# Patient Record
Sex: MALE | Race: WHITE | NOT HISPANIC OR LATINO | Employment: OTHER | URBAN - METROPOLITAN AREA
[De-identification: names, ages, dates, MRNs, and addresses within clinical notes are randomized per-mention and may not be internally consistent; named-entity substitution may affect disease eponyms.]

---

## 2017-11-28 ENCOUNTER — HOME CARE/HOSPICE - HEALTHEAST (OUTPATIENT)
Dept: HOME HEALTH SERVICES | Facility: HOME HEALTH | Age: 77
End: 2017-11-28

## 2017-11-30 ENCOUNTER — HOME CARE/HOSPICE - HEALTHEAST (OUTPATIENT)
Dept: HOME HEALTH SERVICES | Facility: HOME HEALTH | Age: 77
End: 2017-11-30

## 2017-12-04 ENCOUNTER — HOME CARE/HOSPICE - HEALTHEAST (OUTPATIENT)
Dept: HOME HEALTH SERVICES | Facility: HOME HEALTH | Age: 77
End: 2017-12-04

## 2017-12-06 ENCOUNTER — HOME CARE/HOSPICE - HEALTHEAST (OUTPATIENT)
Dept: HOME HEALTH SERVICES | Facility: HOME HEALTH | Age: 77
End: 2017-12-06

## 2017-12-08 ENCOUNTER — HOME CARE/HOSPICE - HEALTHEAST (OUTPATIENT)
Dept: HOME HEALTH SERVICES | Facility: HOME HEALTH | Age: 77
End: 2017-12-08

## 2017-12-11 ENCOUNTER — HOME CARE/HOSPICE - HEALTHEAST (OUTPATIENT)
Dept: HOME HEALTH SERVICES | Facility: HOME HEALTH | Age: 77
End: 2017-12-11

## 2017-12-13 ENCOUNTER — HOME CARE/HOSPICE - HEALTHEAST (OUTPATIENT)
Dept: HOME HEALTH SERVICES | Facility: HOME HEALTH | Age: 77
End: 2017-12-13

## 2017-12-15 ENCOUNTER — HOME CARE/HOSPICE - HEALTHEAST (OUTPATIENT)
Dept: HOME HEALTH SERVICES | Facility: HOME HEALTH | Age: 77
End: 2017-12-15

## 2017-12-19 ENCOUNTER — HOME CARE/HOSPICE - HEALTHEAST (OUTPATIENT)
Dept: HOME HEALTH SERVICES | Facility: HOME HEALTH | Age: 77
End: 2017-12-19

## 2017-12-21 ENCOUNTER — HOME CARE/HOSPICE - HEALTHEAST (OUTPATIENT)
Dept: HOME HEALTH SERVICES | Facility: HOME HEALTH | Age: 77
End: 2017-12-21

## 2017-12-27 ENCOUNTER — HOME CARE/HOSPICE - HEALTHEAST (OUTPATIENT)
Dept: HOME HEALTH SERVICES | Facility: HOME HEALTH | Age: 77
End: 2017-12-27

## 2018-06-12 ENCOUNTER — RECORDS - HEALTHEAST (OUTPATIENT)
Dept: ADMINISTRATIVE | Facility: OTHER | Age: 78
End: 2018-06-12

## 2018-06-12 ENCOUNTER — HOSPITAL ENCOUNTER (OUTPATIENT)
Dept: CT IMAGING | Facility: HOSPITAL | Age: 78
Discharge: HOME | End: 2018-06-12
Attending: PSYCHIATRY & NEUROLOGY

## 2018-06-12 DIAGNOSIS — G45.9 BRAIN TIA: ICD-10-CM

## 2018-06-12 DIAGNOSIS — G90.9 AUTONOMIC DYSFUNCTION: ICD-10-CM

## 2018-06-12 DIAGNOSIS — G20.A1 PARKINSON DISEASE (H): ICD-10-CM

## 2019-06-17 ENCOUNTER — AMBULATORY - HEALTHEAST (OUTPATIENT)
Dept: ADMINISTRATIVE | Facility: REHABILITATION | Age: 79
End: 2019-06-17

## 2019-06-17 DIAGNOSIS — G20.A1 PARKINSON DISEASE (H): ICD-10-CM

## 2019-06-17 DIAGNOSIS — R35.0 INCREASED URINARY FREQUENCY: ICD-10-CM

## 2019-06-17 DIAGNOSIS — G90.9 AUTONOMIC DYSFUNCTION: ICD-10-CM

## 2019-11-11 ENCOUNTER — SURGERY - HEALTHEAST (OUTPATIENT)
Dept: CARDIOLOGY | Facility: CLINIC | Age: 79
End: 2019-11-11

## 2019-11-11 ASSESSMENT — MIFFLIN-ST. JEOR: SCORE: 1599.41

## 2019-11-12 ENCOUNTER — HOME CARE/HOSPICE - HEALTHEAST (OUTPATIENT)
Dept: HOME HEALTH SERVICES | Facility: HOME HEALTH | Age: 79
End: 2019-11-12

## 2019-11-14 ENCOUNTER — HOME CARE/HOSPICE - HEALTHEAST (OUTPATIENT)
Dept: HOME HEALTH SERVICES | Facility: HOME HEALTH | Age: 79
End: 2019-11-14

## 2019-11-17 ENCOUNTER — HOME CARE/HOSPICE - HEALTHEAST (OUTPATIENT)
Dept: HOME HEALTH SERVICES | Facility: HOME HEALTH | Age: 79
End: 2019-11-17

## 2019-11-18 ENCOUNTER — HOME CARE/HOSPICE - HEALTHEAST (OUTPATIENT)
Dept: HOME HEALTH SERVICES | Facility: HOME HEALTH | Age: 79
End: 2019-11-18

## 2019-11-18 ENCOUNTER — RECORDS - HEALTHEAST (OUTPATIENT)
Dept: ADMINISTRATIVE | Facility: OTHER | Age: 79
End: 2019-11-18

## 2019-11-18 ENCOUNTER — COMMUNICATION - HEALTHEAST (OUTPATIENT)
Dept: HOME HEALTH SERVICES | Facility: HOME HEALTH | Age: 79
End: 2019-11-18

## 2019-11-19 ENCOUNTER — HOME CARE/HOSPICE - HEALTHEAST (OUTPATIENT)
Dept: HOME HEALTH SERVICES | Facility: HOME HEALTH | Age: 79
End: 2019-11-19

## 2019-11-20 ENCOUNTER — HOME CARE/HOSPICE - HEALTHEAST (OUTPATIENT)
Dept: HOME HEALTH SERVICES | Facility: HOME HEALTH | Age: 79
End: 2019-11-20

## 2019-11-21 ENCOUNTER — HOME CARE/HOSPICE - HEALTHEAST (OUTPATIENT)
Dept: HOME HEALTH SERVICES | Facility: HOME HEALTH | Age: 79
End: 2019-11-21

## 2019-11-22 ENCOUNTER — HOME CARE/HOSPICE - HEALTHEAST (OUTPATIENT)
Dept: HOME HEALTH SERVICES | Facility: HOME HEALTH | Age: 79
End: 2019-11-22

## 2019-11-25 ENCOUNTER — HOME CARE/HOSPICE - HEALTHEAST (OUTPATIENT)
Dept: HOME HEALTH SERVICES | Facility: HOME HEALTH | Age: 79
End: 2019-11-25

## 2019-11-26 ENCOUNTER — HOME CARE/HOSPICE - HEALTHEAST (OUTPATIENT)
Dept: HOME HEALTH SERVICES | Facility: HOME HEALTH | Age: 79
End: 2019-11-26

## 2019-11-27 ENCOUNTER — OFFICE VISIT - HEALTHEAST (OUTPATIENT)
Dept: CARDIOLOGY | Facility: CLINIC | Age: 79
End: 2019-11-27

## 2019-11-27 ENCOUNTER — HOME CARE/HOSPICE - HEALTHEAST (OUTPATIENT)
Dept: HOME HEALTH SERVICES | Facility: HOME HEALTH | Age: 79
End: 2019-11-27

## 2019-11-27 ENCOUNTER — RECORDS - HEALTHEAST (OUTPATIENT)
Dept: ADMINISTRATIVE | Facility: OTHER | Age: 79
End: 2019-11-27

## 2019-11-27 DIAGNOSIS — R55 SYNCOPE, UNSPECIFIED SYNCOPE TYPE: ICD-10-CM

## 2019-11-27 DIAGNOSIS — I21.4 ACUTE NON-Q WAVE NON-ST ELEVATION MYOCARDIAL INFARCTION (NSTEMI) (H): ICD-10-CM

## 2019-11-27 DIAGNOSIS — E83.42 HYPOMAGNESEMIA: ICD-10-CM

## 2019-11-27 DIAGNOSIS — I21.4 NSTEMI (NON-ST ELEVATED MYOCARDIAL INFARCTION) (H): ICD-10-CM

## 2019-11-27 DIAGNOSIS — R55 NEAR SYNCOPE: ICD-10-CM

## 2019-11-27 DIAGNOSIS — I95.1 ORTHOSTATIC HYPOTENSION: ICD-10-CM

## 2019-11-27 DIAGNOSIS — I10 ESSENTIAL HYPERTENSION: ICD-10-CM

## 2019-11-27 ASSESSMENT — MIFFLIN-ST. JEOR: SCORE: 1603.36

## 2019-11-29 ENCOUNTER — HOME CARE/HOSPICE - HEALTHEAST (OUTPATIENT)
Dept: HOME HEALTH SERVICES | Facility: HOME HEALTH | Age: 79
End: 2019-11-29

## 2019-12-02 ENCOUNTER — HOME CARE/HOSPICE - HEALTHEAST (OUTPATIENT)
Dept: HOME HEALTH SERVICES | Facility: HOME HEALTH | Age: 79
End: 2019-12-02

## 2019-12-03 ENCOUNTER — HOME CARE/HOSPICE - HEALTHEAST (OUTPATIENT)
Dept: HOME HEALTH SERVICES | Facility: HOME HEALTH | Age: 79
End: 2019-12-03

## 2019-12-03 ENCOUNTER — COMMUNICATION - HEALTHEAST (OUTPATIENT)
Dept: CARDIOLOGY | Facility: CLINIC | Age: 79
End: 2019-12-03

## 2019-12-05 ENCOUNTER — HOME CARE/HOSPICE - HEALTHEAST (OUTPATIENT)
Dept: HOME HEALTH SERVICES | Facility: HOME HEALTH | Age: 79
End: 2019-12-05

## 2019-12-06 ENCOUNTER — HOME CARE/HOSPICE - HEALTHEAST (OUTPATIENT)
Dept: HOME HEALTH SERVICES | Facility: HOME HEALTH | Age: 79
End: 2019-12-06

## 2019-12-08 ENCOUNTER — HOME CARE/HOSPICE - HEALTHEAST (OUTPATIENT)
Dept: HOME HEALTH SERVICES | Facility: HOME HEALTH | Age: 79
End: 2019-12-08

## 2019-12-11 ENCOUNTER — OFFICE VISIT - HEALTHEAST (OUTPATIENT)
Dept: GERIATRICS | Facility: CLINIC | Age: 79
End: 2019-12-11

## 2019-12-11 DIAGNOSIS — Z95.0 CARDIAC PACEMAKER IN SITU: ICD-10-CM

## 2019-12-11 DIAGNOSIS — I10 ESSENTIAL HYPERTENSION: ICD-10-CM
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
## 2019-12-11 DIAGNOSIS — G20.A1 PARKINSON DISEASE (H): ICD-10-CM

## 2019-12-11 DIAGNOSIS — G90.9 AUTONOMIC DYSFUNCTION: ICD-10-CM
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
## 2019-12-12 ENCOUNTER — OFFICE VISIT - HEALTHEAST (OUTPATIENT)
Dept: GERIATRICS | Facility: CLINIC | Age: 79
End: 2019-12-12

## 2019-12-12 ENCOUNTER — RECORDS - HEALTHEAST (OUTPATIENT)
Dept: LAB | Facility: CLINIC | Age: 79
End: 2019-12-12

## 2019-12-12 DIAGNOSIS — G90.9 AUTONOMIC DYSFUNCTION: ICD-10-CM

## 2019-12-12 DIAGNOSIS — I25.83 CORONARY ARTERY DISEASE DUE TO LIPID RICH PLAQUE: ICD-10-CM

## 2019-12-12 DIAGNOSIS — G20.A1 PARKINSON DISEASE (H): ICD-10-CM

## 2019-12-12 DIAGNOSIS — I25.10 CORONARY ARTERY DISEASE DUE TO LIPID RICH PLAQUE: ICD-10-CM

## 2019-12-16 ENCOUNTER — OFFICE VISIT - HEALTHEAST (OUTPATIENT)
Dept: GERIATRICS | Facility: CLINIC | Age: 79
End: 2019-12-16

## 2019-12-16 DIAGNOSIS — Z95.0 CARDIAC PACEMAKER IN SITU: ICD-10-CM

## 2019-12-16 DIAGNOSIS — G90.9 AUTONOMIC DYSFUNCTION: ICD-10-CM

## 2019-12-16 DIAGNOSIS — G20.A1 PARKINSON DISEASE (H): ICD-10-CM

## 2019-12-16 LAB
GAMMA INTERFERON BACKGROUND BLD IA-ACNC: 0.02 IU/ML
M TB IFN-G BLD-IMP: NEGATIVE
MITOGEN IGNF BCKGRD COR BLD-ACNC: 0 IU/ML
MITOGEN IGNF BCKGRD COR BLD-ACNC: 0 IU/ML
QTF INTERPRETATION: NORMAL
QTF MITOGEN - NIL: >10 IU/ML

## 2019-12-17 ENCOUNTER — OFFICE VISIT - HEALTHEAST (OUTPATIENT)
Dept: GERIATRICS | Facility: CLINIC | Age: 79
End: 2019-12-17

## 2019-12-17 DIAGNOSIS — G20.A1 PARKINSON DISEASE (H): ICD-10-CM

## 2019-12-17 DIAGNOSIS — I25.83 CORONARY ARTERY DISEASE DUE TO LIPID RICH PLAQUE: ICD-10-CM

## 2019-12-17 DIAGNOSIS — G90.9 AUTONOMIC DYSFUNCTION: ICD-10-CM

## 2019-12-17 DIAGNOSIS — I25.10 CORONARY ARTERY DISEASE DUE TO LIPID RICH PLAQUE: ICD-10-CM

## 2019-12-18 ENCOUNTER — OFFICE VISIT - HEALTHEAST (OUTPATIENT)
Dept: GERIATRICS | Facility: CLINIC | Age: 79
End: 2019-12-18

## 2019-12-18 DIAGNOSIS — G47.09 OTHER INSOMNIA: ICD-10-CM

## 2019-12-18 DIAGNOSIS — G20.A1 PARKINSON DISEASE (H): ICD-10-CM

## 2019-12-18 DIAGNOSIS — G90.9 AUTONOMIC DYSFUNCTION: ICD-10-CM

## 2019-12-19 ENCOUNTER — OFFICE VISIT - HEALTHEAST (OUTPATIENT)
Dept: GERIATRICS | Facility: CLINIC | Age: 79
End: 2019-12-19

## 2019-12-19 DIAGNOSIS — I25.83 CORONARY ARTERY DISEASE DUE TO LIPID RICH PLAQUE: ICD-10-CM

## 2019-12-19 DIAGNOSIS — G90.9 AUTONOMIC DYSFUNCTION: ICD-10-CM

## 2019-12-19 DIAGNOSIS — I25.10 CORONARY ARTERY DISEASE DUE TO LIPID RICH PLAQUE: ICD-10-CM

## 2019-12-19 DIAGNOSIS — G20.A1 PARKINSON DISEASE (H): ICD-10-CM

## 2019-12-20 ENCOUNTER — OFFICE VISIT - HEALTHEAST (OUTPATIENT)
Dept: GERIATRICS | Facility: CLINIC | Age: 79
End: 2019-12-20

## 2019-12-20 DIAGNOSIS — G90.9 AUTONOMIC DYSFUNCTION: ICD-10-CM
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
## 2019-12-20 DIAGNOSIS — G47.09 OTHER INSOMNIA: ICD-10-CM
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
## 2019-12-24 ENCOUNTER — OFFICE VISIT - HEALTHEAST (OUTPATIENT)
Dept: GERIATRICS | Facility: CLINIC | Age: 79
End: 2019-12-24

## 2019-12-24 DIAGNOSIS — G90.9 AUTONOMIC DYSFUNCTION: ICD-10-CM

## 2019-12-24 DIAGNOSIS — I25.83 CORONARY ARTERY DISEASE DUE TO LIPID RICH PLAQUE: ICD-10-CM

## 2019-12-24 DIAGNOSIS — G20.A1 PARKINSON DISEASE (H): ICD-10-CM

## 2019-12-24 DIAGNOSIS — I25.10 CORONARY ARTERY DISEASE DUE TO LIPID RICH PLAQUE: ICD-10-CM

## 2019-12-26 ENCOUNTER — OFFICE VISIT - HEALTHEAST (OUTPATIENT)
Dept: GERIATRICS | Facility: CLINIC | Age: 79
End: 2019-12-26

## 2019-12-26 DIAGNOSIS — I25.83 CORONARY ARTERY DISEASE DUE TO LIPID RICH PLAQUE: ICD-10-CM

## 2019-12-26 DIAGNOSIS — I25.10 CORONARY ARTERY DISEASE DUE TO LIPID RICH PLAQUE: ICD-10-CM

## 2019-12-26 DIAGNOSIS — G90.9 AUTONOMIC DYSFUNCTION: ICD-10-CM

## 2019-12-26 DIAGNOSIS — G20.A1 PARKINSON DISEASE (H): ICD-10-CM

## 2019-12-31 ENCOUNTER — OFFICE VISIT - HEALTHEAST (OUTPATIENT)
Dept: GERIATRICS | Facility: CLINIC | Age: 79
End: 2019-12-31

## 2019-12-31 DIAGNOSIS — G90.9 AUTONOMIC DYSFUNCTION: ICD-10-CM

## 2019-12-31 DIAGNOSIS — I25.10 CORONARY ARTERY DISEASE DUE TO LIPID RICH PLAQUE: ICD-10-CM

## 2019-12-31 DIAGNOSIS — I25.83 CORONARY ARTERY DISEASE DUE TO LIPID RICH PLAQUE: ICD-10-CM

## 2019-12-31 DIAGNOSIS — G20.A1 PARKINSON DISEASE (H): ICD-10-CM

## 2020-01-02 ENCOUNTER — OFFICE VISIT - HEALTHEAST (OUTPATIENT)
Dept: GERIATRICS | Facility: CLINIC | Age: 80
End: 2020-01-02

## 2020-01-02 DIAGNOSIS — G90.9 AUTONOMIC DYSFUNCTION: ICD-10-CM

## 2020-01-02 DIAGNOSIS — I25.83 CORONARY ARTERY DISEASE DUE TO LIPID RICH PLAQUE: ICD-10-CM

## 2020-01-02 DIAGNOSIS — I25.10 CORONARY ARTERY DISEASE DUE TO LIPID RICH PLAQUE: ICD-10-CM

## 2020-01-02 DIAGNOSIS — G20.A1 PARKINSON DISEASE (H): ICD-10-CM

## 2020-01-06 ENCOUNTER — OFFICE VISIT - HEALTHEAST (OUTPATIENT)
Dept: GERIATRICS | Facility: CLINIC | Age: 80
End: 2020-01-06

## 2020-01-06 DIAGNOSIS — G90.9 AUTONOMIC DYSFUNCTION: ICD-10-CM

## 2020-01-06 DIAGNOSIS — F32.9 REACTIVE DEPRESSION: ICD-10-CM

## 2020-01-06 DIAGNOSIS — G20.A1 PARKINSON DISEASE (H): ICD-10-CM

## 2020-01-07 ENCOUNTER — OFFICE VISIT - HEALTHEAST (OUTPATIENT)
Dept: GERIATRICS | Facility: CLINIC | Age: 80
End: 2020-01-07

## 2020-01-07 DIAGNOSIS — I25.10 CORONARY ARTERY DISEASE DUE TO LIPID RICH PLAQUE: ICD-10-CM

## 2020-01-07 DIAGNOSIS — F32.9 REACTIVE DEPRESSION: ICD-10-CM

## 2020-01-07 DIAGNOSIS — G20.A1 PARKINSON DISEASE (H): ICD-10-CM

## 2020-01-07 DIAGNOSIS — G90.9 AUTONOMIC DYSFUNCTION: ICD-10-CM

## 2020-01-07 DIAGNOSIS — I25.83 CORONARY ARTERY DISEASE DUE TO LIPID RICH PLAQUE: ICD-10-CM

## 2020-01-09 ENCOUNTER — RECORDS - HEALTHEAST (OUTPATIENT)
Dept: ADMINISTRATIVE | Facility: OTHER | Age: 80
End: 2020-01-09

## 2020-01-09 ENCOUNTER — OFFICE VISIT - HEALTHEAST (OUTPATIENT)
Dept: GERIATRICS | Facility: CLINIC | Age: 80
End: 2020-01-09

## 2020-01-09 DIAGNOSIS — F32.9 REACTIVE DEPRESSION: ICD-10-CM

## 2020-01-09 DIAGNOSIS — I25.10 CORONARY ARTERY DISEASE DUE TO LIPID RICH PLAQUE: ICD-10-CM

## 2020-01-09 DIAGNOSIS — I25.83 CORONARY ARTERY DISEASE DUE TO LIPID RICH PLAQUE: ICD-10-CM

## 2020-01-09 DIAGNOSIS — G20.A1 PARKINSON DISEASE (H): ICD-10-CM

## 2020-01-14 ENCOUNTER — OFFICE VISIT - HEALTHEAST (OUTPATIENT)
Dept: GERIATRICS | Facility: CLINIC | Age: 80
End: 2020-01-14

## 2020-01-14 DIAGNOSIS — G20.A1 PARKINSON DISEASE (H): ICD-10-CM

## 2020-01-14 DIAGNOSIS — F32.9 REACTIVE DEPRESSION: ICD-10-CM

## 2020-01-14 DIAGNOSIS — I25.83 CORONARY ARTERY DISEASE DUE TO LIPID RICH PLAQUE: ICD-10-CM

## 2020-01-14 DIAGNOSIS — I25.10 CORONARY ARTERY DISEASE DUE TO LIPID RICH PLAQUE: ICD-10-CM

## 2020-01-16 ENCOUNTER — OFFICE VISIT - HEALTHEAST (OUTPATIENT)
Dept: GERIATRICS | Facility: CLINIC | Age: 80
End: 2020-01-16

## 2020-01-16 DIAGNOSIS — I25.10 CORONARY ARTERY DISEASE DUE TO LIPID RICH PLAQUE: ICD-10-CM

## 2020-01-16 DIAGNOSIS — G90.9 AUTONOMIC DYSFUNCTION: ICD-10-CM

## 2020-01-16 DIAGNOSIS — I25.83 CORONARY ARTERY DISEASE DUE TO LIPID RICH PLAQUE: ICD-10-CM

## 2020-01-16 DIAGNOSIS — G20.A1 PARKINSON DISEASE (H): ICD-10-CM

## 2020-01-17 ENCOUNTER — OFFICE VISIT - HEALTHEAST (OUTPATIENT)
Dept: GERIATRICS | Facility: CLINIC | Age: 80
End: 2020-01-17

## 2020-01-17 DIAGNOSIS — G90.9 AUTONOMIC DYSFUNCTION: ICD-10-CM

## 2020-01-21 ENCOUNTER — OFFICE VISIT - HEALTHEAST (OUTPATIENT)
Dept: GERIATRICS | Facility: CLINIC | Age: 80
End: 2020-01-21

## 2020-01-21 DIAGNOSIS — I10 ESSENTIAL HYPERTENSION: ICD-10-CM

## 2020-01-21 DIAGNOSIS — G90.9 AUTONOMIC DYSFUNCTION: ICD-10-CM

## 2020-01-21 DIAGNOSIS — G20.A1 PARKINSON DISEASE (H): ICD-10-CM

## 2020-01-23 ENCOUNTER — OFFICE VISIT - HEALTHEAST (OUTPATIENT)
Dept: GERIATRICS | Facility: CLINIC | Age: 80
End: 2020-01-23

## 2020-01-23 DIAGNOSIS — G20.A1 PARKINSON DISEASE (H): ICD-10-CM

## 2020-01-23 DIAGNOSIS — I10 ESSENTIAL HYPERTENSION: ICD-10-CM

## 2020-01-23 DIAGNOSIS — G90.9 AUTONOMIC DYSFUNCTION: ICD-10-CM

## 2020-01-27 ENCOUNTER — OFFICE VISIT - HEALTHEAST (OUTPATIENT)
Dept: GERIATRICS | Facility: CLINIC | Age: 80
End: 2020-01-27

## 2020-01-27 DIAGNOSIS — R55 SYNCOPE AND COLLAPSE: ICD-10-CM

## 2020-01-27 DIAGNOSIS — G90.9 AUTONOMIC DYSFUNCTION: ICD-10-CM
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
## 2020-01-28 ENCOUNTER — OFFICE VISIT - HEALTHEAST (OUTPATIENT)
Dept: GERIATRICS | Facility: CLINIC | Age: 80
End: 2020-01-28

## 2020-01-28 DIAGNOSIS — R55 SYNCOPE AND COLLAPSE: ICD-10-CM
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
## 2020-01-28 DIAGNOSIS — G20.A1 PARKINSON DISEASE (H): ICD-10-CM

## 2020-01-28 DIAGNOSIS — I10 ESSENTIAL HYPERTENSION: ICD-10-CM

## 2020-01-29 ENCOUNTER — OFFICE VISIT - HEALTHEAST (OUTPATIENT)
Dept: GERIATRICS | Facility: CLINIC | Age: 80
End: 2020-01-29
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
## 2020-01-30 ENCOUNTER — OFFICE VISIT - HEALTHEAST (OUTPATIENT)
Dept: GERIATRICS | Facility: CLINIC | Age: 80
End: 2020-01-30

## 2020-01-30 DIAGNOSIS — I10 ESSENTIAL HYPERTENSION: ICD-10-CM

## 2020-01-30 DIAGNOSIS — G20.A1 PARKINSON DISEASE (H): ICD-10-CM

## 2020-01-30 DIAGNOSIS — I25.83 CORONARY ARTERY DISEASE DUE TO LIPID RICH PLAQUE: ICD-10-CM

## 2020-01-30 DIAGNOSIS — I25.10 CORONARY ARTERY DISEASE DUE TO LIPID RICH PLAQUE: ICD-10-CM

## 2020-02-03 ENCOUNTER — OFFICE VISIT - HEALTHEAST (OUTPATIENT)
Dept: GERIATRICS | Facility: CLINIC | Age: 80
End: 2020-02-03
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
## 2020-02-03 DIAGNOSIS — G20.A1 PARKINSON DISEASE (H): ICD-10-CM

## 2020-02-03 DIAGNOSIS — G90.9 AUTONOMIC DYSFUNCTION: ICD-10-CM
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
## 2020-02-04 ENCOUNTER — OFFICE VISIT - HEALTHEAST (OUTPATIENT)
Dept: GERIATRICS | Facility: CLINIC | Age: 80
End: 2020-02-04

## 2020-02-04 DIAGNOSIS — G20.A1 PARKINSON DISEASE (H): ICD-10-CM

## 2020-02-04 DIAGNOSIS — I25.10 CORONARY ARTERY DISEASE DUE TO LIPID RICH PLAQUE: ICD-10-CM

## 2020-02-04 DIAGNOSIS — I10 ESSENTIAL HYPERTENSION: ICD-10-CM

## 2020-02-04 DIAGNOSIS — I25.83 CORONARY ARTERY DISEASE DUE TO LIPID RICH PLAQUE: ICD-10-CM

## 2020-02-05 ENCOUNTER — OFFICE VISIT - HEALTHEAST (OUTPATIENT)
Dept: GERIATRICS | Facility: CLINIC | Age: 80
End: 2020-02-05

## 2020-02-05 DIAGNOSIS — G90.9 AUTONOMIC DYSFUNCTION: ICD-10-CM

## 2020-02-05 DIAGNOSIS — G20.A1 PARKINSON DISEASE (H): ICD-10-CM

## 2020-02-05 DIAGNOSIS — R07.81 RIB PAIN: ICD-10-CM

## 2020-02-06 ENCOUNTER — OFFICE VISIT - HEALTHEAST (OUTPATIENT)
Dept: GERIATRICS | Facility: CLINIC | Age: 80
End: 2020-02-06

## 2020-02-06 DIAGNOSIS — I10 ESSENTIAL HYPERTENSION: ICD-10-CM

## 2020-02-06 DIAGNOSIS — R07.81 RIB PAIN: ICD-10-CM

## 2020-02-06 DIAGNOSIS — G20.A1 PARKINSON DISEASE (H): ICD-10-CM

## 2020-02-10 ENCOUNTER — HOME CARE/HOSPICE - HEALTHEAST (OUTPATIENT)
Dept: HOME HEALTH SERVICES | Facility: HOME HEALTH | Age: 80
End: 2020-02-10

## 2020-02-11 ENCOUNTER — OFFICE VISIT - HEALTHEAST (OUTPATIENT)
Dept: GERIATRICS | Facility: CLINIC | Age: 80
End: 2020-02-11

## 2020-02-11 DIAGNOSIS — G90.9 AUTONOMIC DYSFUNCTION: ICD-10-CM

## 2020-02-11 DIAGNOSIS — I21.4 NSTEMI (NON-ST ELEVATED MYOCARDIAL INFARCTION) (H): ICD-10-CM

## 2020-02-11 DIAGNOSIS — G20.A1 PARKINSON DISEASE (H): ICD-10-CM

## 2020-02-11 DIAGNOSIS — I10 ESSENTIAL HYPERTENSION: ICD-10-CM

## 2020-02-12 ENCOUNTER — AMBULATORY - HEALTHEAST (OUTPATIENT)
Dept: GERIATRICS | Facility: CLINIC | Age: 80
End: 2020-02-12

## 2020-02-15 ENCOUNTER — HOME CARE/HOSPICE - HEALTHEAST (OUTPATIENT)
Dept: HOME HEALTH SERVICES | Facility: HOME HEALTH | Age: 80
End: 2020-02-15

## 2020-02-16 ENCOUNTER — HOME CARE/HOSPICE - HEALTHEAST (OUTPATIENT)
Dept: HOME HEALTH SERVICES | Facility: HOME HEALTH | Age: 80
End: 2020-02-16

## 2020-02-18 ENCOUNTER — HOME CARE/HOSPICE - HEALTHEAST (OUTPATIENT)
Dept: HOME HEALTH SERVICES | Facility: HOME HEALTH | Age: 80
End: 2020-02-18

## 2020-02-19 ENCOUNTER — HOME CARE/HOSPICE - HEALTHEAST (OUTPATIENT)
Dept: HOME HEALTH SERVICES | Facility: HOME HEALTH | Age: 80
End: 2020-02-19

## 2020-02-20 ENCOUNTER — HOME CARE/HOSPICE - HEALTHEAST (OUTPATIENT)
Dept: HOME HEALTH SERVICES | Facility: HOME HEALTH | Age: 80
End: 2020-02-20

## 2020-02-26 ENCOUNTER — HOME CARE/HOSPICE - HEALTHEAST (OUTPATIENT)
Dept: HOME HEALTH SERVICES | Facility: HOME HEALTH | Age: 80
End: 2020-02-26

## 2020-02-27 ENCOUNTER — HOME CARE/HOSPICE - HEALTHEAST (OUTPATIENT)
Dept: HOME HEALTH SERVICES | Facility: HOME HEALTH | Age: 80
End: 2020-02-27

## 2020-02-28 ENCOUNTER — HOME CARE/HOSPICE - HEALTHEAST (OUTPATIENT)
Dept: HOME HEALTH SERVICES | Facility: HOME HEALTH | Age: 80
End: 2020-02-28

## 2020-03-03 ENCOUNTER — HOME CARE/HOSPICE - HEALTHEAST (OUTPATIENT)
Dept: HOME HEALTH SERVICES | Facility: HOME HEALTH | Age: 80
End: 2020-03-03

## 2020-03-03 ENCOUNTER — COMMUNICATION - HEALTHEAST (OUTPATIENT)
Dept: HOME HEALTH SERVICES | Facility: HOME HEALTH | Age: 80
End: 2020-03-03

## 2020-03-05 ENCOUNTER — HOME CARE/HOSPICE - HEALTHEAST (OUTPATIENT)
Dept: HOME HEALTH SERVICES | Facility: HOME HEALTH | Age: 80
End: 2020-03-05

## 2020-03-11 ENCOUNTER — HOME CARE/HOSPICE - HEALTHEAST (OUTPATIENT)
Dept: HOME HEALTH SERVICES | Facility: HOME HEALTH | Age: 80
End: 2020-03-11

## 2020-03-13 ENCOUNTER — HOME CARE/HOSPICE - HEALTHEAST (OUTPATIENT)
Dept: HOME HEALTH SERVICES | Facility: HOME HEALTH | Age: 80
End: 2020-03-13

## 2020-03-17 ENCOUNTER — HOME CARE/HOSPICE - HEALTHEAST (OUTPATIENT)
Dept: HOME HEALTH SERVICES | Facility: HOME HEALTH | Age: 80
End: 2020-03-17

## 2020-03-20 ENCOUNTER — HOME CARE/HOSPICE - HEALTHEAST (OUTPATIENT)
Dept: HOME HEALTH SERVICES | Facility: HOME HEALTH | Age: 80
End: 2020-03-20

## 2020-05-01 ENCOUNTER — AMBULATORY - HEALTHEAST (OUTPATIENT)
Dept: CARDIOLOGY | Facility: CLINIC | Age: 80
End: 2020-05-01

## 2020-05-04 ENCOUNTER — OFFICE VISIT - HEALTHEAST (OUTPATIENT)
Dept: CARDIOLOGY | Facility: CLINIC | Age: 80
End: 2020-05-04

## 2020-05-04 DIAGNOSIS — E78.5 DYSLIPIDEMIA: ICD-10-CM

## 2020-05-04 DIAGNOSIS — I25.10 CORONARY ARTERY DISEASE INVOLVING NATIVE CORONARY ARTERY OF NATIVE HEART WITHOUT ANGINA PECTORIS: ICD-10-CM

## 2020-05-04 DIAGNOSIS — I95.1 ORTHOSTATIC HYPOTENSION: ICD-10-CM

## 2020-05-04 DIAGNOSIS — G20.A1 PARKINSON DISEASE (H): ICD-10-CM

## 2020-05-04 DIAGNOSIS — R55 SYNCOPE AND COLLAPSE: ICD-10-CM

## 2020-05-04 DIAGNOSIS — I10 ESSENTIAL HYPERTENSION: ICD-10-CM

## 2020-05-11 ENCOUNTER — AMBULATORY - HEALTHEAST (OUTPATIENT)
Dept: CARDIOLOGY | Facility: CLINIC | Age: 80
End: 2020-05-11

## 2020-05-11 DIAGNOSIS — Z95.0 CARDIAC PACEMAKER IN SITU: ICD-10-CM

## 2020-05-11 DIAGNOSIS — R55 SYNCOPE AND COLLAPSE: ICD-10-CM

## 2020-05-11 LAB
HCC DEVICE COMMENTS: NORMAL
HCC DEVICE IMPLANTING PROVIDER: NORMAL
HCC DEVICE MANUFACTURE: NORMAL
HCC DEVICE MODEL: NORMAL
HCC DEVICE SERIAL NUMBER: NORMAL
HCC DEVICE TYPE: NORMAL

## 2020-06-04 ENCOUNTER — AMBULATORY - HEALTHEAST (OUTPATIENT)
Dept: CARDIOLOGY | Facility: CLINIC | Age: 80
End: 2020-06-04

## 2020-06-04 ENCOUNTER — COMMUNICATION - HEALTHEAST (OUTPATIENT)
Dept: CARDIOLOGY | Facility: CLINIC | Age: 80
End: 2020-06-04

## 2020-06-04 DIAGNOSIS — Z95.0 CARDIAC PACEMAKER IN SITU: ICD-10-CM

## 2020-06-24 ENCOUNTER — HOME CARE/HOSPICE - HEALTHEAST (OUTPATIENT)
Dept: HOME HEALTH SERVICES | Facility: HOME HEALTH | Age: 80
End: 2020-06-24

## 2020-06-28 ENCOUNTER — HOME CARE/HOSPICE - HEALTHEAST (OUTPATIENT)
Dept: HOME HEALTH SERVICES | Facility: HOME HEALTH | Age: 80
End: 2020-06-28

## 2020-06-29 ENCOUNTER — HOME CARE/HOSPICE - HEALTHEAST (OUTPATIENT)
Dept: HOME HEALTH SERVICES | Facility: HOME HEALTH | Age: 80
End: 2020-06-29

## 2020-07-01 ENCOUNTER — HOME CARE/HOSPICE - HEALTHEAST (OUTPATIENT)
Dept: HOME HEALTH SERVICES | Facility: HOME HEALTH | Age: 80
End: 2020-07-01

## 2020-07-02 ENCOUNTER — HOME CARE/HOSPICE - HEALTHEAST (OUTPATIENT)
Dept: HOME HEALTH SERVICES | Facility: HOME HEALTH | Age: 80
End: 2020-07-02

## 2020-07-03 ENCOUNTER — HOME CARE/HOSPICE - HEALTHEAST (OUTPATIENT)
Dept: HOME HEALTH SERVICES | Facility: HOME HEALTH | Age: 80
End: 2020-07-03

## 2020-07-06 ENCOUNTER — HOME CARE/HOSPICE - HEALTHEAST (OUTPATIENT)
Dept: HOME HEALTH SERVICES | Facility: HOME HEALTH | Age: 80
End: 2020-07-06

## 2020-07-07 ENCOUNTER — HOME CARE/HOSPICE - HEALTHEAST (OUTPATIENT)
Dept: HOME HEALTH SERVICES | Facility: HOME HEALTH | Age: 80
End: 2020-07-07

## 2020-07-08 ENCOUNTER — HOME CARE/HOSPICE - HEALTHEAST (OUTPATIENT)
Dept: HOME HEALTH SERVICES | Facility: HOME HEALTH | Age: 80
End: 2020-07-08

## 2020-07-09 ENCOUNTER — HOME CARE/HOSPICE - HEALTHEAST (OUTPATIENT)
Dept: HOME HEALTH SERVICES | Facility: HOME HEALTH | Age: 80
End: 2020-07-09

## 2020-07-13 ENCOUNTER — HOME CARE/HOSPICE - HEALTHEAST (OUTPATIENT)
Dept: HOME HEALTH SERVICES | Facility: HOME HEALTH | Age: 80
End: 2020-07-13

## 2020-07-14 ENCOUNTER — HOME CARE/HOSPICE - HEALTHEAST (OUTPATIENT)
Dept: HOME HEALTH SERVICES | Facility: HOME HEALTH | Age: 80
End: 2020-07-14

## 2020-07-15 ENCOUNTER — HOME CARE/HOSPICE - HEALTHEAST (OUTPATIENT)
Dept: HOME HEALTH SERVICES | Facility: HOME HEALTH | Age: 80
End: 2020-07-15

## 2020-07-16 ENCOUNTER — HOME CARE/HOSPICE - HEALTHEAST (OUTPATIENT)
Dept: HOME HEALTH SERVICES | Facility: HOME HEALTH | Age: 80
End: 2020-07-16

## 2020-07-21 ENCOUNTER — HOME CARE/HOSPICE - HEALTHEAST (OUTPATIENT)
Dept: HOME HEALTH SERVICES | Facility: HOME HEALTH | Age: 80
End: 2020-07-21

## 2020-07-22 ENCOUNTER — HOME CARE/HOSPICE - HEALTHEAST (OUTPATIENT)
Dept: HOME HEALTH SERVICES | Facility: HOME HEALTH | Age: 80
End: 2020-07-22

## 2020-07-23 ENCOUNTER — HOME CARE/HOSPICE - HEALTHEAST (OUTPATIENT)
Dept: HOME HEALTH SERVICES | Facility: HOME HEALTH | Age: 80
End: 2020-07-23

## 2020-07-28 ENCOUNTER — HOME CARE/HOSPICE - HEALTHEAST (OUTPATIENT)
Dept: HOME HEALTH SERVICES | Facility: HOME HEALTH | Age: 80
End: 2020-07-28

## 2020-07-31 ENCOUNTER — HOME CARE/HOSPICE - HEALTHEAST (OUTPATIENT)
Dept: HOME HEALTH SERVICES | Facility: HOME HEALTH | Age: 80
End: 2020-07-31

## 2020-09-25 ENCOUNTER — AMBULATORY - HEALTHEAST (OUTPATIENT)
Dept: CARDIOLOGY | Facility: CLINIC | Age: 80
End: 2020-09-25

## 2020-09-25 DIAGNOSIS — Z95.0 CARDIAC PACEMAKER IN SITU: ICD-10-CM

## 2020-09-25 DIAGNOSIS — R55 SYNCOPE, UNSPECIFIED SYNCOPE TYPE: ICD-10-CM

## 2020-10-02 ENCOUNTER — COMMUNICATION - HEALTHEAST (OUTPATIENT)
Dept: CARDIOLOGY | Facility: CLINIC | Age: 80
End: 2020-10-02

## 2020-10-05 ENCOUNTER — AMBULATORY - HEALTHEAST (OUTPATIENT)
Dept: CARDIOLOGY | Facility: CLINIC | Age: 80
End: 2020-10-05

## 2020-10-05 ENCOUNTER — OFFICE VISIT - HEALTHEAST (OUTPATIENT)
Dept: CARDIOLOGY | Facility: CLINIC | Age: 80
End: 2020-10-05

## 2020-10-05 DIAGNOSIS — Z95.0 CARDIAC PACEMAKER IN SITU: ICD-10-CM

## 2020-10-05 DIAGNOSIS — R55 SYNCOPE, UNSPECIFIED SYNCOPE TYPE: ICD-10-CM

## 2020-10-05 DIAGNOSIS — I25.10 CORONARY ARTERY DISEASE DUE TO LIPID RICH PLAQUE: ICD-10-CM

## 2020-10-05 DIAGNOSIS — G20.A1 PARKINSON DISEASE (H): ICD-10-CM

## 2020-10-05 DIAGNOSIS — I95.1 ORTHOSTATIC HYPOTENSION: ICD-10-CM

## 2020-10-05 DIAGNOSIS — G90.9 AUTONOMIC DYSFUNCTION: ICD-10-CM

## 2020-10-05 DIAGNOSIS — I10 ESSENTIAL HYPERTENSION: ICD-10-CM

## 2020-10-05 DIAGNOSIS — E78.5 DYSLIPIDEMIA: ICD-10-CM

## 2020-10-05 DIAGNOSIS — I25.83 CORONARY ARTERY DISEASE DUE TO LIPID RICH PLAQUE: ICD-10-CM

## 2020-10-05 ASSESSMENT — MIFFLIN-ST. JEOR: SCORE: 1580.35

## 2020-10-20 DIAGNOSIS — I95.1 ORTHOSTATIC HYPOTENSION DYSAUTONOMIC SYNDROME: Primary | ICD-10-CM

## 2020-10-20 DIAGNOSIS — G20.A1 PARKINSON DISEASE (H): ICD-10-CM

## 2020-10-20 NOTE — LETTER
10/20/2020        RE: Canelo Cook  [address] [facility] [facility] [address]        We recently provided you with medication refills.  Many medications require routine follow-up with your doctor.    Your prescription(s) have been refilled for 30 days so you may have time for the above noted follow-up. Please call to schedule soon so we can assure you have an appointment before your next refills are needed. If you have already made a follow up appointment, please disregard this letter.         Sincerely,        [facility]     (Formerly known as [facility])

## 2020-10-21 NOTE — TELEPHONE ENCOUNTER
See refill request for Mestinon.  Pt  Is due for a follow up. No appt scheduled.  Letter mailed to pt.  Medication T'd for review and signature  Nathaly Patel LPN on 10/21/2020 at 11:26 AM

## 2020-10-22 RX ORDER — PYRIDOSTIGMINE BROMIDE 60 MG/1
TABLET ORAL
Qty: 45 TABLET | Refills: 6 | Status: SHIPPED | OUTPATIENT
Start: 2020-10-22 | End: 2020-11-06

## 2020-11-05 ENCOUNTER — COMMUNICATION - HEALTHEAST (OUTPATIENT)
Dept: CARDIOLOGY | Facility: CLINIC | Age: 80
End: 2020-11-05

## 2020-11-06 RX ORDER — PYRIDOSTIGMINE BROMIDE 60 MG/1
TABLET ORAL
Qty: 135 TABLET | Refills: 3 | Status: SHIPPED | OUTPATIENT
Start: 2020-11-06 | End: 2020-12-08

## 2020-11-06 NOTE — TELEPHONE ENCOUNTER
Spoke to pt's wife, Susanne, notified her that pt is due for follow up appt.  Appt scheduled on 12/8/20 with Dr. Mackay. Pt prefers face to face visit.  She is wondering if a 90 day supply of Mestinon can be sent.  Medication T'd for review and signature  Nathaly Patel LPN on 11/6/2020 at 11:46 AM

## 2020-11-06 NOTE — TELEPHONE ENCOUNTER
Pt called back to make corrections. She states she spoke with pharmacy and that the medication is not d/c'ed. She's asking for a 90 day supply of Pyridostigmine Rochelle be sent to pharmacy.

## 2020-11-17 ENCOUNTER — COMMUNICATION - HEALTHEAST (OUTPATIENT)
Dept: INTENSIVE CARE | Facility: CLINIC | Age: 80
End: 2020-11-17

## 2020-11-17 DIAGNOSIS — I21.4 ACUTE NON-Q WAVE NON-ST ELEVATION MYOCARDIAL INFARCTION (NSTEMI) (H): ICD-10-CM

## 2020-12-08 ENCOUNTER — VIRTUAL VISIT (OUTPATIENT)
Dept: NEUROLOGY | Facility: CLINIC | Age: 80
End: 2020-12-08
Payer: MEDICARE

## 2020-12-08 DIAGNOSIS — G20.A1 PARKINSON DISEASE (H): ICD-10-CM

## 2020-12-08 DIAGNOSIS — G90.3 PARKINSON'S DISEASE WITH NEUROGENIC ORTHOSTATIC HYPOTENSION (H): Primary | Chronic | ICD-10-CM

## 2020-12-08 DIAGNOSIS — I95.1 ORTHOSTATIC HYPOTENSION DYSAUTONOMIC SYNDROME: ICD-10-CM

## 2020-12-08 PROCEDURE — 99215 OFFICE O/P EST HI 40 MIN: CPT | Mod: 95 | Performed by: PSYCHIATRY & NEUROLOGY

## 2020-12-08 RX ORDER — MIDODRINE HYDROCHLORIDE 10 MG/1
10 TABLET ORAL
COMMUNITY
Start: 2020-02-25 | End: 2020-12-08

## 2020-12-08 RX ORDER — MIRTAZAPINE 7.5 MG/1
7.5 TABLET, FILM COATED ORAL
COMMUNITY
Start: 2020-11-09 | End: 2022-01-01

## 2020-12-08 RX ORDER — PYRIDOSTIGMINE BROMIDE 60 MG/1
TABLET ORAL
Qty: 135 TABLET | Refills: 3 | Status: SHIPPED | OUTPATIENT
Start: 2020-12-08 | End: 2022-01-12

## 2020-12-08 RX ORDER — NITROGLYCERIN 0.4 MG/1
0.4 TABLET SUBLINGUAL
COMMUNITY
Start: 2019-11-18 | End: 2021-10-27

## 2020-12-08 RX ORDER — CARBIDOPA AND LEVODOPA 25; 100 MG/1; MG/1
1.5 TABLET ORAL
COMMUNITY
Start: 2020-02-25 | End: 2020-12-08

## 2020-12-08 RX ORDER — TAMSULOSIN HYDROCHLORIDE 0.4 MG/1
0.4 CAPSULE ORAL
COMMUNITY
Start: 2019-06-07 | End: 2022-01-01

## 2020-12-08 RX ORDER — AZELASTINE 1 MG/ML
1 SPRAY, METERED NASAL
COMMUNITY
Start: 2020-04-14 | End: 2022-01-01

## 2020-12-08 RX ORDER — ATORVASTATIN CALCIUM 40 MG/1
40 TABLET, FILM COATED ORAL
COMMUNITY
Start: 2019-12-10 | End: 2022-01-01

## 2020-12-08 RX ORDER — MIDODRINE HYDROCHLORIDE 10 MG/1
10 TABLET ORAL 2 TIMES DAILY
Qty: 180 TABLET | Refills: 3 | Status: SHIPPED | OUTPATIENT
Start: 2020-12-08 | End: 2022-01-12

## 2020-12-08 RX ORDER — FLUDROCORTISONE ACETATE 0.1 MG/1
0.1 TABLET ORAL
COMMUNITY
Start: 2019-01-07 | End: 2020-12-08

## 2020-12-08 RX ORDER — CARBIDOPA AND LEVODOPA 25; 100 MG/1; MG/1
2 TABLET ORAL 4 TIMES DAILY
Qty: 720 TABLET | Refills: 3 | Status: SHIPPED | OUTPATIENT
Start: 2020-12-08 | End: 2022-01-12

## 2020-12-08 RX ORDER — SENNOSIDES A AND B 8.6 MG/1
2 TABLET, FILM COATED ORAL
COMMUNITY
End: 2022-01-01

## 2020-12-08 RX ORDER — TOLTERODINE 4 MG/1
4 CAPSULE, EXTENDED RELEASE ORAL
COMMUNITY
Start: 2020-02-18 | End: 2022-01-01

## 2020-12-08 RX ORDER — FLUDROCORTISONE ACETATE 0.1 MG/1
0.1 TABLET ORAL DAILY
Qty: 90 TABLET | Refills: 3 | Status: SHIPPED | OUTPATIENT
Start: 2020-12-08 | End: 2022-01-12

## 2020-12-08 NOTE — NURSING NOTE
Chief Complaint   Patient presents with     parkinson's     Pt's wife, Susanne, states he is having more tremors. States he wakes during the night feeling burning feet. Increase in fatigue and sleepiness     Video Visit     [employer] Touchpoint dain send link to [phone]     If difficulties can try land line at [phone].  Nathaly Patel LPN on 12/8/2020 at 1:47 PM

## 2020-12-08 NOTE — PROGRESS NOTES
" video follow-up visit requested by patient  Video follow-up visit due to the COVID-19 pandemic  Text number [phone]  Patient identified    .Patient is being evaluated via a billable video visit. The patient has been notified of following:     \"This video visit will be conducted via a call between you and your physician/provider. We have found that certain health care needs can be provided without the need for an in-person physical exam. This service lets us provide the care you need with a video conversation. If a prescription is necessary we can send it directly to your pharmacy. If lab work is needed we can place an order for that and you can then stop by our lab to have the test done at a later time.    If during the course of the call the physician/provider feels a video visit is not appropriate, you will not be charged for this service.    Physician has received verbal consent for a Video Visit from the patient? YES    Patient would like the video invitation sent by: Text number [phone]    Video Visit Details    Type of service: Video Visit    Video Start Time: 2 PM    Video End Time (time video stopped): 3 PM    Originating Location (pt. Location): Patient's Home    Distant Location (provider location): [facility]     Mode of Communication: Video Conference via [facility]    80-year-old follow-up December 8, 2020  Last seen May 19, 2020    Severe Parkinson's disease with severe autonomic instability  Has had difficulty with increased freezing and tremor as we lowered the parkinsonian medications.    Since last seen in May patient was hospitalized sometime in June for 1 to 2 days  While he was bariatric with his autonomic instability  An episode where he was up to the bathroom started to shake and passed out  Somehow they decided to decrease some of his medicines instead of increase them    Was not necessarily ill or dehydrated from what they could " remember    More recent blood pressures  Noon yesterday was 123/78  10 AM today was 120/79  1:00 today 119/86 next  He had a level of 96/49 a few days ago but it is rare    He did get his lift chair  Has not been wearing the abdominal binder but we discussed using it more often  Feels that he is having more trouble and is mainly wheelchair-bound    During my exam today though he actually was able to stand up next to the couch and his wife said he almost never could do that  He would like to try increasing the Sinemet again  Ranges of Sinemet have included 2.5 tablets 4 times daily down to 1.5 tablets 4 times daily    We will try compromise at 2 tablets 4 times daily and see if he can tolerate this without dropping his pressure may be get some more mobility        80-year-old with history of Parkinson's disease and severe autonomic instability        Some sample blood pressures back in May 2020  152/86  106/64  98/57  151/97    He has some alternating constipation and diarrhea we talked about his bowel program    He has difficulty getting up from a chair he freezes a lot he needs a lift chair so they can get up she has a transfer belt he does use the 4 wheeled walker with hand brakes to ambulate but has standby assistance due to his risk of autonomic instability and falls was able to get a lift chair which does help      We have tried slowly decreasing some of his carbidopa levodopa hoping to decrease some of his autonomic lightheadedness syncope and falls.    Patient had syncope December 2019 was in a TCU for about 2 months    Then February 17, 2020 had near syncope fell and fractured ribs on his left side   hospitalized June 2020 with syncope      For the patient's autonomic instability he is on  Florinef 0.1 mg daily  Proamantine 10 mg twice daily  Mestinon 60 mg half a tablet 3 times daily   we will restart using the abdominal binder  Sleeps with the head of his bed up      Review of Meds  Aspirin 81 mg once per  day  Atorvastatin 40 mg once per day  Brilanta 90 mg twice daily    B12 1000 mcg orally every other day    Florinef 0.1 mg every morning   Proamantine  10 mg by mouth 2 times daily  Mestinon 30 mg 3 times daily  Sinemet 25/100, increase 12/8/2020, (2 tablets 4 times daily) if he gets increased dizziness we will decreased alternating  2/1.5/2/1.5  Remeron 7.5 mg at nighttime             exam        Parkinsonian features  Soft hypophonic voice  Head drop posture old  Mild to moderate decreased blink but reasonable upgaze  No hallucinations or vivid dreams  Mild bradykinesia  Mild resting tremor a little bit more on the left than the right  Orthostatic BP changes     Review of Systems     Pertinent findings on review of system  Alternating constipation and diarrhea discussed his bowel program    Still has some orthostatic difficulty with falls but they seem to come on slower less dramatic so the wife can usually help him down but if he gets on the ground they have to call 911    Chronic decreased range of motion of both shoulders from his Parkinson's    Soft voice  Resting tremor left greater than right  Some bradykinesia  Poor postural stability and balance    Difficulty getting out of a chair    No vivid dreams no hallucinations    No actual headache chest pain shortness of breath no nausea or vomiting    Is trying to stay well-hydrated    Physical Exam Pertinent findings on exam   Alert oriented x3  No aphasia  No neglect  Recall okay    Cranials 2 through 12 significant for  Soft hypophonic voice  Head drop posture old  Mild to moderate decreased blink but reasonable upgaze  No other ophthalmoplegia  No nystagmus      Face is otherwise symmetrical    Upper extremities  No drift  Slow finger-nose  Decreased range of motion of the shoulders    Lower extremities he can kick them out when he is seated    Gait   patient actually got up out of the couch was significantly flexed forward but was able to do this twice as  using can    Can ambulate with a 4 wheeled walker with hand brakes but this is rare and mostly stays in the wheelchair    Gait safety be would be improved by using the transfer belt     Assessment/Plan     Autonomic dysfunction (G90.3)    Reviewed medications as above no changes stay well-hydrated       Parkinson Disease (G20)   We will not lower the Sinemet any further as he started to get more stiffness and tremor    We discussed progressive nature of Parkinson's disease        Wrote a prescription for a lift chair 2020  Patient has difficulty with freezing difficulty getting out of the chair does walk and ambulate with a 4 wheeled walker with hand break    Wife does have a transfer belt    Multiple safety issues and treatment issues discussed above        Current diagnoses:    1. Parkinson s disease going on for greater than 22 plus years as far back as in 1998 with tremor and bradykinesia.  2. Severe autonomic dysfunction with orthostatic blood pressure trouble, failed black liquorice, failed Florinef, had supine hypertension, has tried Mestinon in the past and midodrine.  3. History of heart disease on beta-blockers with some bradycardia, had a pacemaker placed in August 2015.  4. History of TIAs in August 2013 with hypertension and hyperlipidemia, is supposed to be on an aspirin, has been a past smoker.  5. Autonomic instability, which is resistant to multiple manipulations and activities, medications, and habits.    Patient does have:    a. Hospital bed. He is unable to sleep at 30 degrees the last time I saw him because he slides around, discussed at length.  b. Did get an abdominal binder, tried it a little bit, does not like to wear it.  We will try using this more often  c. Has used some midodrine.  D.  Retry abdominal binder    Current plan  Midodrine 10 mg 2 times daily refilled  Florinef 0.1 mg every morning refilled  Sinemet 25/100, 2 tablets 4 times daily, (if increased drops in blood pressure reduce  dose to,  2 / 1.5 / 2 / 1.5    Other physicians  Mestinon 30 mg 2 times daily through her other physicians  Atorvastatin 40 mg daily  Aspirin 81 mg once per day  Other meds see med list    Discussed gait safety high risk for falls and injuries      Patient will follow-up in April 2021    30 minutes total care time today with greater than for percent face-to-face discussion about multiple medication side effects and benefits interaction between the autonomic instability and try to increase his mobility balance seen to symptom signs and medicines.

## 2021-01-04 ENCOUNTER — AMBULATORY - HEALTHEAST (OUTPATIENT)
Dept: CARDIOLOGY | Facility: CLINIC | Age: 81
End: 2021-01-04

## 2021-01-04 DIAGNOSIS — Z95.0 CARDIAC PACEMAKER IN SITU: ICD-10-CM

## 2021-01-04 DIAGNOSIS — R55 SYNCOPE AND COLLAPSE: ICD-10-CM

## 2021-03-19 ENCOUNTER — COMMUNICATION - HEALTHEAST (OUTPATIENT)
Dept: ADMINISTRATIVE | Facility: CLINIC | Age: 81
End: 2021-03-19

## 2021-04-05 ENCOUNTER — AMBULATORY - HEALTHEAST (OUTPATIENT)
Dept: CARDIOLOGY | Facility: CLINIC | Age: 81
End: 2021-04-05

## 2021-04-05 DIAGNOSIS — R55 SYNCOPE AND COLLAPSE: ICD-10-CM

## 2021-04-05 DIAGNOSIS — Z95.0 CARDIAC PACEMAKER IN SITU: ICD-10-CM

## 2021-04-16 ENCOUNTER — OFFICE VISIT - HEALTHEAST (OUTPATIENT)
Dept: CARDIOLOGY | Facility: CLINIC | Age: 81
End: 2021-04-16

## 2021-04-16 DIAGNOSIS — Z95.0 CARDIAC PACEMAKER IN SITU: ICD-10-CM

## 2021-04-16 DIAGNOSIS — I25.83 CORONARY ARTERY DISEASE DUE TO LIPID RICH PLAQUE: ICD-10-CM

## 2021-04-16 DIAGNOSIS — R55 SYNCOPE AND COLLAPSE: ICD-10-CM

## 2021-04-16 DIAGNOSIS — G90.9 AUTONOMIC DYSFUNCTION: ICD-10-CM

## 2021-04-16 DIAGNOSIS — I10 ESSENTIAL HYPERTENSION: ICD-10-CM

## 2021-04-16 DIAGNOSIS — I25.10 CORONARY ARTERY DISEASE DUE TO LIPID RICH PLAQUE: ICD-10-CM

## 2021-04-16 DIAGNOSIS — E78.5 DYSLIPIDEMIA: ICD-10-CM

## 2021-04-16 DIAGNOSIS — G20.A1 PARKINSON DISEASE (H): ICD-10-CM

## 2021-05-20 ENCOUNTER — HOSPITAL ENCOUNTER (EMERGENCY)
Dept: EMERGENCY MEDICINE | Facility: HOSPITAL | Age: 81
Discharge: HOME | End: 2021-05-20
Attending: EMERGENCY MEDICINE
Payer: COMMERCIAL

## 2021-05-20 DIAGNOSIS — R55 SYNCOPE, UNSPECIFIED SYNCOPE TYPE: ICD-10-CM

## 2021-05-20 DIAGNOSIS — N39.0 URINARY TRACT INFECTION WITHOUT HEMATURIA, SITE UNSPECIFIED: ICD-10-CM

## 2021-05-20 LAB
ANION GAP SERPL CALCULATED.3IONS-SCNC: 6 MMOL/L (ref 5–18)
ATRIAL RATE - MUSE: 79 BPM
BASOPHILS # BLD AUTO: 0.1 THOU/UL (ref 0–0.2)
BASOPHILS NFR BLD AUTO: 1 % (ref 0–2)
BUN SERPL-MCNC: 32 MG/DL (ref 8–28)
CALCIUM SERPL-MCNC: 8.2 MG/DL (ref 8.5–10.5)
CHLORIDE BLD-SCNC: 105 MMOL/L (ref 98–107)
CO2 SERPL-SCNC: 30 MMOL/L (ref 22–31)
CREAT SERPL-MCNC: 1.43 MG/DL (ref 0.7–1.3)
DIASTOLIC BLOOD PRESSURE - MUSE: 71 MMHG
EOSINOPHIL # BLD AUTO: 0.1 THOU/UL (ref 0–0.4)
EOSINOPHIL NFR BLD AUTO: 2 % (ref 0–6)
ERYTHROCYTE [DISTWIDTH] IN BLOOD BY AUTOMATED COUNT: 14.2 % (ref 11–14.5)
GFR SERPL CREATININE-BSD FRML MDRD: 48 ML/MIN/1.73M2
GLUCOSE BLD-MCNC: 105 MG/DL (ref 70–125)
HCT VFR BLD AUTO: 34.5 % (ref 40–54)
HGB BLD-MCNC: 10.9 G/DL (ref 14–18)
IMM GRANULOCYTES # BLD: 0 THOU/UL
IMM GRANULOCYTES NFR BLD: 1 %
INR PPP: 1.16 (ref 0.9–1.1)
INTERPRETATION ECG - MUSE: NORMAL
LYMPHOCYTES # BLD AUTO: 0.9 THOU/UL (ref 0.8–4.4)
LYMPHOCYTES NFR BLD AUTO: 15 % (ref 20–40)
MCH RBC QN AUTO: 29.4 PG (ref 27–34)
MCHC RBC AUTO-ENTMCNC: 31.6 G/DL (ref 32–36)
MCV RBC AUTO: 93 FL (ref 80–100)
MONOCYTES # BLD AUTO: 0.6 THOU/UL (ref 0–0.9)
MONOCYTES NFR BLD AUTO: 10 % (ref 2–10)
NEUTROPHILS # BLD AUTO: 4.5 THOU/UL (ref 2–7.7)
NEUTROPHILS NFR BLD AUTO: 72 % (ref 50–70)
P AXIS - MUSE: NORMAL
PLATELET # BLD AUTO: 202 THOU/UL (ref 140–440)
PMV BLD AUTO: 10.8 FL (ref 8.5–12.5)
POTASSIUM BLD-SCNC: 3.8 MMOL/L (ref 3.5–5)
PR INTERVAL - MUSE: NORMAL
QRS DURATION - MUSE: 92 MS
QT - MUSE: 386 MS
QTC - MUSE: 450 MS
R AXIS - MUSE: 9 DEGREES
RBC # BLD AUTO: 3.71 MILL/UL (ref 4.4–6.2)
SODIUM SERPL-SCNC: 141 MMOL/L (ref 136–145)
SYSTOLIC BLOOD PRESSURE - MUSE: 138 MMHG
T AXIS - MUSE: 55 DEGREES
TROPONIN I SERPL-MCNC: 0.03 NG/ML (ref 0–0.29)
VENTRICULAR RATE- MUSE: 82 BPM
WBC: 6.2 THOU/UL (ref 4–11)

## 2021-05-20 ASSESSMENT — MIFFLIN-ST. JEOR: SCORE: 1603.03

## 2021-05-21 LAB — BACTERIA SPEC CULT: NO GROWTH

## 2021-05-26 ENCOUNTER — RECORDS - HEALTHEAST (OUTPATIENT)
Dept: ADMINISTRATIVE | Facility: CLINIC | Age: 81
End: 2021-05-26

## 2021-05-26 VITALS — HEART RATE: 91 BPM | DIASTOLIC BLOOD PRESSURE: 100 MMHG | SYSTOLIC BLOOD PRESSURE: 170 MMHG | OXYGEN SATURATION: 98 %

## 2021-05-26 VITALS — DIASTOLIC BLOOD PRESSURE: 76 MMHG | SYSTOLIC BLOOD PRESSURE: 130 MMHG

## 2021-05-26 VITALS — HEART RATE: 65 BPM | OXYGEN SATURATION: 96 % | SYSTOLIC BLOOD PRESSURE: 164 MMHG | DIASTOLIC BLOOD PRESSURE: 88 MMHG

## 2021-05-26 VITALS — HEART RATE: 73 BPM | TEMPERATURE: 98.3 F | DIASTOLIC BLOOD PRESSURE: 81 MMHG | SYSTOLIC BLOOD PRESSURE: 142 MMHG

## 2021-05-26 VITALS
RESPIRATION RATE: 16 BRPM | HEART RATE: 75 BPM | TEMPERATURE: 98 F | DIASTOLIC BLOOD PRESSURE: 80 MMHG | SYSTOLIC BLOOD PRESSURE: 110 MMHG | OXYGEN SATURATION: 96 %

## 2021-05-26 VITALS
OXYGEN SATURATION: 98 % | SYSTOLIC BLOOD PRESSURE: 85 MMHG | HEART RATE: 85 BPM | TEMPERATURE: 98 F | RESPIRATION RATE: 18 BRPM | DIASTOLIC BLOOD PRESSURE: 55 MMHG

## 2021-05-26 VITALS
RESPIRATION RATE: 16 BRPM | DIASTOLIC BLOOD PRESSURE: 89 MMHG | SYSTOLIC BLOOD PRESSURE: 149 MMHG | TEMPERATURE: 98 F | OXYGEN SATURATION: 98 % | HEART RATE: 92 BPM

## 2021-05-26 VITALS
HEART RATE: 81 BPM | OXYGEN SATURATION: 96 % | DIASTOLIC BLOOD PRESSURE: 72 MMHG | TEMPERATURE: 98.5 F | SYSTOLIC BLOOD PRESSURE: 121 MMHG

## 2021-05-26 VITALS — RESPIRATION RATE: 18 BRPM | HEART RATE: 84 BPM | DIASTOLIC BLOOD PRESSURE: 64 MMHG | SYSTOLIC BLOOD PRESSURE: 106 MMHG

## 2021-05-26 VITALS — RESPIRATION RATE: 18 BRPM | HEART RATE: 80 BPM | SYSTOLIC BLOOD PRESSURE: 152 MMHG | DIASTOLIC BLOOD PRESSURE: 78 MMHG

## 2021-05-26 VITALS
SYSTOLIC BLOOD PRESSURE: 159 MMHG | OXYGEN SATURATION: 97 % | RESPIRATION RATE: 16 BRPM | HEART RATE: 68 BPM | TEMPERATURE: 98.1 F | DIASTOLIC BLOOD PRESSURE: 89 MMHG

## 2021-05-26 VITALS
OXYGEN SATURATION: 96 % | DIASTOLIC BLOOD PRESSURE: 60 MMHG | TEMPERATURE: 98.6 F | HEART RATE: 73 BPM | SYSTOLIC BLOOD PRESSURE: 110 MMHG

## 2021-05-26 VITALS — SYSTOLIC BLOOD PRESSURE: 122 MMHG | DIASTOLIC BLOOD PRESSURE: 70 MMHG | HEART RATE: 82 BPM

## 2021-05-26 VITALS — SYSTOLIC BLOOD PRESSURE: 115 MMHG | DIASTOLIC BLOOD PRESSURE: 70 MMHG | HEART RATE: 70 BPM

## 2021-05-26 VITALS — SYSTOLIC BLOOD PRESSURE: 124 MMHG | HEART RATE: 64 BPM | DIASTOLIC BLOOD PRESSURE: 68 MMHG

## 2021-05-26 VITALS — HEART RATE: 70 BPM | SYSTOLIC BLOOD PRESSURE: 138 MMHG | TEMPERATURE: 97.7 F | DIASTOLIC BLOOD PRESSURE: 84 MMHG

## 2021-05-26 VITALS
OXYGEN SATURATION: 97 % | HEART RATE: 70 BPM | SYSTOLIC BLOOD PRESSURE: 121 MMHG | DIASTOLIC BLOOD PRESSURE: 77 MMHG | TEMPERATURE: 98.4 F

## 2021-05-26 VITALS — DIASTOLIC BLOOD PRESSURE: 55 MMHG | HEART RATE: 67 BPM | SYSTOLIC BLOOD PRESSURE: 95 MMHG

## 2021-05-26 VITALS — HEART RATE: 68 BPM | TEMPERATURE: 99.4 F | DIASTOLIC BLOOD PRESSURE: 80 MMHG | SYSTOLIC BLOOD PRESSURE: 142 MMHG

## 2021-05-26 VITALS — DIASTOLIC BLOOD PRESSURE: 81 MMHG | SYSTOLIC BLOOD PRESSURE: 144 MMHG | HEART RATE: 82 BPM

## 2021-05-26 VITALS
TEMPERATURE: 98.8 F | DIASTOLIC BLOOD PRESSURE: 90 MMHG | SYSTOLIC BLOOD PRESSURE: 130 MMHG | OXYGEN SATURATION: 97 % | HEART RATE: 67 BPM

## 2021-05-27 VITALS
SYSTOLIC BLOOD PRESSURE: 105 MMHG | DIASTOLIC BLOOD PRESSURE: 80 MMHG | RESPIRATION RATE: 17 BRPM | TEMPERATURE: 98.2 F | OXYGEN SATURATION: 98 % | HEART RATE: 72 BPM

## 2021-05-27 VITALS — SYSTOLIC BLOOD PRESSURE: 142 MMHG | DIASTOLIC BLOOD PRESSURE: 100 MMHG | HEART RATE: 84 BPM

## 2021-05-27 VITALS — SYSTOLIC BLOOD PRESSURE: 108 MMHG | HEART RATE: 87 BPM | DIASTOLIC BLOOD PRESSURE: 67 MMHG

## 2021-05-27 VITALS
HEART RATE: 68 BPM | DIASTOLIC BLOOD PRESSURE: 64 MMHG | SYSTOLIC BLOOD PRESSURE: 100 MMHG | TEMPERATURE: 98 F | SYSTOLIC BLOOD PRESSURE: 94 MMHG | DIASTOLIC BLOOD PRESSURE: 59 MMHG

## 2021-05-27 VITALS — DIASTOLIC BLOOD PRESSURE: 81 MMHG | HEART RATE: 71 BPM | SYSTOLIC BLOOD PRESSURE: 133 MMHG | TEMPERATURE: 98 F

## 2021-05-27 VITALS
HEART RATE: 68 BPM | OXYGEN SATURATION: 96 % | DIASTOLIC BLOOD PRESSURE: 85 MMHG | SYSTOLIC BLOOD PRESSURE: 132 MMHG | TEMPERATURE: 98.6 F

## 2021-05-27 VITALS — SYSTOLIC BLOOD PRESSURE: 120 MMHG | HEART RATE: 80 BPM | DIASTOLIC BLOOD PRESSURE: 83 MMHG

## 2021-05-27 VITALS
OXYGEN SATURATION: 94 % | TEMPERATURE: 99.1 F | HEART RATE: 73 BPM | SYSTOLIC BLOOD PRESSURE: 148 MMHG | DIASTOLIC BLOOD PRESSURE: 93 MMHG

## 2021-05-27 VITALS — SYSTOLIC BLOOD PRESSURE: 153 MMHG | DIASTOLIC BLOOD PRESSURE: 99 MMHG | HEART RATE: 73 BPM | TEMPERATURE: 98.2 F

## 2021-05-27 VITALS
TEMPERATURE: 99.2 F | DIASTOLIC BLOOD PRESSURE: 70 MMHG | SYSTOLIC BLOOD PRESSURE: 130 MMHG | OXYGEN SATURATION: 97 % | HEART RATE: 86 BPM

## 2021-05-27 VITALS
SYSTOLIC BLOOD PRESSURE: 158 MMHG | OXYGEN SATURATION: 92 % | TEMPERATURE: 98.2 F | DIASTOLIC BLOOD PRESSURE: 95 MMHG | RESPIRATION RATE: 18 BRPM | HEART RATE: 75 BPM

## 2021-05-27 VITALS
DIASTOLIC BLOOD PRESSURE: 84 MMHG | TEMPERATURE: 98.7 F | OXYGEN SATURATION: 98 % | HEART RATE: 87 BPM | SYSTOLIC BLOOD PRESSURE: 123 MMHG

## 2021-05-27 VITALS
DIASTOLIC BLOOD PRESSURE: 72 MMHG | SYSTOLIC BLOOD PRESSURE: 123 MMHG | HEART RATE: 80 BPM | OXYGEN SATURATION: 97 % | TEMPERATURE: 98 F

## 2021-05-27 VITALS
TEMPERATURE: 98.8 F | DIASTOLIC BLOOD PRESSURE: 80 MMHG | OXYGEN SATURATION: 95 % | HEART RATE: 65 BPM | SYSTOLIC BLOOD PRESSURE: 143 MMHG

## 2021-05-27 VITALS — DIASTOLIC BLOOD PRESSURE: 62 MMHG | SYSTOLIC BLOOD PRESSURE: 110 MMHG | TEMPERATURE: 98.4 F

## 2021-05-27 VITALS
SYSTOLIC BLOOD PRESSURE: 167 MMHG | OXYGEN SATURATION: 96 % | TEMPERATURE: 98.8 F | HEART RATE: 68 BPM | DIASTOLIC BLOOD PRESSURE: 98 MMHG

## 2021-05-27 VITALS — WEIGHT: 185 LBS | HEIGHT: 73 IN | BODY MASS INDEX: 24.52 KG/M2

## 2021-06-03 VITALS — HEIGHT: 73 IN | WEIGHT: 184.2 LBS | BODY MASS INDEX: 24.41 KG/M2

## 2021-06-03 NOTE — TELEPHONE ENCOUNTER
Request for Orders    Who s Requesting: Home Care Occupational Therapist    Orders being requested: OT  2 visit(s) every 1 week(s) for 4 week(s)  And 1 PRN for ADL retraining, CG Education, AE needs, home safety and cognition.      Where to send Orders: [facility], response through Epic preferred. Please call if you have questions.    Thank you!  Jesi Kulkarni, OTR/L  [phone]

## 2021-06-03 NOTE — TELEPHONE ENCOUNTER
----- Message from Lore Rothman sent at 12/2/2019  4:00 PM CST -----  General phone call:    Caller: Monica from [facility] home care  Primary cardiologist: Nanci  Detailed reason for call: Monica stated when she saw pt today his heart rate increased and blood pressure decreased during exercises. She would like to know how to proceed with pt's care. Please call back.  Best phone number: [phone] (Monica's number)  Best time to contact: Any  Ok to leave a detailed message? Yes  Device? No    Additional Info:

## 2021-06-03 NOTE — TELEPHONE ENCOUNTER
Dr Christine - pt takes 25 mg metoprolol tartrate in the evening, and midodrine 2.5 mg two times a day.  Do you want hold / give parameters on either of those medications?  May pt continue with therapy if home care gets those readings?  -rah  ---------------------------------------------------------------------------------------------------------------    Monica, [place] [facility], reports fluctuating BPs with dizziness (ongoing issue).    Date Time BP P Time BP P   11/29/19 1535 172/046 48 8485 147/82 Recliner   12/1/19 1620 220/120 Recliner 1830 113/72 Sitting    2145 167/90 Recliner      12/2/19 1200 75/45 Sitting 1245 115/70 Recliner    1340 77/50 Sitting 1830 70/43 Sitting    2030 171/100 Recliner 2115 195/108 Recliner

## 2021-06-04 VITALS
TEMPERATURE: 97.2 F | SYSTOLIC BLOOD PRESSURE: 170 MMHG | OXYGEN SATURATION: 98 % | BODY MASS INDEX: 24.14 KG/M2 | DIASTOLIC BLOOD PRESSURE: 90 MMHG | HEART RATE: 78 BPM | WEIGHT: 183 LBS

## 2021-06-04 VITALS
HEIGHT: 73 IN | DIASTOLIC BLOOD PRESSURE: 82 MMHG | RESPIRATION RATE: 12 BRPM | WEIGHT: 185.1 LBS | HEART RATE: 80 BPM | BODY MASS INDEX: 24.53 KG/M2 | SYSTOLIC BLOOD PRESSURE: 112 MMHG

## 2021-06-04 VITALS
DIASTOLIC BLOOD PRESSURE: 73 MMHG | BODY MASS INDEX: 24.14 KG/M2 | HEART RATE: 74 BPM | WEIGHT: 183 LBS | SYSTOLIC BLOOD PRESSURE: 126 MMHG | TEMPERATURE: 98.1 F | OXYGEN SATURATION: 96 %

## 2021-06-04 VITALS
BODY MASS INDEX: 22.03 KG/M2 | WEIGHT: 167 LBS | SYSTOLIC BLOOD PRESSURE: 99 MMHG | HEART RATE: 73 BPM | DIASTOLIC BLOOD PRESSURE: 54 MMHG | OXYGEN SATURATION: 98 % | TEMPERATURE: 97.2 F

## 2021-06-04 VITALS
BODY MASS INDEX: 23.75 KG/M2 | TEMPERATURE: 97.6 F | SYSTOLIC BLOOD PRESSURE: 115 MMHG | OXYGEN SATURATION: 95 % | HEART RATE: 77 BPM | DIASTOLIC BLOOD PRESSURE: 75 MMHG | WEIGHT: 180 LBS

## 2021-06-04 VITALS
TEMPERATURE: 98.6 F | OXYGEN SATURATION: 96 % | SYSTOLIC BLOOD PRESSURE: 96 MMHG | BODY MASS INDEX: 22.56 KG/M2 | WEIGHT: 171 LBS | HEART RATE: 73 BPM | DIASTOLIC BLOOD PRESSURE: 62 MMHG

## 2021-06-04 VITALS
BODY MASS INDEX: 23.62 KG/M2 | TEMPERATURE: 98 F | HEART RATE: 64 BPM | WEIGHT: 179 LBS | SYSTOLIC BLOOD PRESSURE: 70 MMHG | OXYGEN SATURATION: 99 % | DIASTOLIC BLOOD PRESSURE: 50 MMHG

## 2021-06-04 VITALS
TEMPERATURE: 97.5 F | SYSTOLIC BLOOD PRESSURE: 167 MMHG | WEIGHT: 171 LBS | HEART RATE: 65 BPM | OXYGEN SATURATION: 97 % | DIASTOLIC BLOOD PRESSURE: 78 MMHG | BODY MASS INDEX: 22.56 KG/M2

## 2021-06-04 VITALS
HEART RATE: 60 BPM | WEIGHT: 180 LBS | SYSTOLIC BLOOD PRESSURE: 140 MMHG | DIASTOLIC BLOOD PRESSURE: 83 MMHG | BODY MASS INDEX: 23.75 KG/M2

## 2021-06-04 VITALS
WEIGHT: 171 LBS | SYSTOLIC BLOOD PRESSURE: 113 MMHG | TEMPERATURE: 97.4 F | DIASTOLIC BLOOD PRESSURE: 69 MMHG | HEART RATE: 68 BPM | BODY MASS INDEX: 22.56 KG/M2

## 2021-06-04 VITALS
SYSTOLIC BLOOD PRESSURE: 92 MMHG | TEMPERATURE: 98.4 F | HEART RATE: 100 BPM | BODY MASS INDEX: 23.75 KG/M2 | OXYGEN SATURATION: 97 % | DIASTOLIC BLOOD PRESSURE: 56 MMHG | WEIGHT: 180 LBS

## 2021-06-04 VITALS
OXYGEN SATURATION: 98 % | HEART RATE: 73 BPM | SYSTOLIC BLOOD PRESSURE: 70 MMHG | TEMPERATURE: 98.5 F | WEIGHT: 180 LBS | DIASTOLIC BLOOD PRESSURE: 46 MMHG | BODY MASS INDEX: 23.75 KG/M2

## 2021-06-04 NOTE — PROGRESS NOTES
[facility]    Facility:   [facility] [[id number]]   Code Status: DNR/DNI      CHIEF COMPLAINT/REASON FOR VISIT:  Chief Complaint   Patient presents with     Problem Visit     insomnia       HISTORY:      HPI: Canelo is a 79 y.o. male with a history of CAD, STEMI (11/11/19), cardiac pacemaker, parkinsons disease with orthostatic hypotension, dsyphagia, htn who experienced a fall at home with near syncope.     Today: Low bp of 65/37 today; this improved to 90s/50s.He is now feeling okay. He is endorsing insomnia and does not think he been receiving melatonin.     For is CAD with recent STEMI: On asa, brilinta, metoprolol, lipitor.     Autonomic orthostatic hypotension: He remains on midodrine and his florinef was increased during this recent hospital stay. He has had extensive workup in the past.      Fall: negative for fractures including CT. Continue OT/PT. Continue ice prn which he is using to right leg.     Parkinson's: on sinimet.     Past Medical History:   Diagnosis Date     Acute chest pain 11/10/2019     Chronic ischemic heart disease      GERD (gastroesophageal reflux disease)      HLD (hyperlipidemia)      HTN (hypertension)      Near syncope 10/12/2015     Pacemaker      Pacemaker     dual chamber     Parkinson's disease (H)      Urinary urgency      Vitamin D deficiency              Family History   Problem Relation Age of Onset     Heart disease Mother      Heart disease Father      Social History     Socioeconomic History     Marital status:      Spouse name: Not on file     Number of children: Not on file     Years of education: Not on file     Highest education level: Not on file   Occupational History     Not on file   Social Needs     Financial resource strain: Not on file     Food insecurity:     Worry: Not on file     Inability: Not on file     Transportation needs:     Medical: Not on file     Non-medical: Not on file   Tobacco Use     Smoking status:  Former Smoker     Smokeless tobacco: Never Used   Substance and Sexual Activity     Alcohol use: Yes     Comment: occasional     Drug use: No     Sexual activity: Not on file   Lifestyle     Physical activity:     Days per week: Not on file     Minutes per session: Not on file     Stress: Not on file   Relationships     Social connections:     Talks on phone: Not on file     Gets together: Not on file     Attends Baptism service: Not on file     Active member of club or organization: Not on file     Attends meetings of clubs or organizations: Not on file     Relationship status: Not on file     Intimate partner violence:     Fear of current or ex partner: Not on file     Emotionally abused: Not on file     Physically abused: Not on file     Forced sexual activity: Not on file   Other Topics Concern     Not on file   Social History Narrative     Not on file         Review of Systems   Cardiovascular: Negative for chest pain and leg swelling.   Gastrointestinal: Negative.    Musculoskeletal: Negative for arthralgias and myalgias.        Right leg pain/hip pain.    Skin: Negative.    Neurological: Positive for weakness.   Psychiatric/Behavioral: Negative for agitation and behavioral problems.       Vitals:    12/26/19 1154   BP: (!) 70/46   Pulse: 73   Temp: 98.5  F (36.9  C)   SpO2: 98%   Weight: 180 lb (81.6 kg)       Physical Exam  Constitutional:       Appearance: Normal appearance.   HENT:      Head: Atraumatic.      Mouth/Throat:      Mouth: Mucous membranes are moist.   Eyes:      Pupils: Pupils are equal, round, and reactive to light.   Cardiovascular:      Rate and Rhythm: Normal rate.      Pulses: Normal pulses.   Pulmonary:      Effort: Pulmonary effort is normal. No respiratory distress.      Breath sounds: Normal breath sounds. No rales.   Abdominal:      General: Abdomen is flat.   Skin:     General: Skin is warm and dry.   Neurological:      Mental Status: He is alert. Mental status is at baseline.    Psychiatric:         Behavior: Behavior normal.           LABS:   Reviewed    ASSESSMENT:    .     ICD-10-CM    1. Other insomnia G47.09    2. Fall at home, sequela W19.XXXS     Y92.009    3. Autonomic dysfunction G90.9        PLAN:    Insomnia: Schedule melatonin.      CHF/swellng: Continue BHARATHI arriaza to norman LE on am an off HS.     For is CAD with recent STEMI: On asa, brilinta, metoprolol, lipitor.     Autonomic orthostatic hypotension: He remains on midodrine and his florinef was increased during this recent hospital stay. He has had extensive workup in the past.   Bps vary as expeced with sbp 70s-170s.     Fall: negative for fractures including CT. Continue OT/PT. Continue ice prn.   -says pain is improved. Denies pain today.     Parkinson's: on sinimet.     R hip: OT/PT.         Electronically signed by: Cy Jerry, CNP

## 2021-06-04 NOTE — PROGRESS NOTES
[facility]    Facility:   [facility] [[id number]]   Code Status: POLST AVAILABLE    Reevaluation of 71-year-old male with advanced Parkinson's, autonomic dysfunction, recent NST SALO, chronic hypotension, admitted to hospital with weakness, transferred to TCU for rehabilitation and management of medical problems. Continued significant decrease in blood pressure recordings.    Review of systems: syncopal episode in physical therapy today, continues with significant lightheadedness with standing. Denies anterior chest discomfort. Nocturia frequent, no dysuria. Parkinson's under adequate control. Frustrated with lack of ability to sit or stand upright without becoming lightheaded. Remainder of 12 point review of systems obtained negative.    Exam: blood pressure 93/61, 80/40, patient semi upright in bed, affect flat with masking of facies. Minimal tremor. Mild cogwheeling. S1 and S2 regular. Pulmonary exam without rales rhonchi or wheezes. Abdomen without masses tenderness organomegaly. Periphery with nonpitting edema of 1 mm. Strength and muscle tone diffusely diminished.    Impression and plan:   significant hypo tension continuing, recent decrease in metoprolol to 12.5 mg Q day, increase Florinef to 0.2 mg Q day increase midodrine to 10 mg TID. Patient is self-limiting fluid intake due to frequent urination, increase fluids, reviewed multiple above issues with patient nursing and wife in attendance.   Recent NSTEMI, no anginal symptoms, no cardiac decompensation.   Deconditioning, unable to do physical therapy out of bed due to hypotension.   Multiple issues reviewed, prolonged evaluation time, issues addressed include hypotension, generalized weakness, nocturia, orthostatic symptoms, medication change. Additional discussion with nursing staff and patient's wife.      Electronically signed by: Elena Orozco MD

## 2021-06-04 NOTE — PROGRESS NOTES
[facility]    Facility:   [facility] [[id number]]   Code Status: DNR/DNI      CHIEF COMPLAINT/REASON FOR VISIT:  Chief Complaint   Patient presents with     Review Of Multiple Medical Conditions       HISTORY:      HPI: Canelo is a 79 y.o. male with a history of CAD, STEMI (11/11/19), cardiac pacemaker, parkinsons disease with orthostatic hypotension, dsyphagia, htn who experienced a fall at home with near syncope.     For is CAD with recent STEMI: On asa, brilinta, metoprolol, lipitor.     Autonomic orthostatic hypotension: He remains on midodrine and his florinef was increased during this recent hospital stay. He has had extensive workup in the past.      Fall: negative for fractures including CT. Continue OT/PT. Continue ice prn.     Parkinson's: on sinimet.     Past Medical History:   Diagnosis Date     Acute chest pain 11/10/2019     Chronic ischemic heart disease      GERD (gastroesophageal reflux disease)      HLD (hyperlipidemia)      HTN (hypertension)      Near syncope 10/12/2015     Pacemaker      Pacemaker     dual chamber     Parkinson's disease (H)      Urinary urgency      Vitamin D deficiency              Family History   Problem Relation Age of Onset     Heart disease Mother      Heart disease Father      Social History     Socioeconomic History     Marital status:      Spouse name: Not on file     Number of children: Not on file     Years of education: Not on file     Highest education level: Not on file   Occupational History     Not on file   Social Needs     Financial resource strain: Not on file     Food insecurity:     Worry: Not on file     Inability: Not on file     Transportation needs:     Medical: Not on file     Non-medical: Not on file   Tobacco Use     Smoking status: Former Smoker     Smokeless tobacco: Never Used   Substance and Sexual Activity     Alcohol use: Yes     Comment: occasional     Drug use: No     Sexual activity: Not on file    Lifestyle     Physical activity:     Days per week: Not on file     Minutes per session: Not on file     Stress: Not on file   Relationships     Social connections:     Talks on phone: Not on file     Gets together: Not on file     Attends Mandaen service: Not on file     Active member of club or organization: Not on file     Attends meetings of clubs or organizations: Not on file     Relationship status: Not on file     Intimate partner violence:     Fear of current or ex partner: Not on file     Emotionally abused: Not on file     Physically abused: Not on file     Forced sexual activity: Not on file   Other Topics Concern     Not on file   Social History Narrative     Not on file         Review of Systems   Cardiovascular: Negative for chest pain and leg swelling.   Gastrointestinal: Negative.    Musculoskeletal: Positive for arthralgias and myalgias.        Right leg pain/hip pain.    Skin: Negative.    Neurological: Positive for weakness.   Psychiatric/Behavioral: Negative for agitation and behavioral problems.       Vitals:    12/18/19 0959   BP: (!) 70/50   Pulse: 64   Temp: 98  F (36.7  C)   SpO2: 99%   Weight: 179 lb (81.2 kg)       Physical Exam  Constitutional:       Appearance: Normal appearance.   HENT:      Head: Atraumatic.      Mouth/Throat:      Mouth: Mucous membranes are moist.   Eyes:      Pupils: Pupils are equal, round, and reactive to light.   Cardiovascular:      Rate and Rhythm: Normal rate.      Pulses: Normal pulses.   Pulmonary:      Effort: Respiratory distress present.      Breath sounds: No rales.   Abdominal:      General: Abdomen is flat.   Skin:     General: Skin is warm and dry.   Neurological:      Mental Status: He is alert. Mental status is at baseline.   Psychiatric:         Behavior: Behavior normal.           LABS:   Reviewed    ASSESSMENT:    .     ICD-10-CM    1. Autonomic dysfunction G90.9    2. Parkinson disease (H) G20    3. Cardiac pacemaker in situ Z95.0         PLAN:    CHF/swellng: Continue BHARATHI arriaza to norman LE on am an off HS.     For is CAD with recent STEMI: On asa, brilinta, metoprolol, lipitor.     Autonomic orthostatic hypotension: He remains on midodrine and his florinef was increased during this recent hospital stay. He has had extensive workup in the past.   Bps vary as expeced with sbp 70s-170s.     Fall: negative for fractures including CT. Continue OT/PT. Continue ice prn.     Parkinson's: on sinimet.     R hip: OT/PT.         Electronically signed by: Cy Jerry, CNP

## 2021-06-04 NOTE — PROGRESS NOTES
[facility]    Facility:   [facility] [[id number]]   Code Status: POLST AVAILABLE    Assessment of 79-year-old male with Parkinson's, significant hypotension, history of hypertension, autonomic dysfunction, recent hospitalization with weakness and falls, Florinef dose increased, continued on midodrine, transferred to TCU for rehabilitation and management of medical problems. Recent discontinuation of Toviaz.    Review of systems: continued profound fatigue. Aware of lightheadedness intermittently. Drowsy at this time, wife present contributes to review of systems. No chest pain. Wife doubts that hypotension can be improved, concerned regarding long-term prognosis. Remainder 12 point review of systems obtained negative.    Exam: continued significant decrease in blood pressure recordings, systolic 70 on this date. Patient drowsy, oriented, bradykinesia present. No evidence of respiratory distress. S1 and S2 regular. Pulmonary exam without adventitious sounds. Abdomen without masses or tenderness. Periphery with nonpitting edema, ace wraps in place. No tremor.    Impression and plan:   persistent hypotension, continue midodrineand Florinef, hold  Flomax times two weeks, monitor urinary symptoms and blood pressure, continue use of ace wraps, continue attempts at rehabilitation.   Autonomic dysfunction significant.   Parkinson's with bradykinesia continuing Sinemet.   Coronary artery disease with recent NSTEMI, no anginal symptoms.   Concerns reviewed with patient and nursing staff.      Electronically signed by: Elena Orozco MD

## 2021-06-04 NOTE — PROGRESS NOTES
[facility]    Facility:   [facility] [[id number]]   Code Status: POLST AVAILABLE    Reevaluation of 79-year-old male with advanced Parkinson's, hypertension, autonomic dysfunction, admitted to hospital with multiple falls, Florinef dose increased in hospital, transferred to TCU, persistent decrease in blood pressure recordings, Toviaz and Flomax have been discontinued, ace wraps applied to lower extremities.    Review of systems: patient is seen with wife on this occasion. Continued significant episodes of hypotension, dizziness and lightheadedness have occurred, no syncope. Notes no improvement in strength. Profoundly fatigued at all times. No focal neurologic deficits of new onset. No anterior chest discomfort. Remainder of 12 point review of systems obtained negative.    Exam: drowsy, oriented, bradykinesia present. Sitting in chair with feet in dependent position. Modest tremor bilateral hands, no cogwheeling. S1 and S2 regular. Pulmonary exam without rales rhonchi or wheezes. Abdomen without masses or tenderness. Periphery with nonpitting edema of 2 mm. Pedal pulses diminished and symmetrical. Strength diffusely diminished.    Impression and plan:   significant hypotension, leg elevation encouraged, continues symptomatic with orthostatic changes, continue attempts at rehabilitation, continue use of ace wraps with edema bilateral lower extremities.   Hypertension historically, recent NSTEMI, continues metoprolol at 25 mg Q day, no anginal symptoms.   Advanced Parkinson's, continues Sinemet.   Autonomic dysfunction contributing greatly to hypotension.   Multiple concerns reviewed with patient and wife in attendance.      Electronically signed by: Elena Orozco MD

## 2021-06-04 NOTE — PROGRESS NOTES
[facility]    Facility:   [facility] [[id number]]   Code Status: POLST AVAILABLE    Reevaluation of 79-year-old male with Parkinson's, autonomic dysfunction, multiple falls and weakness, recent NSTEMI, admitted to hospital with falls, on Florinef and midodrine, transferred to TCU for rehabilitation and management of medical problems. Recent discontinuation of toviaz and Flomax.    Review of systems: persistent decreased blood pressure recordings. Near syncope during physical therapy today, was doing over the head activities. No chest pain or palpitations. No change in profound weakness. No focal neurologic deficits of new onset. Remainder of 12 point review of systems obtained negative.    Exam: blood pressure 65/46, 02 90% on room air, heart rate 73, temperature 98.5, respiratory 17. Masking of facies, minimal tremor, bradykinesia present. No HJR. S1 and S2 regular. Pulmonary exam without rales rhonchi or wheezes. Abdomen without masses or tenderness. Periphery with 2 mm nonpitting edema. Strength and muscle tone diffusely diminished.    Impression and plan:   autonomic dysfunction and significant hypotension, no improvement with discontinuation of Flomax, decrease metoprolol to 12.5 mg from 25 mg Q day, metoprolol will not be discontinued in view of recent NSTEMI.   Advanced Parkinson's on Sinemet.   Near syncopal episode in physical therapy today, consider physical therapy activities in bed, patient will limit activities pending his symptoms of lightheadedness which preceed syncope.   Profound weakness multifactorial.   Issues reviewed with patient and nursing staff.      Electronically signed by: Elena Orozco MD

## 2021-06-04 NOTE — TELEPHONE ENCOUNTER
Recommendations relayed to pt.  Pt verbalized understanding and said he is wearing his support stockings.  Updates sent to home care team as well.  -ra    ----- Message -----  From: Rhona Christine MD  Sent: 12/3/2019   4:15 PM CST  To: Sumi Conrad RN    Please recommend physical therapy and elastic support sock.  Continue midodrine, hold metoprolol if blood pressure is low.  Thanks  Malachi

## 2021-06-04 NOTE — PROGRESS NOTES
[facility]    Facility:   [facility] [[id number]]   Code Status: DNR/DNI      CHIEF COMPLAINT/REASON FOR VISIT:  Chief Complaint   Patient presents with     Review Of Multiple Medical Conditions       HISTORY:      HPI: Canelo is a 79 y.o. male with a history of CAD, STEMI (11/11/19), cardiac pacemaker, parkinsons disease with orthostatic hypotension, dsyphagia, htn who experienced a fall at home with near syncope.     For is CAD with recent STEMI: On asa, brilinta, metoprolol, lipitor.     Autonomic orthostatic hypotension: He remains on midodrine and his florinef was increased during this recent hospital stay. He has had extensive workup in the past.      Fall: negative for fractures including CT. Continue OT/PT. Continue ice prn.     Parkinson's: on sinimet.     Past Medical History:   Diagnosis Date     Acute chest pain 11/10/2019     Chronic ischemic heart disease      GERD (gastroesophageal reflux disease)      HLD (hyperlipidemia)      HTN (hypertension)      Near syncope 10/12/2015     Pacemaker      Pacemaker     dual chamber     Parkinson's disease (H)      Urinary urgency      Vitamin D deficiency              Family History   Problem Relation Age of Onset     Heart disease Mother      Heart disease Father      Social History     Socioeconomic History     Marital status:      Spouse name: Not on file     Number of children: Not on file     Years of education: Not on file     Highest education level: Not on file   Occupational History     Not on file   Social Needs     Financial resource strain: Not on file     Food insecurity:     Worry: Not on file     Inability: Not on file     Transportation needs:     Medical: Not on file     Non-medical: Not on file   Tobacco Use     Smoking status: Former Smoker     Smokeless tobacco: Never Used   Substance and Sexual Activity     Alcohol use: Yes     Comment: occasional     Drug use: No     Sexual activity: Not on file    Lifestyle     Physical activity:     Days per week: Not on file     Minutes per session: Not on file     Stress: Not on file   Relationships     Social connections:     Talks on phone: Not on file     Gets together: Not on file     Attends Mosque service: Not on file     Active member of club or organization: Not on file     Attends meetings of clubs or organizations: Not on file     Relationship status: Not on file     Intimate partner violence:     Fear of current or ex partner: Not on file     Emotionally abused: Not on file     Physically abused: Not on file     Forced sexual activity: Not on file   Other Topics Concern     Not on file   Social History Narrative     Not on file         Review of Systems   Cardiovascular: Negative for chest pain and leg swelling.   Gastrointestinal: Negative.    Musculoskeletal: Positive for arthralgias and myalgias.        Right leg pain/hip pain.    Skin: Negative.    Neurological: Positive for weakness.   Psychiatric/Behavioral: Negative for agitation and behavioral problems.       Vitals:    12/15/19 1436   BP: 115/75   Pulse: 77   Temp: 97.6  F (36.4  C)   SpO2: 95%   Weight: 180 lb (81.6 kg)       Physical Exam  Constitutional:       Appearance: Normal appearance.   HENT:      Head: Atraumatic.      Mouth/Throat:      Mouth: Mucous membranes are moist.   Eyes:      Pupils: Pupils are equal, round, and reactive to light.   Cardiovascular:      Rate and Rhythm: Normal rate.      Pulses: Normal pulses.   Pulmonary:      Effort: Respiratory distress present.      Breath sounds: No rales.   Abdominal:      General: Abdomen is flat.   Skin:     General: Skin is warm and dry.   Neurological:      Mental Status: He is alert. Mental status is at baseline.   Psychiatric:         Behavior: Behavior normal.           LABS:   Reviewed    ASSESSMENT:    .     ICD-10-CM    1. Fall at home, initial encounter W19.XXXA     Y92.009    2. Cardiac pacemaker in situ Z95.0    3. Autonomic  dysfunction G90.9    4. Parkinson disease (H) G20    5. Essential hypertension I10        PLAN:    CHF/swellng: Start BHARATHI hoes to norman LE on am an off HS.   Continue all other medication as ordred,     R hip: OT/PT.     Total 35 minutes of [id number]% was spent counseling and coordination of care renny above plan.    Electronically signed by: Cy Jerry, EDISON

## 2021-06-04 NOTE — PROGRESS NOTES
[facility]    Facility:   [facility] [[id number]]   Code Status: POLST AVAILABLE    Admission evaluation to TCU of 79-year-old male. History is taken from patient who gives a rational history, wife in attendance who gives an accurate history and accompanying hospital notes. History of progressive Parkinson's over 20+ years, dysphagia, hypertension, recent NSTEMI  11/11 with PCI to LAD, placed on Brilinta at that time, urinary frequency on Flomax, recent initiation of Toviaz, hyperlipidemia on Lipitor, autonomic dysfunction, recurrent falls, was admitted to hospital following a fall while getting out of bed, brief loss of consciousness, no acute CNS findings, no seizure activity, pacemaker adequate on admission to hospital, EKG and troponins negative, chronically on midodrine and florinef for orthostatic hypotension, Florinef dose increased, continued on low-dose metoprolol, transferred to TCU for rehabilitation and management of medical problems.    Past medical history, current medical problem list, drug allergies, current medication list, social history, family history, code status reviewed in epic.    Review of systems: profound fatigue present at all times. Progressive Parkinsonian symptoms. Relates multiple falls and brief syncopal episodes, all occurring with standing, no associated neurologic or cardiac symptoms. Toviaz initiated over past two - three months, unaware of increased symptomatology with Toviaz, Flomax initiated over past one year for nocturia, symptoms improved. Aware of mild progressive memory deficit. Dysphagia present. No new focal neurologic deficits. Denies fever sweats or chills. No current lightheadedness with standing. Hip and shoulder pain following recent fall. Remainder of 12 point review of systems obtained negative.    Exam: pleasant male, bradykinesia present, masking of facies, no pharyngeal erythema, no credit bruits or HJR. Blood pressure  182/102, pulse 61, 02 93% on room air. Attends to all conversation, thyroid midline regular without nodularity or tenderness. S1 and S2 regular with systolic flow murmur. Pulmonary exam without rales rhonchi or wheezes. Abdomen without masses tenderness organomegaly. Modest DJD changes joints without acute inflammatory change. Trace venous stasis changes lower extremities. Pedal pulses symmetrical. No calf tenderness or swelling. Bradykinesia present, no cogwheeling.    Impression and plan:   advanced Parkinson's, recurrent falls on the basis of orthostatic hypotension, currently with elevated blood pressure recordings, recent increase in Florinef, hold midodrine and Florinef for systolic blood pressure greater than 140, monitor for orthostatic blood pressure changes should Florinef and midodrine be held, should orthostatic blood pressure changes occur and blood pressure remain elevated increase in metoprolol dose.   Mestinon use, no mention of history of myasthenia gravis, patient reports  drooping of eyelids and profound weakness, wife confirms symptoms, unaware  however of any given diagnosis of myasthenia gravis.   Urinary frequency improved with Flomax, recent addition of Toviaz, consider hold of Flomax pending blood pressure orthostatic recordings.   Coronary artery disease, recent NSTEMI, recent satisfactory EKG and troponins.   Significant deconditioning and weakness multifactorial, rehabilitation indicated.   Dysphagia historically, monitor, no history of aspiration pneumonia.   Hyperlipidemia on statin.   Multiple complex medical issues are reviewed including orthostatic hypotension, current elevation of blood pressure, medication potentially contributing to orthostasis, diagnosis of Parkinson's, deconditioning. Additional discussion with nursing staff. Outside medical records reviewed.      Electronically signed by: Elena Orozco MD

## 2021-06-04 NOTE — PROGRESS NOTES
[facility]    Facility:   [facility] [[id number]]   Code Status: DNR/DNI      CHIEF COMPLAINT/REASON FOR VISIT:  Chief Complaint   Patient presents with     Problem Visit       HISTORY:      HPI: Canelo is a 79 y.o. male with a history of CAD, STEMI (11/11/19), cardiac pacemaker, parkinsons disease with orthostatic hypotension, dsyphagia, htn who experienced a fall at home with near syncope.     Today: Low bp of 65/37 today; this improved to 90s/50s once he was off the toilet (was having bm) and laid down. He is now feeling okay. He is endorsing insomnia that happens nightly and he wakes nearly every 15 minutes.     For is CAD with recent STEMI: On asa, brilinta, metoprolol, lipitor.     Autonomic orthostatic hypotension: He remains on midodrine and his florinef was increased during this recent hospital stay. He has had extensive workup in the past.        Fall: negative for fractures including CT. Continue OT/PT. Continue ice prn which he is using to right leg.     Parkinson's: on sinimet.     Past Medical History:   Diagnosis Date     Acute chest pain 11/10/2019     Chronic ischemic heart disease      GERD (gastroesophageal reflux disease)      HLD (hyperlipidemia)      HTN (hypertension)      Near syncope 10/12/2015     Pacemaker      Pacemaker     dual chamber     Parkinson's disease (H)      Urinary urgency      Vitamin D deficiency              Family History   Problem Relation Age of Onset     Heart disease Mother      Heart disease Father      Social History     Socioeconomic History     Marital status:      Spouse name: Not on file     Number of children: Not on file     Years of education: Not on file     Highest education level: Not on file   Occupational History     Not on file   Social Needs     Financial resource strain: Not on file     Food insecurity:     Worry: Not on file     Inability: Not on file     Transportation needs:     Medical: Not on file      Non-medical: Not on file   Tobacco Use     Smoking status: Former Smoker     Smokeless tobacco: Never Used   Substance and Sexual Activity     Alcohol use: Yes     Comment: occasional     Drug use: No     Sexual activity: Not on file   Lifestyle     Physical activity:     Days per week: Not on file     Minutes per session: Not on file     Stress: Not on file   Relationships     Social connections:     Talks on phone: Not on file     Gets together: Not on file     Attends Gnosticism service: Not on file     Active member of club or organization: Not on file     Attends meetings of clubs or organizations: Not on file     Relationship status: Not on file     Intimate partner violence:     Fear of current or ex partner: Not on file     Emotionally abused: Not on file     Physically abused: Not on file     Forced sexual activity: Not on file   Other Topics Concern     Not on file   Social History Narrative     Not on file         Review of Systems   Cardiovascular: Negative for chest pain and leg swelling.   Gastrointestinal: Negative.    Musculoskeletal: Positive for arthralgias and myalgias.        Right leg pain/hip pain.    Skin: Negative.    Neurological: Positive for weakness.   Psychiatric/Behavioral: Negative for agitation and behavioral problems.       Vitals:    12/20/19 1326   BP: 92/56   Pulse: 100   Temp: 98.4  F (36.9  C)   SpO2: 97%   Weight: 180 lb (81.6 kg)       Physical Exam  Constitutional:       Appearance: Normal appearance.   HENT:      Head: Atraumatic.      Mouth/Throat:      Mouth: Mucous membranes are moist.   Eyes:      Pupils: Pupils are equal, round, and reactive to light.   Cardiovascular:      Rate and Rhythm: Normal rate.      Pulses: Normal pulses.   Pulmonary:      Effort: Respiratory distress present.      Breath sounds: No rales.   Abdominal:      General: Abdomen is flat.   Skin:     General: Skin is warm and dry.   Neurological:      Mental Status: He is alert. Mental status is at  baseline.   Psychiatric:         Behavior: Behavior normal.           LABS:   Reviewed    ASSESSMENT:    .     ICD-10-CM    1. Autonomic dysfunction G90.9    2. Other insomnia G47.09    3. Parkinson disease (H) G20        PLAN:    Insomnia: Start melatonin 3mg po at bedtime. Will reevaluate this after 2 nights.     CHF/swellng: Continue BHARATHI arriaza to norman LE on am an off HS.     For is CAD with recent STEMI: On asa, brilinta, metoprolol, lipitor.     Autonomic orthostatic hypotension: He remains on midodrine and his florinef was increased during this recent hospital stay. He has had extensive workup in the past.   Bps vary as expeced with sbp 70s-170s.     Fall: negative for fractures including CT. Continue OT/PT. Continue ice prn.     Parkinson's: on sinimet.     R hip: OT/PT.         Electronically signed by: Cy Jerry, CNP

## 2021-06-04 NOTE — PROGRESS NOTES
[facility]    Facility:   [facility] [[id number]]   Code Status: POLST AVAILABLE    Reassessment of 79-year-old male with advanced Parkinson's, recent NSTEMI , history of hypertension, significant hypotension on midodrine and florinef, admitted to hospital with weakness and falls, florinef dose increased in hospital, on initial admission to TCU with elevated blood pressure recordings, variable blood pressure recording since that time with significant hypo tension continuing, lowest level of 88/55.    Review of systems: continues to feel weak. Ace wraps have been applied to lower extremities. No syncopal episodes. No near faints. Denies anterior chest discomfort. Parkinson's symptomatic. Bradykinesia as primary complaint. No active urinary symptoms. Remainder of 12 point review of systems obtained negative. Wife in attendance concerned regarding patient's ongoing significant fatigue and weakness.    Exam: blood pressure 88/55, vital signs otherwise stable. Pleasant, flat affect, appears weak. Bradykinesia present. No tremor. No carotid bruits. S1 and S2 regular. Pulmonary exam without rales rhonchi or wheezes. Abdomen without masses tenderness organomegaly. Periphery with 2 mm nonpitting edema, ace wraps are in place. Strength and muscle tone diffusely diminished.    Impression and plan:   advanced Parkinson's continuing Sinemet.   Significant hypotension, recent increase in florinef dose, continues midodrine, discontinue Toviaz, continue use of ace wraps, monitor.   Coronary artery disease with recent NSTEMI, no anginal symptoms.   Profound fatigue multifactorial.   Extensive discussion with patient and wife in attendance regarding symptoms.      Electronically signed by: Elena Orozco MD

## 2021-06-05 VITALS
WEIGHT: 180 LBS | HEART RATE: 76 BPM | SYSTOLIC BLOOD PRESSURE: 86 MMHG | HEIGHT: 73 IN | RESPIRATION RATE: 16 BRPM | BODY MASS INDEX: 23.86 KG/M2 | DIASTOLIC BLOOD PRESSURE: 58 MMHG

## 2021-06-05 VITALS — SYSTOLIC BLOOD PRESSURE: 104 MMHG | RESPIRATION RATE: 16 BRPM | HEART RATE: 72 BPM | DIASTOLIC BLOOD PRESSURE: 64 MMHG

## 2021-06-05 NOTE — PROGRESS NOTES
[facility]    Facility:   [facility] [[id number]]   Code Status: POLST AVAILABLE  Reassessment of 79-year-old male who continues in TCU for management of profound weakness, orthostatic hypotension, history of Parkinson's/hypertension/hypotension/autonomic dysfunction/recent NSTEMI. Recent increase in metoprolol to 25 mg a.m. 12.5 mg afternoon, recent decrease in Florinef to 0.15 mg Q day, continues midodrine 10 mg TID, recent initiation of Remeron 7.5 mg Q day for depression, neurology has decreased  Sinemet to two tablets TID from at 2.5 tablets Q ID.    Review of systems: patient is seen with his wife and son on this occasion. Continues to feel improved. No significant hypotensive episodes, no syncopal episodes, able to ambulate to bathroom. Notes no change in Parkinson's symptoms with decrease in Sinemet dose. Mood significantly improved, appetite improved. Feels hopeful. No chest pain or palpitations. Remainder of 12 point review of systems obtained negative.    Exam: blood pressure continues to show variability, previous elevations greater than 200 systolic have not occurred, levels of 80 systolic have not recurred. Patient sitting in chair, alert and oriented, mild bradykinesia, follows all conversation. No pharyngeal erythema. S1 and S2 regular. Pulmonary exam without adventitious sounds. Abdomen without masses or tenderness. No tremor appreciated. Minimal bradykinesia decreased from previous exam. Periphery with nonpitting edema trace.    Impression and plan:   Parkinson's, recent decrease in Sinemet dose, improved control of symptoms with decreased dose.   Reactive depression on Remeron 7.5 mg QHS, mood improved.   Hypertension and hypotension, less variability in blood pressure with recent adjustment of medications, now able to sit upright and able to walk to the lavatory, one week ago was unable to sit up right in bed without becoming lightheaded.   Recent NSTEMI, no  cardiac decompensation or  anginal symptoms.   Issues reviewed with patient  family members and nursing staff.   Resume physical therapy, patient had discontinued physical therapy due to significant orthostatic symptoms and syncope.        Electronically signed by: Elena Orozco MD

## 2021-06-05 NOTE — PROGRESS NOTES
[facility]    Facility:   [facility] [[id number]]   Code Status: POLST AVAILABLE    Reassessment of 71-year-old male with progressive weakness, Parkinson's, hypertension, hypotension, urinary frequency, coronary artery disease. Recent hospitalization for falls, Florinef dose increased in hospital, transferred to TCU, progressive decrease in strength, increase in variability of blood pressure recordings, Flomax discontinued, midodrine increased to 10 mg TID with Florinef 0.2 mg Q day. Previous parameters for hold of Florinef and midodrine for elevated blood pressure recording discontinued on this date.    Review of systems: continued and progressive weakness. Unable to get out of bed secondary to weakness. Incident last p.m., wanted to use the bathroom, was able to ambulate to bathroom but passed out while sitting on commode, blood pressure once back in bed 233/124 per nursing report. Denies chest pain. No focal neurologic deficit. Has considered initiation of SSRI, agreeable to initiating SSRI at this time. Progressive depressive symptoms related to limited mobility. Urinary frequency continues. Denies chest pain or palpitations. Wife in attendance with concerns.    Exam: blood pressures reviewed over past 48 hours, last p.m. 233/124, currently 90/60. Masked facies, cognitively intact. No HJR. No cervical adenopathy. S1 and S2 regular. Pulmonary exam without rales rhonchi or wheezes. Abdomen without masses tenderness organomegaly. Periphery with 2 mm edema nonpitting. Modest tremor bilateral hands.    Impression and plan:   significant variability blood pressure recordings, increase metoprolol to 25 mg Q day, continue Florinef and midodrine, discontinue parameters for hold of midodrine and Florinef.   Progressive weakness multifactorial, known autonomic dysfunction and Parkinson's, known orthostatic hypotension, complaints of nausea preceding all events of syncope, request has  been made for repeat neurologic evaluation.   Coronary artery disease without indication of angina.   Significant depressive symptoms, begin Remeron 7.5 mg Q day.   Issues reviewed with patient, patient's wife, nursing staff, prolonged evaluation time with issues addressed including depression, hypertension and hypotension, progressive decline in clinical status, differential diagnoses of all symptoms.      Electronically signed by: Elena Orozco MD

## 2021-06-05 NOTE — PROGRESS NOTES
[facility]    Facility:   [facility] [[id number]]   Code Status: DNR/DNI      CHIEF COMPLAINT/REASON FOR VISIT:  Chief Complaint   Patient presents with     Review Of Multiple Medical Conditions     Pain management, hallucinations and confusion.       HISTORY:      HPI: Canelo is a 79 y.o. male with a history of CAD, STEMI (11/11/19), cardiac pacemaker, parkinsons disease with orthostatic hypotension, dsyphagia, htn who experienced a fall at home with near syncope.     Today: Patient seen his wife in the room.  His pain medications have been on hold since Friday for increased confusion, hallucinations and sedation.  He had been on oxycodone twice daily that was switched to Dilaudid which she was taking for new onset pain after having a fall and hitting his ribs against the wall. x-ray was negative.  He began having increased confusion and hallucinations and medications have been on hold since then.  His wife says he rarely takes pain medication while at home.  He does have a allergy to Tylenol so we do have limited options here.  I did reassure the wife that it is common for older individuals to have confusion as a side effect of narcotics and that we will discontinue them.  Today he is comfortable and says he will do just fine without any pain medications.  I am adding ibuprofen to be used once every 24 hours for any unbearable pain rating 7-10.    For is CAD with recent STEMI: On asa, brilinta, metoprolol, lipitor.     Autonomic orthostatic hypotension: He remains on midodrine and his florinef was increased during this recent hospital stay. He has had extensive workup in the past  Has had multiple changes to medications during TCU stay, currently he is stable and able to sit upright without lightheadedness..      Fall: negative for fractures including CT. Continue OT/PT. no longer painful on the right leg.    Parkinson's: on sinimet which was recently decreased per neurology  consult.  He will need to see neurology with a lab visit prior to next dose change.    Past Medical History:   Diagnosis Date     Acute chest pain 11/10/2019     Chronic ischemic heart disease      GERD (gastroesophageal reflux disease)      HLD (hyperlipidemia)      HTN (hypertension)      Near syncope 10/12/2015     Pacemaker      Pacemaker     dual chamber     Parkinson's disease (H)      Urinary urgency      Vitamin D deficiency              Family History   Problem Relation Age of Onset     Heart disease Mother      Heart disease Father      Social History     Socioeconomic History     Marital status:      Spouse name: Not on file     Number of children: Not on file     Years of education: Not on file     Highest education level: Not on file   Occupational History     Not on file   Social Needs     Financial resource strain: Not on file     Food insecurity:     Worry: Not on file     Inability: Not on file     Transportation needs:     Medical: Not on file     Non-medical: Not on file   Tobacco Use     Smoking status: Former Smoker     Smokeless tobacco: Never Used   Substance and Sexual Activity     Alcohol use: Yes     Comment: occasional     Drug use: No     Sexual activity: Not on file   Lifestyle     Physical activity:     Days per week: Not on file     Minutes per session: Not on file     Stress: Not on file   Relationships     Social connections:     Talks on phone: Not on file     Gets together: Not on file     Attends Anabaptism service: Not on file     Active member of club or organization: Not on file     Attends meetings of clubs or organizations: Not on file     Relationship status: Not on file     Intimate partner violence:     Fear of current or ex partner: Not on file     Emotionally abused: Not on file     Physically abused: Not on file     Forced sexual activity: Not on file   Other Topics Concern     Not on file   Social History Narrative     Not on file         Review of Systems    Cardiovascular: Negative for chest pain and leg swelling.   Gastrointestinal: Negative.    Musculoskeletal: Negative for arthralgias and myalgias.   Skin: Negative.    Psychiatric/Behavioral: Negative for agitation and behavioral problems.       Vitals:    02/03/20 2206   BP: 126/73   Pulse: 74   Temp: 98.1  F (36.7  C)   SpO2: 96%   Weight: 183 lb (83 kg)       Physical Exam  Constitutional:       Appearance: Normal appearance.   HENT:      Head: Atraumatic.      Mouth/Throat:      Mouth: Mucous membranes are moist.   Eyes:      Pupils: Pupils are equal, round, and reactive to light.   Cardiovascular:      Rate and Rhythm: Normal rate.      Pulses: Normal pulses.   Pulmonary:      Effort: Pulmonary effort is normal. No respiratory distress.      Breath sounds: Normal breath sounds. No rales.   Abdominal:      General: Abdomen is flat.   Skin:     General: Skin is warm and dry.   Neurological:      General: No focal deficit present.      Mental Status: He is alert. Mental status is at baseline.      Cranial Nerves: Cranial nerves are intact.      Motor: Weakness present. No tremor.      Coordination: Coordination normal.      Gait: Gait normal.      Comments: Bilateral handgrips equal, lower extremity extension and flexion normal, no reproducible pain spine exam, straight leg raise normal.   Psychiatric:         Behavior: Behavior normal.      Comments: Flat affect, depressed mood.           LABS:   Reviewed    ASSESSMENT:    .     ICD-10-CM    1. Fall at nursing home, initial encounter W19.XXXA     Y92.129    2. Autonomic dysfunction G90.9    3. Parkinson disease (H) G20        PLAN:      Fall in nursing home: Neuro is intact, no headache or dizziness.  No bruising or redness.  X-ray negative.  Pain is improved since last week, in general he does not need pain medication.  -Discontinue narcotic pain medication.  -Start ibuprofen 6 mg p.o. every 24 hours as needed for pain 7 through 10.  Discussed risks and  benefits with patient and nursing staff.  Use sparingly.      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  -Now on Remeron, sleeping well.    Insomnia: Improved on mirtazapine.    CHF/swellng: Continue BHARATHI arriaza to norman LE on am an off HS.     For is CAD with recent STEMI: On asa, brilinta, metoprolol, lipitor.     Autonomic orthostatic hypotension: He remains on midodrine and his florinef was increased during this recent hospital stay. He has had extensive workup in the past.   Recent improvement in blood pressures with adjustment of metoprolol, Florinef and midodrine.  -Continue to monitor.    Parkinson's: on sinimet.   -Neurology has recently decreased his Sinemet with improvement in symptoms.    R hip: OT/PT.   -Recently restarted therapies due to improvement in blood pressures.        Electronically signed by: Cy Jerry, EDISON

## 2021-06-05 NOTE — PROGRESS NOTES
[facility]    Facility:   [facility] [[id number]]   Code Status: DNR/DNI      CHIEF COMPLAINT/REASON FOR VISIT:  Chief Complaint   Patient presents with     Problem Visit     mood, blood pressures       HISTORY:      HPI: Canelo is a 79 y.o. male with a history of CAD, STEMI (11/11/19), cardiac pacemaker, parkinsons disease with orthostatic hypotension, dsyphagia, htn who experienced a fall at home with near syncope.     Today: Seen per request of nursing for blood pressures as well as mood.  Nursing had an order to give midodrine and Florinef only after obtaining orthostatic blood pressure however he is unable to stand up to do this.  See notes blood pressure was 91/50 so I encouraged nursing to give both Florinef and midodrine.  Difficulty attending therapies due to severe orthostatic hypotension.  Has been essentially bedbound for much of his stay in the TCU.  Not progressing back to baseline.  Additionally, his mood is notably depressed as noticed by patient's wife and nursing staff.  Had a long discussion with patient and wife regarding his mood and he is open to trying an antidepressant.   They did readdress his complaints of insomnia, he has not been taking melatonin regularly but is open to trying this again.    For is CAD with recent STEMI: On asa, brilinta, metoprolol, lipitor.     Autonomic orthostatic hypotension: He remains on midodrine and his florinef was increased during this recent hospital stay. He has had extensive workup in the past.      Fall: negative for fractures including CT. Continue OT/PT. Continue ice prn which he is using to right leg.     Parkinson's: on sinimet.     Past Medical History:   Diagnosis Date     Acute chest pain 11/10/2019     Chronic ischemic heart disease      GERD (gastroesophageal reflux disease)      HLD (hyperlipidemia)      HTN (hypertension)      Near syncope 10/12/2015     Pacemaker      Pacemaker     dual chamber      Parkinson's disease (H)      Urinary urgency      Vitamin D deficiency              Family History   Problem Relation Age of Onset     Heart disease Mother      Heart disease Father      Social History     Socioeconomic History     Marital status:      Spouse name: Not on file     Number of children: Not on file     Years of education: Not on file     Highest education level: Not on file   Occupational History     Not on file   Social Needs     Financial resource strain: Not on file     Food insecurity:     Worry: Not on file     Inability: Not on file     Transportation needs:     Medical: Not on file     Non-medical: Not on file   Tobacco Use     Smoking status: Former Smoker     Smokeless tobacco: Never Used   Substance and Sexual Activity     Alcohol use: Yes     Comment: occasional     Drug use: No     Sexual activity: Not on file   Lifestyle     Physical activity:     Days per week: Not on file     Minutes per session: Not on file     Stress: Not on file   Relationships     Social connections:     Talks on phone: Not on file     Gets together: Not on file     Attends Hoahaoism service: Not on file     Active member of club or organization: Not on file     Attends meetings of clubs or organizations: Not on file     Relationship status: Not on file     Intimate partner violence:     Fear of current or ex partner: Not on file     Emotionally abused: Not on file     Physically abused: Not on file     Forced sexual activity: Not on file   Other Topics Concern     Not on file   Social History Narrative     Not on file         Review of Systems   Cardiovascular: Negative for chest pain and leg swelling.   Gastrointestinal: Negative.    Musculoskeletal: Negative for arthralgias and myalgias.        Right leg pain/hip pain.    Skin: Negative.    Neurological: Positive for weakness.   Psychiatric/Behavioral: Negative for agitation and behavioral problems.       Vitals:    01/09/20 1602   BP: 99/54   Pulse: 73    Temp: 97.2  F (36.2  C)   SpO2: 98%   Weight: 167 lb (75.8 kg)       Physical Exam  Constitutional:       Appearance: Normal appearance.   HENT:      Head: Atraumatic.      Mouth/Throat:      Mouth: Mucous membranes are moist.   Eyes:      Pupils: Pupils are equal, round, and reactive to light.   Cardiovascular:      Rate and Rhythm: Normal rate.      Pulses: Normal pulses.   Pulmonary:      Effort: Pulmonary effort is normal. No respiratory distress.      Breath sounds: Normal breath sounds. No rales.   Abdominal:      General: Abdomen is flat.   Skin:     General: Skin is warm and dry.   Neurological:      Mental Status: He is alert. Mental status is at baseline.   Psychiatric:         Behavior: Behavior normal.      Comments: Flat affect, depressed mood.           LABS:   Reviewed    ASSESSMENT:    .     ICD-10-CM    1. Autonomic dysfunction G90.9    2. Reactive depression F32.9    3. 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  -Start Zoloft 25 mg p.o. daily.  -Follow-up with PHQ 9 3 to 4 weeks.    Insomnia: Continue as needed melatonin per patient request.    CHF/swellng: Continue BHARATHI hoes to norman LE on am an off HS.     For is CAD with recent STEMI: On asa, brilinta, metoprolol, lipitor.     Autonomic orthostatic hypotension: He remains on midodrine and his florinef was increased during this recent hospital stay. He has had extensive workup in the past.   Bps vary as expeced with sbp 70s-170s.   -Significant concerns blood pressure in TCU.  Today 91/50 while sitting.  Encourage nursing to give midodrine and Florinef spite occasional elevated blood pressures when laying.  He has had difficulty getting out of bed for therapies due to significant orthostatic hypotension.    Fall: negative for fractures including CT. Continue OT/PT. Continue ice prn.   -says pain is  improved. Denies pain today.     Parkinson's: on sinimet.   -I did encourage wife to make follow-up appointment with neurology that was missed due to his hospitalization.    R hip: OT/PT.   -Difficulty dissipating therapy due to his hypotension.  Has been pretty much bedbound for the last week or more.  -We will be having a care conference today romain long-term plans.        Electronically signed by: Cy Jerry, EDISON

## 2021-06-05 NOTE — PROGRESS NOTES
[facility]    Facility:   [facility] [[id number]]   Code Status: POLST AVAILABLE    Reassessment of 79-year-old male with advanced Parkinson's, history of multiple falls and weakness, coronary artery disease with recent NSTEMI, recent hospitalization for weakness and falls, transferred to TCU for rehabilitation and management of medical problems which include significant hypotension, hypertension, autonomic dysfunction, recent fall striking left lower ribs.    Review of systems: patient is seen with wife on this occasion. Left lower rib pain gradually resolving, poorly tolerated narcotics with increased confusion and hallucinations, all narcotics have been discontinued, using Tylenol PRN. Progressing in physical therapy, anticipated home visit in 48 hours. Denies pleuritic chest discomfort. No lightheadedness with standing. No exertional chest discomfort. Continued Parkinsonian symptoms which have decreased in intensity with recent decrease in Sinemet dose. Remainder of 12 point review of systems obtained negative.     Face-to-face documentation regarding need for wheelchair at time of discharge. Patient has a mobility limitation that significantly impairs his ability to participate in one or more mobility related activities of daily living. Mobility limitations are secondary to Parkinson's orthostatic hypotension and profound weakness. The limitation cannot be resolved with an appropriately fitted walker or cane. Patient's home has adequate access between rooms and maneuvering space and surfaces for a manual wheelchair to be used. Patient has not expressed an unwillingness to use the manual wheelchair. Patient has sufficient upper extremity function physical and mental capabilities to safely propel a manual wheelchair during a typical day.    Exam: blood pressure 170/90, heart rate 70, temperature 97.2, 02 98%. Lack of spontaneous facial expression consistent with Parkinson's,  oriented. Cooperative. No HJR, no use of accessory muscles at rest. No pharyngeal erythema. S1 and S2 regular. Pulmonary exam without rales rhonchi or wheezes. Abdomen without masses tenderness organomegaly. Periphery with 2 mm trace pitting edema. Minimal tremor right greater than left hand. Mild bradykinesia. Joints without acute inflammatory change. Trace tenderness left inferior lateral rib edges. No flank tenderness source.    Impression and plan:   face-to-face documentation regarding need for wheelchair on discharge from TCU.   Hypertension, metoprolol currently 25 mg a.m. 12.5 PM, intermittent significant elevation of blood pressure, overall satisfactory control.   Hypotension continuing midodrine 10 mg TID and Florinef 0.1 mg Q day, previous significant orthostatic symptoms and readings have resolved.   Chronic deconditioning, ongoing need for rehabilitation.   Coronary artery disease without current anginal symptoms.   Recent fall with left inferior rib  tenderness slowly resolving, no evidence of retroperitoneal trauma with fall.   Advanced Parkinson's with significant mobility limitations.   Multiple concerns and issues reviewed with patient and wife in attendance.      Electronically signed by: Elena Orozco MD

## 2021-06-05 NOTE — PROGRESS NOTES
[facility]    Facility:   [facility] [[id number]]   Code Status: POLST AVAILABLE    Reassessment of 71-year-old male with Parkinson's, chronic hypotension, hypertension, coronary artery disease, autonomic dysfunction, recent admission to hospital following falls and with weakness, recent increase in midodrine and Florinef, recent decrease in Sinemet per neurology, recent improvement in orthostatic symptoms.    Review of systems: reports he is participating in physical therapy. No recent orthostatic symptoms. Aware of intermittent elevation of blood pressure. Tolerating decreased Sinemet dose, Parkinson's symptoms improved with decreased dose. Able to walk to the lavatory. No syncope with bowel movements which previously occurred. Denies chest pain or palpitations. Mood improved. No dyspeptic symptoms. Remainder of 12 point review of systems obtained negative.    Exam: vital signs reviewed over past five days, intermittent elevation of blood pressure to 170, no low systolic readings over past three days. Flat affect, mild bradykinesia, minimal tremor bilateral hands. S1 and S2 regular with faint systolic murmur. Pulmonary exam without rales rhonchi or wheezes. Abdomen without masses tenderness organomegaly. Periphery with edema 2 mm nonpitting. Strength and muscle tone diffusely diminished.    Impression and plan:   chronic hypotension, significant improvement in blood pressure recordings, continues with intermittent high systolic levels, continue metoprolol 25 AM 12.5 p.m., recent decrease in Florinef to 0.15 mg Q day, midodrine at 10 mg TID.   Coronary artery disease without indication of angina   Chronic profound deconditioning, continue rehabilitation.   Coronary artery disease with recent  NSTEMI, no current angina.   Parkinson's, symptoms decreased with decrease in Sinemet dose.   Multiple concerns are reviewed.      Electronically signed by: Elena Orozco MD

## 2021-06-05 NOTE — PROGRESS NOTES
[facility]    Facility:   [facility] [[id number]]   Code Status: DNR/DNI      CHIEF COMPLAINT/REASON FOR VISIT:  Chief Complaint   Patient presents with     Problem Visit     Fall with left-sided rib pain       HISTORY:      HPI: Canelo is a 79 y.o. male with a history of CAD, STEMI (11/11/19), cardiac pacemaker, parkinsons disease with orthostatic hypotension, dsyphagia, htn who experienced a fall at home with near syncope.     Today: Patient was seen for fall without obvious injury in the bathroom, reports he was trying to transfer himself to the toilet and he fell back and hit his back and left rib cage.  He is now having pain at the left ribs.  He is using ice which is helping.  Unsure if he has had, neuro is been intact with no headache or dizziness.  There is no bruising or redness on his back or left side.  Lower extremities with normal extension flexion and straight leg raise without pain.  Bilateral hand grasps are equal.    For is CAD with recent STEMI: On asa, brilinta, metoprolol, lipitor.     Autonomic orthostatic hypotension: He remains on midodrine and his florinef was increased during this recent hospital stay. He has had extensive workup in the past  Has had multiple changes to medications during TCU stay, currently he is stable and able to sit upright without lightheadedness..      Fall: negative for fractures including CT. Continue OT/PT. no longer painful on the right leg.    Parkinson's: on sinimet which was recently decreased per neurology consult.  He will need to see neurology with a lab visit prior to next dose change.    Past Medical History:   Diagnosis Date     Acute chest pain 11/10/2019     Chronic ischemic heart disease      GERD (gastroesophageal reflux disease)      HLD (hyperlipidemia)      HTN (hypertension)      Near syncope 10/12/2015     Pacemaker      Pacemaker     dual chamber     Parkinson's disease (H)      Urinary urgency      Vitamin D  deficiency              Family History   Problem Relation Age of Onset     Heart disease Mother      Heart disease Father      Social History     Socioeconomic History     Marital status:      Spouse name: Not on file     Number of children: Not on file     Years of education: Not on file     Highest education level: Not on file   Occupational History     Not on file   Social Needs     Financial resource strain: Not on file     Food insecurity:     Worry: Not on file     Inability: Not on file     Transportation needs:     Medical: Not on file     Non-medical: Not on file   Tobacco Use     Smoking status: Former Smoker     Smokeless tobacco: Never Used   Substance and Sexual Activity     Alcohol use: Yes     Comment: occasional     Drug use: No     Sexual activity: Not on file   Lifestyle     Physical activity:     Days per week: Not on file     Minutes per session: Not on file     Stress: Not on file   Relationships     Social connections:     Talks on phone: Not on file     Gets together: Not on file     Attends Christianity service: Not on file     Active member of club or organization: Not on file     Attends meetings of clubs or organizations: Not on file     Relationship status: Not on file     Intimate partner violence:     Fear of current or ex partner: Not on file     Emotionally abused: Not on file     Physically abused: Not on file     Forced sexual activity: Not on file   Other Topics Concern     Not on file   Social History Narrative     Not on file         Review of Systems   Cardiovascular: Negative for chest pain and leg swelling.   Gastrointestinal: Negative.    Musculoskeletal: Negative for arthralgias and myalgias.        Right leg pain/hip pain.    Skin: Negative.    Psychiatric/Behavioral: Negative for agitation and behavioral problems.       Vitals:    01/29/20 0940   BP: 96/62   Pulse: 73   Temp: 98.6  F (37  C)   SpO2: 96%   Weight: 171 lb (77.6 kg)       Physical Exam  Constitutional:        Appearance: Normal appearance.   HENT:      Head: Atraumatic.      Mouth/Throat:      Mouth: Mucous membranes are moist.   Eyes:      Pupils: Pupils are equal, round, and reactive to light.   Cardiovascular:      Rate and Rhythm: Normal rate.      Pulses: Normal pulses.   Pulmonary:      Effort: Pulmonary effort is normal. No respiratory distress.      Breath sounds: Normal breath sounds. No rales.   Abdominal:      General: Abdomen is flat.   Skin:     General: Skin is warm and dry.   Neurological:      General: No focal deficit present.      Mental Status: He is alert. Mental status is at baseline.      Cranial Nerves: Cranial nerves are intact.      Motor: Weakness present. No tremor.      Coordination: Coordination normal.      Gait: Gait normal.      Comments: Bilateral handgrips equal, lower extremity extension and flexion normal, no reproducible pain spine exam, straight leg raise normal.   Psychiatric:         Behavior: Behavior normal.      Comments: Flat affect, depressed mood.           LABS:   Reviewed    ASSESSMENT:    .     ICD-10-CM    1. Syncope and collapse R55    2. Autonomic dysfunction G90.9    3. Fall at nursing home, initial encounter W19.XXXA     Y92.129        PLAN:      Fall in nursing home: Neuro is intact, no headache or dizziness.  No bruising or redness.   -Obtain x-ray AP and lateral of left ribs.  -Continue to monitor and assist with all ambulation.  -Encourage patient to use call light for ambulation.  -Neuro checks per facility protocol.  -Continue ice to left rib cage 20 minutes 4 times daily.  -Oxycodone 5 mg p.o. twice daily as needed for left rib cage pain.    Overactive bladder:  -Okay to discontinue fesoterodine and start tolterodine tartrate.  -I did ask if he had any change in symptoms when his Flomax was held and when fesoterodine was held in December, and he is unable to recall if he had changes in bladder function.  Given that his blood pressures are improved we will  continue this.    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  -Now on Remeron, sleeping well.    Insomnia: Improved on mirtazapine.    CHF/swellng: Continue BHARATHI arriaza to norman LE on am an off HS.     For is CAD with recent STEMI: On asa, brilinta, metoprolol, lipitor.     Autonomic orthostatic hypotension: He remains on midodrine and his florinef was increased during this recent hospital stay. He has had extensive workup in the past.   Recent improvement in blood pressures with adjustment of metoprolol, Florinef and midodrine.  -Continue to monitor.      Parkinson's: on sinimet.   -I did encourage wife to make follow-up appointment with neurology that was missed due to his hospitalization.  -Neurology has recently decreased his Sinemet with improvement in symptoms.    R hip: OT/PT.   -Recently restarted therapies due to improvement in blood pressures.        Electronically signed by: Cy Jerry, CNP

## 2021-06-05 NOTE — PROGRESS NOTES
[facility]    Facility:   [facility] [[id number]]   Code Status: POLST AVAILABLE    Reassessment of 79-year-old male with Parkinson's, autonomic dysfunction, history of hypertension, recent significant hypotension, recent increase in midodrine and Florinef dose, parameters for hold of midodrine and Florinef for elevated blood pressure have been discontinued.    Review of systems: feels much improved, up in chair, no faints have occurred. Aware of significant elevation of blood pressure. Wife has provided patient with salty nuts, believes these have improved his blood pressure control. Denies chest pain or palpitations. Initiated on Remeron 72 hours ago, mood greatly improved. No confusion or hallucinations. Remainder of 12 point review of systems obtained negative.    Exam: patient is seen with his wife on this occasion. Sitting up in chair, smiling, oriented. Cognitively intact. No pharyngeal erythema. No carotid bruits or HJR. S1 and S2 regular. Pulmonary exam without rales rhonchi or wheezes. Abdomen without masses tenderness organomegaly. Periphery with trace nonpitting edema. Mild facial edema present new in onset. Bradykinesia present, mild tremor bilateral hands.    Impression and plan:   significant variation of blood pressure, pressure greater than 200 intermittently, improved clinical status over past 72 hours, previously unable to get out of bed, now sitting upright in chair with lightheadedness, for significant elevation of blood pressure add metoprolol 12.5 mg in after noon, continue 25 mg a.m., decrease Florinef to .15 mg Q day, continue to monitor.   Coronary disease, no current angina.   Advanced Parkinson's, bradykinesia continues, minimal visual hallucinations intermittently present, followed by neurology, unfortunately due to significant weakness and hypotension unable to get to neurologist for evaluation.   Prolonged evaluation time, multiple issues are  reviewed.      Electronically signed by: Elena Orozco MD

## 2021-06-05 NOTE — PROGRESS NOTES
[facility]    Facility:   [facility] [[id number]]   Code Status: DNR/DNI      CHIEF COMPLAINT/REASON FOR VISIT:  Chief Complaint   Patient presents with     Problem Visit     Pain.        HISTORY:      HPI: Canelo is a 79 y.o. male with a history of CAD, STEMI (11/11/19), cardiac pacemaker, parkinsons disease with orthostatic hypotension, dsyphagia, htn who experienced a fall at home with near syncope.     Today: Patient seen at request of nursing for continued pain to the left ribs and back.  X-ray negative.  We have tried oxycodone and Dilaudid for short-term use and he experienced quite a bit of confusion and delusions.  He has also been on tramadol and that was discontinued last week as well.  Nursing staff says that he actually had been doing okay with just tramadol.  I started ibuprofen when we discontinued narcotics and he has been using it daily every 24 hours for pain.  He is asking for more ibuprofen and nursing staff was suggesting restarting tramadol.  I am open to restarting tramadol since he did okay on it however Canelo adamantly refused given his experience last week.  At this time we will continue with ice, infrequent ibuprofen every 24 hours and muscle rub.   His wife says he rarely takes pain medication while at home.  He does have a allergy to Tylenol so we do have limited options here.     For is CAD with recent STEMI: On asa, brilinta, metoprolol, lipitor.     Autonomic orthostatic hypotension: He remains on midodrine and his florinef was increased during this recent hospital stay. He has had extensive workup in the past  Has had multiple changes to medications during TCU stay, currently he is stable and able to sit upright without lightheadedness..      Fall: negative for fractures including CT. Continue OT/PT. no longer painful on the right leg.    Parkinson's: on sinimet which was recently decreased per neurology consult.  He will need to see neurology  with a lab visit prior to next dose change.    Past Medical History:   Diagnosis Date     Acute chest pain 11/10/2019     Chronic ischemic heart disease      GERD (gastroesophageal reflux disease)      HLD (hyperlipidemia)      HTN (hypertension)      Near syncope 10/12/2015     Pacemaker      Pacemaker     dual chamber     Parkinson's disease (H)      Urinary urgency      Vitamin D deficiency              Family History   Problem Relation Age of Onset     Heart disease Mother      Heart disease Father      Social History     Socioeconomic History     Marital status:      Spouse name: Not on file     Number of children: Not on file     Years of education: Not on file     Highest education level: Not on file   Occupational History     Not on file   Social Needs     Financial resource strain: Not on file     Food insecurity:     Worry: Not on file     Inability: Not on file     Transportation needs:     Medical: Not on file     Non-medical: Not on file   Tobacco Use     Smoking status: Former Smoker     Smokeless tobacco: Never Used   Substance and Sexual Activity     Alcohol use: Yes     Comment: occasional     Drug use: No     Sexual activity: Not on file   Lifestyle     Physical activity:     Days per week: Not on file     Minutes per session: Not on file     Stress: Not on file   Relationships     Social connections:     Talks on phone: Not on file     Gets together: Not on file     Attends Denominational service: Not on file     Active member of club or organization: Not on file     Attends meetings of clubs or organizations: Not on file     Relationship status: Not on file     Intimate partner violence:     Fear of current or ex partner: Not on file     Emotionally abused: Not on file     Physically abused: Not on file     Forced sexual activity: Not on file   Other Topics Concern     Not on file   Social History Narrative     Not on file         Review of Systems   Cardiovascular: Negative for chest pain and  leg swelling.   Gastrointestinal: Negative.    Musculoskeletal: Negative for arthralgias and myalgias.        Left rib pain.    Skin: Negative.    Psychiatric/Behavioral: Negative for agitation and behavioral problems.       Vitals:    02/05/20 2200   BP: 170/90   Pulse: 78   Temp: 97.2  F (36.2  C)   SpO2: 98%   Weight: 183 lb (83 kg)       Physical Exam  Constitutional:       Appearance: Normal appearance.   HENT:      Head: Atraumatic.      Mouth/Throat:      Mouth: Mucous membranes are moist.   Eyes:      Pupils: Pupils are equal, round, and reactive to light.   Cardiovascular:      Rate and Rhythm: Normal rate.      Pulses: Normal pulses.   Pulmonary:      Effort: Pulmonary effort is normal. No respiratory distress.      Breath sounds: Normal breath sounds. No rales.   Abdominal:      General: Abdomen is flat.   Skin:     General: Skin is warm and dry.   Neurological:      General: No focal deficit present.      Mental Status: He is alert. Mental status is at baseline.      Cranial Nerves: Cranial nerves are intact.      Motor: Weakness present. No tremor.      Coordination: Coordination normal.      Gait: Gait normal.      Comments: Bilateral handgrips equal, lower extremity extension and flexion normal, no reproducible pain spine exam, straight leg raise normal.   Psychiatric:         Behavior: Behavior normal.      Comments: Flat affect.           LABS:   Reviewed    ASSESSMENT:    .     ICD-10-CM    1. Parkinson disease (H) G20    2. Rib pain R07.81    3. Autonomic dysfunction G90.9        PLAN:      Fall in nursing home:  X-ray negative.  Pain is improved since last week, and per his wife, in general he does not need pain medication.  Nursing staff says he is done well with tramadol in the past however after his experience last week with increased confusion he does not want to try any narcotic pain medications.  -Continue muscle rub to left ribs.  -Continue to use ice 4 times daily as needed for rib and  back pain.  -Continue ibuprofen 600 mg p.o. every 24 hours as needed for pain 7 through 10.  Discussed risks and benefits with patient and nursing staff.  Use sparingly.      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    Insomnia: Improved on mirtazapine.    CHF/swellng: Continue BHARATHI arriaza to norman LE on am an off HS.     For is CAD with recent STEMI: On asa, brilinta, metoprolol, lipitor.     Autonomic orthostatic hypotension: He remains on midodrine and his florinef was increased during this recent hospital stay. He has had extensive workup in the past.   Recent improvement in blood pressures with adjustment of metoprolol, Florinef and midodrine.  -Continue to monitor.    Parkinson's: on sinimet.   -Neurology has recently decreased his Sinemet with improvement in symptoms.    R hip: OT/PT.   -Recently restarted therapies due to improvement in blood pressures.        Electronically signed by: Cy Jerry, CNP

## 2021-06-05 NOTE — PROGRESS NOTES
[facility]    Facility:   [facility] [[id number]]   Code Status: DNR/DNI      CHIEF COMPLAINT/REASON FOR VISIT:  Chief Complaint   Patient presents with     Problem Visit     Medication change.       HISTORY:      HPI: Canelo is a 79 y.o. male with a history of CAD, STEMI (11/11/19), cardiac pacemaker, parkinsons disease with orthostatic hypotension, dsyphagia, htn who experienced a fall at home with near syncope.     Today: Patient was seen regarding a medication change per insurance is no longer covering Toviaz for bladder spasms and overactive bladder.  The recommended replacement is tolterodine tartrate.  I discussed with patient and he is agreeable, however will need to update wife when she is available at next visit.  He reports he is sleeping well on Remeron.  He is seen sitting up in his chair eating lunch today.  Has also recently restarted therapies.    For is CAD with recent STEMI: On asa, brilinta, metoprolol, lipitor.     Autonomic orthostatic hypotension: He remains on midodrine and his florinef was increased during this recent hospital stay. He has had extensive workup in the past  Has had multiple changes to medications during TCU stay, currently he is stable and able to sit upright without lightheadedness..      Fall: negative for fractures including CT. Continue OT/PT. no longer painful on the right leg.    Parkinson's: on sinimet which was recently decreased per neurology consult.  He will need to see neurology with a lab visit prior to next dose change.    Past Medical History:   Diagnosis Date     Acute chest pain 11/10/2019     Chronic ischemic heart disease      GERD (gastroesophageal reflux disease)      HLD (hyperlipidemia)      HTN (hypertension)      Near syncope 10/12/2015     Pacemaker      Pacemaker     dual chamber     Parkinson's disease (H)      Urinary urgency      Vitamin D deficiency              Family History   Problem Relation Age of Onset      Heart disease Mother      Heart disease Father      Social History     Socioeconomic History     Marital status:      Spouse name: Not on file     Number of children: Not on file     Years of education: Not on file     Highest education level: Not on file   Occupational History     Not on file   Social Needs     Financial resource strain: Not on file     Food insecurity:     Worry: Not on file     Inability: Not on file     Transportation needs:     Medical: Not on file     Non-medical: Not on file   Tobacco Use     Smoking status: Former Smoker     Smokeless tobacco: Never Used   Substance and Sexual Activity     Alcohol use: Yes     Comment: occasional     Drug use: No     Sexual activity: Not on file   Lifestyle     Physical activity:     Days per week: Not on file     Minutes per session: Not on file     Stress: Not on file   Relationships     Social connections:     Talks on phone: Not on file     Gets together: Not on file     Attends [employer] service: Not on file     Active member of club or organization: Not on file     Attends meetings of clubs or organizations: Not on file     Relationship status: Not on file     Intimate partner violence:     Fear of current or ex partner: Not on file     Emotionally abused: Not on file     Physically abused: Not on file     Forced sexual activity: Not on file   Other Topics Concern     Not on file   Social History Narrative     Not on file         Review of Systems   Cardiovascular: Negative for chest pain and leg swelling.   Gastrointestinal: Negative.    Musculoskeletal: Negative for arthralgias and myalgias.        Right leg pain/hip pain.    Skin: Negative.    Psychiatric/Behavioral: Negative for agitation and behavioral problems.       Vitals:    01/20/20 2231   BP: 113/69   Pulse: 68   Temp: 97.4  F (36.3  C)   Weight: 171 lb (77.6 kg)       Physical Exam  Constitutional:       Appearance: Normal appearance.   HENT:      Head: Atraumatic.       Mouth/Throat:      Mouth: Mucous membranes are moist.   Eyes:      Pupils: Pupils are equal, round, and reactive to light.   Cardiovascular:      Rate and Rhythm: Normal rate.      Pulses: Normal pulses.   Pulmonary:      Effort: Pulmonary effort is normal. No respiratory distress.      Breath sounds: Normal breath sounds. No rales.   Abdominal:      General: Abdomen is flat.   Skin:     General: Skin is warm and dry.   Neurological:      Mental Status: He is alert. Mental status is at baseline.   Psychiatric:         Behavior: Behavior normal.      Comments: Flat affect, depressed mood.           LABS:   Reviewed    ASSESSMENT:    .     ICD-10-CM    1. Autonomic dysfunction G90.9        PLAN:    Overactive bladder:  -Okay to discontinue fesoterodine and start tolterodine tartrate.  -I did ask if he had any change in symptoms when his Flomax was held and when fesoterodine was held in December, and he is unable to recall if he had changes in bladder function.  Given that his blood pressures are improved we will continue this.    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  -Now on Remeron, sleeping well.    Insomnia: Continue as needed melatonin per patient request.    CHF/swellng: Continue BHARATHI arriaza to norman LE on am an off HS.     For is CAD with recent STEMI: On asa, brilinta, metoprolol, lipitor.     Autonomic orthostatic hypotension: He remains on midodrine and his florinef was increased during this recent hospital stay. He has had extensive workup in the past.   Recent improvement in blood pressures with adjustment of metoprolol, Florinef and midodrine.  -Continue to monitor.      Fall: negative for fractures including CT. Continue OT/PT. Continue ice prn.   -says pain is improved. Denies pain today.     Parkinson's: on sinimet.   -I did encourage wife to make follow-up appointment with neurology that was missed  due to his hospitalization.  -Neurology has recently decreased his Sinemet with improvement in symptoms.    R hip: OT/PT.   -Recently restarted therapies due to improvement in blood pressures.        Electronically signed by: Cy Jerry CNP

## 2021-06-05 NOTE — PROGRESS NOTES
[facility]    Facility:   [facility] [[id number]]   Code Status: POLST AVAILABLE    Reevaluation of 71-year-old male admitted to hospital with weakness and falls, florinef dose increased in hospital, transferred to TCU for rehabilitation and management of medical problems which include chronic hypertension, autonomic dysfunction, advanced Parkinson's, coronary artery disease.    Review of systems: continues with lightheadedness with standing or sitting. Attempted to traverse to bathroom today, fell striking posterior aspect of head and buttock, no significant injury per nursing report. Patient reports he sleep walks at home, his wife confirms this to be an issue, he does not recall getting out of bed. Continues with frequent urination, no dysuria. No anterior chest discomfort. Remainder of 12 point review of systems obtained negative.    Exam: persistent decrease in systolic recordings from 7o-90,  supine readings increased to approximately 140. Masked facies. Cognitively intact. No pharyngeal erythema. No HJR. S1 and S2 regular. Pulmonary exam without rales rhonchi or wheezes. Tender posterior scalp inferior aspect with 02 hematoma, no cervical tenderness, no shoulder discomfort. Abdomen without masses tenderness organomegaly. Periphery with edema 2 mm nonpitting. Mild bradykinesia. Strength and muscle tone diffusely diminished.    Impression and plan:   significant continued hypotension, blood pressure currently 81/60, continue midodrine 10 mg TID and Florinef 0.2 mg Q day.   Recent fall, tender left posterior scalp, no hematoma, observe.   Potential sleep walker, nursing informed of this issue, attempt to monitor bed.   Significant deconditioning multifactorial.   Parkinson's advanced on Sinemet.   Coronary artery disease with recent NS SALO, no current angina.   Issues reviewed with patient, wife, nursing staff, prolonged evaluation time with multiple issues reviewed including  assessment post fall, review of hypo tension, review of urinary frequency, long and short-term prognosis.      Electronically signed by: Elena Orozco MD

## 2021-06-05 NOTE — PROGRESS NOTES
[facility]    Facility:   [facility] [[id number]]   Code Status: POLST AVAILABLE    Reassessment of 79-year-old male who continues in TCU post hospitalization for weakness and multiple falls. History of advanced Parkinson's followed by neurology, autonomic dysfunction, coronary artery disease status post NSTEMI, recent discontinuation of Flomax, recent increase in midodrine to 10 mg TID with Florinef 0.2 mg Q day, continues metoprolol 12.5 mg Q day for hypertension. Patient unable to get out of bed, per nursing report with sitting on side of bed becomes lightheaded and syncopal, worsening strength.    Review of systems: patient is seen with wife on this occasion. Mood depressed, nurse practitioner suggested Zoloft, patient questions if he should take this medication. Describes episodes of sleepwalking in home setting, states he can get out of bed and does not know he has done so, frequently falls on these occasions. Further describes pre-faint symptoms, at all times initially develops nausea, there after generalized weakness with falling or loss of consciousness. Denies vertiginous symptoms. Unaware of initial lightheadedness prior to fall, at all times nausea as primary symptom. Using ace wraps to lower extremity, highly frustrated with progressive weakness. Denies chest pain or palpitations. No seizure activity. Modest symptomatic Parkinson's with tremor upper and lower upper extremities. Aware of mild visual hallucinations intermittent. Remainder of 12 point review of systems obtained negative.    Exam: vital signs reviewed over past 72 hours, when supine blood pressure can be significantly elevated, Florinef and midodrine held for significant elevation of blood pressure, typical blood pressure in 70-80 systolic range. Lack of spontaneous facial movement consistent with Parkinson's. No carotid bruits or HJR. S1 and S2 regular. Pulmonary exam without rales rhonchi or wheezes.  Mild bradykinesia. Mild tremor bilateral hands. Abdomen without masses or tenderness. No flank tenderness. Periphery with edema of 2 mm nonpitting. No abdominal masses or bruits.    Impression and plan:   recurrent faints, symptoms attributed to orthostatic hypotension, on further discussion patient always experiences nausea prior to faints/near faints, this raises possibility of vagal nerve involvement, known autonomic dysfunction and known orthostatic changes additionally  exist. Discussion at length, patient has been evaluated at [place] previously, request reevaluation by neurology regarding potential of vagal nerve involvement in addition to autonomic dysfunction, orthostatic hypotension and Parkinson's with deconditioning as cause of symptomatology, no evidence of stiff man syndrome or seizure disorder, no evidence of arrhythmia, patient's clinical status is worsening, unable to get out of bed due to faints occurring promptly with standing or following several steps.   Coronary artery disease without indication of current angina.   Reactive depression, poor appetite, reviewed potential initiation of SSRI, Remeron suggested, patient will consider.   Prolonged evaluation time, differential diagnosis of syncope, hypertension, hypotension, autonomic dysfunction, contribution of Parkinson's versus other cause of syncope.   Additional prolonged discussion with nursing staff.      Electronically signed by: Elena Orozco MD

## 2021-06-05 NOTE — PROGRESS NOTES
[facility]    Facility:   [facility] [[id number]]   Code Status: POLST AVAILABLE    Reassessment of 71-year-old male with coronary artery disease, advanced Parkinson's, urinary incontinence, hypertension and hypotension, original admission to hospital was with weakness and falls, since transfer to TCU midodrine and florinef dose have been increased, hypotension now resolved, on metoprolol 25 mg a.m. 12.5 p.m.    Review of systems: patient fell in bathroom 1/27, per nursing staff he took himself and complains of left shoulder and left rib that pain, x-rays obtained negative. Receiving oxycodone for pain, drowsiness with oxycodone. Significant pain in left lower back, unable to perform activities in physical therapy which was recently progressing well due to severity of pain. Denies pelvic pain. Denies cardiac or neurologic events prior to fall. No pleuritic chest pain. No focal neurologic deficits of new onset. Intermittent significant elevation of blood pressure without associated symptoms. Remainder of 12 point review of systems obtained negative.    Exam: blood pressure variable, systolic elevated greater then 200 periodically. Patient sitting in chair, complaining of left back pain, listing to right to avoid pressure to back. Bradykinesia present, lack of spontaneous facial movement. No pharyngeal erythema. No HJR. S1 and S2 regular. Pulmonary exam without rales rhonchi or wheezes, full respiratory excursion. Tender left flank, tender left lower rib margins, no tenderness to AP and lateral compression of pelvis. Periphery with edema nonpitting of 2 mm.    Impression and plan:   advanced Parkinson's, recent decrease in Sinemet dose, Parkinsonian symptoms adequately controlled, chronic bradykinesia.   Hypotension resolved, continued intermittent elevation of blood pressure, decrease Florinef to 0.1 mg Q day.   Recent fall with significant left flank pain, per nursing report  x-rays negative, pain limiting activity, oxycodone in use, drowsiness with oxycodone, monitor.   Autonomic dysfunction chronic.   Urinary incontinence, overall adequately controlled.   Multiple concerns are reviewed, discussion with patient nursing staff and wife.      Electronically signed by: Elena Orozco MD

## 2021-06-05 NOTE — PROGRESS NOTES
[facility]    Facility:   [facility] [[id number]]   Code Status: DNR/DNI      CHIEF COMPLAINT/REASON FOR VISIT:  Chief Complaint   Patient presents with     Problem Visit     Pain medication       HISTORY:      HPI: Canelo is a 79 y.o. male with a history of CAD, STEMI (11/11/19), cardiac pacemaker, parkinsons disease with orthostatic hypotension, dsyphagia, htn who experienced a fall at home with near syncope.     Today: Patient seen with his wife in the room, for renewal of pain medications.  He has tramadol 50 mg every 6 hours as needed and oxycodone 5 mg every 12 hours as needed.  He says the tramadol has not been as effective so he has been using the oxycodone infrequently.  X-ray of left rib cage was negative for acute process.  Continues to have some pain there.  He is sitting up and attending therapies.    For is CAD with recent STEMI: On asa, brilinta, metoprolol, lipitor.     Autonomic orthostatic hypotension: He remains on midodrine and his florinef was increased during this recent hospital stay. He has had extensive workup in the past  Has had multiple changes to medications during TCU stay, currently he is stable and able to sit upright without lightheadedness..      Fall: negative for fractures including CT. Continue OT/PT. no longer painful on the right leg.    Parkinson's: on sinimet which was recently decreased per neurology consult.  He will need to see neurology with a lab visit prior to next dose change.    Past Medical History:   Diagnosis Date     Acute chest pain 11/10/2019     Chronic ischemic heart disease      GERD (gastroesophageal reflux disease)      HLD (hyperlipidemia)      HTN (hypertension)      Near syncope 10/12/2015     Pacemaker      Pacemaker     dual chamber     Parkinson's disease (H)      Urinary urgency      Vitamin D deficiency              Family History   Problem Relation Age of Onset     Heart disease Mother      Heart disease Father       Social History     Socioeconomic History     Marital status:      Spouse name: Not on file     Number of children: Not on file     Years of education: Not on file     Highest education level: Not on file   Occupational History     Not on file   Social Needs     Financial resource strain: Not on file     Food insecurity:     Worry: Not on file     Inability: Not on file     Transportation needs:     Medical: Not on file     Non-medical: Not on file   Tobacco Use     Smoking status: Former Smoker     Smokeless tobacco: Never Used   Substance and Sexual Activity     Alcohol use: Yes     Comment: occasional     Drug use: No     Sexual activity: Not on file   Lifestyle     Physical activity:     Days per week: Not on file     Minutes per session: Not on file     Stress: Not on file   Relationships     Social connections:     Talks on phone: Not on file     Gets together: Not on file     Attends Zoroastrian service: Not on file     Active member of club or organization: Not on file     Attends meetings of clubs or organizations: Not on file     Relationship status: Not on file     Intimate partner violence:     Fear of current or ex partner: Not on file     Emotionally abused: Not on file     Physically abused: Not on file     Forced sexual activity: Not on file   Other Topics Concern     Not on file   Social History Narrative     Not on file         Review of Systems   Cardiovascular: Negative for chest pain and leg swelling.   Gastrointestinal: Negative.    Musculoskeletal: Negative for arthralgias and myalgias.        Right leg pain/hip pain.    Skin: Negative.    Psychiatric/Behavioral: Negative for agitation and behavioral problems.       Vitals:    01/30/20 2054   BP: 167/78   Pulse: 65   Temp: 97.5  F (36.4  C)   SpO2: 97%   Weight: 171 lb (77.6 kg)       Physical Exam  Constitutional:       Appearance: Normal appearance.   HENT:      Head: Atraumatic.      Mouth/Throat:      Mouth: Mucous membranes are  moist.   Eyes:      Pupils: Pupils are equal, round, and reactive to light.   Cardiovascular:      Rate and Rhythm: Normal rate.      Pulses: Normal pulses.   Pulmonary:      Effort: Pulmonary effort is normal. No respiratory distress.      Breath sounds: Normal breath sounds. No rales.   Abdominal:      General: Abdomen is flat.   Skin:     General: Skin is warm and dry.   Neurological:      General: No focal deficit present.      Mental Status: He is alert. Mental status is at baseline.      Cranial Nerves: Cranial nerves are intact.      Motor: Weakness present. No tremor.      Coordination: Coordination normal.      Gait: Gait normal.      Comments: Bilateral handgrips equal, lower extremity extension and flexion normal, no reproducible pain spine exam, straight leg raise normal.   Psychiatric:         Behavior: Behavior normal.      Comments: Flat affect, depressed mood.           LABS:   Reviewed    ASSESSMENT:    .     ICD-10-CM    1. Fall at nursing home, initial encounter W19.XXXA     Y92.129        PLAN:      Fall in nursing home: Neuro is intact, no headache or dizziness.  No bruising or redness.  X-ray negative  -Continue to monitor and assist with all ambulation.  -Encourage patient to use call light for ambulation.  -Continue ice to left rib cage 20 minutes 4 times daily.  -Renew oxycodone 5 mg p.o. twice daily as needed for left rib cage pain.    Overactive bladder:  -Okay to discontinue fesoterodine and start tolterodine tartrate.    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  -Now on Remeron, sleeping well.    Insomnia: Improved on mirtazapine.    CHF/swellng: Continue BHARATHI arriaza to norman PATIÑO on am an off HS.     For is CAD with recent STEMI: On asa, brilinta, metoprolol, lipitor.     Autonomic orthostatic hypotension: He remains on midodrine and his florinef was increased during this recent hospital  stay. He has had extensive workup in the past.   Recent improvement in blood pressures with adjustment of metoprolol, Florinef and midodrine.  -Continue to monitor.      Parkinson's: on sinimet.   -I did encourage wife to make follow-up appointment with neurology that was missed due to his hospitalization.  -Neurology has recently decreased his Sinemet with improvement in symptoms.    R hip: OT/PT.   -Recently restarted therapies due to improvement in blood pressures.        Electronically signed by: Cy Jerry CNP

## 2021-06-05 NOTE — PROGRESS NOTES
[facility]    Facility:   [facility] [[id number]]   Code Status: POLST AVAILABLE    Reassessment of 71-year-old male with Parkinson's, coronary artery disease, recent fall striking left shoulder and lower back with persistent lower back discomfort, hypertension and hypotension. Recent decrease in Florinef to 0.1 mg Q day, continues metoprolol 25 mg a.m. 12.5 mg p.m.    Review of systems: continued left lower posterior rib discomfort. Denies pleuritic chest pain. Receiving oxycodone on a PRN basis, aware of excessive sedation and increasing confusion with oxycodone. Has not taken other narcotic medication in past. No pleuritic chest pain. Unable to participate in routine physical therapy due to degree of lower rib discomfort, in bed physical therapy underway. No chest pain or palpitations. Remainder of 12 point review of systems obtained negative.    Exam: recent vital signs reviewed with continued intermittent elevation of blood pressure, no significant hypotensive episodes. Lying in bed, appears uncomfortable, mildly confused, bradykinesia present. No tremor. Mucous membranes moist. Skin turgor intact. No HJR. S1 and S2 regular. Pulmonary exam without rales rhonchi or wheezes. Abdomen without masses tenderness organomegaly. Significantly tender left flank, lower ribs with mild tenderness, no pelvic discomfort to AP and lateral compression. Periphery with nonpitting edema bilateral.    Impression and plan:   advanced Parkinson's, recent decrease in Sinemet dose, adequate control of symptoms.   Coronary artery disease without angina.   Recent fall with significant left flank and lower rib discomfort, poorly tolerating oxycodone with confusion and fatigue, begin Dilaudid 1 mg Q6 hours PRN pain, muscle rub to flank region.   Hypotension resolved.   Significant hypertension with intermittent elevation of blood pressure, continue current metoprolol dose.   Chronic deconditioning with  ongoing need for rehabilitation.   Multiple concerns are reviewed, discussion with patient wife and nursing staff.      Electronically signed by: Elena Orozco MD

## 2021-06-05 NOTE — PROGRESS NOTES
[facility]    Facility:   [facility] [[id number]]   Code Status: POLST AVAILABLE    79 year old with history of Parkinson's, orthostatic hypotension, autonomic dysfunction, recurrent falls, recent NSTEMI, is seen for reevaluation, additional discussion with patient's wife.    Review of systems: continued improvement in strength, continues to participate in physical therapy. No fever sweats or chills. No exertional chest discomfort. No lightheadedness with ambulation, ambulating 3 to 4 times per day. Remainder of 12 point review of systems obtained negative.    Exam: alert, oriented, lack of spontaneous facial movements consistent with Parkinson's, blood pressure 180/92, 76/52, pulse 69, temperature 97.5. Mild tremor bilateral hands. No HJR. Thyroid midline and regular. S1 and S2 regular. Pulmonary exam without rales or rhonchi. Abdomen without masses or tenderness. 2 mm nonpitting edema bilateral lower extremities, trace edema bilateral hands.    Impression and plan:   chronic hypertension, continued intermittent elevation of systolic readings to 180, today has first hypotensive reading in past three days asymptomatic, continue current regimen of metoprolol midodrine  and florinef.   Chronic profound deconditioning, strength improving with physical therapy.   Reactive depression, Remeron 7.5 mg QHS with decreased symptoms, tolerating medication.   Coronary artery disease, no current anginal symptoms.   Parkinson's, continues with diminished symptoms with recent decrease in Sinemet dose.   Issues reviewed with patient nursing staff and wife.      Electronically signed by: Elena Orozco MD

## 2021-06-05 NOTE — PROGRESS NOTES
[facility]    Facility:   [facility] [[id number]]   Code Status: POLST AVAILABLE    Reevaluation of 79-year-old male with advanced Parkinson's, hypertension, recent significant orthostatic hypotension, admitted to hospital with weakness, recent NSTEMI, continues in TCU for rehabilitation and observation of blood pressure, recent fall with left lower rib pain.    Review of systems: denies orthostatic symptoms. Energy level can take use to improve. Previous confusion secondary to narcotics resolved. Vague pain left lower ribs cut, no pleuritic chest pain. No fever sweats or chills. Continues with diminished Parkinson's symptoms post decrease in Sinemet dose. Denies confusion's. Describes floaters in eyes, denies visual hallucinations. Remainder of 12 point review of systems obtained negative.    Exam: pleasant and alert, in chair, lack of spontaneous movement, modest tremor bilateral hands. Vital signs reviewed over past 48 hours. Cognitively intact. No pharyngeal erythema. No HJR. S1 and S2 regular. Pulmonary exam without rales rhonchi or wheezes. Abdomen without masses tenderness organomegaly. Periphery with nonpitting edema 1 mm. Strength and muscle tone diffusely diminished.    Impression and plan:   ASHD, no current angina.   Hypertension, intermittent elevation of systolic levels, overall satisfactory control.   Orthostatic hypotension, rarely experiencing symptoms, rare decrease in systolic blood pressure readings, continues midodrine and Florinef.   Profound weakness and deconditioning, continues rehabilitation.   Recent fall with left rib pain, pain slowly resolving, no pleuritic component to pain, nothing to suggest intrathoracic injury.   Home visit today, anticipated discharge over next seven days.      Electronically signed by: Elena Orozco MD

## 2021-06-06 NOTE — PROGRESS NOTES
[facility]    Facility:   [facility] [[id number]]   Code Status: POLST AVAILABLE    71-year-old male is seen for follow up evaluation and discharge planning. History of advanced Parkinson's, autonomic dysfunction, orthostatic hypotension, hypertension, coronary artery disease, was admitted to hospital with significant weakness and falls, transferred to TCU. Significant hypo tension during initial TCU stay with evetual inability to get out of bed due to loss of consciousness/syncope with sitting upright or standing, midodrine increased to 10 mg TID, Florinef briefly increased to 0.2 mg Q day, eventual increase in blood pressure and resolution of orthostasis majority of time, Florinef subsequently decreased to 0.1 mg Q day. Metoprolol increased to 25 mg a.m. 12.5 mg p.m., patient continues to have intermittent significant elevation of blood pressure greater than 190, previous orthostatic systolic readings in the 80s resolved in entirety. Neurology kindly reviewed patient via phone as patient was unable to visit neurologist due to significant orthostasis with loss of consciousness/syncope with standing, Sinemet dose was decreased, significant improvement in general status and decrease in Parkinsonian symptoms with decrease in Sinemet dose. Patient fell approximately two weeks ago striking the left lower ribs, x-rays negative for fracture, received oxycodone and tramadol for pain, developed significant confusion, now with Tylenol in use for pain, unable to tolerate either oxycodone or tramadol due to significant confusion and hallucinations. Strength is gradually improving, able to ambulate to bathroom independently, discharge to take place today to home setting. Previous face-to-face documentation regarding need for wheelchair, orders signed today with review of medication and order for wheelchair confirmed.    Review of systems: anxious to return to home setting, near-complete  resolution of left lower rib cage pain, no dyspnea or orthopnea.    Exam: blood pressure 170/90, pulse 70, temperature 97.2, 02 90%. Pleasant male oriented, bradykinesia present. No facial asymmetry, flat facies with lack of spontaneous movement consistent with Parkinson's. Mucous membranes moist. S1 and S2 regular. Pulmonary exam without rales rhonchi or wheezes. 1 mm nonpitting bilateral lower extremity edema.    Impression and plan:   advanced Parkinson's, decreased symptoms with recent decrease in Sinemet dose, patient will follow up with neurology over next two weeks.   Significant orthostatic hypotension now resolved, last episode of systolic recording in 80s greater than two weeks ago, Florinef at 0.1 mg Q day, midodrine 10 mg TID.   Hypertension, intermittent systolic elevation to 190, no prolonged episodes of elevation, metoprolol currently 25 mg a.m. 12.5 mg p.m., may require further increase in metoprolol pending systolic recordings.   Coronary artery disease without current indication of angina.   Chronic urinary incontinence, on toviaz, Flomax has been discontinued.   Deconditioning, will require ongoing rehabilitation.   Recent fall with left rib pain, negative x-ray for fracture, pain with near-complete resolution, intolerant of tramadol and oxycodone with significant confusion and hallucinations on pain medication.   Patient will discharge to home setting today, orders reviewed, medications reviewed, signing for wheelchair, discharge medications are as follows:  Current Outpatient Medications:      aspirin 81 mg chewable tablet, Chew 1 tablet (81 mg total) daily. Forever, Disp: 30 tablet, Rfl: 0     atorvastatin (LIPITOR) 40 MG tablet, Take 1 tablet (40 mg total) by mouth at bedtime., Disp: , Rfl: 0     azelastine (ASTELIN) 137 mcg (0.1 %) nasal spray, 1 spray into each nostril as needed., Disp: , Rfl:      carbidopa-levodopa (SINEMET)  mg per tablet, Take 2.5 tablets by mouth 4 (four) times a  day. Take with meals and bedtime.   , Disp: , Rfl:      cholecalciferol, vitamin D3, 2,000 unit Tab, Take 2,000 Units by mouth daily., Disp: , Rfl:      cyanocobalamin 1000 MCG tablet, Take 1,000 mcg by mouth every other day., Disp: , Rfl:      fesoterodine (TOVIAZ) 4 mg Tb24 ER tablet, Take 8 mg by mouth daily with lunch. , Disp: , Rfl:      fludrocortisone (FLORINEF) 0.1 mg tablet, 0.1mg PO MWF and 0.15 PO TuThSatSun thereafter, Disp: 45 tablet, Rfl: 0     magnesium hydroxide (MILK OF MAG) 400 mg/5 mL Susp suspension, Take 30 mL by mouth daily as needed., Disp: , Rfl: 0     magnesium oxide (MAG-OX) 400 mg (241.3 mg magnesium) tablet, Take 1 tablet (400 mg total) by mouth 3 (three) times a day., Disp: , Rfl: 0     metoprolol tartrate (LOPRESSOR) 25 MG tablet, Take 1 tablet (25 mg total) by mouth every evening., Disp: , Rfl: 0     midodrine (PROAMATINE) 2.5 MG tablet, Take 2.5 mg by mouth 2 (two) times a day., Disp: , Rfl:      miscellaneous medical supply Misc, For personal use. Length: calf Strength: 16-20 mmHg Circumference in cm:, Disp: , Rfl:      multivit with iron,hematinic (SUPER B-COMPLEX ORAL), Take 1 tablet by mouth daily with lunch. , Disp: , Rfl:      nitroglycerin (NITROSTAT) 0.4 MG SL tablet, Place 1 tablet (0.4 mg total) under the tongue every 5 (five) minutes as needed for chest pain., Disp: 1 Bottle, Rfl: 0     omeprazole (PRILOSEC) 20 MG capsule, Take 20 mg by mouth daily before breakfast., Disp: , Rfl:      pyridostigmine (MESTINON) 60 mg tablet, Take 30 mg by mouth 3 (three) times a day., Disp: , Rfl:      ranitidine (ZANTAC) 150 MG tablet, Take 150 mg by mouth at bedtime., Disp: , Rfl:      sennosides (SENNA) 8.6 mg cap, Take 16.2 mg by mouth at bedtime. , Disp: , Rfl:      ticagrelor (BRILINTA) 90 mg Tab, Take 1 tablet (90 mg total) by mouth 2 (two) times a day. For 12 months, Disp: 60 tablet, Rfl: 11 change in medication Sinemet per neurology, metoprolol to 12.5 p.m. 25 AM, Florinef 0.1 mg,  midodrine 10 mg TID, off Flomax.   Patient will be homebound on discharge and will require registered nurse, home health aide, physical therapy, occupational therapy.      Electronically signed by: Elena Orozco MD

## 2021-06-06 NOTE — TELEPHONE ENCOUNTER
"              After Visit Summary   2018    Juan Sanders    MRN: [id number]           Patient Information     Date Of Birth          1982        Visit Information        Provider Department      2018 10:20 AM Carrie Diane PA-C [facility] [place]        Today's Diagnoses     Acute cystitis with hematuria    -  1    Menorrhagia with irregular cycle        Breakthrough bleeding on Nexplanon           Follow-ups after your visit        Who to contact     If you have questions or need follow up information about today's clinic visit or your schedule please contact [facility] [place] directly at [phone].  Normal or non-critical lab and imaging results will be communicated to you by [employer], letter or phone within 4 business days after the clinic has received the results. If you do not hear from us within 7 days, please contact the clinic through [employer] or phone. If you have a critical or abnormal lab result, we will notify you by phone as soon as possible.  Submit refill requests through [employer] or call your pharmacy and they will forward the refill request to us. Please allow 3 business days for your refill to be completed.          Additional Information About Your Visit        MyChart Information     [employer] lets you send messages to your doctor, view your test results, renew your prescriptions, schedule appointments and more. To sign up, go to [email or web address] . Click on \"Log in\" on the left side of the screen, which will take you to the Welcome page. Then click on \"Sign up Now\" on the right side of the page.     You will be asked to enter the access code listed below, as well as some personal information. Please follow the directions to create your username and password.     Your access code is: [email or web address]  Expires: 7/10/2018 11:02 AM     Your access code will  in 90 days. If you need help or a new code, please call your [facility] or " Entered in error     "[phone].        Care EveryWhere ID     This is your Care EveryWhere ID. This could be used by other organizations to access your [place] medical records  [id number]        Your Vitals Were     Pulse Temperature Height Last Period Pulse Oximetry BMI (Body Mass Index)    81 98.4  F (36.9  C) (Oral) 5' 10\" (1.778 m) 04/05/2018 99% 24.68 kg/m2       Blood Pressure from Last 3 Encounters:   04/11/18 92/64   12/23/17 96/58   12/19/17 104/71    Weight from Last 3 Encounters:   04/11/18 172 lb (78 kg)   12/19/17 170 lb (77.1 kg)   10/29/17 149 lb (67.6 kg)              We Performed the Following     *UA reflex to Microscopic and Culture ([place] [facility] (except [place] and [place])     CBC with platelets differential     Urine Culture Aerobic Bacterial     Urine Microscopic          Today's Medication Changes          These changes are accurate as of 4/11/18 11:02 AM.  If you have any questions, ask your nurse or doctor.               Start taking these medicines.        Dose/Directions    nitroFURantoin (macrocrystal-monohydrate) 100 MG capsule   Commonly known as:  MACROBID   Used for:  Acute cystitis with hematuria   Started by:  Carrie Diane PA-C        Dose:  100 mg   Take 1 capsule (100 mg) by mouth 2 times daily   Quantity:  14 capsule   Refills:  0            Where to get your medicines      These medications were sent to [id number] - [place], [place] - [address] AT [facility] [address]  [address], [place] [address]     Phone:  [phone]     nitroFURantoin (macrocrystal-monohydrate) 100 MG capsule                Primary Care Provider Fax #    Physician No Ref-Primary [phone]       No address on file        Equal Access to Services     ANA ROSA ROBLES : Meghana kaye Soguanaco, waaxda luqadaha, qaybta kaalmada adeegyatravis, cheri izaguirre. [facility] [phone].    ATENCIÓN: Si mariano fraser, tiene a carlos disposición " servicios gratuitos de asistencia lingüística. Shawna connelly [phone].    We comply with applicable federal civil rights laws and [place] laws. We do not discriminate on the basis of race, color, national origin, age, disability, sex, sexual orientation, or gender identity.            Thank you!     Thank you for choosing [facility] [place]  for your care. Our goal is always to provide you with excellent care. Hearing back from our patients is one way we can continue to improve our services. Please take a few minutes to complete the written survey that you may receive in the mail after your visit with us. Thank you!             Your Updated Medication List - Protect others around you: Learn how to safely use, store and throw away your medicines at www.disposemymeds.org.          This list is accurate as of 4/11/18 11:02 AM.  Always use your most recent med list.                   Brand Name Dispense Instructions for use Diagnosis    NEXPLANON SC           nitroFURantoin (macrocrystal-monohydrate) 100 MG capsule    MACROBID    14 capsule    Take 1 capsule (100 mg) by mouth 2 times daily    Acute cystitis with hematuria       prenatal multivitamin plus iron 27-0.8 MG Tabs per tablet      Take 1 tablet by mouth daily        VITAMIN D (CHOLECALCIFEROL) PO      Take by mouth daily

## 2021-06-07 NOTE — PROGRESS NOTES
Patient had a remote transmission scheduled today prior to appointment with Dr. Christine on Monday.     Patient is transferring care from [facility]. His remote monitor is obsolete. [employer] will mail a new monitor to him and then we will do a remote, this is scheduled for 5/11/2020.

## 2021-06-07 NOTE — PROGRESS NOTES
Review of systems are positive for fainting, fatigue, weakness. All other review of systems are within normal limits.

## 2021-06-08 NOTE — TELEPHONE ENCOUNTER
Wellness Screening Tool  Symptom Screening: (screening applied to both wife/patient as wife will be accompanying him to appt - has parkinson's and is wheelchair bound)  Do you have one of the following NEW symptoms:    Fever (subjective or >100.0)?  No    A new cough?  No    Shortness of breath?  No     Chills? Yes     New loss of taste or smell? No     Generalized body aches? No     New persistent headache? No     New sore throat? No     Nausea, vomiting, or diarrhea?  No    Within the past 3 weeks, have you been exposed to someone with a known positive illness below:    COVID-19 (known or suspected)?  No    Chicken pox?  No    Mealses?  No    Pertussis?  No    Patient notified of visitor restrictions: wife will be accompanying patinet due to disabilities.  Pt/wife informed to wear a mask: Yes  Patient's appointment status: Patient will be seen in clinic as scheduled on 6/4/2020

## 2021-06-08 NOTE — PROGRESS NOTES
A user error has taken place: charting done on wrong patient and has been corrected          Veronica Isbell RN BSN  [facility]  .    This encounter was created in error - please disregard.

## 2021-06-09 NOTE — PROGRESS NOTES
Patient goal: Be able to walk in apartment without passing out.    Reason for this episode: hospitalization due to syncope with fall.  Pertinent medical history: multiple syncopal episodes, recent hosptialization due to hospitalization due to syncopal episode with fall on floor next to bed. States he was sitting EOB, passed out and ended up on floor. No injury. Too weak to get up, so called the non-emergency number to get him up, parame  dics decided that he should go to the hospital this time. Spouse reports he gets really light headed in the morning, these mostly happen in the morning, gets light headed, slumps forward. Wife usually stays with him when he goes to get up so that she can prevent the fall.   Precautions/safety: (ex. contact precautions, cognition, activity restrictions, falls, surgical precautions, etc.):high falls risk, risk of syncope  Prior Level of F  unction: Pt has required some assistance or supervision for ADLs and mobility. Pt's spouse reports Pt has been going down hill since last episode of care.   Current Level of Function: Pt now primarily relies on wheelchair for mobility. Pt requiring increased assist with bathing, dressing, toileting, transfers and mobility.  Tinetti 9/28. 26 feet x1, 4WW, CGA to Min A x 1, decresed step length, decreased foot clearance, decreased gait sp  eed, flexed at knees, forward flexed at trunk and hips, unsteady gait, Min A needed intermittently for balance. CGA transfers. Min A bed mobility. B UE and LE AROM WFL.  B UE and LE MMT generally 4/5 t/o, but fatigues quickly.   Persons present for visit: pt, spouse, PT  Home Environment: senior independent apartment.  Assistance Available: 24/7 from spouse. They also have friends and family in the area who can help intermittently as ne  eded.   Multidisciplinary Plan/Follow up Needs:   PT: 3x1, 2x2, 1x1 for gait, balance, strength, transfers, and endurance training, education in HEP and falls prevention.  OT: vanessa  and treat with emphasis on ADL safety and energy conservation.   HHA: Pt and spouse decline this service. It didn't work out well when they tried this before. Due to Pt never knows how he is going to feel. When he is feeling good and has the energy, that's wh  en he takes the shower. States this resulted in many cancellations.

## 2021-06-10 NOTE — PROGRESS NOTES
"LAST MARTINEZ VISIT  Any other disciplines in that you are aware of No   Non coverage completed (y/n) Yes   Any equipment recommended/ordered   HEP given and level of assist needed UE ROM, Theraputty HEP- Sup   Cognitive level n/t  ADL level of assist Set up-Min A for dsg, Grmg-Set up at sink in w/c, shower transfers-CGA completed from w/c and walker. Spouse plans to install verticle grab bar in bathroom.   IADL level of assist. Spouse comp  letes  Any barriers related to progress (what are they if yes) Parkinson's symptoms Orthostatic BP and neck pain   Brief summary of progress Pt has made progress towards goals. MARTINEZ has fabricated a card naik for pt and provided education to spouse if pt continues with card playing there are wooden options that are more durable. Pt. engaged in games of 31 with Spouse and MARTINEZ using Dyce[employer] under card naik. Self feeding addressed with   trials of Liftware \"Steady\" and \"Level\" adaptive silverware, weighted silverware as well as built up foam handles. Pt. able to feed self with Mod I using a standard bowl and foam handles at this time. Pt issued foam handles. Spouse acknowledges Parkinson's tremors may increase making self feeding more difficult for pt.in the future. Education provided to spouse for adaptive feeding items including scoop plate, plate guard, different cup  s and straws, Liftware utensils and alternate brands and where to purchase if needed. Pt. encouraged to continue with w/c mobility in kitchen to access refrigerator and increase strength. Spouse provides daily Sup with seated Parkinson's HEP.   "

## 2021-06-12 NOTE — TELEPHONE ENCOUNTER
----- Message -----  From: Rhona Christine MD  Sent: 11/9/2020   3:48 PM CST  To: ADRIANA Jacksonta can be discontinued now.  Thanks  Malachi  ----------------------------------------------------------------------------  Brilinta discontinued.  Spouse Susanne updated and had no questions.  -mando

## 2021-06-12 NOTE — TELEPHONE ENCOUNTER
Dr Christine - pt will be out 1 year from PCI 11/12/19.  Will he need to continue taking Brilinta from that point or do you have any other recommendations?  -mando

## 2021-06-12 NOTE — PROGRESS NOTES
In clinic device check with Dr. Christine.  Please see link for full device report.  Patient was informed of results and next follow up during today's visit.

## 2021-06-12 NOTE — TELEPHONE ENCOUNTER
----- Message from Chanel Wilson sent at 11/5/2020  1:37 PM CST -----      Caller: Wife  Primary cardiologist: Dr. Christine    Detailed reason for call: Patient's wife was wondering if her  should stay on ticagrelor (BRILINTA) 90 mg Tab. He was given this prescription on 11/11/2019 for 12 months and they were wondering if he needs to stay on it or not. And if he doesn't need to take it how do they come off of it? Please advise    Best phone number: [phone]  Best time to contact: today  Ok to leave a detailed message? yes  Device? No    Additional Info:

## 2021-06-12 NOTE — TELEPHONE ENCOUNTER
Wellness Screening Tool  Symptom Screening:  Do you have one of the following NEW symptoms:    Fever (subjective or >100.0)?  No    A new cough?  No    Shortness of breath?  No     Chills? No     New loss of taste or smell? No     Generalized body aches? No     New persistent headache? No     New sore throat? No     Nausea, vomiting, or diarrhea?  No    Within the past 2 weeks, have you been exposed to someone with a known positive illness below:    COVID-19 (known or suspected)?  No    Chicken pox?  No    Mealses?  No    Pertussis?  No    Patient notified of visitor policy- They may have one person accompany them to their appointment, but they will need to wear a mask and will be screened upon arrival for symptoms: Yes  Pt informed to wear a mask: Yes  Pt notified if they develop any symptoms listed above, prior to their appointment, they are to call the clinic directly at [phone] for further instructions.  Yes  Patient's appointment status: Patient will be seen in clinic as scheduled on 10/5

## 2021-06-13 NOTE — TELEPHONE ENCOUNTER
ticagrelor (BRILINTA) 90 mg Tab [[id number]]    Electronically signed by: Suzan Pineda MD on 11/11/19 1408 Status: Discontinued   Ordering user: Suzan Pineda MD 11/11/19 1408 Ordering provider: Suzan Pineda MD   Authorized by: Suzan Pineda MD   Frequency: BID 11/11/19 - 11/09/20  Discontinued by: Sumi Conrad RN 11/09/20 0228 [Therapy completed]

## 2021-06-17 NOTE — ED PROVIDER NOTES
EMERGENCY DEPARTMENT ENCOUNTER      NAME: Canelo Cook  AGE: 81 y.o. male  YOB: 1940  MRN: [id number]  EVALUATION DATE & TIME: 5/20/2021  1:57 PM    PCP: Hung Camacho MD    ED PROVIDER: Luis Zimmer DO      Chief Complaint   Patient presents with     Syncope         FINAL IMPRESSION:  1. Syncope, unspecified syncope type    2. Urinary tract infection without hematuria, site unspecified          ED COURSE & MEDICAL DECISION MAKING:    Pertinent Labs & Imaging studies reviewed. (See chart for details)  81 y.o. male presents to the Emergency Department for evaluation of episodes of hypotension, patient is a chronic history of this with a history of disautonomic dysfunction.  Has a history of Parkinson's he also has a pacemaker.  Patient had episodes today where he had a low blood pressure and passed out, in the emergency department blood pressure is normal he is asymptomatic this is similar to previous presentations in the past.  Will do cardiac work-up and urine, CT head as well as have his pacemaker evaluated.    1:56 PM I met with the patient, obtained an initial history, performed an examination and discussed the plan.   PPE including N95 mask, face shield, and gloves worn throughout the encounter. Patient wearing a mask.  3:27 PM Patient's wife is now at patient's bedside. Obtained additional history with patient's wife.  Endorses that the  4:22 PM The representative for the patient's pacemaker arrived. She reports there were no episodes of arrhythmia and it is otherwise normal.  4:34 PM Rechecked patient and updated with results.  Question of a small urinary tract infection, blood work and CT scan otherwise unremarkable pacemaker evaluation normal.  I discussed with the patient that likely there is a progression of his this autonomic dysfunction however there is no episodes of dysrhythmia in the emergency room or episodes of hypotension.  Given that his work-up is reassuring I feel that he  would be okay for discharge home will refer him to cardiology and recommend that they continue to follow with a neurologist.  Wife is agreeable.  Patient otherwise well-appearing in no distress.  We discussed the plan for discharge and the patient is agreeable. Reviewed supportive cares, symptomatic treatment, outpatient follow up, and reasons to return to the Emergency Department.     At the conclusion of the encounter I discussed the results of all of the tests and the disposition. The questions were answered. The patient or family acknowledged understanding and was agreeable with the care plan.       0 minutes of critical care time     MEDICATIONS GIVEN IN THE EMERGENCY:  Medications   sodium chloride flush 10 mL (NS) (has no administration in time range)   sodium chloride 0.9% 1,000 mL (0 mL Intravenous Stopped 5/20/21 9887)       NEW PRESCRIPTIONS STARTED AT TODAY'S ER VISIT  Current Discharge Medication List      CONTINUE these medications which have NOT CHANGED    Details   aspirin 81 mg chewable tablet Chew 1 tablet (81 mg total) daily. Forever  Qty: 30 tablet, Refills: 0    Associated Diagnoses: Acute non-Q wave non-ST elevation myocardial infarction (NSTEMI) (H)      atorvastatin (LIPITOR) 40 MG tablet Take 1 tablet (40 mg total) by mouth at bedtime.  Refills: 0    Associated Diagnoses: NSTEMI (non-ST elevated myocardial infarction) (H)      azelastine (ASTELIN) 137 mcg (0.1 %) nasal spray 1 spray into each nostril as needed.      carbidopa-levodopa (SINEMET)  mg per tablet Take 2 tablets by mouth 4 (four) times a day. Take with meals and bedtime.      cholecalciferol, vitamin D3, 2,000 unit Tab Take 2,000 Units by mouth daily.      cyanocobalamin 1000 MCG tablet Take 1,000 mcg by mouth every other day.      fludrocortisone (FLORINEF) 0.1 mg tablet Take 0.1 mg by mouth daily.      magnesium hydroxide (MILK OF MAG) 400 mg/5 mL Susp suspension Take 30 mL by mouth daily as needed.  Refills: 0     Associated Diagnoses: Parkinson disease (H)      magnesium oxide (MAG-OX) 400 mg (241.3 mg magnesium) tablet Take 1 tablet (400 mg total) by mouth 3 (three) times a day.  Refills: 0    Associated Diagnoses: Hypomagnesemia      !! metoprolol tartrate (LOPRESSOR) 25 MG tablet Take 25 mg by mouth every morning.      !! metoprolol tartrate (LOPRESSOR) 25 MG tablet Take 12.5 mg by mouth every evening.      midodrine (PROAMATINE) 10 MG tablet Take 1 tablet (10 mg total) by mouth 2 (two) times a day with meals.  Qty: 30 tablet, Refills: 0    Associated Diagnoses: Syncope, unspecified syncope type      mirtazapine (REMERON) 7.5 MG tablet Take 7.5 mg by mouth at bedtime. Indications: major depressive disorder      multivit with iron,hematinic (SUPER B-COMPLEX ORAL) Take 1 tablet by mouth daily with lunch.       nitroglycerin (NITROSTAT) 0.4 MG SL tablet Place 0.4 mg under the tongue every 5 (five) minutes as needed for chest pain. Indications: acute attack of angina      omeprazole (PRILOSEC) 20 MG capsule Take 20 mg by mouth daily before breakfast.      pyridostigmine (MESTINON) 60 mg tablet Take 30 mg by mouth 3 (three) times a day.      senna (SENOKOT) 8.6 mg tablet Take 2 tablets by mouth at bedtime as needed for constipation. 6/28/2020- Pt taking 2 tabs PRN, per spouse report, reported to MD team on 6/29/2020- SD      tamsulosin (FLOMAX) 0.4 mg cap Take 0.4 mg by mouth Daily at 5 pm. Indications: enlarged prostate with urination problem      tolterodine (DETROL LA) 4 MG ER capsule Take 4 mg by mouth daily. Indications: urinary urgency, or the sudden urge to urinate       !! - Potential duplicate medications found. Please discuss with provider.               =================================================================    HPI      Canelo Cook is a 81 y.o. male with a pertinent history of parkinson's disease, hyperlipidemia, hypertension, CAD, unspecified syncope type, who presents to this ED for evaluation of  "syncope.    Per chart review, the patient was seen on 4/16 (1 month ago) for follow up in the [facility] clinic after an ER presentation on 4-13-4/14 (discharged 1 month ago) for orthostatic hypotension. Cardiology believed syncope to be secondary \"to autonomic nervous dysfunction and orthostatic hypotension: The patient has significant autonomic dysfunction secondary to Parkinson's disease.\"    Triage nurse reports that the patient has been having syncopal episodes multiple times a day for the last couple months. The episodes used to last ~15 minutes, but now last hours. If the patient's wife attempts to rouse the patient, his blood pressure drops to about 70 systolic.     The patient confirms this story. He has been having multiple syncopal episodes a day for the last couple months. Today, the patient was seated at a table when he lost consciousness. He did not hit his head. The patient called the patient's PCP and was advised to present to the ED. The patient has a pacemaker which was last interrogated 5/5 (15 days ago). He has seen cardiology for his syncopal episodes and states they are unsure why they are occurring. No recent changes in medications. The patient is not anticoagulated. No history of heart failure. The patient has otherwise been feeling well. Denies emesis, diarrhea, and any other physical complaints at this time.       REVIEW OF SYSTEMS   Review of Systems   Constitutional: Negative for activity change, chills, fatigue and fever.   HENT: Negative for congestion, rhinorrhea and sinus pain.    Respiratory: Negative for cough, shortness of breath, wheezing and stridor.    Cardiovascular: Negative for chest pain, palpitations and leg swelling.   Gastrointestinal: Negative for abdominal distention, abdominal pain, diarrhea, nausea and vomiting.   Genitourinary: Negative for dysuria.   Musculoskeletal: Negative for arthralgias and back pain.   Skin: Negative for color change, pallor and " "rash.   Neurological: Positive for syncope (while seated, chronic issue). Negative for dizziness, seizures, facial asymmetry, speech difficulty, weakness, numbness and headaches.   Psychiatric/Behavioral: Negative for agitation and behavioral problems.   All other systems reviewed and are negative.       PAST MEDICAL HISTORY:  Past Medical History:   Diagnosis Date     Acute chest pain 11/10/2019     Acute non-Q wave non-ST elevation myocardial infarction (NSTEMI) (H) 11/11/2019     Anemia 11/15/2013     Closed fracture of multiple ribs of left side with routine healing 2/18/2020     Coronary artery disease due to lipid rich plaque 11/11/2019     DNAR (do not attempt resuscitation) 11/11/2019     Essential hypertension      Fall at home, sequela 12/6/2019     GERD (gastroesophageal reflux disease)      HLD (hyperlipidemia)      Near syncope 10/12/2015     Parkinson's disease (H)      SA node dysfunction (H) 07/29/2015    \"chronotropic incompetence\" per [facility] EP notes     Syncope, unspecified syncope type 11/10/2019     Vitamin D deficiency        PAST SURGICAL HISTORY:  Past Surgical History:   Procedure Laterality Date     BACK SURGERY       CARDIAC PACEMAKER PLACEMENT  08/10/2015    Biotronik AV pacer by Dr. Vidal at [facility] for \"chronotropic incompetence\", syncope has persisted since then     CATARACT EXTRACTION       CORONARY STENT PLACEMENT  11/11/2019    stent to bifurcation of LAD and DG     CV CORONARY ANGIOGRAM N/A 11/11/2019    Procedure: Coronary Angiogram;  Surgeon: Suzan Pineda MD;  Location: [facility] Cath Lab;  Service: Cardiology     CV LEFT HEART CATHETERIZATION WITH LEFT VENTRICULOGRAM N/A 11/11/2019    Procedure: Left Heart Catheterization with Left Ventriculogram;  Surgeon: Suzan Pineda MD;  Location: [facility] Cath Lab;  Service: Cardiology     ESOPHAGOGASTRODUODENOSCOPY N/A 3/20/2016    Procedure: ESOPHAGOGASTRODUODENOSCOPY with biopsy;  Surgeon: Melissa Castellano MD;  " Location: [facility];  Service:      PENILE PROSTHESIS IMPLANT       VA EDG US EXAM SURGICAL ALTER STOM DUODENUM/JEJUNUM N/A 3/23/2016    Procedure: EGD and ENDOSCOPIC ULTRASOUND with brushing and biopsies;  Surgeon: Nj Howe MD;  Location: [facility];  Service: Gastroenterology           CURRENT MEDICATIONS:    No current facility-administered medications on file prior to encounter.      Current Outpatient Medications on File Prior to Encounter   Medication Sig     aspirin 81 mg chewable tablet Chew 1 tablet (81 mg total) daily. Forever     atorvastatin (LIPITOR) 40 MG tablet Take 1 tablet (40 mg total) by mouth at bedtime.     azelastine (ASTELIN) 137 mcg (0.1 %) nasal spray 1 spray into each nostril as needed.     carbidopa-levodopa (SINEMET)  mg per tablet Take 2 tablets by mouth 4 (four) times a day. Take with meals and bedtime.     cholecalciferol, vitamin D3, 2,000 unit Tab Take 2,000 Units by mouth daily.     cyanocobalamin 1000 MCG tablet Take 1,000 mcg by mouth every other day.     fludrocortisone (FLORINEF) 0.1 mg tablet Take 0.1 mg by mouth daily.     magnesium hydroxide (MILK OF MAG) 400 mg/5 mL Susp suspension Take 30 mL by mouth daily as needed.     magnesium oxide (MAG-OX) 400 mg (241.3 mg magnesium) tablet Take 1 tablet (400 mg total) by mouth 3 (three) times a day.     metoprolol tartrate (LOPRESSOR) 25 MG tablet Take 25 mg by mouth every morning.     metoprolol tartrate (LOPRESSOR) 25 MG tablet Take 12.5 mg by mouth every evening.     midodrine (PROAMATINE) 10 MG tablet Take 1 tablet (10 mg total) by mouth 2 (two) times a day with meals.     mirtazapine (REMERON) 7.5 MG tablet Take 7.5 mg by mouth at bedtime. Indications: major depressive disorder     multivit with iron,hematinic (SUPER B-COMPLEX ORAL) Take 1 tablet by mouth daily with lunch.      nitroglycerin (NITROSTAT) 0.4 MG SL tablet Place 0.4 mg under the tongue every 5 (five) minutes as needed for chest pain.  Indications: acute attack of angina     omeprazole (PRILOSEC) 20 MG capsule Take 20 mg by mouth daily before breakfast.     pyridostigmine (MESTINON) 60 mg tablet Take 30 mg by mouth 3 (three) times a day.     senna (SENOKOT) 8.6 mg tablet Take 2 tablets by mouth at bedtime as needed for constipation. 6/28/2020- Pt taking 2 tabs PRN, per spouse report, reported to MD team on 6/29/2020- SD     tamsulosin (FLOMAX) 0.4 mg cap Take 0.4 mg by mouth Daily at 5 pm. Indications: enlarged prostate with urination problem     tolterodine (DETROL LA) 4 MG ER capsule Take 4 mg by mouth daily. Indications: urinary urgency, or the sudden urge to urinate       ALLERGIES:  Allergies   Allergen Reactions     Sulfa (Sulfonamide Antibiotics)      Does not remember     Tylenol [Acetaminophen]      Feeling - skin crawling       FAMILY HISTORY:  Family History   Problem Relation Age of Onset     Heart disease Mother      Heart disease Father        SOCIAL HISTORY:   Social History     Socioeconomic History     Marital status:      Spouse name: Not on file     Number of children: Not on file     Years of education: Not on file     Highest education level: Not on file   Occupational History     Not on file   Social Needs     Financial resource strain: Not on file     Food insecurity     Worry: Not on file     Inability: Not on file     Transportation needs     Medical: Not on file     Non-medical: Not on file   Tobacco Use     Smoking status: Former Smoker     Smokeless tobacco: Never Used   Substance and Sexual Activity     Alcohol use: Yes     Comment: occasional     Drug use: No     Sexual activity: Not on file   Lifestyle     Physical activity     Days per week: Not on file     Minutes per session: Not on file     Stress: Not on file   Relationships     Social connections     Talks on phone: Not on file     Gets together: Not on file     Attends Sikh service: Not on file     Active member of club or organization: Not on file  "    Attends meetings of clubs or organizations: Not on file     Relationship status: Not on file     Intimate partner violence     Fear of current or ex partner: Not on file     Emotionally abused: Not on file     Physically abused: Not on file     Forced sexual activity: Not on file   Other Topics Concern     Not on file   Social History Narrative     Not on file       VITALS:  Patient Vitals for the past 24 hrs:   BP Temp Temp src Pulse Resp SpO2 Height Weight   05/20/21 1535 (!) 187/96 -- -- 65 -- 100 % -- --   05/20/21 1500 (!) 199/102 -- -- 81 (!) 41 100 % -- --   05/20/21 1445 (!) 186/92 -- -- 74 17 100 % -- --   05/20/21 1430 (!) 199/97 -- -- 86 17 99 % -- --   05/20/21 1415 138/71 -- -- 60 17 99 % -- --   05/20/21 1409 139/78 98.3  F (36.8  C) Oral 86 12 99 % 6' 1\" (1.854 m) 185 lb (83.9 kg)   05/20/21 1400 139/78 -- -- 100 22 99 % -- --       PHYSICAL EXAM    Physical Exam   Constitutional: He is oriented to person, place, and time. He appears well-developed and well-nourished. No distress.   HENT:   Head: Normocephalic and atraumatic.   Mouth/Throat: Oropharynx is clear and moist. No oropharyngeal exudate.   Eyes: Pupils are equal, round, and reactive to light. Conjunctivae and EOM are normal.   Neck: Normal range of motion. Neck supple. No tracheal deviation present.   Cardiovascular: Normal rate, regular rhythm, normal heart sounds and intact distal pulses.   No murmur heard.  Pulmonary/Chest: Effort normal and breath sounds normal. No stridor. No respiratory distress. He has no wheezes. He has no rales. He exhibits no tenderness.   Abdominal: Soft. Bowel sounds are normal. He exhibits no distension. There is no abdominal tenderness. There is no rebound and no guarding.   Musculoskeletal: Normal range of motion.         General: No tenderness, deformity or edema.   Neurological: He is alert and oriented to person, place, and time. He displays normal reflexes. No cranial nerve deficit. He exhibits normal " muscle tone. Coordination normal.   Pill rolling tremor.   Skin: Skin is warm and dry. No rash noted. He is not diaphoretic. No erythema.   Psychiatric: He has a normal mood and affect. His behavior is normal.   Nursing note and vitals reviewed.         LAB:  All pertinent labs reviewed and interpreted.  Results for orders placed or performed during the hospital encounter of 05/20/21   INR   Result Value Ref Range    INR 1.16 (H) 0.90 - 1.10   Basic Metabolic Panel   Result Value Ref Range    Sodium 141 136 - 145 mmol/L    Potassium 3.8 3.5 - 5.0 mmol/L    Chloride 105 98 - 107 mmol/L    CO2 30 22 - 31 mmol/L    Anion Gap, Calculation 6 5 - 18 mmol/L    Glucose 105 70 - 125 mg/dL    Calcium 8.2 (L) 8.5 - 10.5 mg/dL    BUN 32 (H) 8 - 28 mg/dL    Creatinine 1.43 (H) 0.70 - 1.30 mg/dL    GFR MDRD Af Amer 58 (L) >60 mL/min/1.73m2    GFR MDRD Non Af Amer 48 (L) >60 mL/min/1.73m2   Troponin I   Result Value Ref Range    Troponin I 0.03 0.00 - 0.29 ng/mL   Urinalysis-UC if Indicated   Result Value Ref Range    Color, UA Light Yellow Light Yellow, Yellow    Clarity, UA Clear Clear    Glucose, UA Negative Negative    Protein, UA Negative Negative    Bilirubin, UA Negative Negative    Urobilinogen, UA <2.0 mg/dL <2.0 mg/dL    pH, UA 6.5 5.0 - 8.0    Blood, UA Negative Negative    Ketones, UA Negative Negative    Nitrite, UA Negative Negative    Leukocytes, UA 25 Sierra/uL (!) Negative    Specific Gravity, UA 1.009 1.001 - 1.030    RBC, UA 1 <=2 hpf    WBC UA 7 (H) <=5 hpf    Bacteria, UA None Seen None Seen    Squamous Epithel, UA <1 <=5 /HPF   HM1 (CBC with Diff)   Result Value Ref Range    WBC 6.2 4.0 - 11.0 thou/uL    RBC 3.71 (L) 4.40 - 6.20 mill/uL    Hemoglobin 10.9 (L) 14.0 - 18.0 g/dL    Hematocrit 34.5 (L) 40.0 - 54.0 %    MCV 93 80 - 100 fL    MCH 29.4 27.0 - 34.0 pg    MCHC 31.6 (L) 32.0 - 36.0 g/dL    RDW 14.2 11.0 - 14.5 %    Platelets 202 140 - 440 thou/uL    MPV 10.8 8.5 - 12.5 fL    Neutrophils % 72 (H) 50 - 70 %     Lymphocytes % 15 (L) 20 - 40 %    Monocytes % 10 2 - 10 %    Eosinophils % 2 0 - 6 %    Basophils % 1 0 - 2 %    Immature Granulocyte % 1 (H) <=0 %    Neutrophils Absolute 4.5 2.0 - 7.7 thou/uL    Lymphocytes Absolute 0.9 0.8 - 4.4 thou/uL    Monocytes Absolute 0.6 0.0 - 0.9 thou/uL    Eosinophils Absolute 0.1 0.0 - 0.4 thou/uL    Basophils Absolute 0.1 0.0 - 0.2 thou/uL    Immature Granulocyte Absolute 0.0 <=0.0 thou/uL   ECG 12 lead nursing unit performed   Result Value Ref Range    SYSTOLIC BLOOD PRESSURE 138 mmHg    DIASTOLIC BLOOD PRESSURE 71 mmHg    VENTRICULAR RATE 82 BPM    ATRIAL RATE 79 BPM    P-R INTERVAL      QRS DURATION 92 ms    Q-T INTERVAL 386 ms    QTC CALCULATION (BEZET) 450 ms    P Axis      R AXIS 9 degrees    T AXIS 55 degrees    MUSE DIAGNOSIS       Accelerated Junctional rhythm  Nonspecific T wave abnormality  Abnormal ECG  When compared with ECG of 13-APR-2021 20:21,  Inverted T waves have replaced nonspecific T wave abnormality in Lateral leads  Confirmed by SEE ED PROVIDER NOTE FOR, ECG INTERPRETATION ([id number]),  RACHEL GALVAN ([id number]) on 5/20/2021 2:40:46 PM         RADIOLOGY:  Reviewed all pertinent imaging. Please see official radiology report.  Ct Head Without Contrast    Result Date: 5/20/2021  EXAM: CT HEAD WO CONTRAST LOCATION: [facility] DATE/TIME: 5/20/2021 3:14 PM INDICATION: syncope COMPARISON: None. TECHNIQUE: Routine CT Head without IV contrast. Multiplanar reformats. Dose reduction techniques were used. FINDINGS: INTRACRANIAL CONTENTS: No intracranial hemorrhage, extraaxial collection, or mass effect.  No CT evidence of acute infarct. Mild presumed chronic small vessel ischemic changes. Mild to moderate diffuse parenchymal volume loss. Ventricular size is in keeping with this volume loss. Calcification of the distal internal carotid arteries bilaterally. VISUALIZED ORBITS/SINUSES/MASTOIDS: Prior bilateral cataract surgery. Visualized portions of  the orbits are otherwise unremarkable. No paranasal sinus mucosal disease. No middle ear or mastoid effusion. BONES/SOFT TISSUES: No skull fracture. No scalp hematoma.     1.  Age-related changes, as above, with no acute intracranial abnormality.      EKG:    Performed at: 1428    Impression: junctional rhythm, Nonspecific T wave abnormality, No STEMI    Rate: 82 bpm  Rhythm: junctional rhythm  QRS Interval: 92 ms  QTc Interval: 450 ms  ST Changes: No ST changes  Comparison: when compared to ECG from 4/13/21 inverted T waves have replaced nonspecific T wave abnormality in lateral leads.    I have independently reviewed and interpreted the EKG(s) documented above.    Procedures  None.      I, Kemi Zaragoza, am serving as a scribe to document services personally performed by Dr. Zimmer based on my observation and the provider's statements to me. I, Luis Zimmer, DO attest that Kemi Zaragoza is acting in a scribe capacity, has observed my performance of the services and has documented them in accordance with my direction.    Luis Zimmer DO  Emergency Medicine  [facility] EMERGENCY DEPARTMENT  [address]  [facility] [address]  Dept: [phone]  Loc: [phone]       Luis Zimmer DO  05/20/21 5592

## 2021-06-17 NOTE — ED TRIAGE NOTES
Pt with hx of pacemaker for bradycardia and parkinson's, and hx of syncopy multiple times a day from 15 min to hours per family.  Pt's wife called primary again today re long time unable to wake pt and primary said to have pt assessed.  Pt was alert when medics arrived with bp 78/30 and then 15 min later 148/95 with no intervention.  Wife states his bp is always very high and very low within minutes.   Pt arrives alert and oriented to person, place and situation not time. Pupils are equil round and reactive to light.  Tremors noted.

## 2021-06-17 NOTE — PATIENT INSTRUCTIONS - HE
Patient Instructions by Shahrzad Moreland NP at 11/27/2019  2:50 PM     Author: Shahrzad Moreland NP Service: -- Author Type: Nurse Practitioner    Filed: 11/27/2019  3:28 PM Encounter Date: 11/27/2019 Status: Signed    : Shahrzad Moreland NP (Nurse Practitioner)       Canelo Cook,    It was a pleasure to see you today at the [facility].     My recommendations after this visit include:    - Decrease Atorvastatin from 40 mg to 20 mg daily for 2 weeks and see if this improves your fatigue and sleepiness. You may go back to 40 mg daily if no change in your fatigue and sleepiness.     - I did some lab work today  and will call you with results when available     - Please seek medical attention if recurrent chest pain/tightness/pressure/ or shortness of breath    - Follow up with Dr. Christine in 4 weeks     - Please call  [phone], if you have any questions or concerns    Shahrzad Moreland CNP      Medication     o Take all your medications as prescribed  o Do not stop any medications without talking with a healthcare provider    Exercise      o Physical activity is important for overall health  o Set a goal of 150 minutes of exercise each week  o For example, 30 minutes of exercise 5 days each week.    o These 30 minutes can be broken into shorter periods of 15 minutes twice daily or 10 minutes three times daily  o Start any exercise program slowly and work towards the goal of 150 minutes each week  o For example, you may start with 10 minutes and plan to add a few minutes each week as you get stronger   o Examples of exercise include walking, swimming, or biking  o Remember to stretch and stay hydrated with exercise    Diet     o A heart healthy diet includes:  o A variety of fruits and vegetables  o Whole grains  o Low-fat dairy (fat-free, 1% fat, and low-fat)  o Lean meats and poultry without skin   o Fish (eat fish 2 times each week)  o Nuts  o Limit saturated fat to about 13 grams each day  (based on a 2000 calorie diet)  o Limit red meat  o Limit sugars (sweets and sugary beverages)  o Limit your portion sizes  o Do not add salt to your food when cooking or at the table  o Limit alcohol intake (no more than 1 drink each day for women or 2 drinks each day for men)    Weight Loss     o Work on losing weight with diet and exercise  o You BMI (body mass index) should be between 18.5-24.9  o This is a calculation of your weight and height  o Please ask your healthcare provider for your BMI    Manage Other Chronic Health Conditions     o Control cholesterol  o Eat a diet low in saturated fat  o Exercise   o Take a statin medication as prescribed  o Manage blood pressure  o Eat a diet low in sodium  o Exercise  o Reduce stress  o Lose weight   o Take blood pressure medications as prescribed  o Control blood sugars if diabetic  o Monitor sugars and carbohydrates in your diet  o Lose weight   o Take diabetes medications as prescribed  o Follow-up with your primary care provider to make sure your blood sugars are well controlled    Stress Reduction     o Find time each day to relax  o Reading, listening to music, yoga, meditation, exercise, spending time with friends and family, volunteering   o Get 6-8 hours of sleep each night    Smoking Cessation     o Smoking causes numerous health problems including coronary artery disease  o It is never too late to quit  o Set realistic goals for quitting  o Decrease the number of cigarettes used each week  o Use nicotine gum or patches to help you quit    Information from the American Heart Association.  Please visit their website at www.heart.org  Patient Education     Eating Heart-Healthy Foods  Eating has a big impact on your heart health. In fact, eating healthier can improve several of your heart risks at once. For instance, it helps you manage weight, cholesterol, and blood pressure. Here are ideas to help you make heart-healthy changes without giving up all the  foods and flavors you love.  Getting started    Talk with your healthcare provider about eating plans, such as the DASH or Mediterranean diet. You may also be referred to a dietitian.    Change a few things at a time. Give yourself time to get used to a few eating changes before adding more.    Work to create a tasty, healthy eating plan that you can stick to for the rest of your life.    Goals for healthy eating  Below are some tips to improve your eating habits:    Limit saturated fats and trans fats. Saturated fats raise your levels of cholesterol, so keep these fats to a minimum. They are found in foods such as fatty meats, whole milk, cheese, and palm and coconut oils. Avoid trans fats because they lower good cholesterol as well as raise bad cholesterol. Trans fats are most often found in processed foods.    Reduce sodium (salt) intake. Eating too much salt may increase your blood pressure. Limit your sodium intake to 2,300 milligrams (mg) per day (the amount in 1 teaspoon of salt), or less if your healthcare provider recommends it. Dining out less often and eating fewer processed foods are two great ways to decrease the amount of salt you consume.    Managing calories. A calorie is a unit of energy. Your body burns calories for fuel, but if you eat more calories than your body burns, the extras are stored as fat. Your healthcare provider can help you create a diet plan to manage your calories. This will likely include eating healthier foods as well as exercising regularly. To help you track your progress, keep a diary to record what you eat and how often you exercise.  Choose the right foods  Aim to make these foods staples of your diet. If you have diabetes, you may have different recommendations than what is listed here:    Fruits and vegetables provide plenty of nutrients without a lot of calories. At meals, fill half your plate with these foods. Split the other half of your plate between whole grains and  lean protein.    Whole grains are high in fiber and rich in vitamins and nutrients. Good choices include whole-wheat bread, pasta, and brown rice.    Lean proteins give you nutrition with less fat. Good choices include fish, skinless chicken, and beans.    Low-fat or nonfat dairy provides nutrients without a lot of fat. Try low-fat or nonfat milk, cheese, or yogurt.    Healthy fats can be good for you in small amounts. These are unsaturated fats, such as olive oil, nuts, and fish. Try to have at least 2 servings per week of fatty fish, such as salmon, sardines, mackerel, rainbow trout, and albacore tuna. These contain omega-3 fatty acids, which are good for your heart. Flaxseed is another source of a heart-healthy fat.  More on heart-healthy eating  Read food labels  Healthy eating starts at the grocery store. Be sure to pay attention to food labels on packaged foods. Look for products that are high in fiber and protein, and low in saturated fat, cholesterol, and sodium. Avoid products that contain trans fat. And pay close attention to serving size. For instance, if you plan to eat two servings, double all the numbers on the label.  Prepare food right  A key part of healthy cooking is cutting down on added fat and salt. Look on the internet for lower-fat, lower-sodium recipes. Also, try these tips:    Remove fat from meat and skin from poultry before cooking.    Skim fat from the surface of soups and sauces.    Broil, boil, bake, steam, grill, and microwave food without added fats.    Choose ingredients that spice up your food without adding calories, fat, or sodium. Try these items: horseradish, hot sauce, lemon, mustard, nonfat salad dressings, and vinegar. For salt-free herbs and spices, try basil, cilantro, cinnamon, pepper, and rosemary.  Date Last Reviewed: 10/1/2017    1867-3476 [employer]. [address] [place] [address]. All rights reserved. This information is not intended as a  substitute for professional medical care. Always follow your healthcare professional's instructions.

## 2021-06-20 NOTE — LETTER
Letter by Elena Orozco MD at      Author: Elena Orozco MD Service: -- Author Type: --    Filed:  Encounter Date: 12/19/2019 Status: Signed         Patient: Canelo Cook   MR Number: [id number]   YOB: 1940   Date of Visit: 12/19/2019     [facility]    Facility:   [facility] [[id number]]   Code Status: POLST AVAILABLE    Assessment of 79-year-old male with Parkinson's, significant hypotension, history of hypertension, autonomic dysfunction, recent hospitalization with weakness and falls, Florinef dose increased, continued on midodrine, transferred to TCU for rehabilitation and management of medical problems. Recent discontinuation of Toviaz.    Review of systems: continued profound fatigue. Aware of lightheadedness intermittently. Drowsy at this time, wife present contributes to review of systems. No chest pain. Wife doubts that hypotension can be improved, concerned regarding long-term prognosis. Remainder 12 point review of systems obtained negative.    Exam: continued significant decrease in blood pressure recordings, systolic 70 on this date. Patient drowsy, oriented, bradykinesia present. No evidence of respiratory distress. S1 and S2 regular. Pulmonary exam without adventitious sounds. Abdomen without masses or tenderness. Periphery with nonpitting edema, ace wraps in place. No tremor.    Impression and plan:   persistent hypotension, continue midodrineand Florinef, hold  Flomax times two weeks, monitor urinary symptoms and blood pressure, continue use of ace wraps, continue attempts at rehabilitation.   Autonomic dysfunction significant.   Parkinson's with bradykinesia continuing Sinemet.   Coronary artery disease with recent NSTEMI, no anginal symptoms.   Concerns reviewed with patient and nursing staff.      Electronically signed by: Elena Orozco MD

## 2021-06-20 NOTE — LETTER
Letter by Cy Jerry CNP at      Author: Cy Jerry CNP Service: -- Author Type: --    Filed:  Encounter Date: 2/5/2020 Status: (Other)         Patient: Canelo Cook   MR Number: [id number]   YOB: 1940   Date of Visit: 2/5/2020     [facility]    Facility:   [facility] [[id number]]   Code Status: DNR/DNI      CHIEF COMPLAINT/REASON FOR VISIT:  Chief Complaint   Patient presents with   ? Problem Visit     Pain.        HISTORY:      HPI: Canelo is a 79 y.o. male with a history of CAD, STEMI (11/11/19), cardiac pacemaker, parkinsons disease with orthostatic hypotension, dsyphagia, htn who experienced a fall at home with near syncope.     Today: Patient seen at request of nursing for continued pain to the left ribs and back.  X-ray negative.  We have tried oxycodone and Dilaudid for short-term use and he experienced quite a bit of confusion and delusions.  He has also been on tramadol and that was discontinued last week as well.  Nursing staff says that he actually had been doing okay with just tramadol.  I started ibuprofen when we discontinued narcotics and he has been using it daily every 24 hours for pain.  He is asking for more ibuprofen and nursing staff was suggesting restarting tramadol.  I am open to restarting tramadol since he did okay on it however Canelo adamantly refused given his experience last week.  At this time we will continue with ice, infrequent ibuprofen every 24 hours and muscle rub.   His wife says he rarely takes pain medication while at home.  He does have a allergy to Tylenol so we do have limited options here.     For is CAD with recent STEMI: On asa, brilinta, metoprolol, lipitor.     Autonomic orthostatic hypotension: He remains on midodrine and his florinef was increased during this recent hospital stay. He has had extensive workup in the past  Has had multiple changes to medications during TCU stay, currently  he is stable and able to sit upright without lightheadedness..      Fall: negative for fractures including CT. Continue OT/PT. no longer painful on the right leg.    Parkinson's: on sinimet which was recently decreased per neurology consult.  He will need to see neurology with a lab visit prior to next dose change.    Past Medical History:   Diagnosis Date   ? Acute chest pain 11/10/2019   ? Chronic ischemic heart disease    ? GERD (gastroesophageal reflux disease)    ? HLD (hyperlipidemia)    ? HTN (hypertension)    ? Near syncope 10/12/2015   ? Pacemaker    ? Pacemaker     dual chamber   ? Parkinson's disease (H)    ? Urinary urgency    ? Vitamin D deficiency              Family History   Problem Relation Age of Onset   ? Heart disease Mother    ? Heart disease Father      Social History     Socioeconomic History   ? Marital status:      Spouse name: Not on file   ? Number of children: Not on file   ? Years of education: Not on file   ? Highest education level: Not on file   Occupational History   ? Not on file   Social Needs   ? Financial resource strain: Not on file   ? Food insecurity:     Worry: Not on file     Inability: Not on file   ? Transportation needs:     Medical: Not on file     Non-medical: Not on file   Tobacco Use   ? Smoking status: Former Smoker   ? Smokeless tobacco: Never Used   Substance and Sexual Activity   ? Alcohol use: Yes     Comment: occasional   ? Drug use: No   ? Sexual activity: Not on file   Lifestyle   ? Physical activity:     Days per week: Not on file     Minutes per session: Not on file   ? Stress: Not on file   Relationships   ? Social connections:     Talks on phone: Not on file     Gets together: Not on file     Attends [employer] service: Not on file     Active member of club or organization: Not on file     Attends meetings of clubs or organizations: Not on file     Relationship status: Not on file   ? Intimate partner violence:     Fear of current or ex partner: Not  on file     Emotionally abused: Not on file     Physically abused: Not on file     Forced sexual activity: Not on file   Other Topics Concern   ? Not on file   Social History Narrative   ? Not on file         Review of Systems   Cardiovascular: Negative for chest pain and leg swelling.   Gastrointestinal: Negative.    Musculoskeletal: Negative for arthralgias and myalgias.        Left rib pain.    Skin: Negative.    Psychiatric/Behavioral: Negative for agitation and behavioral problems.       Vitals:    02/05/20 2200   BP: 170/90   Pulse: 78   Temp: 97.2  F (36.2  C)   SpO2: 98%   Weight: 183 lb (83 kg)       Physical Exam  Constitutional:       Appearance: Normal appearance.   HENT:      Head: Atraumatic.      Mouth/Throat:      Mouth: Mucous membranes are moist.   Eyes:      Pupils: Pupils are equal, round, and reactive to light.   Cardiovascular:      Rate and Rhythm: Normal rate.      Pulses: Normal pulses.   Pulmonary:      Effort: Pulmonary effort is normal. No respiratory distress.      Breath sounds: Normal breath sounds. No rales.   Abdominal:      General: Abdomen is flat.   Skin:     General: Skin is warm and dry.   Neurological:      General: No focal deficit present.      Mental Status: He is alert. Mental status is at baseline.      Cranial Nerves: Cranial nerves are intact.      Motor: Weakness present. No tremor.      Coordination: Coordination normal.      Gait: Gait normal.      Comments: Bilateral handgrips equal, lower extremity extension and flexion normal, no reproducible pain spine exam, straight leg raise normal.   Psychiatric:         Behavior: Behavior normal.      Comments: Flat affect.           LABS:   Reviewed    ASSESSMENT:    .     ICD-10-CM    1. Parkinson disease (H) G20    2. Rib pain R07.81    3. Autonomic dysfunction G90.9        PLAN:      Fall in nursing home:  X-ray negative.  Pain is improved since last week, and per his wife, in general he does not need pain medication.   Nursing staff says he is done well with tramadol in the past however after his experience last week with increased confusion he does not want to try any narcotic pain medications.  -Continue muscle rub to left ribs.  -Continue to use ice 4 times daily as needed for rib and back pain.  -Continue ibuprofen 600 mg p.o. every 24 hours as needed for pain 7 through 10.  Discussed risks and benefits with patient and nursing staff.  Use sparingly.      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    Insomnia: Improved on mirtazapine.    CHF/swellng: Continue BHARATHI arriaza to norman LE on am an off HS.     For is CAD with recent STEMI: On asa, brilinta, metoprolol, lipitor.     Autonomic orthostatic hypotension: He remains on midodrine and his florinef was increased during this recent hospital stay. He has had extensive workup in the past.   Recent improvement in blood pressures with adjustment of metoprolol, Florinef and midodrine.  -Continue to monitor.    Parkinson's: on sinimet.   -Neurology has recently decreased his Sinemet with improvement in symptoms.    R hip: OT/PT.   -Recently restarted therapies due to improvement in blood pressures.        Electronically signed by: Cy Jerry, CNP

## 2021-06-20 NOTE — LETTER
Letter by Elena Orozco MD at      Author: Elena Orozco MD Service: -- Author Type: --    Filed:  Encounter Date: 2/6/2020 Status: (Other)         Patient: Canelo Cook   MR Number: [id number]   YOB: 1940   Date of Visit: 2/6/2020     [facility]    Facility:   [facility] [[id number]]   Code Status: POLST AVAILABLE    Reevaluation of 79-year-old male with advanced Parkinson's, hypertension, recent significant orthostatic hypotension, admitted to hospital with weakness, recent NSTEMI, continues in TCU for rehabilitation and observation of blood pressure, recent fall with left lower rib pain.    Review of systems: denies orthostatic symptoms. Energy level can take use to improve. Previous confusion secondary to narcotics resolved. Vague pain left lower ribs cut, no pleuritic chest pain. No fever sweats or chills. Continues with diminished Parkinson's symptoms post decrease in Sinemet dose. Denies confusion's. Describes floaters in eyes, denies visual hallucinations. Remainder of 12 point review of systems obtained negative.    Exam: pleasant and alert, in chair, lack of spontaneous movement, modest tremor bilateral hands. Vital signs reviewed over past 48 hours. Cognitively intact. No pharyngeal erythema. No HJR. S1 and S2 regular. Pulmonary exam without rales rhonchi or wheezes. Abdomen without masses tenderness organomegaly. Periphery with nonpitting edema 1 mm. Strength and muscle tone diffusely diminished.    Impression and plan:   ASHD, no current angina.   Hypertension, intermittent elevation of systolic levels, overall satisfactory control.   Orthostatic hypotension, rarely experiencing symptoms, rare decrease in systolic blood pressure readings, continues midodrine and Florinef.   Profound weakness and deconditioning, continues rehabilitation.   Recent fall with left rib pain, pain slowly resolving, no pleuritic component to pain, nothing to  suggest intrathoracic injury.   Home visit today, anticipated discharge over next seven days.      Electronically signed by: Elena Orozco MD

## 2021-06-20 NOTE — LETTER
Letter by Elena Orozco MD at      Author: Elena Orozco MD Service: -- Author Type: --    Filed:  Encounter Date: 1/23/2020 Status: (Other)         Patient: Canelo Cook   MR Number: [id number]   YOB: 1940   Date of Visit: 1/23/2020     [facility]    Facility:   [facility] [[id number]]   Code Status: POLST AVAILABLE    79 year old with history of Parkinson's, orthostatic hypotension, autonomic dysfunction, recurrent falls, recent NSTEMI, is seen for reevaluation, additional discussion with patient's wife.    Review of systems: continued improvement in strength, continues to participate in physical therapy. No fever sweats or chills. No exertional chest discomfort. No lightheadedness with ambulation, ambulating 3 to 4 times per day. Remainder of 12 point review of systems obtained negative.    Exam: alert, oriented, lack of spontaneous facial movements consistent with Parkinson's, blood pressure 180/92, 76/52, pulse 69, temperature 97.5. Mild tremor bilateral hands. No HJR. Thyroid midline and regular. S1 and S2 regular. Pulmonary exam without rales or rhonchi. Abdomen without masses or tenderness. 2 mm nonpitting edema bilateral lower extremities, trace edema bilateral hands.    Impression and plan:   chronic hypertension, continued intermittent elevation of systolic readings to 180, today has first hypotensive reading in past three days asymptomatic, continue current regimen of metoprolol midodrine  and florinef.   Chronic profound deconditioning, strength improving with physical therapy.   Reactive depression, Remeron 7.5 mg QHS with decreased symptoms, tolerating medication.   Coronary artery disease, no current anginal symptoms.   Parkinson's, continues with diminished symptoms with recent decrease in Sinemet dose.   Issues reviewed with patient nursing staff and wife.      Electronically signed by: Elena Orozco MD

## 2021-06-20 NOTE — LETTER
Letter by Cy Jerry CNP at      Author: Cy Jerry CNP Service: -- Author Type: --    Filed:  Encounter Date: 12/11/2019 Status: Signed         Patient: Canelo Cook   MR Number: [id number]   YOB: 1940   Date of Visit: 12/11/2019     [facility]    Facility:   [facility] [[id number]]   Code Status: DNR/DNI      CHIEF COMPLAINT/REASON FOR VISIT:  Chief Complaint   Patient presents with   ? Review Of Multiple Medical Conditions       HISTORY:      HPI: Canelo is a 79 y.o. male with a history of CAD, STEMI (11/11/19), cardiac pacemaker, parkinsons disease with orthostatic hypotension, dsyphagia, htn who experienced a fall at home with near syncope.     For is CAD with recent STEMI: On asa, brilinta, metoprolol, lipitor.     Autonomic orthostatic hypotension: He remains on midodrine and his florinef was increased during this recent hospital stay. He has had extensive workup in the past.      Fall: negative for fractures including CT. Continue OT/PT. Continue ice prn.     Parkinson's: on sinimet.     Past Medical History:   Diagnosis Date   ? Acute chest pain 11/10/2019   ? Chronic ischemic heart disease    ? GERD (gastroesophageal reflux disease)    ? HLD (hyperlipidemia)    ? HTN (hypertension)    ? Near syncope 10/12/2015   ? Pacemaker    ? Pacemaker     dual chamber   ? Parkinson's disease (H)    ? Urinary urgency    ? Vitamin D deficiency              Family History   Problem Relation Age of Onset   ? Heart disease Mother    ? Heart disease Father      Social History     Socioeconomic History   ? Marital status:      Spouse name: Not on file   ? Number of children: Not on file   ? Years of education: Not on file   ? Highest education level: Not on file   Occupational History   ? Not on file   Social Needs   ? Financial resource strain: Not on file   ? Food insecurity:     Worry: Not on file     Inability: Not on file   ?  Transportation needs:     Medical: Not on file     Non-medical: Not on file   Tobacco Use   ? Smoking status: Former Smoker   ? Smokeless tobacco: Never Used   Substance and Sexual Activity   ? Alcohol use: Yes     Comment: occasional   ? Drug use: No   ? Sexual activity: Not on file   Lifestyle   ? Physical activity:     Days per week: Not on file     Minutes per session: Not on file   ? Stress: Not on file   Relationships   ? Social connections:     Talks on phone: Not on file     Gets together: Not on file     Attends Sikhism service: Not on file     Active member of club or organization: Not on file     Attends meetings of clubs or organizations: Not on file     Relationship status: Not on file   ? Intimate partner violence:     Fear of current or ex partner: Not on file     Emotionally abused: Not on file     Physically abused: Not on file     Forced sexual activity: Not on file   Other Topics Concern   ? Not on file   Social History Narrative   ? Not on file         Review of Systems   Cardiovascular: Negative for chest pain and leg swelling.   Gastrointestinal: Negative.    Musculoskeletal: Positive for arthralgias and myalgias.        Right leg pain/hip pain.    Skin: Negative.    Neurological: Positive for weakness.   Psychiatric/Behavioral: Negative for agitation and behavioral problems.       Vitals:    12/15/19 1436   BP: 115/75   Pulse: 77   Temp: 97.6  F (36.4  C)   SpO2: 95%   Weight: 180 lb (81.6 kg)       Physical Exam  Constitutional:       Appearance: Normal appearance.   HENT:      Head: Atraumatic.      Mouth/Throat:      Mouth: Mucous membranes are moist.   Eyes:      Pupils: Pupils are equal, round, and reactive to light.   Cardiovascular:      Rate and Rhythm: Normal rate.      Pulses: Normal pulses.   Pulmonary:      Effort: Respiratory distress present.      Breath sounds: No rales.   Abdominal:      General: Abdomen is flat.   Skin:     General: Skin is warm and dry.   Neurological:       Mental Status: He is alert. Mental status is at baseline.   Psychiatric:         Behavior: Behavior normal.           LABS:   Reviewed    ASSESSMENT:    .     ICD-10-CM    1. Fall at home, initial encounter W19.XXXA     Y92.009    2. Cardiac pacemaker in situ Z95.0    3. Autonomic dysfunction G90.9    4. Parkinson disease (H) G20    5. Essential hypertension I10        PLAN:    CHF/swellng: Start BHARATHI hoes to norman LE on am an off HS.   Continue all other medication as ordred,     R hip: OT/PT.     Total 35 minutes of [id number]*% was spent counseling and coordination of care renny above plan.    Electronically signed by: Cy Jerry, CNP

## 2021-06-20 NOTE — LETTER
Letter by Elena Orozco MD at      Author: Elena Orozco MD Service: -- Author Type: --    Filed:  Encounter Date: 2/4/2020 Status: (Other)         Patient: Canelo Cook   MR Number: [id number]   YOB: 1940   Date of Visit: 2/4/2020     [facility]    Facility:   [facility] [[id number]]   Code Status: POLST AVAILABLE    Reassessment of 79-year-old male with advanced Parkinson's, history of multiple falls and weakness, coronary artery disease with recent NSTEMI, recent hospitalization for weakness and falls, transferred to TCU for rehabilitation and management of medical problems which include significant hypotension, hypertension, autonomic dysfunction, recent fall striking left lower ribs.    Review of systems: patient is seen with wife on this occasion. Left lower rib pain gradually resolving, poorly tolerated narcotics with increased confusion and hallucinations, all narcotics have been discontinued, using Tylenol PRN. Progressing in physical therapy, anticipated home visit in 48 hours. Denies pleuritic chest discomfort. No lightheadedness with standing. No exertional chest discomfort. Continued Parkinsonian symptoms which have decreased in intensity with recent decrease in Sinemet dose. Remainder of 12 point review of systems obtained negative.     Face-to-face documentation regarding need for wheelchair at time of discharge. Patient has a mobility limitation that significantly impairs his ability to participate in one or more mobility related activities of daily living. Mobility limitations are secondary to Parkinson's orthostatic hypotension and profound weakness. The limitation cannot be resolved with an appropriately fitted walker or cane. Patient's home has adequate access between rooms and maneuvering space and surfaces for a manual wheelchair to be used. Patient has not expressed an unwillingness to use the manual wheelchair. Patient has  sufficient upper extremity function physical and mental capabilities to safely propel a manual wheelchair during a typical day.    Exam: blood pressure 170/90, heart rate 70, temperature 97.2, 02 98%. Lack of spontaneous facial expression consistent with Parkinson's, oriented. Cooperative. No HJR, no use of accessory muscles at rest. No pharyngeal erythema. S1 and S2 regular. Pulmonary exam without rales rhonchi or wheezes. Abdomen without masses tenderness organomegaly. Periphery with 2 mm trace pitting edema. Minimal tremor right greater than left hand. Mild bradykinesia. Joints without acute inflammatory change. Trace tenderness left inferior lateral rib edges. No flank tenderness source.    Impression and plan:   face-to-face documentation regarding need for wheelchair on discharge from TCU.   Hypertension, metoprolol currently 25 mg a.m. 12.5 PM, intermittent significant elevation of blood pressure, overall satisfactory control.   Hypotension continuing midodrine 10 mg TID and Florinef 0.1 mg Q day, previous significant orthostatic symptoms and readings have resolved.   Chronic deconditioning, ongoing need for rehabilitation.   Coronary artery disease without current anginal symptoms.   Recent fall with left inferior rib  tenderness slowly resolving, no evidence of retroperitoneal trauma with fall.   Advanced Parkinson's with significant mobility limitations.   Multiple concerns and issues reviewed with patient and wife in attendance.      Electronically signed by: Elena Orozco MD

## 2021-06-20 NOTE — LETTER
Letter by Cy Jerry CNP at      Author: Cy Jerry CNP Service: -- Author Type: --    Filed:  Encounter Date: 12/20/2019 Status: Signed         Patient: Canelo Cook   MR Number: [id number]   YOB: 1940   Date of Visit: 12/20/2019     [facility]    Facility:   [facility] [[id number]]   Code Status: DNR/DNI      CHIEF COMPLAINT/REASON FOR VISIT:  Chief Complaint   Patient presents with   ? Problem Visit     insomnia       HISTORY:      HPI: Canelo is a 79 y.o. male with a history of CAD, STEMI (11/11/19), cardiac pacemaker, parkinsons disease with orthostatic hypotension, dsyphagia, htn who experienced a fall at home with near syncope.     Today: Low bp of 65/37 today; this improved to 90s/50s.He is now feeling okay. He is endorsing insomnia and does not think he been receiving melatonin.     For is CAD with recent STEMI: On asa, brilinta, metoprolol, lipitor.     Autonomic orthostatic hypotension: He remains on midodrine and his florinef was increased during this recent hospital stay. He has had extensive workup in the past.      Fall: negative for fractures including CT. Continue OT/PT. Continue ice prn which he is using to right leg.     Parkinson's: on sinimet.     Past Medical History:   Diagnosis Date   ? Acute chest pain 11/10/2019   ? Chronic ischemic heart disease    ? GERD (gastroesophageal reflux disease)    ? HLD (hyperlipidemia)    ? HTN (hypertension)    ? Near syncope 10/12/2015   ? Pacemaker    ? Pacemaker     dual chamber   ? Parkinson's disease (H)    ? Urinary urgency    ? Vitamin D deficiency              Family History   Problem Relation Age of Onset   ? Heart disease Mother    ? Heart disease Father      Social History     Socioeconomic History   ? Marital status:      Spouse name: Not on file   ? Number of children: Not on file   ? Years of education: Not on file   ? Highest education level: Not on file    Occupational History   ? Not on file   Social Needs   ? Financial resource strain: Not on file   ? Food insecurity:     Worry: Not on file     Inability: Not on file   ? Transportation needs:     Medical: Not on file     Non-medical: Not on file   Tobacco Use   ? Smoking status: Former Smoker   ? Smokeless tobacco: Never Used   Substance and Sexual Activity   ? Alcohol use: Yes     Comment: occasional   ? Drug use: No   ? Sexual activity: Not on file   Lifestyle   ? Physical activity:     Days per week: Not on file     Minutes per session: Not on file   ? Stress: Not on file   Relationships   ? Social connections:     Talks on phone: Not on file     Gets together: Not on file     Attends Restoration service: Not on file     Active member of club or organization: Not on file     Attends meetings of clubs or organizations: Not on file     Relationship status: Not on file   ? Intimate partner violence:     Fear of current or ex partner: Not on file     Emotionally abused: Not on file     Physically abused: Not on file     Forced sexual activity: Not on file   Other Topics Concern   ? Not on file   Social History Narrative   ? Not on file         Review of Systems   Cardiovascular: Negative for chest pain and leg swelling.   Gastrointestinal: Negative.    Musculoskeletal: Negative for arthralgias and myalgias.        Right leg pain/hip pain.    Skin: Negative.    Neurological: Positive for weakness.   Psychiatric/Behavioral: Negative for agitation and behavioral problems.       Vitals:    12/26/19 1154   BP: (!) 70/46   Pulse: 73   Temp: 98.5  F (36.9  C)   SpO2: 98%   Weight: 180 lb (81.6 kg)       Physical Exam  Constitutional:       Appearance: Normal appearance.   HENT:      Head: Atraumatic.      Mouth/Throat:      Mouth: Mucous membranes are moist.   Eyes:      Pupils: Pupils are equal, round, and reactive to light.   Cardiovascular:      Rate and Rhythm: Normal rate.      Pulses: Normal pulses.   Pulmonary:       Effort: Pulmonary effort is normal. No respiratory distress.      Breath sounds: Normal breath sounds. No rales.   Abdominal:      General: Abdomen is flat.   Skin:     General: Skin is warm and dry.   Neurological:      Mental Status: He is alert. Mental status is at baseline.   Psychiatric:         Behavior: Behavior normal.           LABS:   Reviewed    ASSESSMENT:    .     ICD-10-CM    1. Other insomnia G47.09    2. Fall at home, sequela W19.XXXS     Y92.009    3. Autonomic dysfunction G90.9        PLAN:    Insomnia: Schedule melatonin.      CHF/swellng: Continue BHARATHI hoes to norman LE on am an off HS.     For is CAD with recent STEMI: On asa, brilinta, metoprolol, lipitor.     Autonomic orthostatic hypotension: He remains on midodrine and his florinef was increased during this recent hospital stay. He has had extensive workup in the past.   Bps vary as expeced with sbp 70s-170s.     Fall: negative for fractures including CT. Continue OT/PT. Continue ice prn.   -says pain is improved. Denies pain today.     Parkinson's: on sinimet.     R hip: OT/PT.         Electronically signed by: Cy Jerry, CNP

## 2021-06-20 NOTE — LETTER
Letter by Elena Orozco MD at      Author: Elena Orozco MD Service: -- Author Type: --    Filed:  Encounter Date: 12/24/2019 Status: Signed         Patient: Canelo Cook   MR Number: [id number]   YOB: 1940   Date of Visit: 12/24/2019     [facility]    Facility:   [facility] [[id number]]   Code Status: POLST AVAILABLE    Reevaluation of 79-year-old male with advanced Parkinson's, hypertension, autonomic dysfunction, admitted to hospital with multiple falls, Florinef dose increased in hospital, transferred to TCU, persistent decrease in blood pressure recordings, Toviaz and Flomax have been discontinued, ace wraps applied to lower extremities.    Review of systems: patient is seen with wife on this occasion. Continued significant episodes of hypotension, dizziness and lightheadedness have occurred, no syncope. Notes no improvement in strength. Profoundly fatigued at all times. No focal neurologic deficits of new onset. No anterior chest discomfort. Remainder of 12 point review of systems obtained negative.    Exam: drowsy, oriented, bradykinesia present. Sitting in chair with feet in dependent position. Modest tremor bilateral hands, no cogwheeling. S1 and S2 regular. Pulmonary exam without rales rhonchi or wheezes. Abdomen without masses or tenderness. Periphery with nonpitting edema of 2 mm. Pedal pulses diminished and symmetrical. Strength diffusely diminished.    Impression and plan:   significant hypotension, leg elevation encouraged, continues symptomatic with orthostatic changes, continue attempts at rehabilitation, continue use of ace wraps with edema bilateral lower extremities.   Hypertension historically, recent NSTEMI, continues metoprolol at 25 mg Q day, no anginal symptoms.   Advanced Parkinson's, continues Sinemet.   Autonomic dysfunction contributing greatly to hypotension.   Multiple concerns reviewed with patient and wife in  attendance.      Electronically signed by: Elena Orozco MD

## 2021-06-20 NOTE — LETTER
Letter by Elena Orozco MD at      Author: Elena Orozco MD Service: -- Author Type: --    Filed:  Encounter Date: 12/26/2019 Status: Signed         Patient: Canelo Cook   MR Number: [id number]   YOB: 1940   Date of Visit: 12/26/2019     [facility]    Facility:   [facility] [[id number]]   Code Status: POLST AVAILABLE    Reevaluation of 79-year-old male with Parkinson's, autonomic dysfunction, multiple falls and weakness, recent NSTEMI, admitted to hospital with falls, on Florinef and midodrine, transferred to TCU for rehabilitation and management of medical problems. Recent discontinuation of toviaz and Flomax.    Review of systems: persistent decreased blood pressure recordings. Near syncope during physical therapy today, was doing over the head activities. No chest pain or palpitations. No change in profound weakness. No focal neurologic deficits of new onset. Remainder of 12 point review of systems obtained negative.    Exam: blood pressure 65/46, 02 90% on room air, heart rate 73, temperature 98.5, respiratory 17. Masking of facies, minimal tremor, bradykinesia present. No HJR. S1 and S2 regular. Pulmonary exam without rales rhonchi or wheezes. Abdomen without masses or tenderness. Periphery with 2 mm nonpitting edema. Strength and muscle tone diffusely diminished.    Impression and plan:   autonomic dysfunction and significant hypotension, no improvement with discontinuation of Flomax, decrease metoprolol to 12.5 mg from 25 mg Q day, metoprolol will not be discontinued in view of recent NSTEMI.   Advanced Parkinson's on Sinemet.   Near syncopal episode in physical therapy today, consider physical therapy activities in bed, patient will limit activities pending his symptoms of lightheadedness which preceed syncope.   Profound weakness multifactorial.   Issues reviewed with patient and nursing staff.      Electronically signed by: Elena Hdez  MD Ernesto

## 2021-06-20 NOTE — LETTER
Letter by Elena Orozco MD at      Author: Elena Orozco MD Service: -- Author Type: --    Filed:  Encounter Date: 1/14/2020 Status: Signed         Patient: Canelo Cook   MR Number: [id number]   YOB: 1940   Date of Visit: 1/14/2020     [facility]    Facility:   [facility] [[id number]]   Code Status: POLST AVAILABLE    Reassessment of 79-year-old male with Parkinson's, autonomic dysfunction, history of hypertension, recent significant hypotension, recent increase in midodrine and Florinef dose, parameters for hold of midodrine and Florinef for elevated blood pressure have been discontinued.    Review of systems: feels much improved, up in chair, no faints have occurred. Aware of significant elevation of blood pressure. Wife has provided patient with salty nuts, believes these have improved his blood pressure control. Denies chest pain or palpitations. Initiated on Remeron 72 hours ago, mood greatly improved. No confusion or hallucinations. Remainder of 12 point review of systems obtained negative.    Exam: patient is seen with his wife on this occasion. Sitting up in chair, smiling, oriented. Cognitively intact. No pharyngeal erythema. No carotid bruits or HJR. S1 and S2 regular. Pulmonary exam without rales rhonchi or wheezes. Abdomen without masses tenderness organomegaly. Periphery with trace nonpitting edema. Mild facial edema present new in onset. Bradykinesia present, mild tremor bilateral hands.    Impression and plan:   significant variation of blood pressure, pressure greater than 200 intermittently, improved clinical status over past 72 hours, previously unable to get out of bed, now sitting upright in chair with lightheadedness, for significant elevation of blood pressure add metoprolol 12.5 mg in after noon, continue 25 mg a.m., decrease Florinef to .15 mg Q day, continue to monitor.   Coronary disease, no current angina.   Advanced  Parkinson's, bradykinesia continues, minimal visual hallucinations intermittently present, followed by neurology, unfortunately due to significant weakness and hypotension unable to get to neurologist for evaluation.   Prolonged evaluation time, multiple issues are reviewed.      Electronically signed by: Elena Orozco MD

## 2021-06-20 NOTE — LETTER
Letter by Elena Orozco MD at      Author: Elena Orozco MD Service: -- Author Type: --    Filed:  Encounter Date: 1/2/2020 Status: Signed         Patient: Canelo Cook   MR Number: [id number]   YOB: 1940   Date of Visit: 1/2/2020     [facility]    Facility:   [facility] [[id number]]   Code Status: POLST AVAILABLE    Reevaluation of 71-year-old male admitted to hospital with weakness and falls, florinef dose increased in hospital, transferred to TCU for rehabilitation and management of medical problems which include chronic hypertension, autonomic dysfunction, advanced Parkinson's, coronary artery disease.    Review of systems: continues with lightheadedness with standing or sitting. Attempted to traverse to bathroom today, fell striking posterior aspect of head and buttock, no significant injury per nursing report. Patient reports he sleep walks at home, his wife confirms this to be an issue, he does not recall getting out of bed. Continues with frequent urination, no dysuria. No anterior chest discomfort. Remainder of 12 point review of systems obtained negative.    Exam: persistent decrease in systolic recordings from 7o-90,  supine readings increased to approximately 140. Masked facies. Cognitively intact. No pharyngeal erythema. No HJR. S1 and S2 regular. Pulmonary exam without rales rhonchi or wheezes. Tender posterior scalp inferior aspect with 02 hematoma, no cervical tenderness, no shoulder discomfort. Abdomen without masses tenderness organomegaly. Periphery with edema 2 mm nonpitting. Mild bradykinesia. Strength and muscle tone diffusely diminished.    Impression and plan:   significant continued hypotension, blood pressure currently 81/60, continue midodrine 10 mg TID and Florinef 0.2 mg Q day.   Recent fall, tender left posterior scalp, no hematoma, observe.   Potential sleep walker, nursing informed of this issue, attempt to monitor  bed.   Significant deconditioning multifactorial.   Parkinson's advanced on Sinemet.   Coronary artery disease with recent NS SALO, no current angina.   Issues reviewed with patient, wife, nursing staff, prolonged evaluation time with multiple issues reviewed including assessment post fall, review of hypo tension, review of urinary frequency, long and short-term prognosis.      Electronically signed by: Elena Orozco MD

## 2021-06-20 NOTE — LETTER
Letter by Cy Jerry CNP at      Author: Cy Jerry CNP Service: -- Author Type: --    Filed:  Encounter Date: 1/27/2020 Status: (Other)         Patient: Canelo Cook   MR Number: [id number]   YOB: 1940   Date of Visit: 1/27/2020     [facility]    Facility:   [facility] [[id number]]   Code Status: DNR/DNI      CHIEF COMPLAINT/REASON FOR VISIT:  Chief Complaint   Patient presents with   ? Problem Visit     Fall with left-sided rib pain       HISTORY:      HPI: Canelo is a 79 y.o. male with a history of CAD, STEMI (11/11/19), cardiac pacemaker, parkinsons disease with orthostatic hypotension, dsyphagia, htn who experienced a fall at home with near syncope.     Today: Patient was seen for fall without obvious injury in the bathroom, reports he was trying to transfer himself to the toilet and he fell back and hit his back and left rib cage.  He is now having pain at the left ribs.  He is using ice which is helping.  Unsure if he has had, neuro is been intact with no headache or dizziness.  There is no bruising or redness on his back or left side.  Lower extremities with normal extension flexion and straight leg raise without pain.  Bilateral hand grasps are equal.    For is CAD with recent STEMI: On asa, brilinta, metoprolol, lipitor.     Autonomic orthostatic hypotension: He remains on midodrine and his florinef was increased during this recent hospital stay. He has had extensive workup in the past  Has had multiple changes to medications during TCU stay, currently he is stable and able to sit upright without lightheadedness..      Fall: negative for fractures including CT. Continue OT/PT. no longer painful on the right leg.    Parkinson's: on sinimet which was recently decreased per neurology consult.  He will need to see neurology with a lab visit prior to next dose change.    Past Medical History:   Diagnosis Date   ? Acute chest pain  11/10/2019   ? Chronic ischemic heart disease    ? GERD (gastroesophageal reflux disease)    ? HLD (hyperlipidemia)    ? HTN (hypertension)    ? Near syncope 10/12/2015   ? Pacemaker    ? Pacemaker     dual chamber   ? Parkinson's disease (H)    ? Urinary urgency    ? Vitamin D deficiency              Family History   Problem Relation Age of Onset   ? Heart disease Mother    ? Heart disease Father      Social History     Socioeconomic History   ? Marital status:      Spouse name: Not on file   ? Number of children: Not on file   ? Years of education: Not on file   ? Highest education level: Not on file   Occupational History   ? Not on file   Social Needs   ? Financial resource strain: Not on file   ? Food insecurity:     Worry: Not on file     Inability: Not on file   ? Transportation needs:     Medical: Not on file     Non-medical: Not on file   Tobacco Use   ? Smoking status: Former Smoker   ? Smokeless tobacco: Never Used   Substance and Sexual Activity   ? Alcohol use: Yes     Comment: occasional   ? Drug use: No   ? Sexual activity: Not on file   Lifestyle   ? Physical activity:     Days per week: Not on file     Minutes per session: Not on file   ? Stress: Not on file   Relationships   ? Social connections:     Talks on phone: Not on file     Gets together: Not on file     Attends Hinduism service: Not on file     Active member of club or organization: Not on file     Attends meetings of clubs or organizations: Not on file     Relationship status: Not on file   ? Intimate partner violence:     Fear of current or ex partner: Not on file     Emotionally abused: Not on file     Physically abused: Not on file     Forced sexual activity: Not on file   Other Topics Concern   ? Not on file   Social History Narrative   ? Not on file         Review of Systems   Cardiovascular: Negative for chest pain and leg swelling.   Gastrointestinal: Negative.    Musculoskeletal: Negative for arthralgias and myalgias.         Right leg pain/hip pain.    Skin: Negative.    Psychiatric/Behavioral: Negative for agitation and behavioral problems.       Vitals:    01/29/20 0940   BP: 96/62   Pulse: 73   Temp: 98.6  F (37  C)   SpO2: 96%   Weight: 171 lb (77.6 kg)       Physical Exam  Constitutional:       Appearance: Normal appearance.   HENT:      Head: Atraumatic.      Mouth/Throat:      Mouth: Mucous membranes are moist.   Eyes:      Pupils: Pupils are equal, round, and reactive to light.   Cardiovascular:      Rate and Rhythm: Normal rate.      Pulses: Normal pulses.   Pulmonary:      Effort: Pulmonary effort is normal. No respiratory distress.      Breath sounds: Normal breath sounds. No rales.   Abdominal:      General: Abdomen is flat.   Skin:     General: Skin is warm and dry.   Neurological:      General: No focal deficit present.      Mental Status: He is alert. Mental status is at baseline.      Cranial Nerves: Cranial nerves are intact.      Motor: Weakness present. No tremor.      Coordination: Coordination normal.      Gait: Gait normal.      Comments: Bilateral handgrips equal, lower extremity extension and flexion normal, no reproducible pain spine exam, straight leg raise normal.   Psychiatric:         Behavior: Behavior normal.      Comments: Flat affect, depressed mood.           LABS:   Reviewed    ASSESSMENT:    .     ICD-10-CM    1. Syncope and collapse R55    2. Autonomic dysfunction G90.9    3. Fall at nursing home, initial encounter W19.XXXA     Y92.129        PLAN:      Fall in nursing home: Neuro is intact, no headache or dizziness.  No bruising or redness.   -Obtain x-ray AP and lateral of left ribs.  -Continue to monitor and assist with all ambulation.  -Encourage patient to use call light for ambulation.  -Neuro checks per facility protocol.  -Continue ice to left rib cage 20 minutes 4 times daily.  -Oxycodone 5 mg p.o. twice daily as needed for left rib cage pain.    Overactive bladder:  -Okay to discontinue  fesoterodine and start tolterodine tartrate.  -I did ask if he had any change in symptoms when his Flomax was held and when fesoterodine was held in December, and he is unable to recall if he had changes in bladder function.  Given that his blood pressures are improved we will continue this.    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  -Now on Remeron, sleeping well.    Insomnia: Improved on mirtazapine.    CHF/swellng: Continue BHARATHI michelees to norman LE on am an off HS.     For is CAD with recent STEMI: On asa, brilinta, metoprolol, lipitor.     Autonomic orthostatic hypotension: He remains on midodrine and his florinef was increased during this recent hospital stay. He has had extensive workup in the past.   Recent improvement in blood pressures with adjustment of metoprolol, Florinef and midodrine.  -Continue to monitor.      Parkinson's: on sinimet.   -I did encourage wife to make follow-up appointment with neurology that was missed due to his hospitalization.  -Neurology has recently decreased his Sinemet with improvement in symptoms.    R hip: OT/PT.   -Recently restarted therapies due to improvement in blood pressures.        Electronically signed by: Cy Jerry, CNP

## 2021-06-20 NOTE — LETTER
Letter by Elena Orozco MD at      Author: Elena Orozco MD Service: -- Author Type: --    Filed:  Encounter Date: 2/11/2020 Status: (Other)         Patient: Canelo Cook   MR Number: [id number]   YOB: 1940   Date of Visit: 2/11/2020     [facility]    Facility:   [facility] [[id number]]   Code Status: POLST AVAILABLE    71-year-old male is seen for follow up evaluation and discharge planning. History of advanced Parkinson's, autonomic dysfunction, orthostatic hypotension, hypertension, coronary artery disease, was admitted to hospital with significant weakness and falls, transferred to TCU. Significant hypo tension during initial TCU stay with evetual inability to get out of bed due to loss of consciousness/syncope with sitting upright or standing, midodrine increased to 10 mg TID, Florinef briefly increased to 0.2 mg Q day, eventual increase in blood pressure and resolution of orthostasis majority of time, Florinef subsequently decreased to 0.1 mg Q day. Metoprolol increased to 25 mg a.m. 12.5 mg p.m., patient continues to have intermittent significant elevation of blood pressure greater than 190, previous orthostatic systolic readings in the 80s resolved in entirety. Neurology kindly reviewed patient via phone as patient was unable to visit neurologist due to significant orthostasis with loss of consciousness/syncope with standing, Sinemet dose was decreased, significant improvement in general status and decrease in Parkinsonian symptoms with decrease in Sinemet dose. Patient fell approximately two weeks ago striking the left lower ribs, x-rays negative for fracture, received oxycodone and tramadol for pain, developed significant confusion, now with Tylenol in use for pain, unable to tolerate either oxycodone or tramadol due to significant confusion and hallucinations. Strength is gradually improving, able to ambulate to bathroom independently,  discharge to take place today to home setting. Previous face-to-face documentation regarding need for wheelchair, orders signed today with review of medication and order for wheelchair confirmed.    Review of systems: anxious to return to home setting, near-complete resolution of left lower rib cage pain, no dyspnea or orthopnea.    Exam: blood pressure 170/90, pulse 70, temperature 97.2, 02 90%. Pleasant male oriented, bradykinesia present. No facial asymmetry, flat facies with lack of spontaneous movement consistent with Parkinson's. Mucous membranes moist. S1 and S2 regular. Pulmonary exam without rales rhonchi or wheezes. 1 mm nonpitting bilateral lower extremity edema.    Impression and plan:   advanced Parkinson's, decreased symptoms with recent decrease in Sinemet dose, patient will follow up with neurology over next two weeks.   Significant orthostatic hypotension now resolved, last episode of systolic recording in 80s greater than two weeks ago, Florinef at 0.1 mg Q day, midodrine 10 mg TID.   Hypertension, intermittent systolic elevation to 190, no prolonged episodes of elevation, metoprolol currently 25 mg a.m. 12.5 mg p.m., may require further increase in metoprolol pending systolic recordings.   Coronary artery disease without current indication of angina.   Chronic urinary incontinence, on toviaz, Flomax has been discontinued.   Deconditioning, will require ongoing rehabilitation.   Recent fall with left rib pain, negative x-ray for fracture, pain with near-complete resolution, intolerant of tramadol and oxycodone with significant confusion and hallucinations on pain medication.   Patient will discharge to home setting today, orders reviewed, medications reviewed, signing for wheelchair, discharge medications are as follows:  Current Outpatient Medications:   ?  aspirin 81 mg chewable tablet, Chew 1 tablet (81 mg total) daily. Forever, Disp: 30 tablet, Rfl: 0  ?  atorvastatin (LIPITOR) 40 MG tablet,  Take 1 tablet (40 mg total) by mouth at bedtime., Disp: , Rfl: 0  ?  azelastine (ASTELIN) 137 mcg (0.1 %) nasal spray, 1 spray into each nostril as needed., Disp: , Rfl:   ?  carbidopa-levodopa (SINEMET)  mg per tablet, Take 2.5 tablets by mouth 4 (four) times a day. Take with meals and bedtime.   , Disp: , Rfl:   ?  cholecalciferol, vitamin D3, 2,000 unit Tab, Take 2,000 Units by mouth daily., Disp: , Rfl:   ?  cyanocobalamin 1000 MCG tablet, Take 1,000 mcg by mouth every other day., Disp: , Rfl:   ?  fesoterodine (TOVIAZ) 4 mg Tb24 ER tablet, Take 8 mg by mouth daily with lunch. , Disp: , Rfl:   ?  fludrocortisone (FLORINEF) 0.1 mg tablet, 0.1mg PO MWF and 0.15 PO TuThSatSun thereafter, Disp: 45 tablet, Rfl: 0  ?  magnesium hydroxide (MILK OF MAG) 400 mg/5 mL Susp suspension, Take 30 mL by mouth daily as needed., Disp: , Rfl: 0  ?  magnesium oxide (MAG-OX) 400 mg (241.3 mg magnesium) tablet, Take 1 tablet (400 mg total) by mouth 3 (three) times a day., Disp: , Rfl: 0  ?  metoprolol tartrate (LOPRESSOR) 25 MG tablet, Take 1 tablet (25 mg total) by mouth every evening., Disp: , Rfl: 0  ?  midodrine (PROAMATINE) 2.5 MG tablet, Take 2.5 mg by mouth 2 (two) times a day., Disp: , Rfl:   ?  miscellaneous medical supply Misc, For personal use. Length: calf Strength: 16-20 mmHg Circumference in cm:, Disp: , Rfl:   ?  multivit with iron,hematinic (SUPER B-COMPLEX ORAL), Take 1 tablet by mouth daily with lunch. , Disp: , Rfl:   ?  nitroglycerin (NITROSTAT) 0.4 MG SL tablet, Place 1 tablet (0.4 mg total) under the tongue every 5 (five) minutes as needed for chest pain., Disp: 1 Bottle, Rfl: 0  ?  omeprazole (PRILOSEC) 20 MG capsule, Take 20 mg by mouth daily before breakfast., Disp: , Rfl:   ?  pyridostigmine (MESTINON) 60 mg tablet, Take 30 mg by mouth 3 (three) times a day., Disp: , Rfl:   ?  ranitidine (ZANTAC) 150 MG tablet, Take 150 mg by mouth at bedtime., Disp: , Rfl:   ?  sennosides (SENNA) 8.6 mg cap, Take 16.2  mg by mouth at bedtime. , Disp: , Rfl:   ?  ticagrelor (BRILINTA) 90 mg Tab, Take 1 tablet (90 mg total) by mouth 2 (two) times a day. For 12 months, Disp: 60 tablet, Rfl: 11 change in medication Sinemet per neurology, metoprolol to 12.5 p.m. 25 AM, Florinef 0.1 mg, midodrine 10 mg TID, off Flomax.   Patient will be homebound on discharge and will require registered nurse, home health aide, physical therapy, occupational therapy.      Electronically signed by: Elena Orozco MD

## 2021-06-20 NOTE — LETTER
Letter by Elena Orozco MD at      Author: Elena Orzoco MD Service: -- Author Type: --    Filed:  Encounter Date: 12/17/2019 Status: Signed         Patient: Canelo Cook   MR Number: [id number]   YOB: 1940   Date of Visit: 12/17/2019     [facility]    Facility:   [facility] [[id number]]   Code Status: POLST AVAILABLE    Reassessment of 79-year-old male with advanced Parkinson's, recent NSTEMI , history of hypertension, significant hypotension on midodrine and florinef, admitted to hospital with weakness and falls, florinef dose increased in hospital, on initial admission to TCU with elevated blood pressure recordings, variable blood pressure recording since that time with significant hypo tension continuing, lowest level of 88/55.    Review of systems: continues to feel weak. Ace wraps have been applied to lower extremities. No syncopal episodes. No near faints. Denies anterior chest discomfort. Parkinson's symptomatic. Bradykinesia as primary complaint. No active urinary symptoms. Remainder of 12 point review of systems obtained negative. Wife in attendance concerned regarding patient's ongoing significant fatigue and weakness.    Exam: blood pressure 88/55, vital signs otherwise stable. Pleasant, flat affect, appears weak. Bradykinesia present. No tremor. No carotid bruits. S1 and S2 regular. Pulmonary exam without rales rhonchi or wheezes. Abdomen without masses tenderness organomegaly. Periphery with 2 mm nonpitting edema, ace wraps are in place. Strength and muscle tone diffusely diminished.    Impression and plan:   advanced Parkinson's continuing Sinemet.   Significant hypotension, recent increase in florinef dose, continues midodrine, discontinue Toviaz, continue use of ace wraps, monitor.   Coronary artery disease with recent NSTEMI, no anginal symptoms.   Profound fatigue multifactorial.   Extensive discussion with patient and wife in  attendance regarding symptoms.      Electronically signed by: Elena Orozco MD            Complex Repair And M Plasty Text: The defect edges were debeveled with a #15 scalpel blade.  The primary defect was closed partially with a complex linear closure.  Given the location of the remaining defect, shape of the defect and the proximity to free margins an M plasty was deemed most appropriate for complete closure of the defect.  Using a sterile surgical marker, an appropriate advancement flap was drawn incorporating the defect and placing the expected incisions within the relaxed skin tension lines where possible.    The area thus outlined was incised deep to adipose tissue with a #15 scalpel blade.  The skin margins were undermined to an appropriate distance in all directions utilizing iris scissors.

## 2021-06-20 NOTE — LETTER
Letter by Cy Jerry CNP at      Author: Cy Jerry CNP Service: -- Author Type: --    Filed:  Encounter Date: 1/29/2020 Status: (Other)         Patient: Canelo Cook   MR Number: [id number]   YOB: 1940   Date of Visit: 1/29/2020     [facility]    Facility:   [facility] [[id number]]   Code Status: DNR/DNI      CHIEF COMPLAINT/REASON FOR VISIT:  Chief Complaint   Patient presents with   ? Problem Visit     Pain medication       HISTORY:      HPI: Canelo is a 79 y.o. male with a history of CAD, STEMI (11/11/19), cardiac pacemaker, parkinsons disease with orthostatic hypotension, dsyphagia, htn who experienced a fall at home with near syncope.     Today: Patient seen with his wife in the room, for renewal of pain medications.  He has tramadol 50 mg every 6 hours as needed and oxycodone 5 mg every 12 hours as needed.  He says the tramadol has not been as effective so he has been using the oxycodone infrequently.  X-ray of left rib cage was negative for acute process.  Continues to have some pain there.  He is sitting up and attending therapies.    For is CAD with recent STEMI: On asa, brilinta, metoprolol, lipitor.     Autonomic orthostatic hypotension: He remains on midodrine and his florinef was increased during this recent hospital stay. He has had extensive workup in the past  Has had multiple changes to medications during TCU stay, currently he is stable and able to sit upright without lightheadedness..      Fall: negative for fractures including CT. Continue OT/PT. no longer painful on the right leg.    Parkinson's: on sinimet which was recently decreased per neurology consult.  He will need to see neurology with a lab visit prior to next dose change.    Past Medical History:   Diagnosis Date   ? Acute chest pain 11/10/2019   ? Chronic ischemic heart disease    ? GERD (gastroesophageal reflux disease)    ? HLD (hyperlipidemia)    ? HTN  (hypertension)    ? Near syncope 10/12/2015   ? Pacemaker    ? Pacemaker     dual chamber   ? Parkinson's disease (H)    ? Urinary urgency    ? Vitamin D deficiency              Family History   Problem Relation Age of Onset   ? Heart disease Mother    ? Heart disease Father      Social History     Socioeconomic History   ? Marital status:      Spouse name: Not on file   ? Number of children: Not on file   ? Years of education: Not on file   ? Highest education level: Not on file   Occupational History   ? Not on file   Social Needs   ? Financial resource strain: Not on file   ? Food insecurity:     Worry: Not on file     Inability: Not on file   ? Transportation needs:     Medical: Not on file     Non-medical: Not on file   Tobacco Use   ? Smoking status: Former Smoker   ? Smokeless tobacco: Never Used   Substance and Sexual Activity   ? Alcohol use: Yes     Comment: occasional   ? Drug use: No   ? Sexual activity: Not on file   Lifestyle   ? Physical activity:     Days per week: Not on file     Minutes per session: Not on file   ? Stress: Not on file   Relationships   ? Social connections:     Talks on phone: Not on file     Gets together: Not on file     Attends Congregation service: Not on file     Active member of club or organization: Not on file     Attends meetings of clubs or organizations: Not on file     Relationship status: Not on file   ? Intimate partner violence:     Fear of current or ex partner: Not on file     Emotionally abused: Not on file     Physically abused: Not on file     Forced sexual activity: Not on file   Other Topics Concern   ? Not on file   Social History Narrative   ? Not on file         Review of Systems   Cardiovascular: Negative for chest pain and leg swelling.   Gastrointestinal: Negative.    Musculoskeletal: Negative for arthralgias and myalgias.        Right leg pain/hip pain.    Skin: Negative.    Psychiatric/Behavioral: Negative for agitation and behavioral problems.        Vitals:    01/30/20 2054   BP: 167/78   Pulse: 65   Temp: 97.5  F (36.4  C)   SpO2: 97%   Weight: 171 lb (77.6 kg)       Physical Exam  Constitutional:       Appearance: Normal appearance.   HENT:      Head: Atraumatic.      Mouth/Throat:      Mouth: Mucous membranes are moist.   Eyes:      Pupils: Pupils are equal, round, and reactive to light.   Cardiovascular:      Rate and Rhythm: Normal rate.      Pulses: Normal pulses.   Pulmonary:      Effort: Pulmonary effort is normal. No respiratory distress.      Breath sounds: Normal breath sounds. No rales.   Abdominal:      General: Abdomen is flat.   Skin:     General: Skin is warm and dry.   Neurological:      General: No focal deficit present.      Mental Status: He is alert. Mental status is at baseline.      Cranial Nerves: Cranial nerves are intact.      Motor: Weakness present. No tremor.      Coordination: Coordination normal.      Gait: Gait normal.      Comments: Bilateral handgrips equal, lower extremity extension and flexion normal, no reproducible pain spine exam, straight leg raise normal.   Psychiatric:         Behavior: Behavior normal.      Comments: Flat affect, depressed mood.           LABS:   Reviewed    ASSESSMENT:    .     ICD-10-CM    1. Fall at nursing home, initial encounter W19.XXXA     Y92.129        PLAN:      Fall in nursing home: Neuro is intact, no headache or dizziness.  No bruising or redness.  X-ray negative  -Continue to monitor and assist with all ambulation.  -Encourage patient to use call light for ambulation.  -Continue ice to left rib cage 20 minutes 4 times daily.  -Renew oxycodone 5 mg p.o. twice daily as needed for left rib cage pain.    Overactive bladder:  -Okay to discontinue fesoterodine and start tolterodine tartrate.    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  -Now on Remeron, sleeping well.    Insomnia:  Improved on mirtazapine.    CHF/swellng: Continue BHARATHI arriaza to norman LE on am an off HS.     For is CAD with recent STEMI: On asa, brilinta, metoprolol, lipitor.     Autonomic orthostatic hypotension: He remains on midodrine and his florinef was increased during this recent hospital stay. He has had extensive workup in the past.   Recent improvement in blood pressures with adjustment of metoprolol, Florinef and midodrine.  -Continue to monitor.      Parkinson's: on sinimet.   -I did encourage wife to make follow-up appointment with neurology that was missed due to his hospitalization.  -Neurology has recently decreased his Sinemet with improvement in symptoms.    R hip: OT/PT.   -Recently restarted therapies due to improvement in blood pressures.        Electronically signed by: Cy Jerry, CNP

## 2021-06-20 NOTE — LETTER
Letter by Elena Orozco MD at      Author: Elena Orozco MD Service: -- Author Type: --    Filed:  Encounter Date: 1/21/2020 Status: (Other)         Patient: Canelo Cook   MR Number: [id number]   YOB: 1940   Date of Visit: 1/21/2020     [facility]    Facility:   [facility] [[id number]]   Code Status: POLST AVAILABLE    Reassessment of 71-year-old male with Parkinson's, chronic hypotension, hypertension, coronary artery disease, autonomic dysfunction, recent admission to hospital following falls and with weakness, recent increase in midodrine and Florinef, recent decrease in Sinemet per neurology, recent improvement in orthostatic symptoms.    Review of systems: reports he is participating in physical therapy. No recent orthostatic symptoms. Aware of intermittent elevation of blood pressure. Tolerating decreased Sinemet dose, Parkinson's symptoms improved with decreased dose. Able to walk to the lavatory. No syncope with bowel movements which previously occurred. Denies chest pain or palpitations. Mood improved. No dyspeptic symptoms. Remainder of 12 point review of systems obtained negative.    Exam: vital signs reviewed over past five days, intermittent elevation of blood pressure to 170, no low systolic readings over past three days. Flat affect, mild bradykinesia, minimal tremor bilateral hands. S1 and S2 regular with faint systolic murmur. Pulmonary exam without rales rhonchi or wheezes. Abdomen without masses tenderness organomegaly. Periphery with edema 2 mm nonpitting. Strength and muscle tone diffusely diminished.    Impression and plan:   chronic hypotension, significant improvement in blood pressure recordings, continues with intermittent high systolic levels, continue metoprolol 25 AM 12.5 p.m., recent decrease in Florinef to 0.15 mg Q day, midodrine at 10 mg TID.   Coronary artery disease without indication of angina   Chronic profound  deconditioning, continue rehabilitation.   Coronary artery disease with recent  NSTEMI, no current angina.   Parkinson's, symptoms decreased with decrease in Sinemet dose.   Multiple concerns are reviewed.      Electronically signed by: Elena Orozco MD

## 2021-06-20 NOTE — LETTER
Letter by Cy Jerry CNP at      Author: Cy Jerry CNP Service: -- Author Type: --    Filed:  Encounter Date: 12/16/2019 Status: Signed         Patient: Canelo Cook   MR Number: [id number]   YOB: 1940   Date of Visit: 12/16/2019     [facility]    Facility:   [facility] [[id number]]   Code Status: DNR/DNI      CHIEF COMPLAINT/REASON FOR VISIT:  Chief Complaint   Patient presents with   ? Review Of Multiple Medical Conditions       HISTORY:      HPI: Canelo is a 79 y.o. male with a history of CAD, STEMI (11/11/19), cardiac pacemaker, parkinsons disease with orthostatic hypotension, dsyphagia, htn who experienced a fall at home with near syncope.     For is CAD with recent STEMI: On asa, brilinta, metoprolol, lipitor.     Autonomic orthostatic hypotension: He remains on midodrine and his florinef was increased during this recent hospital stay. He has had extensive workup in the past.      Fall: negative for fractures including CT. Continue OT/PT. Continue ice prn.     Parkinson's: on sinimet.     Past Medical History:   Diagnosis Date   ? Acute chest pain 11/10/2019   ? Chronic ischemic heart disease    ? GERD (gastroesophageal reflux disease)    ? HLD (hyperlipidemia)    ? HTN (hypertension)    ? Near syncope 10/12/2015   ? Pacemaker    ? Pacemaker     dual chamber   ? Parkinson's disease (H)    ? Urinary urgency    ? Vitamin D deficiency              Family History   Problem Relation Age of Onset   ? Heart disease Mother    ? Heart disease Father      Social History     Socioeconomic History   ? Marital status:      Spouse name: Not on file   ? Number of children: Not on file   ? Years of education: Not on file   ? Highest education level: Not on file   Occupational History   ? Not on file   Social Needs   ? Financial resource strain: Not on file   ? Food insecurity:     Worry: Not on file     Inability: Not on file   ?  Transportation needs:     Medical: Not on file     Non-medical: Not on file   Tobacco Use   ? Smoking status: Former Smoker   ? Smokeless tobacco: Never Used   Substance and Sexual Activity   ? Alcohol use: Yes     Comment: occasional   ? Drug use: No   ? Sexual activity: Not on file   Lifestyle   ? Physical activity:     Days per week: Not on file     Minutes per session: Not on file   ? Stress: Not on file   Relationships   ? Social connections:     Talks on phone: Not on file     Gets together: Not on file     Attends Yazidi service: Not on file     Active member of club or organization: Not on file     Attends meetings of clubs or organizations: Not on file     Relationship status: Not on file   ? Intimate partner violence:     Fear of current or ex partner: Not on file     Emotionally abused: Not on file     Physically abused: Not on file     Forced sexual activity: Not on file   Other Topics Concern   ? Not on file   Social History Narrative   ? Not on file         Review of Systems   Cardiovascular: Negative for chest pain and leg swelling.   Gastrointestinal: Negative.    Musculoskeletal: Positive for arthralgias and myalgias.        Right leg pain/hip pain.    Skin: Negative.    Neurological: Positive for weakness.   Psychiatric/Behavioral: Negative for agitation and behavioral problems.       Vitals:    12/18/19 0959   BP: (!) 70/50   Pulse: 64   Temp: 98  F (36.7  C)   SpO2: 99%   Weight: 179 lb (81.2 kg)       Physical Exam  Constitutional:       Appearance: Normal appearance.   HENT:      Head: Atraumatic.      Mouth/Throat:      Mouth: Mucous membranes are moist.   Eyes:      Pupils: Pupils are equal, round, and reactive to light.   Cardiovascular:      Rate and Rhythm: Normal rate.      Pulses: Normal pulses.   Pulmonary:      Effort: Respiratory distress present.      Breath sounds: No rales.   Abdominal:      General: Abdomen is flat.   Skin:     General: Skin is warm and dry.   Neurological:       Mental Status: He is alert. Mental status is at baseline.   Psychiatric:         Behavior: Behavior normal.           LABS:   Reviewed    ASSESSMENT:    .     ICD-10-CM    1. Autonomic dysfunction G90.9    2. Parkinson disease (H) G20    3. Cardiac pacemaker in situ Z95.0        PLAN:    CHF/swellng: Continue BHARATHI arriaza to normna LE on am an off HS.     For is CAD with recent STEMI: On asa, brilinta, metoprolol, lipitor.     Autonomic orthostatic hypotension: He remains on midodrine and his florinef was increased during this recent hospital stay. He has had extensive workup in the past.   Bps vary as expeced with sbp 70s-170s.     Fall: negative for fractures including CT. Continue OT/PT. Continue ice prn.     Parkinson's: on sinimet.     R hip: OT/PT.         Electronically signed by: Cy Jerry, CNP

## 2021-06-20 NOTE — LETTER
Letter by Cy Jerry CNP at      Author: Cy Jerry CNP Service: -- Author Type: --    Filed:  Encounter Date: 1/17/2020 Status: Signed         Patient: Canelo Cook   MR Number: [id number]   YOB: 1940   Date of Visit: 1/17/2020     [facility]    Facility:   [facility] [[id number]]   Code Status: DNR/DNI      CHIEF COMPLAINT/REASON FOR VISIT:  Chief Complaint   Patient presents with   ? Problem Visit     Medication change.       HISTORY:      HPI: Canelo is a 79 y.o. male with a history of CAD, STEMI (11/11/19), cardiac pacemaker, parkinsons disease with orthostatic hypotension, dsyphagia, htn who experienced a fall at home with near syncope.     Today: Patient was seen regarding a medication change per insurance is no longer covering Toviaz for bladder spasms and overactive bladder.  The recommended replacement is tolterodine tartrate.  I discussed with patient and he is agreeable, however will need to update wife when she is available at next visit.  He reports he is sleeping well on Remeron.  He is seen sitting up in his chair eating lunch today.  Has also recently restarted therapies.    For is CAD with recent STEMI: On asa, brilinta, metoprolol, lipitor.     Autonomic orthostatic hypotension: He remains on midodrine and his florinef was increased during this recent hospital stay. He has had extensive workup in the past  Has had multiple changes to medications during TCU stay, currently he is stable and able to sit upright without lightheadedness..      Fall: negative for fractures including CT. Continue OT/PT. no longer painful on the right leg.    Parkinson's: on sinimet which was recently decreased per neurology consult.  He will need to see neurology with a lab visit prior to next dose change.    Past Medical History:   Diagnosis Date   ? Acute chest pain 11/10/2019   ? Chronic ischemic heart disease    ? GERD (gastroesophageal  reflux disease)    ? HLD (hyperlipidemia)    ? HTN (hypertension)    ? Near syncope 10/12/2015   ? Pacemaker    ? Pacemaker     dual chamber   ? Parkinson's disease (H)    ? Urinary urgency    ? Vitamin D deficiency              Family History   Problem Relation Age of Onset   ? Heart disease Mother    ? Heart disease Father      Social History     Socioeconomic History   ? Marital status:      Spouse name: Not on file   ? Number of children: Not on file   ? Years of education: Not on file   ? Highest education level: Not on file   Occupational History   ? Not on file   Social Needs   ? Financial resource strain: Not on file   ? Food insecurity:     Worry: Not on file     Inability: Not on file   ? Transportation needs:     Medical: Not on file     Non-medical: Not on file   Tobacco Use   ? Smoking status: Former Smoker   ? Smokeless tobacco: Never Used   Substance and Sexual Activity   ? Alcohol use: Yes     Comment: occasional   ? Drug use: No   ? Sexual activity: Not on file   Lifestyle   ? Physical activity:     Days per week: Not on file     Minutes per session: Not on file   ? Stress: Not on file   Relationships   ? Social connections:     Talks on phone: Not on file     Gets together: Not on file     Attends Buddhist service: Not on file     Active member of club or organization: Not on file     Attends meetings of clubs or organizations: Not on file     Relationship status: Not on file   ? Intimate partner violence:     Fear of current or ex partner: Not on file     Emotionally abused: Not on file     Physically abused: Not on file     Forced sexual activity: Not on file   Other Topics Concern   ? Not on file   Social History Narrative   ? Not on file         Review of Systems   Cardiovascular: Negative for chest pain and leg swelling.   Gastrointestinal: Negative.    Musculoskeletal: Negative for arthralgias and myalgias.        Right leg pain/hip pain.    Skin: Negative.    Psychiatric/Behavioral:  Negative for agitation and behavioral problems.       Vitals:    01/20/20 2231   BP: 113/69   Pulse: 68   Temp: 97.4  F (36.3  C)   Weight: 171 lb (77.6 kg)       Physical Exam  Constitutional:       Appearance: Normal appearance.   HENT:      Head: Atraumatic.      Mouth/Throat:      Mouth: Mucous membranes are moist.   Eyes:      Pupils: Pupils are equal, round, and reactive to light.   Cardiovascular:      Rate and Rhythm: Normal rate.      Pulses: Normal pulses.   Pulmonary:      Effort: Pulmonary effort is normal. No respiratory distress.      Breath sounds: Normal breath sounds. No rales.   Abdominal:      General: Abdomen is flat.   Skin:     General: Skin is warm and dry.   Neurological:      Mental Status: He is alert. Mental status is at baseline.   Psychiatric:         Behavior: Behavior normal.      Comments: Flat affect, depressed mood.           LABS:   Reviewed    ASSESSMENT:    .     ICD-10-CM    1. Autonomic dysfunction G90.9        PLAN:    Overactive bladder:  -Okay to discontinue fesoterodine and start tolterodine tartrate.  -I did ask if he had any change in symptoms when his Flomax was held and when fesoterodine was held in December, and he is unable to recall if he had changes in bladder function.  Given that his blood pressures are improved we will continue this.    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  -Now on Remeron, sleeping well.    Insomnia: Continue as needed melatonin per patient request.    CHF/swellng: Continue BHARATHI arriaza to norman LE on am an off HS.     For is CAD with recent STEMI: On asa, brilinta, metoprolol, lipitor.     Autonomic orthostatic hypotension: He remains on midodrine and his florinef was increased during this recent hospital stay. He has had extensive workup in the past.   Recent improvement in blood pressures with adjustment of metoprolol, Florinef and  midodrine.  -Continue to monitor.      Fall: negative for fractures including CT. Continue OT/PT. Continue ice prn.   -says pain is improved. Denies pain today.     Parkinson's: on sinimet.   -I did encourage wife to make follow-up appointment with neurology that was missed due to his hospitalization.  -Neurology has recently decreased his Sinemet with improvement in symptoms.    R hip: OT/PT.   -Recently restarted therapies due to improvement in blood pressures.        Electronically signed by: Cy Jerry, CNP

## 2021-06-20 NOTE — LETTER
Letter by Elena Orozco MD at      Author: Elena Orozco MD Service: -- Author Type: --    Filed:  Encounter Date: 1/7/2020 Status: Signed         Patient: Canelo Cook   MR Number: [id number]   YOB: 1940   Date of Visit: 1/7/2020     [facility]    Facility:   [facility] [[id number]]   Code Status: POLST AVAILABLE    Reassessment of 79-year-old male who continues in TCU post hospitalization for weakness and multiple falls. History of advanced Parkinson's followed by neurology, autonomic dysfunction, coronary artery disease status post NSTEMI, recent discontinuation of Flomax, recent increase in midodrine to 10 mg TID with Florinef 0.2 mg Q day, continues metoprolol 12.5 mg Q day for hypertension. Patient unable to get out of bed, per nursing report with sitting on side of bed becomes lightheaded and syncopal, worsening strength.    Review of systems: patient is seen with wife on this occasion. Mood depressed, nurse practitioner suggested Zoloft, patient questions if he should take this medication. Describes episodes of sleepwalking in home setting, states he can get out of bed and does not know he has done so, frequently falls on these occasions. Further describes pre-faint symptoms, at all times initially develops nausea, there after generalized weakness with falling or loss of consciousness. Denies vertiginous symptoms. Unaware of initial lightheadedness prior to fall, at all times nausea as primary symptom. Using ace wraps to lower extremity, highly frustrated with progressive weakness. Denies chest pain or palpitations. No seizure activity. Modest symptomatic Parkinson's with tremor upper and lower upper extremities. Aware of mild visual hallucinations intermittent. Remainder of 12 point review of systems obtained negative.    Exam: vital signs reviewed over past 72 hours, when supine blood pressure can be significantly elevated, Florinef and  midodrine held for significant elevation of blood pressure, typical blood pressure in 70-80 systolic range. Lack of spontaneous facial movement consistent with Parkinson's. No carotid bruits or HJR. S1 and S2 regular. Pulmonary exam without rales rhonchi or wheezes. Mild bradykinesia. Mild tremor bilateral hands. Abdomen without masses or tenderness. No flank tenderness. Periphery with edema of 2 mm nonpitting. No abdominal masses or bruits.    Impression and plan:   recurrent faints, symptoms attributed to orthostatic hypotension, on further discussion patient always experiences nausea prior to faints/near faints, this raises possibility of vagal nerve involvement, known autonomic dysfunction and known orthostatic changes additionally  exist. Discussion at length, patient has been evaluated at [facility] previously, request reevaluation by neurology regarding potential of vagal nerve involvement in addition to autonomic dysfunction, orthostatic hypotension and Parkinson's with deconditioning as cause of symptomatology, no evidence of stiff man syndrome or seizure disorder, no evidence of arrhythmia, patient's clinical status is worsening, unable to get out of bed due to faints occurring promptly with standing or following several steps.   Coronary artery disease without indication of current angina.   Reactive depression, poor appetite, reviewed potential initiation of SSRI, Remeron suggested, patient will consider.   Prolonged evaluation time, differential diagnosis of syncope, hypertension, hypotension, autonomic dysfunction, contribution of Parkinson's versus other cause of syncope.   Additional prolonged discussion with nursing staff.      Electronically signed by: Elena Orozco MD

## 2021-06-20 NOTE — LETTER
Letter by Elena Orozco MD at      Author: Elena Orozco MD Service: -- Author Type: --    Filed:  Encounter Date: 1/9/2020 Status: Signed         Patient: Canelo Cook   MR Number: [id number]   YOB: 1940   Date of Visit: 1/9/2020     [facility]    Facility:   [facility] [[id number]]   Code Status: POLST AVAILABLE    Reassessment of 71-year-old male with progressive weakness, Parkinson's, hypertension, hypotension, urinary frequency, coronary artery disease. Recent hospitalization for falls, Florinef dose increased in hospital, transferred to TCU, progressive decrease in strength, increase in variability of blood pressure recordings, Flomax discontinued, midodrine increased to 10 mg TID with Florinef 0.2 mg Q day. Previous parameters for hold of Florinef and midodrine for elevated blood pressure recording discontinued on this date.    Review of systems: continued and progressive weakness. Unable to get out of bed secondary to weakness. Incident last p.m., wanted to use the bathroom, was able to ambulate to bathroom but passed out while sitting on commode, blood pressure once back in bed 233/124 per nursing report. Denies chest pain. No focal neurologic deficit. Has considered initiation of SSRI, agreeable to initiating SSRI at this time. Progressive depressive symptoms related to limited mobility. Urinary frequency continues. Denies chest pain or palpitations. Wife in attendance with concerns.    Exam: blood pressures reviewed over past 48 hours, last p.m. 233/124, currently 90/60. Masked facies, cognitively intact. No HJR. No cervical adenopathy. S1 and S2 regular. Pulmonary exam without rales rhonchi or wheezes. Abdomen without masses tenderness organomegaly. Periphery with 2 mm edema nonpitting. Modest tremor bilateral hands.    Impression and plan:   significant variability blood pressure recordings, increase metoprolol to 25 mg Q day, continue  Florinef and midodrine, discontinue parameters for hold of midodrine and Florinef.   Progressive weakness multifactorial, known autonomic dysfunction and Parkinson's, known orthostatic hypotension, complaints of nausea preceding all events of syncope, request has been made for repeat neurologic evaluation.   Coronary artery disease without indication of angina.   Significant depressive symptoms, begin Remeron 7.5 mg Q day.   Issues reviewed with patient, patient's wife, nursing staff, prolonged evaluation time with issues addressed including depression, hypertension and hypotension, progressive decline in clinical status, differential diagnoses of all symptoms.      Electronically signed by: Elena Orozco MD

## 2021-06-20 NOTE — LETTER
Letter by Elena Orozco MD at      Author: Elena Orozco MD Service: -- Author Type: --    Filed:  Encounter Date: 12/31/2019 Status: Signed         Patient: Canelo Cook   MR Number: [id number]   YOB: 1940   Date of Visit: 12/31/2019     [facility]    Facility:   [facility] [[id number]]   Code Status: POLST AVAILABLE    Reevaluation of 71-year-old male with advanced Parkinson's, autonomic dysfunction, recent NST SALO, chronic hypotension, admitted to hospital with weakness, transferred to TCU for rehabilitation and management of medical problems. Continued significant decrease in blood pressure recordings.    Review of systems: syncopal episode in physical therapy today, continues with significant lightheadedness with standing. Denies anterior chest discomfort. Nocturia frequent, no dysuria. Parkinson's under adequate control. Frustrated with lack of ability to sit or stand upright without becoming lightheaded. Remainder of 12 point review of systems obtained negative.    Exam: blood pressure 93/61, 80/40, patient semi upright in bed, affect flat with masking of facies. Minimal tremor. Mild cogwheeling. S1 and S2 regular. Pulmonary exam without rales rhonchi or wheezes. Abdomen without masses tenderness organomegaly. Periphery with nonpitting edema of 1 mm. Strength and muscle tone diffusely diminished.    Impression and plan:   significant hypo tension continuing, recent decrease in metoprolol to 12.5 mg Q day, increase Florinef to 0.2 mg Q day increase midodrine to 10 mg TID. Patient is self-limiting fluid intake due to frequent urination, increase fluids, reviewed multiple above issues with patient nursing and wife in attendance.   Recent NSTEMI, no anginal symptoms, no cardiac decompensation.   Deconditioning, unable to do physical therapy out of bed due to hypotension.   Multiple issues reviewed, prolonged evaluation time, issues addressed  include hypotension, generalized weakness, nocturia, orthostatic symptoms, medication change. Additional discussion with nursing staff and patient's wife.      Electronically signed by: Elena Orozco MD

## 2021-06-20 NOTE — LETTER
Letter by Elena Orozco MD at      Author: Elena Orozco MD Service: -- Author Type: --    Filed:  Encounter Date: 1/30/2020 Status: (Other)         Patient: Canelo Cook   MR Number: [id number]   YOB: 1940   Date of Visit: 1/30/2020     [facility]    Facility:   [facility] [[id number]]   Code Status: POLST AVAILABLE    Reassessment of 71-year-old male with Parkinson's, coronary artery disease, recent fall striking left shoulder and lower back with persistent lower back discomfort, hypertension and hypotension. Recent decrease in Florinef to 0.1 mg Q day, continues metoprolol 25 mg a.m. 12.5 mg p.m.    Review of systems: continued left lower posterior rib discomfort. Denies pleuritic chest pain. Receiving oxycodone on a PRN basis, aware of excessive sedation and increasing confusion with oxycodone. Has not taken other narcotic medication in past. No pleuritic chest pain. Unable to participate in routine physical therapy due to degree of lower rib discomfort, in bed physical therapy underway. No chest pain or palpitations. Remainder of 12 point review of systems obtained negative.    Exam: recent vital signs reviewed with continued intermittent elevation of blood pressure, no significant hypotensive episodes. Lying in bed, appears uncomfortable, mildly confused, bradykinesia present. No tremor. Mucous membranes moist. Skin turgor intact. No HJR. S1 and S2 regular. Pulmonary exam without rales rhonchi or wheezes. Abdomen without masses tenderness organomegaly. Significantly tender left flank, lower ribs with mild tenderness, no pelvic discomfort to AP and lateral compression. Periphery with nonpitting edema bilateral.    Impression and plan:   advanced Parkinson's, recent decrease in Sinemet dose, adequate control of symptoms.   Coronary artery disease without angina.   Recent fall with significant left flank and lower rib discomfort, poorly  tolerating oxycodone with confusion and fatigue, begin Dilaudid 1 mg Q6 hours PRN pain, muscle rub to flank region.   Hypotension resolved.   Significant hypertension with intermittent elevation of blood pressure, continue current metoprolol dose.   Chronic deconditioning with ongoing need for rehabilitation.   Multiple concerns are reviewed, discussion with patient wife and nursing staff.      Electronically signed by: Elena Orozco MD

## 2021-06-20 NOTE — LETTER
Letter by Veronica Isbell RN at      Author: Veronica Isbell RN Service: -- Author Type: --    Filed:  Encounter Date: 5/11/2020 Status: (Other)         Canelo Cook  [address]  [place] [address]      May 11, 2020      Dear Mr. Cook,    RE: Remote Results    We are writing to you regarding your recent Remote Pacemaker check from home. Your transmission was received successfully. Battery status is satisfactory at this time.     Your results are within normal limits.    Your next device appointment will be a remote check on 8/12/2020; this will occur automatically.    To schedule or reschedule, please call [phone] and press 1.    NOTE: If you would like to do an extra transmission, please call [phone] and press 3 to speak to a nurse BEFORE transmitting. This ensures that the Device Clinic staff is aware of the reason you are sending a transmission, and can follow-up with you after it has been reviewed.    We will be checking your implanted device from home (remotely) every three months unless otherwise instructed. We will need to see you in the clinic at least once a year. You may need to be seen in the clinic sooner depending on the results of your check.    Please be aware:    The follow-up schedule is like a Physician prescription.    Your remote monitor is paired to your specific implanted device.      Sincerely,    [facility]

## 2021-06-20 NOTE — LETTER
Letter by Cy Jerry CNP at      Author: Cy Jerry CNP Service: -- Author Type: --    Filed:  Encounter Date: 2/3/2020 Status: (Other)         Patient: Canelo Cook   MR Number: [id number]   YOB: 1940   Date of Visit: 2/3/2020     [facility]    Facility:   [facility] [[id number]]   Code Status: DNR/DNI      CHIEF COMPLAINT/REASON FOR VISIT:  Chief Complaint   Patient presents with   ? Review Of Multiple Medical Conditions     Pain management, hallucinations and confusion.       HISTORY:      HPI: Canelo is a 79 y.o. male with a history of CAD, STEMI (11/11/19), cardiac pacemaker, parkinsons disease with orthostatic hypotension, dsyphagia, htn who experienced a fall at home with near syncope.     Today: Patient seen his wife in the room.  His pain medications have been on hold since Friday for increased confusion, hallucinations and sedation.  He had been on oxycodone twice daily that was switched to Dilaudid which she was taking for new onset pain after having a fall and hitting his ribs against the wall. x-ray was negative.  He began having increased confusion and hallucinations and medications have been on hold since then.  His wife says he rarely takes pain medication while at home.  He does have a allergy to Tylenol so we do have limited options here.  I did reassure the wife that it is common for older individuals to have confusion as a side effect of narcotics and that we will discontinue them.  Today he is comfortable and says he will do just fine without any pain medications.  I am adding ibuprofen to be used once every 24 hours for any unbearable pain rating 7-10.    For is CAD with recent STEMI: On asa, brilinta, metoprolol, lipitor.     Autonomic orthostatic hypotension: He remains on midodrine and his florinef was increased during this recent hospital stay. He has had extensive workup in the past  Has had multiple changes to  medications during TCU stay, currently he is stable and able to sit upright without lightheadedness..      Fall: negative for fractures including CT. Continue OT/PT. no longer painful on the right leg.    Parkinson's: on sinimet which was recently decreased per neurology consult.  He will need to see neurology with a lab visit prior to next dose change.    Past Medical History:   Diagnosis Date   ? Acute chest pain 11/10/2019   ? Chronic ischemic heart disease    ? GERD (gastroesophageal reflux disease)    ? HLD (hyperlipidemia)    ? HTN (hypertension)    ? Near syncope 10/12/2015   ? Pacemaker    ? Pacemaker     dual chamber   ? Parkinson's disease (H)    ? Urinary urgency    ? Vitamin D deficiency              Family History   Problem Relation Age of Onset   ? Heart disease Mother    ? Heart disease Father      Social History     Socioeconomic History   ? Marital status:      Spouse name: Not on file   ? Number of children: Not on file   ? Years of education: Not on file   ? Highest education level: Not on file   Occupational History   ? Not on file   Social Needs   ? Financial resource strain: Not on file   ? Food insecurity:     Worry: Not on file     Inability: Not on file   ? Transportation needs:     Medical: Not on file     Non-medical: Not on file   Tobacco Use   ? Smoking status: Former Smoker   ? Smokeless tobacco: Never Used   Substance and Sexual Activity   ? Alcohol use: Yes     Comment: occasional   ? Drug use: No   ? Sexual activity: Not on file   Lifestyle   ? Physical activity:     Days per week: Not on file     Minutes per session: Not on file   ? Stress: Not on file   Relationships   ? Social connections:     Talks on phone: Not on file     Gets together: Not on file     Attends Pentecostal service: Not on file     Active member of club or organization: Not on file     Attends meetings of clubs or organizations: Not on file     Relationship status: Not on file   ? Intimate partner violence:      Fear of current or ex partner: Not on file     Emotionally abused: Not on file     Physically abused: Not on file     Forced sexual activity: Not on file   Other Topics Concern   ? Not on file   Social History Narrative   ? Not on file         Review of Systems   Cardiovascular: Negative for chest pain and leg swelling.   Gastrointestinal: Negative.    Musculoskeletal: Negative for arthralgias and myalgias.   Skin: Negative.    Psychiatric/Behavioral: Negative for agitation and behavioral problems.       Vitals:    02/03/20 2206   BP: 126/73   Pulse: 74   Temp: 98.1  F (36.7  C)   SpO2: 96%   Weight: 183 lb (83 kg)       Physical Exam  Constitutional:       Appearance: Normal appearance.   HENT:      Head: Atraumatic.      Mouth/Throat:      Mouth: Mucous membranes are moist.   Eyes:      Pupils: Pupils are equal, round, and reactive to light.   Cardiovascular:      Rate and Rhythm: Normal rate.      Pulses: Normal pulses.   Pulmonary:      Effort: Pulmonary effort is normal. No respiratory distress.      Breath sounds: Normal breath sounds. No rales.   Abdominal:      General: Abdomen is flat.   Skin:     General: Skin is warm and dry.   Neurological:      General: No focal deficit present.      Mental Status: He is alert. Mental status is at baseline.      Cranial Nerves: Cranial nerves are intact.      Motor: Weakness present. No tremor.      Coordination: Coordination normal.      Gait: Gait normal.      Comments: Bilateral handgrips equal, lower extremity extension and flexion normal, no reproducible pain spine exam, straight leg raise normal.   Psychiatric:         Behavior: Behavior normal.      Comments: Flat affect, depressed mood.           LABS:   Reviewed    ASSESSMENT:    .     ICD-10-CM    1. Fall at nursing home, initial encounter W19.XXXA     Y92.129    2. Autonomic dysfunction G90.9    3. Parkinson disease (H) G20        PLAN:      Fall in nursing home: Neuro is intact, no headache or dizziness.   No bruising or redness.  X-ray negative.  Pain is improved since last week, in general he does not need pain medication.  -Discontinue narcotic pain medication.  -Start ibuprofen 6 mg p.o. every 24 hours as needed for pain 7 through 10.  Discussed risks and benefits with patient and nursing staff.  Use sparingly.      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  -Now on Remeron, sleeping well.    Insomnia: Improved on mirtazapine.    CHF/swellng: Continue BHARATHI arriaza to norman LE on am an off HS.     For is CAD with recent STEMI: On asa, brilinta, metoprolol, lipitor.     Autonomic orthostatic hypotension: He remains on midodrine and his florinef was increased during this recent hospital stay. He has had extensive workup in the past.   Recent improvement in blood pressures with adjustment of metoprolol, Florinef and midodrine.  -Continue to monitor.    Parkinson's: on sinimet.   -Neurology has recently decreased his Sinemet with improvement in symptoms.    R hip: OT/PT.   -Recently restarted therapies due to improvement in blood pressures.        Electronically signed by: Cy Jerry, CNP

## 2021-06-20 NOTE — LETTER
Letter by Elena Orozco MD at      Author: Elena Orozco MD Service: -- Author Type: --    Filed:  Encounter Date: 12/12/2019 Status: Signed         Patient: Canelo Cook   MR Number: [id number]   YOB: 1940   Date of Visit: 12/12/2019     [facility]    Facility:   [facility] [[id number]]   Code Status: POLST AVAILABLE    Admission evaluation to TCU of 79-year-old male. History is taken from patient who gives a rational history, wife in attendance who gives an accurate history and accompanying hospital notes. History of progressive Parkinson's over 20+ years, dysphagia, hypertension, recent NSTEMI  11/11 with PCI to LAD, placed on Brilinta at that time, urinary frequency on Flomax, recent initiation of Toviaz, hyperlipidemia on Lipitor, autonomic dysfunction, recurrent falls, was admitted to hospital following a fall while getting out of bed, brief loss of consciousness, no acute CNS findings, no seizure activity, pacemaker adequate on admission to hospital, EKG and troponins negative, chronically on midodrine and florinef for orthostatic hypotension, Florinef dose increased, continued on low-dose metoprolol, transferred to TCU for rehabilitation and management of medical problems.    Past medical history, current medical problem list, drug allergies, current medication list, social history, family history, code status reviewed in epic.    Review of systems: profound fatigue present at all times. Progressive Parkinsonian symptoms. Relates multiple falls and brief syncopal episodes, all occurring with standing, no associated neurologic or cardiac symptoms. Toviaz initiated over past two - three months, unaware of increased symptomatology with Toviaz, Flomax initiated over past one year for nocturia, symptoms improved. Aware of mild progressive memory deficit. Dysphagia present. No new focal neurologic deficits. Denies fever sweats or chills. No  current lightheadedness with standing. Hip and shoulder pain following recent fall. Remainder of 12 point review of systems obtained negative.    Exam: pleasant male, bradykinesia present, masking of facies, no pharyngeal erythema, no credit bruits or HJR. Blood pressure 182/102, pulse 61, 02 93% on room air. Attends to all conversation, thyroid midline regular without nodularity or tenderness. S1 and S2 regular with systolic flow murmur. Pulmonary exam without rales rhonchi or wheezes. Abdomen without masses tenderness organomegaly. Modest DJD changes joints without acute inflammatory change. Trace venous stasis changes lower extremities. Pedal pulses symmetrical. No calf tenderness or swelling. Bradykinesia present, no cogwheeling.    Impression and plan:   advanced Parkinson's, recurrent falls on the basis of orthostatic hypotension, currently with elevated blood pressure recordings, recent increase in Florinef, hold midodrine and Florinef for systolic blood pressure greater than 140, monitor for orthostatic blood pressure changes should Florinef and midodrine be held, should orthostatic blood pressure changes occur and blood pressure remain elevated increase in metoprolol dose.   Mestinon use, no mention of history of myasthenia gravis, patient reports  drooping of eyelids and profound weakness, wife confirms symptoms, unaware  however of any given diagnosis of myasthenia gravis.   Urinary frequency improved with Flomax, recent addition of Toviaz, consider hold of Flomax pending blood pressure orthostatic recordings.   Coronary artery disease, recent NSTEMI, recent satisfactory EKG and troponins.   Significant deconditioning and weakness multifactorial, rehabilitation indicated.   Dysphagia historically, monitor, no history of aspiration pneumonia.   Hyperlipidemia on statin.   Multiple complex medical issues are reviewed including orthostatic hypotension, current elevation of blood pressure, medication  potentially contributing to orthostasis, diagnosis of Parkinson's, deconditioning. Additional discussion with nursing staff. Outside medical records reviewed.      Electronically signed by: Elena Orozco MD

## 2021-06-20 NOTE — LETTER
Letter by Cy Jerry CNP at      Author: Cy Jerry CNP Service: -- Author Type: --    Filed:  Encounter Date: 1/6/2020 Status: Signed         Patient: Canelo Cook   MR Number: [id number]   YOB: 1940   Date of Visit: 1/6/2020     [facility]    Facility:   [facility] [[id number]]   Code Status: DNR/DNI      CHIEF COMPLAINT/REASON FOR VISIT:  Chief Complaint   Patient presents with   ? Problem Visit     mood, blood pressures       HISTORY:      HPI: Canelo is a 79 y.o. male with a history of CAD, STEMI (11/11/19), cardiac pacemaker, parkinsons disease with orthostatic hypotension, dsyphagia, htn who experienced a fall at home with near syncope.     Today: Seen per request of nursing for blood pressures as well as mood.  Nursing had an order to give midodrine and Florinef only after obtaining orthostatic blood pressure however he is unable to stand up to do this.  See notes blood pressure was 91/50 so I encouraged nursing to give both Florinef and midodrine.  Difficulty attending therapies due to severe orthostatic hypotension.  Has been essentially bedbound for much of his stay in the TCU.  Not progressing back to baseline.  Additionally, his mood is notably depressed as noticed by patient's wife and nursing staff.  Had a long discussion with patient and wife regarding his mood and he is open to trying an antidepressant.   They did readdress his complaints of insomnia, he has not been taking melatonin regularly but is open to trying this again.    For is CAD with recent STEMI: On asa, brilinta, metoprolol, lipitor.     Autonomic orthostatic hypotension: He remains on midodrine and his florinef was increased during this recent hospital stay. He has had extensive workup in the past.      Fall: negative for fractures including CT. Continue OT/PT. Continue ice prn which he is using to right leg.     Parkinson's: on sinimet.     Past Medical  History:   Diagnosis Date   ? Acute chest pain 11/10/2019   ? Chronic ischemic heart disease    ? GERD (gastroesophageal reflux disease)    ? HLD (hyperlipidemia)    ? HTN (hypertension)    ? Near syncope 10/12/2015   ? Pacemaker    ? Pacemaker     dual chamber   ? Parkinson's disease (H)    ? Urinary urgency    ? Vitamin D deficiency              Family History   Problem Relation Age of Onset   ? Heart disease Mother    ? Heart disease Father      Social History     Socioeconomic History   ? Marital status:      Spouse name: Not on file   ? Number of children: Not on file   ? Years of education: Not on file   ? Highest education level: Not on file   Occupational History   ? Not on file   Social Needs   ? Financial resource strain: Not on file   ? Food insecurity:     Worry: Not on file     Inability: Not on file   ? Transportation needs:     Medical: Not on file     Non-medical: Not on file   Tobacco Use   ? Smoking status: Former Smoker   ? Smokeless tobacco: Never Used   Substance and Sexual Activity   ? Alcohol use: Yes     Comment: occasional   ? Drug use: No   ? Sexual activity: Not on file   Lifestyle   ? Physical activity:     Days per week: Not on file     Minutes per session: Not on file   ? Stress: Not on file   Relationships   ? Social connections:     Talks on phone: Not on file     Gets together: Not on file     Attends Oriental orthodox service: Not on file     Active member of club or organization: Not on file     Attends meetings of clubs or organizations: Not on file     Relationship status: Not on file   ? Intimate partner violence:     Fear of current or ex partner: Not on file     Emotionally abused: Not on file     Physically abused: Not on file     Forced sexual activity: Not on file   Other Topics Concern   ? Not on file   Social History Narrative   ? Not on file         Review of Systems   Cardiovascular: Negative for chest pain and leg swelling.   Gastrointestinal: Negative.     Musculoskeletal: Negative for arthralgias and myalgias.        Right leg pain/hip pain.    Skin: Negative.    Neurological: Positive for weakness.   Psychiatric/Behavioral: Negative for agitation and behavioral problems.       Vitals:    01/09/20 1602   BP: 99/54   Pulse: 73   Temp: 97.2  F (36.2  C)   SpO2: 98%   Weight: 167 lb (75.8 kg)       Physical Exam  Constitutional:       Appearance: Normal appearance.   HENT:      Head: Atraumatic.      Mouth/Throat:      Mouth: Mucous membranes are moist.   Eyes:      Pupils: Pupils are equal, round, and reactive to light.   Cardiovascular:      Rate and Rhythm: Normal rate.      Pulses: Normal pulses.   Pulmonary:      Effort: Pulmonary effort is normal. No respiratory distress.      Breath sounds: Normal breath sounds. No rales.   Abdominal:      General: Abdomen is flat.   Skin:     General: Skin is warm and dry.   Neurological:      Mental Status: He is alert. Mental status is at baseline.   Psychiatric:         Behavior: Behavior normal.      Comments: Flat affect, depressed mood.           LABS:   Reviewed    ASSESSMENT:    .     ICD-10-CM    1. Autonomic dysfunction G90.9    2. Reactive depression F32.9    3. 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  -Start Zoloft 25 mg p.o. daily.  -Follow-up with PHQ 9 3 to 4 weeks.    Insomnia: Continue as needed melatonin per patient request.    CHF/swellng: Continue BHARATHI arriaza to norman LE on am an off HS.     For is CAD with recent STEMI: On asa, brilinta, metoprolol, lipitor.     Autonomic orthostatic hypotension: He remains on midodrine and his florinef was increased during this recent hospital stay. He has had extensive workup in the past.   Bps vary as expeced with sbp 70s-170s.   -Significant concerns blood pressure in TCU.  Today 91/50 while sitting.  Encourage nursing to give  midodrine and Florinef spite occasional elevated blood pressures when laying.  He has had difficulty getting out of bed for therapies due to significant orthostatic hypotension.    Fall: negative for fractures including CT. Continue OT/PT. Continue ice prn.   -says pain is improved. Denies pain today.     Parkinson's: on sinimet.   -I did encourage wife to make follow-up appointment with neurology that was missed due to his hospitalization.    R hip: OT/PT.   -Difficulty dissipating therapy due to his hypotension.  Has been pretty much bedbound for the last week or more.  -We will be having a care conference today romain long-term plans.        Electronically signed by: Cy Jerry, EDISON

## 2021-06-20 NOTE — LETTER
Letter by Cy Jerry CNP at      Author: Cy Jerry CNP Service: -- Author Type: --    Filed:  Encounter Date: 12/18/2019 Status: Signed         Patient: Canelo Cook   MR Number: [id number]   YOB: 1940   Date of Visit: 12/18/2019     [facility]    Facility:   [facility] [[id number]]   Code Status: DNR/DNI      CHIEF COMPLAINT/REASON FOR VISIT:  Chief Complaint   Patient presents with   ? Problem Visit       HISTORY:      HPI: Canelo is a 79 y.o. male with a history of CAD, STEMI (11/11/19), cardiac pacemaker, parkinsons disease with orthostatic hypotension, dsyphagia, htn who experienced a fall at home with near syncope.     Today: Low bp of 65/37 today; this improved to 90s/50s once he was off the toilet (was having bm) and laid down. He is now feeling okay. He is endorsing insomnia that happens nightly and he wakes nearly every 15 minutes.     For is CAD with recent STEMI: On asa, brilinta, metoprolol, lipitor.     Autonomic orthostatic hypotension: He remains on midodrine and his florinef was increased during this recent hospital stay. He has had extensive workup in the past.        Fall: negative for fractures including CT. Continue OT/PT. Continue ice prn which he is using to right leg.     Parkinson's: on sinimet.     Past Medical History:   Diagnosis Date   ? Acute chest pain 11/10/2019   ? Chronic ischemic heart disease    ? GERD (gastroesophageal reflux disease)    ? HLD (hyperlipidemia)    ? HTN (hypertension)    ? Near syncope 10/12/2015   ? Pacemaker    ? Pacemaker     dual chamber   ? Parkinson's disease (H)    ? Urinary urgency    ? Vitamin D deficiency              Family History   Problem Relation Age of Onset   ? Heart disease Mother    ? Heart disease Father      Social History     Socioeconomic History   ? Marital status:      Spouse name: Not on file   ? Number of children: Not on file   ? Years of education:  Not on file   ? Highest education level: Not on file   Occupational History   ? Not on file   Social Needs   ? Financial resource strain: Not on file   ? Food insecurity:     Worry: Not on file     Inability: Not on file   ? Transportation needs:     Medical: Not on file     Non-medical: Not on file   Tobacco Use   ? Smoking status: Former Smoker   ? Smokeless tobacco: Never Used   Substance and Sexual Activity   ? Alcohol use: Yes     Comment: occasional   ? Drug use: No   ? Sexual activity: Not on file   Lifestyle   ? Physical activity:     Days per week: Not on file     Minutes per session: Not on file   ? Stress: Not on file   Relationships   ? Social connections:     Talks on phone: Not on file     Gets together: Not on file     Attends Mandaeism service: Not on file     Active member of club or organization: Not on file     Attends meetings of clubs or organizations: Not on file     Relationship status: Not on file   ? Intimate partner violence:     Fear of current or ex partner: Not on file     Emotionally abused: Not on file     Physically abused: Not on file     Forced sexual activity: Not on file   Other Topics Concern   ? Not on file   Social History Narrative   ? Not on file         Review of Systems   Cardiovascular: Negative for chest pain and leg swelling.   Gastrointestinal: Negative.    Musculoskeletal: Positive for arthralgias and myalgias.        Right leg pain/hip pain.    Skin: Negative.    Neurological: Positive for weakness.   Psychiatric/Behavioral: Negative for agitation and behavioral problems.       Vitals:    12/20/19 1326   BP: 92/56   Pulse: 100   Temp: 98.4  F (36.9  C)   SpO2: 97%   Weight: 180 lb (81.6 kg)       Physical Exam  Constitutional:       Appearance: Normal appearance.   HENT:      Head: Atraumatic.      Mouth/Throat:      Mouth: Mucous membranes are moist.   Eyes:      Pupils: Pupils are equal, round, and reactive to light.   Cardiovascular:      Rate and Rhythm: Normal  rate.      Pulses: Normal pulses.   Pulmonary:      Effort: Respiratory distress present.      Breath sounds: No rales.   Abdominal:      General: Abdomen is flat.   Skin:     General: Skin is warm and dry.   Neurological:      Mental Status: He is alert. Mental status is at baseline.   Psychiatric:         Behavior: Behavior normal.           LABS:   Reviewed    ASSESSMENT:    .     ICD-10-CM    1. Autonomic dysfunction G90.9    2. Other insomnia G47.09    3. Parkinson disease (H) G20        PLAN:    Insomnia: Start melatonin 3mg po at bedtime. Will reevaluate this after 2 nights.     CHF/swellng: Continue BHARATHI hoes to norman LE on am an off HS.     For is CAD with recent STEMI: On asa, brilinta, metoprolol, lipitor.     Autonomic orthostatic hypotension: He remains on midodrine and his florinef was increased during this recent hospital stay. He has had extensive workup in the past.   Bps vary as expeced with sbp 70s-170s.     Fall: negative for fractures including CT. Continue OT/PT. Continue ice prn.     Parkinson's: on sinimet.     R hip: OT/PT.         Electronically signed by: Cy Jerry, CNP

## 2021-06-20 NOTE — LETTER
Letter by Elena Orozco MD at      Author: Elena Orozco MD Service: -- Author Type: --    Filed:  Encounter Date: 1/28/2020 Status: (Other)         Patient: Canelo Cook   MR Number: [id number]   YOB: 1940   Date of Visit: 1/28/2020     [facility]    Facility:   [facility] [[id number]]   Code Status: POLST AVAILABLE    Reassessment of 71-year-old male with coronary artery disease, advanced Parkinson's, urinary incontinence, hypertension and hypotension, original admission to hospital was with weakness and falls, since transfer to TCU midodrine and florinef dose have been increased, hypotension now resolved, on metoprolol 25 mg a.m. 12.5 p.m.    Review of systems: patient fell in bathroom 1/27, per nursing staff he took himself and complains of left shoulder and left rib that pain, x-rays obtained negative. Receiving oxycodone for pain, drowsiness with oxycodone. Significant pain in left lower back, unable to perform activities in physical therapy which was recently progressing well due to severity of pain. Denies pelvic pain. Denies cardiac or neurologic events prior to fall. No pleuritic chest pain. No focal neurologic deficits of new onset. Intermittent significant elevation of blood pressure without associated symptoms. Remainder of 12 point review of systems obtained negative.    Exam: blood pressure variable, systolic elevated greater then 200 periodically. Patient sitting in chair, complaining of left back pain, listing to right to avoid pressure to back. Bradykinesia present, lack of spontaneous facial movement. No pharyngeal erythema. No HJR. S1 and S2 regular. Pulmonary exam without rales rhonchi or wheezes, full respiratory excursion. Tender left flank, tender left lower rib margins, no tenderness to AP and lateral compression of pelvis. Periphery with edema nonpitting of 2 mm.    Impression and plan:   advanced Parkinson's, recent  decrease in Sinemet dose, Parkinsonian symptoms adequately controlled, chronic bradykinesia.   Hypotension resolved, continued intermittent elevation of blood pressure, decrease Florinef to 0.1 mg Q day.   Recent fall with significant left flank pain, per nursing report x-rays negative, pain limiting activity, oxycodone in use, drowsiness with oxycodone, monitor.   Autonomic dysfunction chronic.   Urinary incontinence, overall adequately controlled.   Multiple concerns are reviewed, discussion with patient nursing staff and wife.      Electronically signed by: Elena Orozco MD

## 2021-06-20 NOTE — LETTER
Letter by Elena Orozco MD at      Author: Elena Orozco MD Service: -- Author Type: --    Filed:  Encounter Date: 1/16/2020 Status: Signed         Patient: Canelo Cook   MR Number: [id number]   YOB: 1940   Date of Visit: 1/16/2020     [facility]    Facility:   [facility] [[id number]]   Code Status: POLST AVAILABLE  Reassessment of 79-year-old male who continues in TCU for management of profound weakness, orthostatic hypotension, history of Parkinson's/hypertension/hypotension/autonomic dysfunction/recent NSTEMI. Recent increase in metoprolol to 25 mg a.m. 12.5 mg afternoon, recent decrease in Florinef to 0.15 mg Q day, continues midodrine 10 mg TID, recent initiation of Remeron 7.5 mg Q day for depression, neurology has decreased  Sinemet to two tablets TID from at 2.5 tablets Q ID.    Review of systems: patient is seen with his wife and son on this occasion. Continues to feel improved. No significant hypotensive episodes, no syncopal episodes, able to ambulate to bathroom. Notes no change in Parkinson's symptoms with decrease in Sinemet dose. Mood significantly improved, appetite improved. Feels hopeful. No chest pain or palpitations. Remainder of 12 point review of systems obtained negative.    Exam: blood pressure continues to show variability, previous elevations greater than 200 systolic have not occurred, levels of 80 systolic have not recurred. Patient sitting in chair, alert and oriented, mild bradykinesia, follows all conversation. No pharyngeal erythema. S1 and S2 regular. Pulmonary exam without adventitious sounds. Abdomen without masses or tenderness. No tremor appreciated. Minimal bradykinesia decreased from previous exam. Periphery with nonpitting edema trace.    Impression and plan:   Parkinson's, recent decrease in Sinemet dose, improved control of symptoms with decreased dose.   Reactive depression on Remeron 7.5 mg QHS, mood  improved.   Hypertension and hypotension, less variability in blood pressure with recent adjustment of medications, now able to sit upright and able to walk to the lavatory, one week ago was unable to sit up right in bed without becoming lightheaded.   Recent NSTEMI, no cardiac decompensation or  anginal symptoms.   Issues reviewed with patient  family members and nursing staff.   Resume physical therapy, patient had discontinued physical therapy due to significant orthostatic symptoms and syncope.        Electronically signed by: Elena Orozco MD

## 2021-06-21 NOTE — LETTER
Letter by Sybil Milian at      Author: Sybil Milian Service: -- Author Type: --    Filed:  Encounter Date: 4/5/2021 Status: (Other)         Canelo Cook  [address] [address]      April 5, 2021      Dear Mr. Cook,    RE: Remote Results    We are writing to you regarding your recent Remote Pacemaker check from home. Your transmission was received successfully. Battery status is satisfactory at this time.     Your results are within normal limits.    Your next device appointment will be a remote check on July 15, 2021; this will occur automatically.    To schedule or reschedule, please call [phone] and press 1.    NOTE: If you would like to do an extra transmission, please call [phone] and press 3 to speak to a nurse BEFORE transmitting. This ensures that the Device Clinic staff is aware of the reason you are sending a transmission, and can follow-up with you after it has been reviewed.    We will be checking your implanted device from home (remotely) every three months unless otherwise instructed. We will need to see you in the clinic at least once a year. You may need to be seen in the clinic sooner depending on the results of your check.    Please be aware:    The follow-up schedule is like a Physician prescription.    Your remote monitor is paired to your specific implanted device.      Sincerely,    [facility]

## 2021-06-21 NOTE — LETTER
Letter by Arianna Mesa RDCS at      Author: Arianna Mesa RDCS Service: -- Author Type: --    Filed:  Encounter Date: 1/4/2021 Status: (Other)         Canelo Cook  [address]  [place] [address]      January 4, 2021      Dear Mr. Cook,    RE: Remote Results    We are writing to you regarding your recent Remote Pacemaker check from home. Your transmission was received successfully. Battery status is satisfactory at this time.     Your results are showing no significant changes.    Your next device appointment will be a remote check on or near April 5, 2021; this will occur automatically.    To schedule or reschedule, please call [phone] and press 1.    NOTE: If you would like to do an extra transmission, please call [phone] and press 3 to speak to a nurse BEFORE transmitting. This ensures that the Device Clinic staff is aware of the reason you are sending a transmission, and can follow-up with you after it has been reviewed.    We will be checking your implanted device from home (remotely) every three months unless otherwise instructed. We will need to see you in the clinic at least once a year. You may need to be seen in the clinic sooner depending on the results of your check.    Please be aware:    The follow-up schedule is like a Physician prescription.    Your remote monitor is paired to your specific implanted device.      Sincerely,    [facility]

## 2021-06-21 NOTE — LETTER
Letter by Rhona Christine MD at      Author: Rhona Christine MD Service: -- Author Type: --    Filed:  Encounter Date: 3/19/2021 Status: (Other)         Canelo Cook  [address] [address]      March 19, 2021      Dear Canelo,    This letter is to remind you that you will be due for your follow up appointment with Dr. Rhona Christine in April, 2021. To help ensure you are in the best health possible, a regular follow-up with your cardiologist is essential.     Please call our Patient Scheduling Line at [phone] to schedule your appointment at your earliest convenience.  If you have recently scheduled an appointment, please disregard this letter.    We look forward to seeing you again. As always, we are available at the number  above for any questions or concerns you may have.      Sincerely,     The Physicians and Staff of [facility]

## 2021-06-24 SDOH — HEALTH STABILITY: MENTAL HEALTH: HOW OFTEN DO YOU HAVE 6 OR MORE DRINKS ON ONE OCCASION?: NOT ASKED

## 2021-06-24 SDOH — HEALTH STABILITY: MENTAL HEALTH: HOW MANY STANDARD DRINKS CONTAINING ALCOHOL DO YOU HAVE ON A TYPICAL DAY?: NOT ASKED

## 2021-06-24 SDOH — HEALTH STABILITY: MENTAL HEALTH: HOW OFTEN DO YOU HAVE A DRINK CONTAINING ALCOHOL?: NOT ASKED

## 2021-06-25 ENCOUNTER — OFFICE VISIT (OUTPATIENT)
Dept: NEUROLOGY | Facility: CLINIC | Age: 81
End: 2021-06-25
Payer: MEDICARE

## 2021-06-25 VITALS
SYSTOLIC BLOOD PRESSURE: 134 MMHG | BODY MASS INDEX: 24.52 KG/M2 | HEIGHT: 73 IN | DIASTOLIC BLOOD PRESSURE: 72 MMHG | WEIGHT: 185 LBS | HEART RATE: 78 BPM

## 2021-06-25 DIAGNOSIS — G20.A1 PARKINSON DISEASE (H): Primary | ICD-10-CM

## 2021-06-25 DIAGNOSIS — I95.1 ORTHOSTATIC HYPOTENSION DYSAUTONOMIC SYNDROME: ICD-10-CM

## 2021-06-25 PROCEDURE — 99215 OFFICE O/P EST HI 40 MIN: CPT | Performed by: PSYCHIATRY & NEUROLOGY

## 2021-06-25 ASSESSMENT — MIFFLIN-ST. JEOR: SCORE: 1603.03

## 2021-06-25 NOTE — LETTER
"    6/25/2021         RE: Canelo Cook  [address]  [place] [address]        Dear Colleague,    Thank you for referring your patient, Canelo Cook, to the [facility] [place]. Please see a copy of my visit note below.    In person evaluation  Patient accompanied by wife who helps with history and information    HPI  2/25/2020, in person visit  5/19/2020, video visit  12/8/2020, video visit    81-year-old followed neurologically for:  Parkinson's disease going on for 22+ years  Severe autonomic instability/syncope    Patient had episode of significant syncope taken to [facility] 5/20/2021  CT scan head 5/20/2021 moderate volume loss small vessel changes but no hemorrhage or acute change  Thought to be his autonomic instability  Some concern about a UTI      A.  In regards to the autonomic instability       On multiple medications       Syncope in December 2019 TCU x2 months       February 2020 fell with fractured ribs       Previous hospitalization May 2020 had syncope       June 2020 syncope hospital follow-up visit       Multiple episodes of passing out at home most the time wife does not call if he is already in the lift chair      Multiple episodes of falling while transferring to the toilet or back with EMTs called 2-3 times per month for a \"lift assist\"     He did get his lift chair  Is not wearing the abdominal binder  Patient has a hospital bed but I do not think that he is actually elevating it and not to get an affect  Does wear knee-high pressure socks but has some edema in his feet  Is basically wheelchair-bound  Transported by wheelchair has a lift chair at home  Seems to pass out a lot when getting up to go to the bathroom or back or if he is on the toilet seat  Also passes out a lot at breakfast so she has been a lift chair so she can leg and down  Talked about if he passes out using a lift chair laying flat  Feels that he is having more trouble " and is mainly wheelchair-bound      For the patient's autonomic instability he is on  Florinef 0.1 mg daily  Proamantine 10 mg twice daily  Mestinon 60 mg half a tablet 3 times daily  He is going to try to use the abdominal binder but maybe not have it on so tight so he can tolerate it to put it on in the morning  Sleeps with the head of his bed up we will try having elevated to at least 30 to 45 degrees discussed at length with patient's wife        Review of Meds  Aspirin 81 mg once per day  Atorvastatin 40 mg once per day  Brilanta 90 mg twice daily    B12 1000 mcg orally every other day    Florinef 0.1 mg every morning   Proamantine  10 mg by mouth 2 times daily  Mestinon 30 mg 3 times daily  Sinemet 25/100, increase 12/8/2020, (2 tablets 4 times daily) if he gets increased dizziness we will decreased alternating  2/1.5/2/1.5  Remeron 7.5 mg at nighttime    B.  Parkinson's disease for 22+ years        Very hypophonic voice autonomic instability resting tremor right side greater than left chronic head drop with leaning to the left        Difficulty with rigidity difficulty decrease carbidopa levodopa medication        No visual hallucinations or vivid dreams        Discussed living well which has been filled out in the past        Discussed power of  which has been filled out in the past        Discussed moving to a apartment that has escalating care available as seniors apartment is not enough and increasing care needs are imminent in the near future        Sinemet 25/100, increase 12/8/2020, (2 tablets 4 times daily) if he gets increased dizziness we will decreased alternating  2/1.5/2/1.5        Remeron 7.5 mg at nighttime    Review of Meds  Aspirin 81 mg once per day  Atorvastatin 40 mg once per day  Brilanta 90 mg twice daily    B12 1000 mcg orally every other day    Florinef 0.1 mg every morning   Proamantine  10 mg by mouth 2 times daily  Mestinon 30 mg 3 times daily  Sinemet 25/100, increase  12/8/2020, (2 tablets 4 times daily) if he gets increased dizziness we will decreased alternating  2/1.5/2/1.5  Remeron 7.5 mg at nighttime        Past medical history  Parkinson's disease onset prior to 1999  Severe autonomic instability  Pacemaker  MI/NSTEMI 2019  Hypertension  Hyperlipidemia  GERD  Syncope/falls due to autonomic instability    Habits  Past smoker  Past occasional alcohol beverage    Family history  Mom with heart disease  Father with heart disease      Work-up summary:  CT scan had June 2018 no subdural mild chronic small vessel changes mild volume loss  Previous workup included the following which is also outlined in the November 27, 2013 note for evaluation:  a. CTA of the neck vessels normal.  b. CTA of the head vessels no stenosis.  c. Echo 60% ejection fraction, normal left atrium.  d. HDL in the past 34, LDL 83.  CT cervical spine 12/12/2020 spondylolisthesis no fracture  CT head 5/20/2021 moderate volume loss small vessel changes no subdural hematoma or mass  B12 1038 (November 2019)  Labs May 2021  Sodium 142 potassium 4.0 BUN 25 creatinine 1.36  Glucose 94  White blood count 7.3, hemoglobin 11.1, platelets 201,000         exam    Review of systems  See HPI above  Pertinent positives and negatives  Leaning to the left  Hypophonic speech  Can swallow okay not choking on food  No hallucinations or vivid dreams  Basically wheelchair-bound  Postural instability and severe autonomic instability  Weakness of his legs  Edema in the feet    Past alternating diarrhea constipation is on a bowel program    No diplopia no dysphagia  No visual field changes    No headache or chest pain or shortness of breath    Otherwise review of systems negative    General exam  HEENT normal  Lungs clear  Heart rate regular  Abdomen soft  Edema in the feet wearing pressure stockings, 1+ edema up the shin and foot  Decreased range of motion of both shoulders    Neurologic exam  Alert attentive oriented x3  No  aphasia  No neglect  Brief memory testing okay    Cranial nerves II through XII except for parkinsonian features normal  No ophthalmoplegia  No nystagmus  Visual fields intact  Face symmetrical  Tongue twisters slow but understandable    Parkinsonian features  Soft hypophonic voice  Head drop posture old/chronic  Mild to moderate decreased blink but reasonable upgaze  No hallucinations or vivid dreams  Mild bradykinesia  Mild resting tremor a little bit more on the right greater than the left today  Has had difficulty with alternating constipation and diarrhea  Orthostatic BP changes     Upper extremities  No drift  Tremor today was a little bit more noticed on the right than the left but on other visits has been the other way around  Mild to moderate bradykinesia  Slow finger tapping  Decreased range of motion of both shoulders getting his arms up    Lower extremities  Diffusely weak  In the seated position can move them weekly  Inability to stand due to weakness    Gait   at times he is able to have enough strength to stand up and transfer or hang onto the walker with a hand brakes  Other times he is too weak and he cannot stand or transfer without a lot of help transfer belt  Has a lift chair due to this fact        Assessment/Plan     1.  Autonomic dysfunction (G90.3)       Reviewed medications, stay well-hydrated       2.  Parkinson Disease (G20)         We will not lower the Sinemet any further as he started to get more stiffness and tremor        We discussed progressive nature of Parkinson's disease      Current diagnoses:    1. Parkinson s disease going on for greater than 22 plus years as far back as in 1998 with tremor and bradykinesia.  2. Severe autonomic dysfunction with orthostatic blood pressure trouble, failed black liquorice, failed Florinef, had supine hypertension, has tried Mestinon in the past and midodrine.  3. History of heart disease on beta-blockers with some bradycardia, had a pacemaker  placed in August 2015.  4. History of TIAs in August 2013 with hypertension and hyperlipidemia, is supposed to be on an aspirin, has been a past smoker.  5. Autonomic instability, which is resistant to multiple manipulations and activities, medications, and habits.    Patient does have:   Hospital bed. He is unable to sleep at 30 degrees the last time I saw him because he slides around, discussed at length.   Did get an abdominal binder, tried it a little bit, does not like to wear it.  We will try using this more often   Has a lift chair   Has a transfer belt for his wife to use   Does have a 4 wheeled walker with hand brake      Current plan  Midodrine 10 mg 2 times daily refilled  Florinef 0.1 mg every morning refilled  Sinemet 25/100, 2 tablets 4 times daily, (if increased drops in blood pressure reduce dose to,  2 / 1.5 / 2 / 1.5  Hospital bed should raise to at least 30 to 45 degrees 3 discussed  Retry the abdominal binder but maybe wear it less tight put it on in the morning to see if he can tolerate it    Other physicians  Mestinon 30 mg 2 times daily through her other physicians  Atorvastatin 40 mg daily  Aspirin 81 mg once per day  Other meds see med list    Discussed gait safety high risk for falls and injuries    Patient will follow-up in October 2021  In the meantime we will work with his family and wife to find a living situation that has escalating ability to provide more care  With having to call EMT 2-3 times per month for lift help due to not been able to get him back up into the chair mostly after going to the bathroom is becoming more problematic.  Concerned that the wife is going to develop caregiver burnout  In in the future his neurodegenerative disease declines any comes more prominent and his care needs significantly increased I do not think that they are going to be able to provide them at his current living situation    Wife is no longer able to transfer him easily and he is going to need  [employer] [facility]  Patient wheelchair-bound with autonomic instability requires [employer] [facility] for transfer to medical appointments  Patient's wife will get me the paperwork to fill out    As part of this visit  Reviewed CT scan may 20th 2021  Reviewed ED visit 5/20/2021  Reviewed past labs and work-up  Reviewed current medications treatment  Discussed end-of-life issues living well power of  and increasing care needs in the near future and looking into another living situation with more help.    Total care time today 46 minutes      Again, thank you for allowing me to participate in the care of your patient.        Sincerely,        Tramaine Mackay MD

## 2021-06-25 NOTE — NURSING NOTE
Chief Complaint   Patient presents with     Parkinson     Pt was seen in hospital in May.     Nathaly Patel LPN on 6/25/2021 at 11:43 AM

## 2021-06-25 NOTE — PROGRESS NOTES
"In person evaluation  Patient accompanied by wife who helps with history and information    HPI  2/25/2020, in person visit  5/19/2020, video visit  12/8/2020, video visit    81-year-old followed neurologically for:  Parkinson's disease going on for 22+ years  Severe autonomic instability/syncope    Patient had episode of significant syncope taken to [facility] 5/20/2021  CT scan head 5/20/2021 moderate volume loss small vessel changes but no hemorrhage or acute change  Thought to be his autonomic instability  Some concern about a UTI      A.  In regards to the autonomic instability       On multiple medications       Syncope in December 2019 TCU x2 months       February 2020 fell with fractured ribs       Previous hospitalization May 2020 had syncope       June 2020 syncope hospital follow-up visit       Multiple episodes of passing out at home most the time wife does not call if he is already in the lift chair      Multiple episodes of falling while transferring to the toilet or back with EMTs called 2-3 times per month for a \"lift assist\"     He did get his lift chair  Is not wearing the abdominal binder  Patient has a hospital bed but I do not think that he is actually elevating it and not to get an affect  Does wear knee-high pressure socks but has some edema in his feet  Is basically wheelchair-bound  Transported by wheelchair has a lift chair at home  Seems to pass out a lot when getting up to go to the bathroom or back or if he is on the toilet seat  Also passes out a lot at breakfast so she has been a lift chair so she can leg and down  Talked about if he passes out using a lift chair laying flat  Feels that he is having more trouble and is mainly wheelchair-bound      For the patient's autonomic instability he is on  Florinef 0.1 mg daily  Proamantine 10 mg twice daily  Mestinon 60 mg half a tablet 3 times daily  He is going to try to use the abdominal binder but maybe not have it on so tight so he " can tolerate it to put it on in the morning  Sleeps with the head of his bed up we will try having elevated to at least 30 to 45 degrees discussed at length with patient's wife        Review of Meds  Aspirin 81 mg once per day  Atorvastatin 40 mg once per day  Brilanta 90 mg twice daily    B12 1000 mcg orally every other day    Florinef 0.1 mg every morning   Proamantine  10 mg by mouth 2 times daily  Mestinon 30 mg 3 times daily  Sinemet 25/100, increase 12/8/2020, (2 tablets 4 times daily) if he gets increased dizziness we will decreased alternating  2/1.5/2/1.5  Remeron 7.5 mg at nighttime    B.  Parkinson's disease for 22+ years        Very hypophonic voice autonomic instability resting tremor right side greater than left chronic head drop with leaning to the left        Difficulty with rigidity difficulty decrease carbidopa levodopa medication        No visual hallucinations or vivid dreams        Discussed living well which has been filled out in the past        Discussed power of  which has been filled out in the past        Discussed moving to a apartment that has escalating care available as [facility] apartment is not enough and increasing care needs are imminent in the near future        Sinemet 25/100, increase 12/8/2020, (2 tablets 4 times daily) if he gets increased dizziness we will decreased alternating  2/1.5/2/1.5        Remeron 7.5 mg at nighttime    Review of Meds  Aspirin 81 mg once per day  Atorvastatin 40 mg once per day  Brilanta 90 mg twice daily    B12 1000 mcg orally every other day    Florinef 0.1 mg every morning   Proamantine  10 mg by mouth 2 times daily  Mestinon 30 mg 3 times daily  Sinemet 25/100, increase 12/8/2020, (2 tablets 4 times daily) if he gets increased dizziness we will decreased alternating  2/1.5/2/1.5  Remeron 7.5 mg at nighttime        Past medical history  Parkinson's disease onset prior to 1999  Severe autonomic instability  Pacemaker  MI/NSTEMI  2019  Hypertension  Hyperlipidemia  GERD  Syncope/falls due to autonomic instability    Habits  Past smoker  Past occasional alcohol beverage    Family history  Mom with heart disease  Father with heart disease      Work-up summary:  CT scan had June 2018 no subdural mild chronic small vessel changes mild volume loss  Previous workup included the following which is also outlined in the November 27, 2013 note for evaluation:  a. CTA of the neck vessels normal.  b. CTA of the head vessels no stenosis.  c. Echo 60% ejection fraction, normal left atrium.  d. HDL in the past 34, LDL 83.  CT cervical spine 12/12/2020 spondylolisthesis no fracture  CT head 5/20/2021 moderate volume loss small vessel changes no subdural hematoma or mass  B12 1038 (November 2019)  Labs May 2021  Sodium 142 potassium 4.0 BUN 25 creatinine 1.36  Glucose 94  White blood count 7.3, hemoglobin 11.1, platelets 201,000         exam    Review of systems  See HPI above  Pertinent positives and negatives  Leaning to the left  Hypophonic speech  Can swallow okay not choking on food  No hallucinations or vivid dreams  Basically wheelchair-bound  Postural instability and severe autonomic instability  Weakness of his legs  Edema in the feet    Past alternating diarrhea constipation is on a bowel program    No diplopia no dysphagia  No visual field changes    No headache or chest pain or shortness of breath    Otherwise review of systems negative    General exam  HEENT normal  Lungs clear  Heart rate regular  Abdomen soft  Edema in the feet wearing pressure stockings, 1+ edema up the shin and foot  Decreased range of motion of both shoulders    Neurologic exam  Alert attentive oriented x3  No aphasia  No neglect  Brief memory testing okay    Cranial nerves II through XII except for parkinsonian features normal  No ophthalmoplegia  No nystagmus  Visual fields intact  Face symmetrical  Tongue twisters slow but understandable    Parkinsonian features  Soft  hypophonic voice  Head drop posture old/chronic  Mild to moderate decreased blink but reasonable upgaze  No hallucinations or vivid dreams  Mild bradykinesia  Mild resting tremor a little bit more on the right greater than the left today  Has had difficulty with alternating constipation and diarrhea  Orthostatic BP changes     Upper extremities  No drift  Tremor today was a little bit more noticed on the right than the left but on other visits has been the other way around  Mild to moderate bradykinesia  Slow finger tapping  Decreased range of motion of both shoulders getting his arms up    Lower extremities  Diffusely weak  In the seated position can move them weekly  Inability to stand due to weakness    Gait   at times he is able to have enough strength to stand up and transfer or hang onto the walker with a hand brakes  Other times he is too weak and he cannot stand or transfer without a lot of help transfer belt  Has a lift chair due to this fact        Assessment/Plan     1.  Autonomic dysfunction (G90.3)       Reviewed medications, stay well-hydrated       2.  Parkinson Disease (G20)         We will not lower the Sinemet any further as he started to get more stiffness and tremor        We discussed progressive nature of Parkinson's disease      Current diagnoses:    1. Parkinson s disease going on for greater than 22 plus years as far back as in 1998 with tremor and bradykinesia.  2. Severe autonomic dysfunction with orthostatic blood pressure trouble, failed black liquorice, failed Florinef, had supine hypertension, has tried Mestinon in the past and midodrine.  3. History of heart disease on beta-blockers with some bradycardia, had a pacemaker placed in August 2015.  4. History of TIAs in August 2013 with hypertension and hyperlipidemia, is supposed to be on an aspirin, has been a past smoker.  5. Autonomic instability, which is resistant to multiple manipulations and activities, medications, and  habits.    Patient does have:   Hospital bed. He is unable to sleep at 30 degrees the last time I saw him because he slides around, discussed at length.   Did get an abdominal binder, tried it a little bit, does not like to wear it.  We will try using this more often   Has a lift chair   Has a transfer belt for his wife to use   Does have a 4 wheeled walker with hand brake      Current plan  Midodrine 10 mg 2 times daily refilled  Florinef 0.1 mg every morning refilled  Sinemet 25/100, 2 tablets 4 times daily, (if increased drops in blood pressure reduce dose to,  2 / 1.5 / 2 / 1.5  Hospital bed should raise to at least 30 to 45 degrees 3 discussed  Retry the abdominal binder but maybe wear it less tight put it on in the morning to see if he can tolerate it    Other physicians  Mestinon 30 mg 2 times daily through her other physicians  Atorvastatin 40 mg daily  Aspirin 81 mg once per day  Other meds see med list    Discussed gait safety high risk for falls and injuries    Patient will follow-up in October 2021  In the meantime we will work with his family and wife to find a living situation that has escalating ability to provide more care  With having to call EMT 2-3 times per month for lift help due to not been able to get him back up into the chair mostly after going to the bathroom is becoming more problematic.  Concerned that the wife is going to develop caregiver burnout  In in the future his neurodegenerative disease declines any comes more prominent and his care needs significantly increased I do not think that they are going to be able to provide them at his current living situation    Wife is no longer able to transfer him easily and he is going to need [employer] [facility]  Patient wheelchair-bound with autonomic instability requires [employer] [facility] for transfer to medical appointments  Patient's wife will get me the paperwork to fill out    As part of this visit  Reviewed CT scan may 20th 2021  Reviewed ED visit  5/20/2021  Reviewed past labs and work-up  Reviewed current medications treatment  Discussed end-of-life issues living well power of  and increasing care needs in the near future and looking into another living situation with more help.    Total care time today 46 minutes

## 2021-06-28 NOTE — PROGRESS NOTES
Progress Notes by Shahrzad Moreland NP at 11/27/2019  2:50 PM     Author: Shahrzad Moreland NP Service: -- Author Type: Nurse Practitioner    Filed: 11/27/2019  4:31 PM Encounter Date: 11/27/2019 Status: Signed    : Shahrzad Moreland NP (Nurse Practitioner)               Assessment/Recommendations   Assessment:    1. Coronary artery disease: Recent hospitalization with syncope and elevated troponin.  Coronary angiogram showed significant lesion in proximal to mid LAD and diagonal 1.  Lesions were successfully treated with a drug-eluting stents with no complications.(see angio result for detail).  Patient was recommended to stay on a dual antiplatelet therapy for 12 months.  Most recent BMP, hemoglobin, platelets are stable.    Patient denies any chest pain or angina.  Cardiac rehab has been ordered.  Patient is currently going through home PT.  Per wife, his physical activity is very limited due to Parkinson.  He uses walker for stability.    2.  Dyslipidemia: Canelo Cook is on high intensity statin therapy with atorvastatin 40 mg daily. Most recent LDL is 101.  Patient reports excessive fatigue and daytime sleepiness.  Most recent AST/ALT are stable.    3.  Hypertension: His blood pressure is 112/82 and heart rate 80. Currently on metoprolol tartrate 25 mg daily.  He is also on midodrine.    4.  Hypomagnesemia: Most recent magnesium level of 1.5.  On magnesium oxide 400 mg 3 times daily.    Plan:  1.We discussed the importance of antiplatelet therapy and talking with his cardiologist prior to stopping these medications for any reason.  Continue aspirin 81 mg indefinitely and Brilinta 90 mg twice a day for 1 year.    2.Risk factor modification and lifestyle management topics were discussed including managing comorbidities, weight loss, heart healthy diet, exercise and stress reduction.      3.We discussed a diet low in saturated fat, weight loss, and exercise along with medication for better control of  cholesterol.  Decreased atorvastatin from 40 mg to 20 mg daily and see if this improves his fatigue and sleepiness.  In 2 weeks if no improvement, instructed to go back to 40 mg daily.    4. Continue current hypertension regimen    Follow up with Dr. Christine in 4 weeks     History of Present Illness/Subjective    Mr. Canelo Cook is a 79 y.o. male with a past medical history of Parkinson's disease, hypertension, orthostatic hypotension, GERD, sick sinus syndrome with status post pacemaker placement and coronary artery disease with status post PCI who is seen at [facility] for post coronary intervention follow up.  Patient was recently hospitalized on November 11, 2019 with syncope.  With intermittent chest pain over the last couple months.  His troponins were negative initially but subsequent one was found elevated.  He was transferred to [facility] for heart catheterization.  Coronary angiogram showed significant lesion in proximal to mid LAD and diagonal 1.  Lesions were successfully treated with the drug-eluting stents with no complications.  Dual antiplatelet therapy is being used with aspirin indefinitely and ticagrelor for 1 year. He was recommended to stay on high statin therapy.  His most recent echocardiogram showed normal left ventricular systolic function with no wall motion abnormalities or any significant valve abnormalities noted.    Today, patient is here accompanied by his wife.  He denies having any chest pain or angina since recent stent placement.  However he had one episode of syncope lasted for 10 minutes and then resolved.  Patient's wife stated that he usually falls asleep and then he wakes up.  She said this is not new to him and he has been having chronic issue with syncope but has gotten worse lately.  He follows up with neurologist.    Patient was with [facility] cardiology team and now planning to transfer to [facility] and stay with Dr. Christine as a primary  cardiologist.      He denies lightheadedness, shortness of breath, dyspnea on exertion, orthopnea, PND, palpitations, chest pain, abdominal fullness/bloating and lower extremity edema.  He reports excessive daytime sleepiness and tiredness.  He is wondering if this is due to the new statin medication.  He denies any bleeding complications    Coronary Angiogram: reviewed:  Results for orders placed during the hospital encounter of 11/11/19   Cardiac Catheterization [CATH01] 11/11/2019    Narrative   NSTEMI    Severe proximal LAD/first diagonal bifurcation stenosis treated with   mini-crush bifurcation stenting with good angiographic results.    Minimal coronary disease otherwise    LV EPD normal    Blood loss < 20 cc      ECHO-Reviewed:   Results for orders placed during the hospital encounter of 11/10/19   Echo Complete [ECH10] 11/11/2019    Narrative   When compared to the previous study dated 11/16/2013, no significant   change.    Normal right ventricular size and systolic function.    Normal left ventricular size and systolic function.    Left ventricle ejection fraction is normal. The estimated left   ventricular ejection fraction is 65%.    No hemodynamically significant valvular heart abnormalities.           Physical Examination Review of Systems   Vitals:    11/27/19 1451   BP: 112/82   Pulse: 80   Resp: 12     Body mass index is 24.43 kg/m .  Wt Readings from Last 3 Encounters:   11/27/19 185 lb 1.6 oz (84 kg)   11/11/19 184 lb 3.2 oz (83.6 kg)   11/11/19 186 lb 4.8 oz (84.5 kg)     General Appearance:   no distress, normal body habitus   ENT/Mouth: membranes moist, no oral lesions or bleeding gums.      EYES:  no scleral icterus, normal conjunctivae   Neck: no carotid bruits or thyromegaly   Chest/Lungs:   lungs are clear to auscultation, no rales or wheezing,  equal chest wall expansion    Cardiovascular:    Normal first and second heart sounds with no murmurs, rubs, or gallops; the carotid, radial and  posterior tibial pulses are intact, Jugular venous pressure flat with no, mild edema bilaterally -compression stocking on   Abdomen:  no organomegaly, masses, bruits, or tenderness; bowel sounds are present   Extremities  Puncture Site: no cyanosis or clubbing  Left radial site is soft with no bruising.  Radial pulses and Pedal pulses intact and symmetrical.  CMS intact.   Skin: no xanthelasma, warm.    Neurologic: normal  bilateral, no tremors     Psychiatric: alert and oriented x3, calm               General: WNL  Eyes: WNL  Ears/Nose/Throat: WNL  Lungs: WNL  Heart: Fainting  Stomach: Diarrhea  Bladder: Frequent Urination at Night  Muscle/Joints: Muscle Pain, Muscle Weakness  Skin: WNL  Nervous System: Falls, Daytime Sleepiness, Dizziness, Loss of Balance  Mental Health: WNL     Blood: WNL     Medical History  Surgical History Family History Social History   Past Medical History:   Diagnosis Date   ? Acute chest pain 11/10/2019   ? Chronic ischemic heart disease    ? GERD (gastroesophageal reflux disease)    ? HLD (hyperlipidemia)    ? HTN (hypertension)    ? Near syncope 10/12/2015   ? Pacemaker    ? Pacemaker     dual chamber   ? Parkinson's disease (H)    ? Urinary urgency    ? Vitamin D deficiency     Past Surgical History:   Procedure Laterality Date   ? BACK SURGERY     ? CARDIAC PACEMAKER PLACEMENT     ? CARDIAC PACEMAKER PLACEMENT     ? CATARACT EXTRACTION     ? CV CORONARY ANGIOGRAM N/A 11/11/2019    Procedure: Coronary Angiogram;  Surgeon: Suzan Pineda MD;  Location: [facility] Cath Lab;  Service: Cardiology   ? CV LEFT HEART CATHETERIZATION WITH LEFT VENTRICULOGRAM N/A 11/11/2019    Procedure: Left Heart Catheterization with Left Ventriculogram;  Surgeon: Suzan Pineda MD;  Location: [facility] Cath Lab;  Service: Cardiology   ? ESOPHAGOGASTRODUODENOSCOPY N/A 3/20/2016    Procedure: ESOPHAGOGASTRODUODENOSCOPY with biopsy;  Surgeon: Melissa Castellano MD;  Location: [facility] GI;  Service:    ?  PENILE PROSTHESIS IMPLANT     ? ND EDG US EXAM SURGICAL ALTER STOM DUODENUM/JEJUNUM N/A 3/23/2016    Procedure: EGD and ENDOSCOPIC ULTRASOUND with brushing and biopsies;  Surgeon: Nj Howe MD;  Location: [facility];  Service: Gastroenterology    Family History   Problem Relation Age of Onset   ? Heart disease Mother    ? Heart disease Father     Social History     Socioeconomic History   ? Marital status:      Spouse name: Not on file   ? Number of children: Not on file   ? Years of education: Not on file   ? Highest education level: Not on file   Occupational History   ? Not on file   Social Needs   ? Financial resource strain: Not on file   ? Food insecurity:     Worry: Not on file     Inability: Not on file   ? Transportation needs:     Medical: Not on file     Non-medical: Not on file   Tobacco Use   ? Smoking status: Former Smoker   ? Smokeless tobacco: Never Used   Substance and Sexual Activity   ? Alcohol use: Yes     Comment: occasional   ? Drug use: No   ? Sexual activity: Not on file   Lifestyle   ? Physical activity:     Days per week: Not on file     Minutes per session: Not on file   ? Stress: Not on file   Relationships   ? Social connections:     Talks on phone: Not on file     Gets together: Not on file     Attends Druze service: Not on file     Active member of club or organization: Not on file     Attends meetings of clubs or organizations: Not on file     Relationship status: Not on file   ? Intimate partner violence:     Fear of current or ex partner: Not on file     Emotionally abused: Not on file     Physically abused: Not on file     Forced sexual activity: Not on file   Other Topics Concern   ? Not on file   Social History Narrative   ? Not on file          Medications  Allergies   Current Outpatient Medications   Medication Sig Dispense Refill   ? aspirin 81 mg chewable tablet Chew 1 tablet (81 mg total) daily. Forever 30 tablet 0   ? atorvastatin (LIPITOR) 40  MG tablet Take 1 tablet (40 mg total) by mouth daily. 30 tablet 1   ? azelastine (ASTELIN) 137 mcg (0.1 %) nasal spray 1 spray into each nostril as needed.     ? carbidopa-levodopa (SINEMET)  mg per tablet Take 2.5 tablets by mouth 4 (four) times a day. Take with meals and bedtime.           ? cholecalciferol, vitamin D3, 2,000 unit Tab Take 2,000 Units by mouth daily.     ? cyanocobalamin 500 MCG tablet Take 500 mcg by mouth daily.     ? fesoterodine (TOVIAZ) 4 mg Tb24 ER tablet Take 8 mg by mouth daily with lunch.     ? fludrocortisone (FLORINEF) 0.1 mg tablet Take 0.1-0.15 mg by mouth daily. 0.15 mg on even days and 0.1 mg on odd days           ? magnesium oxide (MAG-OX) 400 mg (241.3 mg magnesium) tablet Take 1 tablet (400 mg total) by mouth 3 (three) times a day.  0   ? metoprolol tartrate (LOPRESSOR) 25 MG tablet Take 1 tablet (25 mg total) by mouth daily. 30 tablet 0   ? midodrine (PROAMATINE) 2.5 MG tablet Take 2.5 mg by mouth 2 (two) times a day.     ? miscellaneous medical supply Misc For personal use. Length: calf Strength: 16-20 mmHg Circumference in cm:     ? multivit with iron,hematinic (SUPER B-COMPLEX ORAL) Take 1 tablet by mouth daily.     ? nitroglycerin (NITROSTAT) 0.4 MG SL tablet Place 1 tablet (0.4 mg total) under the tongue every 5 (five) minutes as needed for chest pain. 1 Bottle 0   ? omeprazole (PRILOSEC) 20 MG capsule Take 20 mg by mouth daily before breakfast.     ? pyridostigmine (MESTINON) 60 mg tablet Take 30 mg by mouth 3 (three) times a day.     ? ranitidine (ZANTAC) 150 MG tablet Take 150 mg by mouth at bedtime.     ? sennosides (SENNA) 8.6 mg cap Take 16.2 mg by mouth daily as needed.            ? tamsulosin (FLOMAX) 0.4 mg cap Take 0.4 mg by mouth daily after supper.     ? ticagrelor (BRILINTA) 90 mg Tab Take 1 tablet (90 mg total) by mouth 2 (two) times a day. For 12 months 60 tablet 11     No current facility-administered medications for this visit.     Allergies    Allergen Reactions   ? Sulfa (Sulfonamide Antibiotics)      Does not remember   ? Tylenol [Acetaminophen]      Feeling - skin crawling         Lab Results    Chemistry/lipid CBC Cardiac Enzymes/BNP/TSH/INR   Lab Results   Component Value Date    CHOL 135 11/16/2013    HDL 34 (L) 11/16/2013    LDLCALC 83 11/16/2013    TRIG 89 11/16/2013    CREATININE 1.09 11/12/2019    BUN 25 11/12/2019    K 3.6 11/12/2019     11/12/2019     (H) 11/12/2019    CO2 26 11/12/2019    Lab Results   Component Value Date    WBC 6.3 11/11/2019    HGB 11.3 (L) 11/12/2019    HCT 32.8 (L) 11/11/2019    MCV 89 11/11/2019     11/12/2019    Lab Results   Component Value Date    TROPONINI 1.61 (HH) 11/12/2019     (H) 11/10/2019    TSH 2.63 11/11/2019    INR 1.20 (H) 11/11/2019        35   minutes were spent face to face with the patient with greater than 50% spent on education and counseling.    This note has been dictated using voice recognition software. Any grammatical, typographical, or context distortions are unintentional and inherent to the software

## 2021-06-29 NOTE — PROGRESS NOTES
"Progress Notes by Rhona Christine MD at 5/4/2020  3:50 PM     Author: Rhona Christine MD Service: -- Author Type: Physician    Filed: 5/4/2020  3:44 PM Encounter Date: 5/4/2020 Status: Signed    : Rhona Christine MD (Physician)           The patient has been notified of following:     \"This video visit will be conducted via a call between you and your physician/provider. We have found that certain health care needs can be provided without the need for an in-person physical exam.  This service lets us provide the care you need with a video conversation.  If a prescription is necessary we can send it directly to your pharmacy.  If lab work is needed we can place an order for that and you can then stop by our lab to have the test done at a later time.      Patient has given verbal consent to a Video visit? Yes    HEART CARE VIDEO ENCOUNTER        The patient has chosen to have the visit conducted as a video visit, to reduce risk of exposure given the current status of Coronavirus in our community. This video visit is being conducted via a call between the patient and physician/provider. Health care needs are being provided without a physical exam.        Assessment/Plan:   1.  Coronary artery disease s/p M and s/p TAMRA to proximal LAD and D1 on 11-: The patient has no chest pain, stable SOB.  Continue aspirin, Brilinta, metoprolol and Lipitor.     2.  History of syncope secondary to orthostatic hypotension: The patient has a significant autonomic dysfunction secondary to Parkinson's disease.  He is on fludrocortisone, midodrine.  Close to monitor his blood pressure.     3.  Dyslipidemia: His recent LDL was 101.  Continue Lipitor 40 mg at bedtime.  Check Lipitor profile and LFT at Dr. Camacho's office.     4.  Status post dual-chamber pacemaker placement: Device works well.  Will schedule device clinic.     5.  Parkinson's disease, and all the chronic medical issues: Please see primary note for management.      Follow Up " Plan:  6 months and as needed  I have reviewed the note as documented.  This accurately captures the substance of my conversation with the patient.    Total time of video between patient and provider was 12 minutes   Start Time: 3:17 pm   Stop Time: 3:29 pm    Originating Location (pt. Location): video visit pt location:[id number] Home    Distant Location (provider location):  [facility] HEART CARE     Mode of Communication:  Video Conference via [email or web address]       History of Present Illness:   Canelo Cook is a 80 y.o. male who is being evaluated via a billable video visit and has consented to a video visit. Canelo Cook has a history of coronary artery disease s/p TAMRA to proximal LAD and D1 on 11-, Parkinson's disease, autonomic dysfunction, orthostatic hypotension, recurrent syncope secondary to orthostatic hypotension, status post dual-chamber pacemaker placement due to sinus node dysfunction, essential hypertension.    Canelo has no chest pain, palpitations, orthopnea, or leg edema.  He has chronic shortness of breath which has been stable.  He has lightheadedness.  His blood pressure and heart rate are controlled well.  No side effects from his cardiac medications.    I have reviewed and updated the patient's Past Medical History, Social History, Family History and Medication List.    Past Medical History:     Patient Active Problem List   Diagnosis   ? Near syncope   ? Parkinson disease (H)   ? Autonomic dysfunction   ? Dysphagia   ? Odynophagia   ? Essential hypertension   ? Syncope, unspecified syncope type   ? Hypomagnesemia   ? Acute non-Q wave non-ST elevation myocardial infarction (NSTEMI) (H)   ? Orthostatic hypotension   ? Cardiac pacemaker in situ   ? NSTEMI (non-ST elevated myocardial infarction) (H)   ? Dyslipidemia   ? Syncope and collapse   ? Fall at home, sequela   ? Other insomnia   ? Reactive depression   ? Fall at nursing home, initial encounter   ? Rib pain       Past Surgical  History:     Past Surgical History:   Procedure Laterality Date   ? BACK SURGERY     ? CARDIAC PACEMAKER PLACEMENT     ? CARDIAC PACEMAKER PLACEMENT     ? CATARACT EXTRACTION     ? CV CORONARY ANGIOGRAM N/A 11/11/2019    Procedure: Coronary Angiogram;  Surgeon: Suzan Pineda MD;  Location: [facility] Cath Lab;  Service: Cardiology   ? CV LEFT HEART CATHETERIZATION WITH LEFT VENTRICULOGRAM N/A 11/11/2019    Procedure: Left Heart Catheterization with Left Ventriculogram;  Surgeon: Suzan Pineda MD;  Location: [facility] Cath Lab;  Service: Cardiology   ? ESOPHAGOGASTRODUODENOSCOPY N/A 3/20/2016    Procedure: ESOPHAGOGASTRODUODENOSCOPY with biopsy;  Surgeon: Melissa Castellano MD;  Location: [facility];  Service:    ? PENILE PROSTHESIS IMPLANT     ? MT EDG US EXAM SURGICAL ALTER STOM DUODENUM/JEJUNUM N/A 3/23/2016    Procedure: EGD and ENDOSCOPIC ULTRASOUND with brushing and biopsies;  Surgeon: Nj Howe MD;  Location: [facility];  Service: Gastroenterology       Family History:     Family History   Problem Relation Age of Onset   ? Heart disease Mother    ? Heart disease Father        Social History:    reports that he has quit smoking. He has never used smokeless tobacco. He reports current alcohol use. He reports that he does not use drugs.    Review of Systems:   12 systems are reviewed negative except for in HPI.    Meds:     Current Outpatient Medications:   ?  aspirin 81 mg chewable tablet, Chew 1 tablet (81 mg total) daily. Forever, Disp: 30 tablet, Rfl: 0  ?  atorvastatin (LIPITOR) 40 MG tablet, Take 1 tablet (40 mg total) by mouth at bedtime., Disp: , Rfl: 0  ?  azelastine (ASTELIN) 137 mcg (0.1 %) nasal spray, 1 spray into each nostril as needed., Disp: , Rfl:   ?  carbidopa-levodopa (SINEMET)  mg per tablet, Take 1.5 tablets by mouth 4 (four) times a day. Take with meals and bedtime., Disp: , Rfl:   ?  cholecalciferol, vitamin D3, 2,000 unit Tab, Take 2,000 Units by mouth  daily., Disp: , Rfl:   ?  cyanocobalamin 1000 MCG tablet, Take 1,000 mcg by mouth every other day., Disp: , Rfl:   ?  fludrocortisone (FLORINEF) 0.1 mg tablet, 0.1mg PO MWF and 0.15 PO TuThSatSun thereafter (Patient taking differently: Take 0.1 mg by mouth daily. Indications: a feeling of dizziness upon standing due to a drop in blood pressure), Disp: 45 tablet, Rfl: 0  ?  magnesium hydroxide (MILK OF MAG) 400 mg/5 mL Susp suspension, Take 30 mL by mouth daily as needed., Disp: , Rfl: 0  ?  magnesium oxide (MAG-OX) 400 mg (241.3 mg magnesium) tablet, Take 1 tablet (400 mg total) by mouth 3 (three) times a day., Disp: , Rfl: 0  ?  metoprolol tartrate (LOPRESSOR) 25 MG tablet, Take 1 tablet (25 mg total) by mouth every evening. (Patient taking differently: Take 1 tablet by mouth every evening. take 1 tablet in the AM and 1/2 tablet to = 12.5 mg in the evening  Indications: ventricular rate control in atrial fibrillation), Disp: , Rfl: 0  ?  midodrine (PROAMATINE) 2.5 MG tablet, Take 10 mg by mouth 3 (three) times a day with meals., Disp: , Rfl:   ?  mirtazapine (REMERON) 7.5 MG tablet, Take 7.5 mg by mouth at bedtime. Indications: major depressive disorder, Disp: , Rfl:   ?  miscellaneous medical supply Misc, For personal use. Length: calf Strength: 16-20 mmHg Circumference in cm:, Disp: , Rfl:   ?  multivit with iron,hematinic (SUPER B-COMPLEX ORAL), Take 1 tablet by mouth daily with lunch. , Disp: , Rfl:   ?  nitroglycerin (NITROSTAT) 0.4 MG SL tablet, Place 0.4 mg under the tongue every 5 (five) minutes as needed for chest pain. Indications: acute attack of angina, Disp: , Rfl:   ?  omeprazole (PRILOSEC) 20 MG capsule, Take 20 mg by mouth daily before breakfast., Disp: , Rfl:   ?  pyridostigmine (MESTINON) 60 mg tablet, Take 30 mg by mouth 3 (three) times a day., Disp: , Rfl:   ?  tamsulosin (FLOMAX) 0.4 mg cap, Take 0.4 mg by mouth Daily at 5 pm. Indications: enlarged prostate with urination problem, Disp: , Rfl:    ?  ticagrelor (BRILINTA) 90 mg Tab, Take 1 tablet (90 mg total) by mouth 2 (two) times a day. For 12 months, Disp: 60 tablet, Rfl: 11  ?  tolterodine (DETROL LA) 4 MG ER capsule, Take 4 mg by mouth daily. Indications: urinary urgency, or the sudden urge to urinate, Disp: , Rfl:   ?  famotidine (PEPCID) 20 MG tablet, Take 20 mg by mouth Daily at 5 pm. Indications: indigestion, Disp: , Rfl:   ?  fesoterodine (TOVIAZ) 4 mg Tb24 ER tablet, Take 8 mg by mouth daily with lunch. , Disp: , Rfl:   ?  melatonin 3 mg cap, Take 3 mg by mouth daily as needed (insomnia). Indications: difficulty sleeping, Disp: , Rfl:   ?  nitroglycerin (NITROSTAT) 0.4 MG SL tablet, Place 1 tablet (0.4 mg total) under the tongue every 5 (five) minutes as needed for chest pain., Disp: 1 Bottle, Rfl: 0  ?  ranitidine (ZANTAC) 150 MG tablet, Take 150 mg by mouth at bedtime., Disp: , Rfl:   ?  senna (SENOKOT) 8.6 mg tablet, Take 1 tablet by mouth daily as needed for constipation. Indications: constipation, Disp: , Rfl:   ?  sennosides (SENNA) 8.6 mg cap, Take 16.2 mg by mouth at bedtime. , Disp: , Rfl:      Allergies:   Sulfa (sulfonamide antibiotics) and Tylenol [acetaminophen]      Objective:      Physical Exam  180 lb (81.6 kg)     Body mass index is 23.75 kg/m .  /83   Pulse 60   Wt 180 lb (81.6 kg)   BMI 23.75 kg/m    General Appearance:   no distress, normal body habitus, upright.   ENT/Mouth: membranes moist, no nasal discharge or bleeding gums.  Normal head shape, no evidence of injury or laceration.     EYES:  no scleral icterus, normal conjunctivae   Neck: no evidence of thyromegaly.  Supple   Chest/Lungs:   No audible wheezing equal chest wall expansion. Non labored breathing.  No cough.   Cardiovascular:   No evidence of elevated jugular venous pressure.  No evidence of pitting edema bilaterally    Abdomen:  no evidence of abdominal distention. No observe juandice.     Extremities: no cyanosis or clubbing noted.    Skin: no  xanthelasma, normal skin color. No evidence of facial lacerations.      Neurologic: Normal arm motion bilateral, no tremors.  No evidence of focal defect.       Psychiatric: alert and oriented x3, calm      EKG:  Personally reivewed  Electronic atrial pacemaker  ST abnormality, possible digitalis effect  Abnormal ECG  When compared with ECG of 07-DEC-2019 08:53,  No significant change was found    Cardiac Imaging Studies  Coronary angiogram with PCI on 11-:    Severe proximal LAD/first diagonal bifurcation stenosis treated with mini-crush bifurcation stenting with good angiographic results.    Minimal coronary disease otherwise    LV EPD normal    Lab Review   Lab Results   Component Value Date     02/17/2020    K 3.9 02/17/2020     02/17/2020    CO2 29 02/17/2020    BUN 20 02/17/2020    CREATININE 1.07 02/17/2020    CALCIUM 8.9 02/17/2020     Lab Results   Component Value Date    WBC 7.3 02/17/2020    HGB 12.4 (L) 02/17/2020    HCT 38.6 (L) 02/17/2020    MCV 91 02/17/2020     02/17/2020     Lab Results   Component Value Date    CHOL 135 11/16/2013    TRIG 89 11/16/2013    HDL 34 (L) 11/16/2013     Lab Results   Component Value Date    TROPONINI <0.01 02/17/2020     Lab Results   Component Value Date     (H) 11/10/2019     Lab Results   Component Value Date    TSH 2.63 11/11/2019

## 2021-06-29 NOTE — PROGRESS NOTES
Progress Notes by Rhona Christine MD at 10/5/2020  4:30 PM     Author: Rhona Christine MD Service: -- Author Type: Physician    Filed: 10/5/2020  4:50 PM Encounter Date: 10/5/2020 Status: Signed    : Rhona Christine MD (Physician)           Click to link to [facility] NOTE       Assessment/Plan:   1.  Coronary artery disease s/p M and s/p TAMRA to proximal LAD and D1 on 11-: The patient has no chest pain, stable SOB.  Continue aspirin, Brilinta, metoprolol and Lipitor.     2.  History of syncope secondary to orthostatic hypotension: The patient has a significant autonomic dysfunction secondary to Parkinson's disease.  He is on fludrocortisone, midodrine.  No change in his medication today.  Close to monitor his blood pressure.     3.  Dyslipidemia: Continue Lipitor 40 mg at bedtime.       4.  Status post dual-chamber pacemaker placement: Device works well. No significant findings.     5.  Parkinson's disease, and all the chronic medical issues: Please see primary note for management.    Thank you for the opportunity to be involved in the care of Canelo Cook. If you have any questions, please feel free to contact me.  I will see the patient again in 6 months and as needed.    Much or all of the text in this note was generated through the use of Dragon Dictate voice-to-text software. Errors in spelling or words which seem out of context are unintentional.   Sound alike errors, in particular, may have escaped editing.       History of Present Illness:   It is my pleasure to see Canelo Cook at the [facility] clinic for evaluation of Follow-up.  Canelo Cook is a 80 y.o. male with a medical history of coronary artery disease s/p TAMRA to proximal LAD and D1 on 11-, Parkinson's disease, autonomic dysfunction, orthostatic hypotension, recurrent syncope secondary to orthostatic hypotension, status post dual-chamber pacemaker placement due to sinus node  "dysfunction, essential hypertension.     Canelo has no chest pain, palpitations, orthopnea, or leg edema.  He has chronic shortness of breath which has been stable.  He complains of fatigue.  He has mild bilateral leg edema which has been stable. His blood pressure has been fluctuated due to autonomic dysfunction and orthostatic hypotension and hypertension.  Currently he is on  fludrocortisone 0.1 mg daily, midodrine 10 mg twice a day for orthostatic hypotension, metoprolol 12.5 mg a.m., 25 mg p.m. for hypertension.  He did not have complaints of dizziness today even his blood pressure at the low side.  He did not have syncope recently.    Past Medical History:     Patient Active Problem List   Diagnosis   ? Parkinson disease (H)   ? Autonomic dysfunction   ? Essential hypertension   ? Syncope, unspecified syncope type   ? Orthostatic hypotension   ? Cardiac pacemaker in situ   ? Dyslipidemia   ? Syncope and collapse   ? Other insomnia   ? Reactive depression   ? Anemia   ? Coronary artery disease due to lipid rich plaque   ? Chronic low back pain without sciatica   ? GERD (gastroesophageal reflux disease)   ? Hypogonadism, testicular   ? PD (perceptive deafness), asymmetrical   ? DNAR (do not attempt resuscitation)       Past Surgical History:     Past Surgical History:   Procedure Laterality Date   ? BACK SURGERY     ? CARDIAC PACEMAKER PLACEMENT  08/10/2015    Biotronik AV pacer by Dr. Vidal at [facility] for \"chronotropic incompetence\", syncope has persisted since then   ? CATARACT EXTRACTION     ? CORONARY STENT PLACEMENT  11/11/2019    stent to bifurcation of LAD and DG   ? CV CORONARY ANGIOGRAM N/A 11/11/2019    Procedure: Coronary Angiogram;  Surgeon: Suzan Pineda MD;  Location: [facility] Cath Lab;  Service: Cardiology   ? CV LEFT HEART CATHETERIZATION WITH LEFT VENTRICULOGRAM N/A 11/11/2019    Procedure: Left Heart Catheterization with Left Ventriculogram;  Surgeon: Suzan Pineda MD;  " Location: [facility] Cath Lab;  Service: Cardiology   ? ESOPHAGOGASTRODUODENOSCOPY N/A 3/20/2016    Procedure: ESOPHAGOGASTRODUODENOSCOPY with biopsy;  Surgeon: Melissa Castellano MD;  Location: [facility];  Service:    ? PENILE PROSTHESIS IMPLANT     ? ME EDG US EXAM SURGICAL ALTER STOM DUODENUM/JEJUNUM N/A 3/23/2016    Procedure: EGD and ENDOSCOPIC ULTRASOUND with brushing and biopsies;  Surgeon: Nj Howe MD;  Location: [facility];  Service: Gastroenterology       Family History:     Family History   Problem Relation Age of Onset   ? Heart disease Mother    ? Heart disease Father         Social History:    reports that he has quit smoking. He has never used smokeless tobacco. He reports current alcohol use. He reports that he does not use drugs.    Review of Systems:   General: WNL  Eyes: WNL  Ears/Nose/Throat: WNL  Lungs: WNL  Heart: Fainting  Stomach: Diarrhea  Bladder: WNL  Muscle/Joints: Muscle Weakness  Skin: WNL  Nervous System: Loss of Balance  Mental Health: WNL     Blood: WNL    Meds:     Current Outpatient Medications:   ?  aspirin 81 mg chewable tablet, Chew 1 tablet (81 mg total) daily. Forever, Disp: 30 tablet, Rfl: 0  ?  atorvastatin (LIPITOR) 40 MG tablet, Take 1 tablet (40 mg total) by mouth at bedtime., Disp: , Rfl: 0  ?  azelastine (ASTELIN) 137 mcg (0.1 %) nasal spray, 1 spray into each nostril as needed., Disp: , Rfl:   ?  carbidopa-levodopa (SINEMET)  mg per tablet, Take 1.5 tablets by mouth 4 (four) times a day. Take with meals and bedtime., Disp: , Rfl:   ?  cholecalciferol, vitamin D3, 2,000 unit Tab, Take 2,000 Units by mouth daily., Disp: , Rfl:   ?  cyanocobalamin 1000 MCG tablet, Take 1,000 mcg by mouth every other day., Disp: , Rfl:   ?  fludrocortisone (FLORINEF) 0.1 mg tablet, Take 0.1 mg by mouth daily., Disp: , Rfl:   ?  magnesium hydroxide (MILK OF MAG) 400 mg/5 mL Susp suspension, Take 30 mL by mouth daily as needed., Disp: , Rfl: 0  ?  metoprolol  "tartrate (LOPRESSOR) 25 MG tablet, Take 25 mg by mouth every morning., Disp: , Rfl:   ?  metoprolol tartrate (LOPRESSOR) 25 MG tablet, Take 12.5 mg by mouth every evening., Disp: , Rfl:   ?  midodrine (PROAMATINE) 10 MG tablet, Take 1 tablet (10 mg total) by mouth 2 (two) times a day with meals., Disp: 30 tablet, Rfl: 0  ?  mirtazapine (REMERON) 7.5 MG tablet, Take 7.5 mg by mouth at bedtime. Indications: major depressive disorder, Disp: , Rfl:   ?  multivit with iron,hematinic (SUPER B-COMPLEX ORAL), Take 1 tablet by mouth daily with lunch. , Disp: , Rfl:   ?  nitroglycerin (NITROSTAT) 0.4 MG SL tablet, Place 0.4 mg under the tongue every 5 (five) minutes as needed for chest pain. Indications: acute attack of angina, Disp: , Rfl:   ?  omeprazole (PRILOSEC) 20 MG capsule, Take 20 mg by mouth daily before breakfast., Disp: , Rfl:   ?  pyridostigmine (MESTINON) 60 mg tablet, Take 30 mg by mouth 3 (three) times a day., Disp: , Rfl:   ?  senna (SENOKOT) 8.6 mg tablet, Take 2 tablets by mouth at bedtime as needed for constipation. 6/28/2020- Pt taking 2 tabs PRN, per spouse report, reported to MD team on 6/29/2020- SD, Disp: , Rfl:   ?  tamsulosin (FLOMAX) 0.4 mg cap, Take 0.4 mg by mouth Daily at 5 pm. Indications: enlarged prostate with urination problem, Disp: , Rfl:   ?  ticagrelor (BRILINTA) 90 mg Tab, Take 1 tablet (90 mg total) by mouth 2 (two) times a day. For 12 months, Disp: 60 tablet, Rfl: 11  ?  tolterodine (DETROL LA) 4 MG ER capsule, Take 4 mg by mouth daily. Indications: urinary urgency, or the sudden urge to urinate, Disp: , Rfl:   ?  magnesium oxide (MAG-OX) 400 mg (241.3 mg magnesium) tablet, Take 1 tablet (400 mg total) by mouth 3 (three) times a day., Disp: , Rfl: 0    Allergies:   Sulfa (sulfonamide antibiotics) and Tylenol [acetaminophen]      Objective:      Physical Exam  180 lb (81.6 kg)  6' 1\" (1.854 m)  Body mass index is 23.75 kg/m .  BP (!) 78/52 (Patient Site: Right Arm, Patient Position: " "Sitting, Cuff Size: Adult Regular)   Pulse 76   Resp 16   Ht 6' 1\" (1.854 m)   Wt 180 lb (81.6 kg)   BMI 23.75 kg/m      General Appearance:   Awake, Alert, No acute distress.   HEENT:  Pupil equal and reactive to light. No scleral icterus; the mucous membranes were moist.   Neck: No cervical bruits. No JVD. No thyromegaly.     Chest: The spine was straight. The chest was symmetric.   Lungs:   Respirations unlabored; Lungs are clear to auscultation. No crackles. No wheezing.   Cardiovascular:   Regular rhythm and rate, normal first and second heart sounds with no murmurs. No rubs or gallops.    Abdomen:  Soft. No tenderness. Non-distended. Bowels sounds are present   Extremities: Equal tibial pulses. No leg edema.   Skin: No rashes or ulcers. Warm, Dry.   Musculoskeletal: No tenderness. No deformity.   Neurologic: Mood and affect are appropriate. No focal deficits. Persistent shaking from Parkinson;s disease.         Pacemaker interrogation today: Personally reviewed  Normal device function  APVS, stable battery, no cardiac arrhythmia    Cardiac Imaging Studies  ECHO on 6-:    Left ventricle ejection fraction is normal. The estimated left ventricular ejection fraction is 60% without wall motion abnormality.    When compared to the previous study dated 11/11/2019, no interval change.    Coronary angiogram with PCI on 11-:    Severe proximal LAD/first diagonal bifurcation stenosis treated with mini-crush bifurcation stenting with good angiographic results.    Minimal coronary disease otherwise    LV EPD normal      Lab Review   Lab Results   Component Value Date     06/25/2020    K 3.9 06/26/2020     (H) 06/25/2020    CO2 19 (L) 06/25/2020    BUN 32 (H) 06/25/2020    CREATININE 1.20 06/25/2020    CALCIUM 7.9 (L) 06/25/2020     Lab Results   Component Value Date    WBC 6.5 06/25/2020    HGB 11.7 (L) 06/25/2020    HCT 39.5 (L) 06/25/2020     06/25/2020     06/25/2020     Lab " Results   Component Value Date    CHOL 135 11/16/2013     Lab Results   Component Value Date    HDL 34 (L) 11/16/2013     Lab Results   Component Value Date    LDLCALC 83 11/16/2013     Lab Results   Component Value Date    TRIG 89 11/16/2013     No components found for: CHOLHDL  Lab Results   Component Value Date    TROPONINI 0.03 06/23/2020     Lab Results   Component Value Date     (H) 11/10/2019     Lab Results   Component Value Date    TSH 6.67 (H) 06/23/2020

## 2021-06-30 NOTE — PROGRESS NOTES
Progress Notes by Rhona Christine MD at 4/16/2021  3:50 PM     Author: Rhona Christine MD Service: -- Author Type: Physician    Filed: 4/16/2021  5:06 PM Encounter Date: 4/16/2021 Status: Signed    : Rhona Christine MD (Physician)           Click to link to [facility] Heart [facility] HEART [facility] NOTE       Assessment/Plan:   1.  Coronary artery disease s/p M and s/p TAMRA to proximal LAD and D1 on 11-: The patient has no chest pain.  Continue aspirin, metoprolol and Lipitor.     2.  Syncope secondary to autonomic nervous dysfunction and orthostatic hypotension: The patient has significant autonomic dysfunction secondary to Parkinson's disease.   Currently he is on fludrocortisone 0.1 mg daily and midodrine 10 mg twice a day, pressure support socks in both legs to knee, avoid dehydration.     3.  Dyslipidemia: Continue Lipitor 40 mg at bedtime.       4.  Status post dual-chamber pacemaker placement: Device works well.   No cardiac arrhythmia based on pacemaker check on April 5, 2021.     5.  Parkinson's disease, and all the chronic medical issues: Please see primary note for management.    Thank you for the opportunity to be involved in the care of Canelo Cook. If you have any questions, please feel free to contact me.  I will see the patient again in 6 months and as needed.    Much or all of the text in this note was generated through the use of Dragon Dictate voice-to-text software. Errors in spelling or words which seem out of context are unintentional.   Sound alike errors, in particular, may have escaped editing.       History of Present Illness:   It is my pleasure to see Canelo Cook at the [facility] Care clinic for evaluation of Follow-up.  Canelo Cook is a 80 y.o. male with a medical history of coronary artery disease s/p TAMRA to proximal LAD and D1 on 11-, Parkinson's disease, autonomic nerve dysfunction, orthostatic hypotension, recurrent syncope secondary to  "orthostatic hypotension, status post dual-chamber pacemaker placement due to sinus node dysfunction, essential hypertension.     Canelo has no chest pain, palpitations, orthopnea, or leg edema.  He has chronic shortness of breath which has been stable.  He complains of fatigue.  His wife states that he passed out frequently.  He had ER evaluation on April 13, 2021 due to fatigue and orthostatic hypotension.  His symptoms was improved with IV hydration.  Currently he is on  fludrocortisone 0.1 mg daily, midodrine 10 mg twice a day for orthostatic hypotension, metoprolol 25 mg a.m., 12.5 mg p.m. for hypertension.  Today his blood pressure is 104/64 mmHg, pulse is 72 bpm.    Past Medical History:     Patient Active Problem List   Diagnosis   ? Parkinson disease (H)   ? Autonomic dysfunction   ? Essential hypertension   ? Syncope, unspecified syncope type   ? Orthostatic hypotension   ? Cardiac pacemaker in situ   ? Dyslipidemia   ? Syncope and collapse   ? Other insomnia   ? Reactive depression   ? Anemia   ? Coronary artery disease due to lipid rich plaque   ? Chronic low back pain without sciatica   ? GERD (gastroesophageal reflux disease)   ? Hypogonadism, testicular   ? PD (perceptive deafness), asymmetrical   ? DNAR (do not attempt resuscitation)       Past Surgical History:     Past Surgical History:   Procedure Laterality Date   ? BACK SURGERY     ? CARDIAC PACEMAKER PLACEMENT  08/10/2015    Biotronik AV pacer by Dr. Vidal at [facility] for \"chronotropic incompetence\", syncope has persisted since then   ? CATARACT EXTRACTION     ? CORONARY STENT PLACEMENT  11/11/2019    stent to bifurcation of LAD and DG   ? CV CORONARY ANGIOGRAM N/A 11/11/2019    Procedure: Coronary Angiogram;  Surgeon: Suzan Pineda MD;  Location: [facility] Cath Lab;  Service: Cardiology   ? CV LEFT HEART CATHETERIZATION WITH LEFT VENTRICULOGRAM N/A 11/11/2019    Procedure: Left Heart Catheterization with Left Ventriculogram;  " Surgeon: Suzan Pineda MD;  Location: [facility] Cath Lab;  Service: Cardiology   ? ESOPHAGOGASTRODUODENOSCOPY N/A 3/20/2016    Procedure: ESOPHAGOGASTRODUODENOSCOPY with biopsy;  Surgeon: Melissa Castellano MD;  Location: [facility];  Service:    ? PENILE PROSTHESIS IMPLANT     ? [facility] EDG US EXAM SURGICAL ALTER STOM DUODENUM/JEJUNUM N/A 3/23/2016    Procedure: EGD and ENDOSCOPIC ULTRASOUND with brushing and biopsies;  Surgeon: Nj Howe MD;  Location: [facility];  Service: Gastroenterology       Family History:     Family History   Problem Relation Age of Onset   ? Heart disease Mother    ? Heart disease Father         Social History:    reports that he has quit smoking. He has never used smokeless tobacco. He reports current alcohol use. He reports that he does not use drugs.    Review of Systems:   General: WNL  Eyes: WNL  Ears/Nose/Throat: WNL  Lungs: WNL  Heart: Fainting  Stomach: WNL  Bladder: WNL  Muscle/Joints: Muscle Weakness  Skin: WNL  Nervous System: Loss of Balance  Mental Health: WNL     Blood: WNL    Meds:     Current Outpatient Medications:   ?  aspirin 81 mg chewable tablet, Chew 1 tablet (81 mg total) daily. Forever, Disp: 30 tablet, Rfl: 0  ?  atorvastatin (LIPITOR) 40 MG tablet, Take 1 tablet (40 mg total) by mouth at bedtime., Disp: , Rfl: 0  ?  azelastine (ASTELIN) 137 mcg (0.1 %) nasal spray, 1 spray into each nostril as needed., Disp: , Rfl:   ?  carbidopa-levodopa (SINEMET)  mg per tablet, Take 2 tablets by mouth 4 (four) times a day. Take with meals and bedtime., Disp: , Rfl:   ?  cholecalciferol, vitamin D3, 2,000 unit Tab, Take 2,000 Units by mouth daily., Disp: , Rfl:   ?  cyanocobalamin 1000 MCG tablet, Take 1,000 mcg by mouth every other day., Disp: , Rfl:   ?  fludrocortisone (FLORINEF) 0.1 mg tablet, Take 0.1 mg by mouth daily., Disp: , Rfl:   ?  magnesium oxide (MAG-OX) 400 mg (241.3 mg magnesium) tablet, Take 1 tablet (400 mg total) by mouth 3 (three) times  a day., Disp: , Rfl: 0  ?  metoprolol tartrate (LOPRESSOR) 25 MG tablet, Take 25 mg by mouth every morning., Disp: , Rfl:   ?  metoprolol tartrate (LOPRESSOR) 25 MG tablet, Take 12.5 mg by mouth every evening., Disp: , Rfl:   ?  midodrine (PROAMATINE) 10 MG tablet, Take 1 tablet (10 mg total) by mouth 2 (two) times a day with meals., Disp: 30 tablet, Rfl: 0  ?  mirtazapine (REMERON) 7.5 MG tablet, Take 7.5 mg by mouth at bedtime. Indications: major depressive disorder, Disp: , Rfl:   ?  multivit with iron,hematinic (SUPER B-COMPLEX ORAL), Take 1 tablet by mouth daily with lunch. , Disp: , Rfl:   ?  nitroglycerin (NITROSTAT) 0.4 MG SL tablet, Place 0.4 mg under the tongue every 5 (five) minutes as needed for chest pain. Indications: acute attack of angina, Disp: , Rfl:   ?  omeprazole (PRILOSEC) 20 MG capsule, Take 20 mg by mouth daily before breakfast., Disp: , Rfl:   ?  pyridostigmine (MESTINON) 60 mg tablet, Take 30 mg by mouth 3 (three) times a day., Disp: , Rfl:   ?  senna (SENOKOT) 8.6 mg tablet, Take 2 tablets by mouth at bedtime as needed for constipation. 6/28/2020- Pt taking 2 tabs PRN, per spouse report, reported to MD team on 6/29/2020- SD, Disp: , Rfl:   ?  tamsulosin (FLOMAX) 0.4 mg cap, Take 0.4 mg by mouth Daily at 5 pm. Indications: enlarged prostate with urination problem, Disp: , Rfl:   ?  tolterodine (DETROL LA) 4 MG ER capsule, Take 4 mg by mouth daily. Indications: urinary urgency, or the sudden urge to urinate, Disp: , Rfl:   ?  magnesium hydroxide (MILK OF MAG) 400 mg/5 mL Susp suspension, Take 30 mL by mouth daily as needed., Disp: , Rfl: 0    Allergies:   Sulfa (sulfonamide antibiotics) and Tylenol [acetaminophen]      Objective:      Physical Exam        There is no height or weight on file to calculate BMI.  /64 (Patient Site: Right Arm, Patient Position: Sitting, Cuff Size: Adult Small)   Pulse 72   Resp 16     General Appearance:   Awake, Alert, No acute distress.   HEENT:  Pupil  equal and reactive to light. No scleral icterus; the mucous membranes were moist.   Neck: No cervical bruits. No JVD. No thyromegaly.     Chest: The spine was straight. The chest was symmetric.   Lungs:   Respirations unlabored; Lungs are clear to auscultation. No crackles. No wheezing.   Cardiovascular:   Regular rhythm and rate, normal first and second heart sounds with no murmurs. No rubs or gallops.    Abdomen:  Soft. No tenderness. Non-distended. Bowels sounds are present   Extremities: Equal tibial pulses. No leg edema.   Skin: No rashes or ulcers. Warm, Dry.   Musculoskeletal: No tenderness. No deformity.   Neurologic: Mood and affect are appropriate. No focal deficits. Persistent shaking from Parkinson;s disease.         Pacemaker interrogation on April 5, 2021: Personally reviewed  Normal device function  APVS, stable battery, no cardiac arrhythmia    Cardiac Imaging Studies  ECHO on 6-:    Left ventricle ejection fraction is normal. The estimated left ventricular ejection fraction is 60% without wall motion abnormality.    When compared to the previous study dated 11/11/2019, no interval change.    Coronary angiogram with PCI on 11-:    Severe proximal LAD/first diagonal bifurcation stenosis treated with mini-crush bifurcation stenting with good angiographic results.    Minimal coronary disease otherwise    LV EPD normal      Lab Review   Lab Results   Component Value Date     04/13/2021    K 3.8 04/13/2021     (H) 04/13/2021    CO2 23 04/13/2021    BUN 29 (H) 04/13/2021    CREATININE 1.18 04/13/2021    CALCIUM 7.5 (L) 04/13/2021     Lab Results   Component Value Date    WBC 5.9 04/13/2021    HGB 10.5 (L) 04/13/2021    HCT 32.1 (L) 04/13/2021    MCV 92 04/13/2021     04/13/2021     Lab Results   Component Value Date    CHOL 135 11/16/2013     Lab Results   Component Value Date    HDL 34 (L) 11/16/2013     Lab Results   Component Value Date    LDLCALC 83 11/16/2013     Lab  Results   Component Value Date    TRIG 89 11/16/2013     No components found for: CHOLHDL  Lab Results   Component Value Date    TROPONINI 0.02 04/13/2021     Lab Results   Component Value Date     (H) 11/10/2019     Lab Results   Component Value Date    TSH 6.67 (H) 06/23/2020

## 2021-07-02 ENCOUNTER — AMBULATORY - HEALTHEAST (OUTPATIENT)
Dept: CARDIOLOGY | Facility: CLINIC | Age: 81
End: 2021-07-02

## 2021-07-02 DIAGNOSIS — R55 SYNCOPE AND COLLAPSE: ICD-10-CM

## 2021-07-02 DIAGNOSIS — Z95.0 CARDIAC PACEMAKER IN SITU: ICD-10-CM

## 2021-07-03 NOTE — ADDENDUM NOTE
Addendum Note by Anshu Deleon, RN at 5/11/2020 12:00 AM     Author: Anshu Deleon RN Service: -- Author Type: Registered Nurse    Filed: 5/18/2020 10:55 AM Encounter Date: 5/11/2020 Status: Signed    : Anshu Deleon, RN (Registered Nurse)    Addended by: ANSHU DELEON on: 5/18/2020 10:55 AM        Modules accepted: Level of Service, SmartSet

## 2021-07-04 NOTE — PROGRESS NOTES
Progress Notes by Arianna Mesa RDCS at 7/2/2021 12:00 AM     Author: Arianna Mesa RDCS Service: -- Author Type: Technologist    Filed: 7/2/2021 12:35 PM Encounter Date: 7/2/2021 Status: Signed    : Arianna Mesa RDCS (Technologist)       Remote device check.  Please see link for full device report.  Patient was informed of results and next follow up via mail.

## 2021-07-14 PROBLEM — I25.10 CORONARY ARTERY DISEASE INVOLVING NATIVE CORONARY ARTERY OF NATIVE HEART WITHOUT ANGINA PECTORIS: Status: RESOLVED | Noted: 2020-05-04 | Resolved: 2020-06-25

## 2021-07-14 PROBLEM — I21.4 NSTEMI (NON-ST ELEVATED MYOCARDIAL INFARCTION) (H): Status: RESOLVED | Noted: 2019-11-11 | Resolved: 2020-06-25

## 2021-07-14 PROBLEM — I21.4 ACUTE NON-Q WAVE NON-ST ELEVATION MYOCARDIAL INFARCTION (NSTEMI) (H): Status: RESOLVED | Noted: 2019-11-11 | Resolved: 2020-06-25

## 2021-07-22 NOTE — LETTER
Letter by Arianna Mesa RDCS at      Author: Arianna Mesa RDCS Service: -- Author Type: --    Filed:  Encounter Date: 7/2/2021 Status: (Other)         Canelo Cook  [address]  [place] [address]      July 2, 2021      Dear Mr. Cook,    RE: Remote Results    We are writing to you regarding your recent Remote Pacemaker check from home. Your transmission was received successfully. Battery status is satisfactory at this time.     Your results are showing no significant changes.    Your next device appointment will be a clinic visit.  Please call in July to schedule.      To schedule or reschedule, please call [phone] and press 1.    NOTE: If you would like to do an extra transmission, please call [phone] and press 3 to speak to a nurse BEFORE transmitting. This ensures that the Device Clinic staff is aware of the reason you are sending a transmission, and can follow-up with you after it has been reviewed.    We will be checking your implanted device from home (remotely) every three months unless otherwise instructed. We will need to see you in the clinic at least once a year. You may need to be seen in the clinic sooner depending on the results of your check.    Please be aware:    The follow-up schedule is like a Physician prescription.    Your remote monitor is paired to your specific implanted device.      Sincerely,    [facility]

## 2021-10-27 ENCOUNTER — OFFICE VISIT (OUTPATIENT)
Dept: CARDIOLOGY | Facility: CLINIC | Age: 81
End: 2021-10-27
Attending: INTERNAL MEDICINE

## 2021-10-27 ENCOUNTER — ANCILLARY PROCEDURE (OUTPATIENT)
Dept: CARDIOLOGY | Facility: CLINIC | Age: 81
End: 2021-10-27
Attending: INTERNAL MEDICINE
Payer: MEDICARE

## 2021-10-27 VITALS
HEIGHT: 73 IN | WEIGHT: 185 LBS | SYSTOLIC BLOOD PRESSURE: 98 MMHG | DIASTOLIC BLOOD PRESSURE: 62 MMHG | BODY MASS INDEX: 24.52 KG/M2 | RESPIRATION RATE: 16 BRPM | HEART RATE: 70 BPM

## 2021-10-27 DIAGNOSIS — Z95.0 S/P PLACEMENT OF CARDIAC PACEMAKER: ICD-10-CM

## 2021-10-27 DIAGNOSIS — I95.1 ORTHOSTATIC HYPOTENSION: ICD-10-CM

## 2021-10-27 DIAGNOSIS — E78.5 DYSLIPIDEMIA, GOAL LDL BELOW 100: ICD-10-CM

## 2021-10-27 DIAGNOSIS — Z95.0 PACEMAKER: ICD-10-CM

## 2021-10-27 DIAGNOSIS — I25.10 CORONARY ARTERY DISEASE INVOLVING NATIVE CORONARY ARTERY OF NATIVE HEART WITHOUT ANGINA PECTORIS: Primary | ICD-10-CM

## 2021-10-27 DIAGNOSIS — I49.5 SINUS NODE DYSFUNCTION (H): Primary | ICD-10-CM

## 2021-10-27 DIAGNOSIS — G20.A1 PARKINSON DISEASE (H): ICD-10-CM

## 2021-10-27 LAB
MDC_IDC_LEAD_IMPLANT_DT: NORMAL
MDC_IDC_LEAD_IMPLANT_DT: NORMAL
MDC_IDC_LEAD_LOCATION: NORMAL
MDC_IDC_LEAD_LOCATION: NORMAL
MDC_IDC_LEAD_LOCATION_DETAIL_1: NORMAL
MDC_IDC_LEAD_LOCATION_DETAIL_1: NORMAL
MDC_IDC_LEAD_MFG: NORMAL
MDC_IDC_LEAD_MFG: NORMAL
MDC_IDC_LEAD_MODEL: NORMAL
MDC_IDC_LEAD_MODEL: NORMAL
MDC_IDC_LEAD_POLARITY_TYPE: NORMAL
MDC_IDC_LEAD_POLARITY_TYPE: NORMAL
MDC_IDC_LEAD_SERIAL: NORMAL
MDC_IDC_LEAD_SERIAL: NORMAL
MDC_IDC_LEAD_SPECIAL_FUNCTION: NORMAL
MDC_IDC_MSMT_BATTERY_DTM: NORMAL
MDC_IDC_MSMT_BATTERY_REMAINING_LONGEVITY: 51 MO
MDC_IDC_MSMT_BATTERY_REMAINING_PERCENTAGE: 55 %
MDC_IDC_MSMT_BATTERY_STATUS: NORMAL
MDC_IDC_MSMT_LEADCHNL_RA_IMPEDANCE_VALUE: 507 OHM
MDC_IDC_MSMT_LEADCHNL_RA_PACING_THRESHOLD_AMPLITUDE: 1.1 V
MDC_IDC_MSMT_LEADCHNL_RA_PACING_THRESHOLD_PULSEWIDTH: 0.4 MS
MDC_IDC_MSMT_LEADCHNL_RA_SENSING_INTR_AMPL: 4.4 MV
MDC_IDC_MSMT_LEADCHNL_RV_IMPEDANCE_VALUE: 546 OHM
MDC_IDC_MSMT_LEADCHNL_RV_PACING_THRESHOLD_AMPLITUDE: 1 V
MDC_IDC_MSMT_LEADCHNL_RV_PACING_THRESHOLD_PULSEWIDTH: 0.4 MS
MDC_IDC_MSMT_LEADCHNL_RV_SENSING_INTR_AMPL: 10.7 MV
MDC_IDC_PG_IMPLANT_DTM: NORMAL
MDC_IDC_PG_MFG: NORMAL
MDC_IDC_PG_MODEL: NORMAL
MDC_IDC_PG_SERIAL: NORMAL
MDC_IDC_PG_TYPE: NORMAL
MDC_IDC_SESS_CLINIC_NAME: NORMAL
MDC_IDC_SESS_DTM: NORMAL
MDC_IDC_SESS_REPROGRAMMED: NORMAL
MDC_IDC_SESS_TYPE: NORMAL
MDC_IDC_SET_BRADY_AT_MODE_SWITCH_MODE: NORMAL
MDC_IDC_SET_BRADY_AT_MODE_SWITCH_RATE: 160 {BEATS}/MIN
MDC_IDC_SET_BRADY_HYSTRATE: 60 {BEATS}/MIN
MDC_IDC_SET_BRADY_LOWRATE: 60 {BEATS}/MIN
MDC_IDC_SET_BRADY_MAX_SENSOR_RATE: 115 {BEATS}/MIN
MDC_IDC_SET_BRADY_MAX_TRACKING_RATE: 120 {BEATS}/MIN
MDC_IDC_SET_BRADY_MODE: NORMAL
MDC_IDC_SET_BRADY_NIGHT_RATE: 60 {BEATS}/MIN
MDC_IDC_SET_BRADY_PAV_DELAY_HIGH: 170 MS
MDC_IDC_SET_BRADY_PAV_DELAY_LOW: 275 MS
MDC_IDC_SET_BRADY_SAV_DELAY_HIGH: 170 MS
MDC_IDC_SET_BRADY_SAV_DELAY_LOW: 275 MS
MDC_IDC_SET_CRT_PACED_CHAMBERS: NORMAL
MDC_IDC_SET_LEADCHNL_LV_PACING_CATHODE_ELECTRODE_1: NORMAL
MDC_IDC_SET_LEADCHNL_LV_PACING_CATHODE_LOCATION_1: NORMAL
MDC_IDC_SET_LEADCHNL_LV_PACING_POLARITY: NORMAL
MDC_IDC_SET_LEADCHNL_LV_SENSING_CATHODE_ELECTRODE_1: NORMAL
MDC_IDC_SET_LEADCHNL_LV_SENSING_CATHODE_LOCATION_1: NORMAL
MDC_IDC_SET_LEADCHNL_LV_SENSING_POLARITY: NORMAL
MDC_IDC_SET_LEADCHNL_RA_PACING_AMPLITUDE: NORMAL
MDC_IDC_SET_LEADCHNL_RA_PACING_POLARITY: NORMAL
MDC_IDC_SET_LEADCHNL_RA_PACING_PULSEWIDTH: 0.4 MS
MDC_IDC_SET_LEADCHNL_RA_SENSING_ADAPTATION_MODE: NORMAL
MDC_IDC_SET_LEADCHNL_RA_SENSING_POLARITY: NORMAL
MDC_IDC_SET_LEADCHNL_RA_SENSING_SENSITIVITY: NORMAL
MDC_IDC_SET_LEADCHNL_RV_PACING_AMPLITUDE: 2.5 V
MDC_IDC_SET_LEADCHNL_RV_PACING_CAPTURE_MODE: NORMAL
MDC_IDC_SET_LEADCHNL_RV_PACING_POLARITY: NORMAL
MDC_IDC_SET_LEADCHNL_RV_PACING_PULSEWIDTH: 0.4 MS
MDC_IDC_SET_LEADCHNL_RV_SENSING_ADAPTATION_MODE: NORMAL
MDC_IDC_SET_LEADCHNL_RV_SENSING_POLARITY: NORMAL
MDC_IDC_SET_LEADCHNL_RV_SENSING_SENSITIVITY: NORMAL
MDC_IDC_STAT_BRADY_DTM_END: NORMAL
MDC_IDC_STAT_BRADY_DTM_START: NORMAL
MDC_IDC_STAT_BRADY_RA_PERCENT_PACED: 98 %
MDC_IDC_STAT_BRADY_RV_PERCENT_PACED: 0 %
MDC_IDC_STAT_EPISODE_RECENT_COUNT: 34
MDC_IDC_STAT_EPISODE_RECENT_COUNT_DTM_END: NORMAL
MDC_IDC_STAT_EPISODE_RECENT_COUNT_DTM_START: NORMAL
MDC_IDC_STAT_EPISODE_TYPE: NORMAL

## 2021-10-27 PROCEDURE — 99214 OFFICE O/P EST MOD 30 MIN: CPT | Performed by: INTERNAL MEDICINE

## 2021-10-27 RX ORDER — NITROGLYCERIN 0.4 MG/1
0.4 TABLET SUBLINGUAL EVERY 5 MIN PRN
Qty: 30 TABLET | Refills: 4 | Status: SHIPPED | OUTPATIENT
Start: 2021-10-27 | End: 2022-01-01

## 2021-10-27 ASSESSMENT — MIFFLIN-ST. JEOR: SCORE: 1603.03

## 2021-10-27 NOTE — LETTER
10/27/2021    Hung Camacho MD  [address]  [facility] [address]    RE: Canelo Herreradomenico       Dear Colleague,    I had the pleasure of seeing Canelo Cook in the [facility].              Assessment/Plan:   1.  Coronary artery disease s/p M and s/p TAMRA to proximal LAD and D1 on 11-: The patient has no chest pain.  Continue aspirin, metoprolol and Lipitor.     2.  Syncope secondary to autonomic nervous dysfunction and orthostatic hypotension: The patient has significant autonomic dysfunction secondary to Parkinson's disease.   Currently he is on fludrocortisone 0.1 mg daily and midodrine 10 mg twice a day, pressure support socks in both legs to knee, avoid dehydration.  His blood pressure has been in reasonable range.  He had no syncopal episode since last visit.     3.  Dyslipidemia: Continue Lipitor 40 mg at bedtime.       4.  Status post dual-chamber pacemaker placement: Device is interrogated today.  Normal device function.  Atrial tachycardia 8 seconds.  No other cardiac arrhythmia.      5.  Parkinson's disease, and all the chronic medical issues: Please see primary note for management.    Thank you for the opportunity to be involved in the care of Canelo Cook. If you have any questions, please feel free to contact me.  I will see the patient again in 12 months and as needed.    Much or all of the text in this note was generated through the use of Dragon Dictate voice-to-text software. Errors in spelling or words which seem out of context are unintentional. Sound alike errors, in particular, may have escaped editing.       History of Present Illness:   It is my pleasure to see Canelo Cook at the [facility] clinic for routine cardiology follow up.  Canelo Cook is a 81 year old male with a medical history of coronary artery disease s/p TAMRA to proximal LAD and D1 on 11-, Parkinson's disease,  "autonomic nerve dysfunction, orthostatic hypotension, recurrent syncope secondary to orthostatic hypotension, status post dual-chamber pacemaker placement due to sinus node dysfunction, essential hypertension.    The patient states that he had no chest pain, palpitations, shortness of breath, orthopnea.  He denies the episodes of lightheadedness, dizziness.  He had no syncopal episode since last visit.  He developed bilateral mild leg edema which has been stable.  His weight has been stable.  His blood pressure is in reasonable range.    Past Medical History:     Patient Active Problem List   Diagnosis     Parkinson disease (H)       Past Surgical History:     Past Surgical History:   Procedure Laterality Date     BACK SURGERY       CATARACT EXTRACTION       CORONARY STENT PLACEMENT  11/11/2019    stent to bifurcation of LAD and DG     CV CORONARY ANGIOGRAM N/A 11/11/2019    Procedure: Coronary Angiogram;  Surgeon: Suzan Pineda MD;  Location: [facility];  Service: Cardiology     CV LEFT HEART CATHETERIZATION WITH LEFT VENTRICULOGRAM N/A 11/11/2019    Procedure: Left Heart Catheterization with Left Ventriculogram;  Surgeon: Suzan Pineda MD;  Location: [facility];  Service: Cardiology     ESOPHAGOSCOPY, GASTROSCOPY, DUODENOSCOPY (EGD), COMBINED N/A 3/20/2016    Procedure: ESOPHAGOGASTRODUODENOSCOPY with biopsy;  Surgeon: Melissa Castellano MD;  Location: [facility];  Service:      IMPLANT PACEMAKER  08/10/2015    Biotronik AV pacer by Dr. Vidal at [facility] for \"chronotropic incompetence\", syncope has persisted since then     IMPLANT PROSTHESIS PENIS INFLATABLE       VT EDG US EXAM SURGICAL ALTER STOM DUODENUM/JEJUNUM N/A 3/23/2016    Procedure: EGD and ENDOSCOPIC ULTRASOUND with brushing and biopsies;  Surgeon: Nj Howe MD;  Location: [facility];  Service: Gastroenterology       Family History:     Family History   Problem Relation Age of Onset     Heart Disease " Mother      Myocardial Infarction Father      Myocardial Infarction Sister      Lung Cancer Brother      Heart Disease Brother      Cancer Sister      Heart Disease Brother      Lung Cancer Brother      Heart Disease Brother      Lung Cancer Brother      Heart Disease Brother      Heart Disease Father         Social History:    reports that he has quit smoking. He has never used smokeless tobacco. He reports current alcohol use.    Review of Systems:   12 systems are reviewed negative except for in HPI.    Meds:     Current Outpatient Medications:      aspirin (ASA) 81 MG EC tablet, Take 81 mg by mouth, Disp: , Rfl:      atorvastatin (LIPITOR) 40 MG tablet, Take 40 mg by mouth, Disp: , Rfl:      azelastine (ASTELIN) 0.1 % nasal spray, Spray 1 spray in nostril, Disp: , Rfl:      carbidopa-levodopa (SINEMET)  MG tablet, Take 2 tablets by mouth 4 times daily, Disp: 720 tablet, Rfl: 3     cholecalciferol 50 MCG (2000 UT) tablet, Take 2,000 Units by mouth, Disp: , Rfl:      fludrocortisone (FLORINEF) 0.1 MG tablet, Take 1 tablet (0.1 mg) by mouth daily, Disp: 90 tablet, Rfl: 3     magnesium oxide (MAG-OX) 400 (241.3 Mg) MG tablet, Take 400 mg by mouth, Disp: , Rfl:      Metoprolol Succinate 25 MG CS24, Take 1.5 mg by mouth, Disp: , Rfl:      midodrine (PROAMATINE) 10 MG tablet, Take 1 tablet (10 mg) by mouth 2 times daily, Disp: 180 tablet, Rfl: 3     mirtazapine (REMERON) 7.5 MG tablet, Take 7.5 mg by mouth, Disp: , Rfl:      nitroGLYcerin (NITROSTAT) 0.4 MG sublingual tablet, Place 1 tablet (0.4 mg) under the tongue every 5 minutes as needed for chest pain, Disp: 30 tablet, Rfl: 4     omeprazole (PRILOSEC) 20 MG DR capsule, Take 20 mg by mouth, Disp: , Rfl:      pyridostigmine (MESTINON) 60 MG tablet, TAKE 1/2 TABLET 3 TIMES A  DAY, Disp: 135 tablet, Rfl: 3     senna (SENOKOT) 8.6 MG tablet, Take 2 tablets by mouth, Disp: , Rfl:      tamsulosin (FLOMAX) 0.4 MG capsule, Take 0.4 mg by mouth, Disp: , Rfl:       "tolterodine ER (DETROL LA) 4 MG 24 hr capsule, Take 4 mg by mouth, Disp: , Rfl:      vitamin B complex with vitamin C (VITAMIN  B COMPLEX) tablet, Take 1 tablet by mouth daily, Disp: , Rfl:      vitamin B-12 (CYANOCOBALAMIN) 1000 MCG tablet, Take 1,000 mcg by mouth, Disp: , Rfl:     Allergies:   Sulfa drugs and Acetaminophen      Objective:      Physical Exam  83.9 kg (185 lb)  1.854 m (6' 1\")  Body mass index is 24.41 kg/m .  BP 98/62 (BP Location: Left arm, Patient Position: Sitting, Cuff Size: Adult Regular)   Pulse 70   Resp 16   Ht 1.854 m (6' 1\")   Wt 83.9 kg (185 lb)   BMI 24.41 kg/m      General Appearance:   Awake, Alert, No acute distress.   HEENT:  Pupil equal and reactive to light. No scleral icterus; the mucous membranes were moist.   Neck: No cervical bruits. No JVD. No thyromegaly.     Chest: The spine was straight. The chest was symmetric.   Lungs:   Respirations unlabored; Lungs are clear to auscultation. No crackles. No wheezing.   Cardiovascular:   Regular rhythm and rate, normal first and second heart sounds with no murmurs. No rubs or gallops.    Abdomen:  Soft. No tenderness. Non-distended. Bowels sounds are present   Extremities: Equal tibial pulses. Bilateral leg edema.   Skin: No rashes or ulcers. Warm, Dry.   Musculoskeletal: No tenderness. No deformity.   Neurologic: Mood and affect are appropriate. No focal deficits.         Pacemaker interrogation on 10-: Personally reviewed  Normal device function  APVS, stable battery, no cardiac arrhythmia     Cardiac Imaging Studies  ECHO on 6-:    Left ventricle ejection fraction is normal. The estimated left ventricular ejection fraction is 60% without wall motion abnormality.    When compared to the previous study dated 11/11/2019, no interval change.     Coronary angiogram with PCI on 11-:    Severe proximal LAD/first diagonal bifurcation stenosis treated with mini-crush bifurcation stenting with good angiographic " results.    Minimal coronary disease otherwise    LV EPD normal      Lab Review   Lab Results   Component Value Date     05/20/2021    CO2 30 05/20/2021    BUN 32 05/20/2021     Lab Results   Component Value Date    WBC 6.2 05/20/2021    HGB 10.9 05/20/2021    HCT 34.5 05/20/2021    MCV 93 05/20/2021     05/20/2021     Lab Results   Component Value Date    CHOL 135 11/16/2013     Lab Results   Component Value Date    HDL 34 (L) 11/16/2013     No components found for: LDLCALC  Lab Results   Component Value Date    TRIG 89 11/16/2013     No components found for: CHOLHDL  Lab Results   Component Value Date    TROPONINI 0.03 05/20/2021     Lab Results   Component Value Date     11/10/2019     Lab Results   Component Value Date    TSH 6.67 06/23/2020                 Thank you for allowing me to participate in the care of your patient.      Sincerely,     Rhona Christine MD     [facility]  cc:   Dom Zuniga MD  [address] [place] [id number]

## 2021-10-27 NOTE — PROGRESS NOTES
Assessment/Plan:   1.  Coronary artery disease s/p M and s/p TAMRA to proximal LAD and D1 on 11-: The patient has no chest pain.  Continue aspirin, metoprolol and Lipitor.     2.  Syncope secondary to autonomic nervous dysfunction and orthostatic hypotension: The patient has significant autonomic dysfunction secondary to Parkinson's disease.   Currently he is on fludrocortisone 0.1 mg daily and midodrine 10 mg twice a day, pressure support socks in both legs to knee, avoid dehydration.  His blood pressure has been in reasonable range.  He had no syncopal episode since last visit.     3.  Dyslipidemia: Continue Lipitor 40 mg at bedtime.       4.  Status post dual-chamber pacemaker placement: Device is interrogated today.  Normal device function.  Atrial tachycardia 8 seconds.  No other cardiac arrhythmia.      5.  Parkinson's disease, and all the chronic medical issues: Please see primary note for management.    Thank you for the opportunity to be involved in the care of Canelo Cook. If you have any questions, please feel free to contact me.  I will see the patient again in 12 months and as needed.    Much or all of the text in this note was generated through the use of Dragon Dictate voice-to-text software. Errors in spelling or words which seem out of context are unintentional. Sound alike errors, in particular, may have escaped editing.       History of Present Illness:   It is my pleasure to see Canelo Cook at the [facility] clinic for routine cardiology follow up.  Canelo Cook is a 81 year old male with a medical history of coronary artery disease s/p TAMRA to proximal LAD and D1 on 11-, Parkinson's disease, autonomic nerve dysfunction, orthostatic hypotension, recurrent syncope secondary to orthostatic hypotension, status post dual-chamber pacemaker placement due to sinus node dysfunction, essential hypertension.    The patient states that he had no  "chest pain, palpitations, shortness of breath, orthopnea.  He denies the episodes of lightheadedness, dizziness.  He had no syncopal episode since last visit.  He developed bilateral mild leg edema which has been stable.  His weight has been stable.  His blood pressure is in reasonable range.    Past Medical History:     Patient Active Problem List   Diagnosis     Parkinson disease (H)       Past Surgical History:     Past Surgical History:   Procedure Laterality Date     BACK SURGERY       CATARACT EXTRACTION       CORONARY STENT PLACEMENT  11/11/2019    stent to bifurcation of LAD and DG     CV CORONARY ANGIOGRAM N/A 11/11/2019    Procedure: Coronary Angiogram;  Surgeon: Suzan Pineda MD;  Location: [facility];  Service: Cardiology     CV LEFT HEART CATHETERIZATION WITH LEFT VENTRICULOGRAM N/A 11/11/2019    Procedure: Left Heart Catheterization with Left Ventriculogram;  Surgeon: Suzan Pineda MD;  Location: [facility];  Service: Cardiology     ESOPHAGOSCOPY, GASTROSCOPY, DUODENOSCOPY (EGD), COMBINED N/A 3/20/2016    Procedure: ESOPHAGOGASTRODUODENOSCOPY with biopsy;  Surgeon: Melissa Castellano MD;  Location: [facility];  Service:      IMPLANT PACEMAKER  08/10/2015    Biotronik AV pacer by Dr. Vidal at [facility] for \"chronotropic incompetence\", syncope has persisted since then     IMPLANT PROSTHESIS PENIS INFLATABLE       FL EDG US EXAM SURGICAL ALTER STOM DUODENUM/JEJUNUM N/A 3/23/2016    Procedure: EGD and ENDOSCOPIC ULTRASOUND with brushing and biopsies;  Surgeon: Nj Howe MD;  Location: [facility];  Service: Gastroenterology       Family History:     Family History   Problem Relation Age of Onset     Heart Disease Mother      Myocardial Infarction Father      Myocardial Infarction Sister      Lung Cancer Brother      Heart Disease Brother      Cancer Sister      Heart Disease Brother      Lung Cancer Brother      Heart Disease Brother      Lung Cancer Brother  "     Heart Disease Brother      Heart Disease Father         Social History:    reports that he has quit smoking. He has never used smokeless tobacco. He reports current alcohol use.    Review of Systems:   12 systems are reviewed negative except for in HPI.    Meds:     Current Outpatient Medications:      aspirin (ASA) 81 MG EC tablet, Take 81 mg by mouth, Disp: , Rfl:      atorvastatin (LIPITOR) 40 MG tablet, Take 40 mg by mouth, Disp: , Rfl:      azelastine (ASTELIN) 0.1 % nasal spray, Spray 1 spray in nostril, Disp: , Rfl:      carbidopa-levodopa (SINEMET)  MG tablet, Take 2 tablets by mouth 4 times daily, Disp: 720 tablet, Rfl: 3     cholecalciferol 50 MCG (2000 UT) tablet, Take 2,000 Units by mouth, Disp: , Rfl:      fludrocortisone (FLORINEF) 0.1 MG tablet, Take 1 tablet (0.1 mg) by mouth daily, Disp: 90 tablet, Rfl: 3     magnesium oxide (MAG-OX) 400 (241.3 Mg) MG tablet, Take 400 mg by mouth, Disp: , Rfl:      Metoprolol Succinate 25 MG CS24, Take 1.5 mg by mouth, Disp: , Rfl:      midodrine (PROAMATINE) 10 MG tablet, Take 1 tablet (10 mg) by mouth 2 times daily, Disp: 180 tablet, Rfl: 3     mirtazapine (REMERON) 7.5 MG tablet, Take 7.5 mg by mouth, Disp: , Rfl:      nitroGLYcerin (NITROSTAT) 0.4 MG sublingual tablet, Place 1 tablet (0.4 mg) under the tongue every 5 minutes as needed for chest pain, Disp: 30 tablet, Rfl: 4     omeprazole (PRILOSEC) 20 MG DR capsule, Take 20 mg by mouth, Disp: , Rfl:      pyridostigmine (MESTINON) 60 MG tablet, TAKE 1/2 TABLET 3 TIMES A  DAY, Disp: 135 tablet, Rfl: 3     senna (SENOKOT) 8.6 MG tablet, Take 2 tablets by mouth, Disp: , Rfl:      tamsulosin (FLOMAX) 0.4 MG capsule, Take 0.4 mg by mouth, Disp: , Rfl:      tolterodine ER (DETROL LA) 4 MG 24 hr capsule, Take 4 mg by mouth, Disp: , Rfl:      vitamin B complex with vitamin C (VITAMIN  B COMPLEX) tablet, Take 1 tablet by mouth daily, Disp: , Rfl:      vitamin B-12 (CYANOCOBALAMIN) 1000 MCG tablet, Take 1,000  "mcg by mouth, Disp: , Rfl:     Allergies:   Sulfa drugs and Acetaminophen      Objective:      Physical Exam  83.9 kg (185 lb)  1.854 m (6' 1\")  Body mass index is 24.41 kg/m .  BP 98/62 (BP Location: Left arm, Patient Position: Sitting, Cuff Size: Adult Regular)   Pulse 70   Resp 16   Ht 1.854 m (6' 1\")   Wt 83.9 kg (185 lb)   BMI 24.41 kg/m      General Appearance:   Awake, Alert, No acute distress.   HEENT:  Pupil equal and reactive to light. No scleral icterus; the mucous membranes were moist.   Neck: No cervical bruits. No JVD. No thyromegaly.     Chest: The spine was straight. The chest was symmetric.   Lungs:   Respirations unlabored; Lungs are clear to auscultation. No crackles. No wheezing.   Cardiovascular:   Regular rhythm and rate, normal first and second heart sounds with no murmurs. No rubs or gallops.    Abdomen:  Soft. No tenderness. Non-distended. Bowels sounds are present   Extremities: Equal tibial pulses. Bilateral leg edema.   Skin: No rashes or ulcers. Warm, Dry.   Musculoskeletal: No tenderness. No deformity.   Neurologic: Mood and affect are appropriate. No focal deficits.         Pacemaker interrogation on 10-: Personally reviewed  Normal device function  APVS, stable battery, no cardiac arrhythmia     Cardiac Imaging Studies  ECHO on 6-:    Left ventricle ejection fraction is normal. The estimated left ventricular ejection fraction is 60% without wall motion abnormality.    When compared to the previous study dated 11/11/2019, no interval change.     Coronary angiogram with PCI on 11-:    Severe proximal LAD/first diagonal bifurcation stenosis treated with mini-crush bifurcation stenting with good angiographic results.    Minimal coronary disease otherwise    LV EPD normal      Lab Review   Lab Results   Component Value Date     05/20/2021    CO2 30 05/20/2021    BUN 32 05/20/2021     Lab Results   Component Value Date    WBC 6.2 05/20/2021    HGB 10.9 " 05/20/2021    HCT 34.5 05/20/2021    MCV 93 05/20/2021     05/20/2021     Lab Results   Component Value Date    CHOL 135 11/16/2013     Lab Results   Component Value Date    HDL 34 (L) 11/16/2013     No components found for: LDLCALC  Lab Results   Component Value Date    TRIG 89 11/16/2013     No components found for: CHOLHDL  Lab Results   Component Value Date    TROPONINI 0.03 05/20/2021     Lab Results   Component Value Date     11/10/2019     Lab Results   Component Value Date    TSH 6.67 06/23/2020

## 2022-01-01 ENCOUNTER — TRANSITIONAL CARE UNIT VISIT (OUTPATIENT)
Dept: GERIATRICS | Facility: CLINIC | Age: 82
End: 2022-01-01
Payer: MEDICARE

## 2022-01-01 ENCOUNTER — APPOINTMENT (OUTPATIENT)
Dept: PHYSICAL THERAPY | Facility: HOSPITAL | Age: 82
End: 2022-01-01
Payer: MEDICARE

## 2022-01-01 ENCOUNTER — PATIENT OUTREACH (OUTPATIENT)
Dept: CARE COORDINATION | Facility: CLINIC | Age: 82
End: 2022-01-01
Payer: MEDICARE

## 2022-01-01 ENCOUNTER — APPOINTMENT (OUTPATIENT)
Dept: RADIOLOGY | Facility: HOSPITAL | Age: 82
End: 2022-01-01
Attending: INTERNAL MEDICINE
Payer: MEDICARE

## 2022-01-01 ENCOUNTER — APPOINTMENT (OUTPATIENT)
Dept: CT IMAGING | Facility: HOSPITAL | Age: 82
End: 2022-01-01
Attending: EMERGENCY MEDICINE
Payer: MEDICARE

## 2022-01-01 ENCOUNTER — APPOINTMENT (OUTPATIENT)
Dept: ULTRASOUND IMAGING | Facility: HOSPITAL | Age: 82
End: 2022-01-01
Attending: INTERNAL MEDICINE
Payer: MEDICARE

## 2022-01-01 ENCOUNTER — TELEPHONE (OUTPATIENT)
Dept: GERIATRICS | Facility: CLINIC | Age: 82
End: 2022-01-01
Payer: MEDICARE

## 2022-01-01 ENCOUNTER — APPOINTMENT (OUTPATIENT)
Dept: NEUROLOGY | Facility: HOSPITAL | Age: 82
End: 2022-01-01
Attending: INTERNAL MEDICINE
Payer: MEDICARE

## 2022-01-01 ENCOUNTER — PATIENT OUTREACH (OUTPATIENT)
Dept: CARE COORDINATION | Facility: CLINIC | Age: 82
End: 2022-01-01

## 2022-01-01 ENCOUNTER — APPOINTMENT (OUTPATIENT)
Dept: OCCUPATIONAL THERAPY | Facility: HOSPITAL | Age: 82
End: 2022-01-01
Attending: INTERNAL MEDICINE
Payer: MEDICARE

## 2022-01-01 ENCOUNTER — APPOINTMENT (OUTPATIENT)
Dept: NEUROLOGY | Facility: HOSPITAL | Age: 82
End: 2022-01-01
Attending: PSYCHIATRY & NEUROLOGY
Payer: MEDICARE

## 2022-01-01 ENCOUNTER — HOSPITAL ENCOUNTER (OUTPATIENT)
Facility: HOSPITAL | Age: 82
Setting detail: OBSERVATION
Discharge: NURSING, REHAB OR LONG-TERM CARE | End: 2022-05-31
Attending: EMERGENCY MEDICINE | Admitting: INTERNAL MEDICINE
Payer: MEDICARE

## 2022-01-01 ENCOUNTER — ANCILLARY PROCEDURE (OUTPATIENT)
Dept: CARDIOLOGY | Facility: CLINIC | Age: 82
End: 2022-01-01
Attending: INTERNAL MEDICINE
Payer: MEDICARE

## 2022-01-01 ENCOUNTER — ANCILLARY PROCEDURE (OUTPATIENT)
Dept: CARDIOLOGY | Facility: CLINIC | Age: 82
End: 2022-01-01
Attending: INTERNAL MEDICINE
Payer: OTHER MISCELLANEOUS

## 2022-01-01 ENCOUNTER — DOCUMENTATION ONLY (OUTPATIENT)
Dept: OTHER | Facility: CLINIC | Age: 82
End: 2022-01-01
Payer: MEDICARE

## 2022-01-01 ENCOUNTER — APPOINTMENT (OUTPATIENT)
Dept: PHYSICAL THERAPY | Facility: HOSPITAL | Age: 82
End: 2022-01-01
Attending: INTERNAL MEDICINE
Payer: MEDICARE

## 2022-01-01 ENCOUNTER — APPOINTMENT (OUTPATIENT)
Dept: RADIOLOGY | Facility: HOSPITAL | Age: 82
End: 2022-01-01
Attending: EMERGENCY MEDICINE
Payer: MEDICARE

## 2022-01-01 ENCOUNTER — DISCHARGE SUMMARY NURSING HOME (OUTPATIENT)
Dept: GERIATRICS | Facility: CLINIC | Age: 82
End: 2022-01-01
Payer: MEDICARE

## 2022-01-01 ENCOUNTER — HEALTH MAINTENANCE LETTER (OUTPATIENT)
Age: 82
End: 2022-01-01

## 2022-01-01 ENCOUNTER — TELEPHONE (OUTPATIENT)
Dept: MULTI SPECIALTY CLINIC | Facility: CLINIC | Age: 82
End: 2022-01-01

## 2022-01-01 ENCOUNTER — HOSPITAL ENCOUNTER (EMERGENCY)
Facility: HOSPITAL | Age: 82
Discharge: HOME | End: 2022-08-24
Attending: EMERGENCY MEDICINE | Admitting: EMERGENCY MEDICINE
Payer: MEDICARE

## 2022-01-01 ENCOUNTER — TELEPHONE (OUTPATIENT)
Dept: NEUROLOGY | Facility: CLINIC | Age: 82
End: 2022-01-01

## 2022-01-01 ENCOUNTER — VIRTUAL VISIT (OUTPATIENT)
Dept: NEUROLOGY | Facility: CLINIC | Age: 82
End: 2022-01-01
Payer: MEDICARE

## 2022-01-01 ENCOUNTER — HOSPITAL ENCOUNTER (EMERGENCY)
Facility: HOSPITAL | Age: 82
Discharge: HOME | End: 2022-05-02
Attending: EMERGENCY MEDICINE | Admitting: EMERGENCY MEDICINE
Payer: MEDICARE

## 2022-01-01 ENCOUNTER — HOSPITAL ENCOUNTER (OUTPATIENT)
Facility: HOSPITAL | Age: 82
Setting detail: OBSERVATION
Discharge: HOME HEALTH | End: 2022-10-01
Attending: EMERGENCY MEDICINE | Admitting: EMERGENCY MEDICINE
Payer: MEDICARE

## 2022-01-01 ENCOUNTER — LAB REQUISITION (OUTPATIENT)
Dept: LAB | Facility: CLINIC | Age: 82
End: 2022-01-01
Payer: MEDICARE

## 2022-01-01 ENCOUNTER — APPOINTMENT (OUTPATIENT)
Dept: CARDIOLOGY | Facility: HOSPITAL | Age: 82
End: 2022-01-01
Attending: INTERNAL MEDICINE
Payer: MEDICARE

## 2022-01-01 ENCOUNTER — TELEPHONE (OUTPATIENT)
Dept: CARDIOLOGY | Facility: CLINIC | Age: 82
End: 2022-01-01

## 2022-01-01 ENCOUNTER — APPOINTMENT (OUTPATIENT)
Dept: SPEECH THERAPY | Facility: HOSPITAL | Age: 82
End: 2022-01-01
Attending: INTERNAL MEDICINE
Payer: MEDICARE

## 2022-01-01 ENCOUNTER — APPOINTMENT (OUTPATIENT)
Dept: RADIOLOGY | Facility: HOSPITAL | Age: 82
End: 2022-01-01
Attending: STUDENT IN AN ORGANIZED HEALTH CARE EDUCATION/TRAINING PROGRAM
Payer: MEDICARE

## 2022-01-01 ENCOUNTER — TELEPHONE (OUTPATIENT)
Dept: NEUROLOGY | Facility: CLINIC | Age: 82
End: 2022-01-01
Payer: MEDICARE

## 2022-01-01 VITALS — BODY MASS INDEX: 23.7 KG/M2 | HEIGHT: 72 IN | WEIGHT: 175 LBS

## 2022-01-01 VITALS
HEIGHT: 72 IN | TEMPERATURE: 99 F | BODY MASS INDEX: 22.54 KG/M2 | OXYGEN SATURATION: 95 % | WEIGHT: 166.4 LBS | RESPIRATION RATE: 18 BRPM | SYSTOLIC BLOOD PRESSURE: 113 MMHG | DIASTOLIC BLOOD PRESSURE: 65 MMHG | HEART RATE: 67 BPM

## 2022-01-01 VITALS
RESPIRATION RATE: 18 BRPM | BODY MASS INDEX: 24.41 KG/M2 | DIASTOLIC BLOOD PRESSURE: 103 MMHG | HEART RATE: 72 BPM | SYSTOLIC BLOOD PRESSURE: 200 MMHG | WEIGHT: 185 LBS | OXYGEN SATURATION: 97 % | TEMPERATURE: 98.2 F

## 2022-01-01 VITALS
TEMPERATURE: 98.4 F | SYSTOLIC BLOOD PRESSURE: 193 MMHG | OXYGEN SATURATION: 99 % | BODY MASS INDEX: 23.19 KG/M2 | DIASTOLIC BLOOD PRESSURE: 92 MMHG | HEART RATE: 80 BPM | HEIGHT: 73 IN | RESPIRATION RATE: 30 BRPM | WEIGHT: 175 LBS

## 2022-01-01 VITALS
RESPIRATION RATE: 16 BRPM | HEIGHT: 72 IN | OXYGEN SATURATION: 96 % | HEART RATE: 79 BPM | WEIGHT: 180.5 LBS | DIASTOLIC BLOOD PRESSURE: 83 MMHG | TEMPERATURE: 97.7 F | BODY MASS INDEX: 24.45 KG/M2 | SYSTOLIC BLOOD PRESSURE: 124 MMHG

## 2022-01-01 VITALS
BODY MASS INDEX: 25.73 KG/M2 | RESPIRATION RATE: 18 BRPM | HEIGHT: 72 IN | SYSTOLIC BLOOD PRESSURE: 171 MMHG | DIASTOLIC BLOOD PRESSURE: 100 MMHG | TEMPERATURE: 98.1 F | HEART RATE: 87 BPM | OXYGEN SATURATION: 98 % | WEIGHT: 190 LBS

## 2022-01-01 VITALS
DIASTOLIC BLOOD PRESSURE: 67 MMHG | HEIGHT: 73 IN | SYSTOLIC BLOOD PRESSURE: 130 MMHG | WEIGHT: 170 LBS | TEMPERATURE: 98 F | OXYGEN SATURATION: 99 % | RESPIRATION RATE: 16 BRPM | HEART RATE: 69 BPM | BODY MASS INDEX: 22.53 KG/M2

## 2022-01-01 VITALS
BODY MASS INDEX: 22.59 KG/M2 | RESPIRATION RATE: 18 BRPM | HEART RATE: 66 BPM | SYSTOLIC BLOOD PRESSURE: 160 MMHG | WEIGHT: 166.8 LBS | OXYGEN SATURATION: 94 % | TEMPERATURE: 98 F | HEIGHT: 72 IN | DIASTOLIC BLOOD PRESSURE: 81 MMHG

## 2022-01-01 VITALS
TEMPERATURE: 97.7 F | OXYGEN SATURATION: 95 % | WEIGHT: 177.9 LBS | RESPIRATION RATE: 18 BRPM | SYSTOLIC BLOOD PRESSURE: 117 MMHG | HEIGHT: 72 IN | HEART RATE: 62 BPM | BODY MASS INDEX: 24.09 KG/M2 | DIASTOLIC BLOOD PRESSURE: 79 MMHG

## 2022-01-01 VITALS
HEART RATE: 76 BPM | OXYGEN SATURATION: 97 % | RESPIRATION RATE: 16 BRPM | BODY MASS INDEX: 22.65 KG/M2 | WEIGHT: 167 LBS | TEMPERATURE: 96.9 F | DIASTOLIC BLOOD PRESSURE: 71 MMHG | SYSTOLIC BLOOD PRESSURE: 112 MMHG

## 2022-01-01 VITALS
HEIGHT: 72 IN | TEMPERATURE: 93 F | SYSTOLIC BLOOD PRESSURE: 139 MMHG | WEIGHT: 164.9 LBS | OXYGEN SATURATION: 98 % | HEART RATE: 77 BPM | RESPIRATION RATE: 17 BRPM | BODY MASS INDEX: 22.34 KG/M2 | DIASTOLIC BLOOD PRESSURE: 80 MMHG

## 2022-01-01 VITALS
DIASTOLIC BLOOD PRESSURE: 57 MMHG | WEIGHT: 174.4 LBS | SYSTOLIC BLOOD PRESSURE: 110 MMHG | TEMPERATURE: 98.5 F | OXYGEN SATURATION: 95 % | HEART RATE: 74 BPM | HEIGHT: 72 IN | RESPIRATION RATE: 20 BRPM | BODY MASS INDEX: 23.62 KG/M2

## 2022-01-01 VITALS
BODY MASS INDEX: 24.09 KG/M2 | WEIGHT: 177.9 LBS | HEART RATE: 79 BPM | OXYGEN SATURATION: 96 % | TEMPERATURE: 96.8 F | RESPIRATION RATE: 18 BRPM | DIASTOLIC BLOOD PRESSURE: 87 MMHG | SYSTOLIC BLOOD PRESSURE: 157 MMHG | HEIGHT: 72 IN

## 2022-01-01 DIAGNOSIS — Z95.0 CARDIAC PACEMAKER IN SITU: ICD-10-CM

## 2022-01-01 DIAGNOSIS — G90.9 AUTONOMIC INSTABILITY: ICD-10-CM

## 2022-01-01 DIAGNOSIS — I25.10 CORONARY ARTERY DISEASE INVOLVING NATIVE CORONARY ARTERY OF NATIVE HEART WITHOUT ANGINA PECTORIS: ICD-10-CM

## 2022-01-01 DIAGNOSIS — N18.30 STAGE 3 CHRONIC KIDNEY DISEASE, UNSPECIFIED WHETHER STAGE 3A OR 3B CKD (H): ICD-10-CM

## 2022-01-01 DIAGNOSIS — N17.9 ACUTE KIDNEY FAILURE, UNSPECIFIED (H): ICD-10-CM

## 2022-01-01 DIAGNOSIS — N39.0 URINARY TRACT INFECTION, SITE NOT SPECIFIED: ICD-10-CM

## 2022-01-01 DIAGNOSIS — E87.6 HYPOKALEMIA: ICD-10-CM

## 2022-01-01 DIAGNOSIS — G93.41 ACUTE METABOLIC ENCEPHALOPATHY: ICD-10-CM

## 2022-01-01 DIAGNOSIS — I49.5 SICK SINUS SYNDROME (H): ICD-10-CM

## 2022-01-01 DIAGNOSIS — G20.A1 PARKINSON DISEASE (H): ICD-10-CM

## 2022-01-01 DIAGNOSIS — K59.00 CONSTIPATION, UNSPECIFIED CONSTIPATION TYPE: ICD-10-CM

## 2022-01-01 DIAGNOSIS — I95.1 ORTHOSTATIC HYPOTENSION DYSAUTONOMIC SYNDROME: ICD-10-CM

## 2022-01-01 DIAGNOSIS — E78.5 DYSLIPIDEMIA, GOAL LDL BELOW 100: ICD-10-CM

## 2022-01-01 DIAGNOSIS — R30.0 DYSURIA: ICD-10-CM

## 2022-01-01 DIAGNOSIS — U07.1 COVID-19 VIRUS INFECTION: ICD-10-CM

## 2022-01-01 DIAGNOSIS — E87.6 HYPOKALEMIA: Primary | ICD-10-CM

## 2022-01-01 DIAGNOSIS — I10 BENIGN ESSENTIAL HYPERTENSION: Primary | ICD-10-CM

## 2022-01-01 DIAGNOSIS — Z71.89 OTHER SPECIFIED COUNSELING: ICD-10-CM

## 2022-01-01 DIAGNOSIS — N17.9 AKI (ACUTE KIDNEY INJURY) (H): ICD-10-CM

## 2022-01-01 DIAGNOSIS — I10 BENIGN ESSENTIAL HYPERTENSION: ICD-10-CM

## 2022-01-01 DIAGNOSIS — R62.7 ADULT FAILURE TO THRIVE: ICD-10-CM

## 2022-01-01 DIAGNOSIS — D64.9 ANEMIA, UNSPECIFIED: ICD-10-CM

## 2022-01-01 DIAGNOSIS — M62.81 GENERALIZED MUSCLE WEAKNESS: ICD-10-CM

## 2022-01-01 DIAGNOSIS — R60.0 BILATERAL LEG EDEMA: ICD-10-CM

## 2022-01-01 DIAGNOSIS — R41.82 ALTERED MENTAL STATUS, UNSPECIFIED ALTERED MENTAL STATUS TYPE: ICD-10-CM

## 2022-01-01 DIAGNOSIS — R53.81 PHYSICAL DECONDITIONING: ICD-10-CM

## 2022-01-01 DIAGNOSIS — K21.00 GASTROESOPHAGEAL REFLUX DISEASE WITH ESOPHAGITIS, UNSPECIFIED WHETHER HEMORRHAGE: ICD-10-CM

## 2022-01-01 DIAGNOSIS — R55 RECURRENT SYNCOPE: ICD-10-CM

## 2022-01-01 DIAGNOSIS — G20.A1 PARKINSON'S DISEASE (H): ICD-10-CM

## 2022-01-01 DIAGNOSIS — R55 SYNCOPE, UNSPECIFIED SYNCOPE TYPE: ICD-10-CM

## 2022-01-01 DIAGNOSIS — I10 ELEVATED BLOOD PRESSURE READING WITH DIAGNOSIS OF HYPERTENSION: ICD-10-CM

## 2022-01-01 DIAGNOSIS — G90.3 PARKINSON'S DISEASE WITH NEUROGENIC ORTHOSTATIC HYPOTENSION (H): Chronic | ICD-10-CM

## 2022-01-01 DIAGNOSIS — K13.6 IRRITATION OF ORAL CAVITY: ICD-10-CM

## 2022-01-01 DIAGNOSIS — D64.9 NORMOCYTIC ANEMIA: ICD-10-CM

## 2022-01-01 DIAGNOSIS — I95.9 HYPOTENSION, UNSPECIFIED HYPOTENSION TYPE: ICD-10-CM

## 2022-01-01 DIAGNOSIS — G47.00 INSOMNIA, UNSPECIFIED TYPE: ICD-10-CM

## 2022-01-01 DIAGNOSIS — I10 ESSENTIAL (PRIMARY) HYPERTENSION: ICD-10-CM

## 2022-01-01 DIAGNOSIS — R31.9 HEMATURIA, UNSPECIFIED TYPE: ICD-10-CM

## 2022-01-01 DIAGNOSIS — K59.01 SLOW TRANSIT CONSTIPATION: ICD-10-CM

## 2022-01-01 DIAGNOSIS — G93.40 ACUTE ENCEPHALOPATHY: ICD-10-CM

## 2022-01-01 DIAGNOSIS — I10 HYPERTENSION, UNSPECIFIED TYPE: ICD-10-CM

## 2022-01-01 DIAGNOSIS — G20.A1 PARKINSON DISEASE (H): Primary | ICD-10-CM

## 2022-01-01 DIAGNOSIS — R44.3 HALLUCINATIONS: ICD-10-CM

## 2022-01-01 DIAGNOSIS — U07.1 INFECTION DUE TO 2019 NOVEL CORONAVIRUS: ICD-10-CM

## 2022-01-01 DIAGNOSIS — R53.81 PHYSICAL DECONDITIONING: Primary | ICD-10-CM

## 2022-01-01 DIAGNOSIS — G46.3 BRAINSTEM STROKE SYNDROME: ICD-10-CM

## 2022-01-01 DIAGNOSIS — R53.1 GENERALIZED WEAKNESS: ICD-10-CM

## 2022-01-01 DIAGNOSIS — Z86.69 HISTORY OF PARKINSON'S DISEASE: ICD-10-CM

## 2022-01-01 DIAGNOSIS — K21.9 GASTROESOPHAGEAL REFLUX DISEASE WITHOUT ESOPHAGITIS: ICD-10-CM

## 2022-01-01 DIAGNOSIS — R55 SYNCOPE AND COLLAPSE: Primary | ICD-10-CM

## 2022-01-01 LAB
ALBUMIN SERPL BCG-MCNC: 3.5 G/DL (ref 3.5–5.2)
ALBUMIN SERPL-MCNC: 2.7 G/DL (ref 3.5–5)
ALBUMIN SERPL-MCNC: 3 G/DL (ref 3.5–5)
ALBUMIN SERPL-MCNC: 3.2 G/DL (ref 3.5–5)
ALBUMIN UR-MCNC: 20 MG/DL
ALBUMIN UR-MCNC: NEGATIVE MG/DL
ALP SERPL-CCNC: 53 U/L (ref 45–120)
ALP SERPL-CCNC: 67 U/L (ref 45–120)
ALP SERPL-CCNC: 68 U/L (ref 45–120)
ALP SERPL-CCNC: 72 U/L (ref 40–129)
ALT SERPL W P-5'-P-CCNC: 7 U/L (ref 10–50)
ALT SERPL W P-5'-P-CCNC: <9 U/L (ref 0–45)
ANION GAP SERPL CALCULATED.3IONS-SCNC: 0 MMOL/L (ref 5–18)
ANION GAP SERPL CALCULATED.3IONS-SCNC: 10 MMOL/L (ref 5–18)
ANION GAP SERPL CALCULATED.3IONS-SCNC: 10 MMOL/L (ref 7–15)
ANION GAP SERPL CALCULATED.3IONS-SCNC: 11 MMOL/L (ref 5–18)
ANION GAP SERPL CALCULATED.3IONS-SCNC: 3 MMOL/L (ref 5–18)
ANION GAP SERPL CALCULATED.3IONS-SCNC: 4 MMOL/L (ref 5–18)
ANION GAP SERPL CALCULATED.3IONS-SCNC: 4 MMOL/L (ref 5–18)
ANION GAP SERPL CALCULATED.3IONS-SCNC: 5 MMOL/L (ref 5–18)
ANION GAP SERPL CALCULATED.3IONS-SCNC: 5 MMOL/L (ref 5–18)
ANION GAP SERPL CALCULATED.3IONS-SCNC: 6 MMOL/L (ref 5–18)
ANION GAP SERPL CALCULATED.3IONS-SCNC: 6 MMOL/L (ref 5–18)
ANION GAP SERPL CALCULATED.3IONS-SCNC: 7 MMOL/L (ref 5–18)
ANION GAP SERPL CALCULATED.3IONS-SCNC: 7 MMOL/L (ref 5–18)
ANION GAP SERPL CALCULATED.3IONS-SCNC: 7 MMOL/L (ref 7–15)
ANION GAP SERPL CALCULATED.3IONS-SCNC: 7 MMOL/L (ref 7–15)
ANION GAP SERPL CALCULATED.3IONS-SCNC: 8 MMOL/L (ref 5–18)
ANION GAP SERPL CALCULATED.3IONS-SCNC: 9 MMOL/L (ref 5–18)
ANION GAP SERPL CALCULATED.3IONS-SCNC: 9 MMOL/L (ref 7–15)
APPEARANCE UR: CLEAR
AST SERPL W P-5'-P-CCNC: 12 U/L (ref 0–40)
AST SERPL W P-5'-P-CCNC: 14 U/L (ref 0–40)
AST SERPL W P-5'-P-CCNC: 16 U/L (ref 0–40)
AST SERPL W P-5'-P-CCNC: 20 U/L (ref 10–50)
ATRIAL RATE - MUSE: 80 BPM
ATRIAL RATE - MUSE: 88 BPM
ATRIAL RATE - MUSE: 93 BPM
BACTERIA BLD CULT: NO GROWTH
BACTERIA BLD CULT: NO GROWTH
BASOPHILS # BLD AUTO: 0 10E3/UL (ref 0–0.2)
BASOPHILS # BLD AUTO: 0 10E3/UL (ref 0–0.2)
BASOPHILS # BLD AUTO: 0.1 10E3/UL (ref 0–0.2)
BASOPHILS NFR BLD AUTO: 0 %
BASOPHILS NFR BLD AUTO: 1 %
BILIRUB DIRECT SERPL-MCNC: 0.22 MG/DL (ref 0–0.3)
BILIRUB DIRECT SERPL-MCNC: 0.3 MG/DL
BILIRUB SERPL-MCNC: 0.8 MG/DL (ref 0–1)
BILIRUB SERPL-MCNC: 1 MG/DL
BILIRUB UR QL STRIP: NEGATIVE
BNP SERPL-MCNC: 294 PG/ML (ref 0–88)
BNP SERPL-MCNC: 314 PG/ML (ref 0–88)
BUN SERPL-MCNC: 22 MG/DL (ref 8–28)
BUN SERPL-MCNC: 22.1 MG/DL (ref 8–23)
BUN SERPL-MCNC: 24 MG/DL (ref 8–28)
BUN SERPL-MCNC: 24 MG/DL (ref 8–28)
BUN SERPL-MCNC: 24.5 MG/DL (ref 8–23)
BUN SERPL-MCNC: 25 MG/DL (ref 8–28)
BUN SERPL-MCNC: 26 MG/DL (ref 8–28)
BUN SERPL-MCNC: 26 MG/DL (ref 8–28)
BUN SERPL-MCNC: 28 MG/DL (ref 8–28)
BUN SERPL-MCNC: 29 MG/DL (ref 8–28)
BUN SERPL-MCNC: 29 MG/DL (ref 8–28)
BUN SERPL-MCNC: 31 MG/DL (ref 8–28)
BUN SERPL-MCNC: 32 MG/DL (ref 8–28)
BUN SERPL-MCNC: 34 MG/DL (ref 8–23)
BUN SERPL-MCNC: 34 MG/DL (ref 8–28)
BUN SERPL-MCNC: 34 MG/DL (ref 8–28)
BUN SERPL-MCNC: 37.6 MG/DL (ref 8–23)
BUN SERPL-MCNC: 42 MG/DL (ref 8–28)
C REACTIVE PROTEIN LHE: 0.6 MG/DL (ref 0–0.8)
C REACTIVE PROTEIN LHE: 0.9 MG/DL (ref 0–0.8)
CALCIUM SERPL-MCNC: 8 MG/DL (ref 8.5–10.5)
CALCIUM SERPL-MCNC: 8.1 MG/DL (ref 8.5–10.5)
CALCIUM SERPL-MCNC: 8.2 MG/DL (ref 8.5–10.5)
CALCIUM SERPL-MCNC: 8.2 MG/DL (ref 8.8–10.2)
CALCIUM SERPL-MCNC: 8.3 MG/DL (ref 8.5–10.5)
CALCIUM SERPL-MCNC: 8.5 MG/DL (ref 8.5–10.5)
CALCIUM SERPL-MCNC: 8.6 MG/DL (ref 8.5–10.5)
CALCIUM SERPL-MCNC: 8.7 MG/DL (ref 8.5–10.5)
CALCIUM SERPL-MCNC: 8.7 MG/DL (ref 8.5–10.5)
CALCIUM SERPL-MCNC: 8.8 MG/DL (ref 8.8–10.2)
CALCIUM SERPL-MCNC: 8.9 MG/DL (ref 8.5–10.5)
CALCIUM SERPL-MCNC: 8.9 MG/DL (ref 8.8–10.2)
CALCIUM SERPL-MCNC: 9 MG/DL (ref 8.8–10.2)
CHLORIDE BLD-SCNC: 105 MMOL/L (ref 98–107)
CHLORIDE BLD-SCNC: 106 MMOL/L (ref 98–107)
CHLORIDE BLD-SCNC: 106 MMOL/L (ref 98–107)
CHLORIDE BLD-SCNC: 107 MMOL/L (ref 98–107)
CHLORIDE BLD-SCNC: 107 MMOL/L (ref 98–107)
CHLORIDE BLD-SCNC: 108 MMOL/L (ref 98–107)
CHLORIDE BLD-SCNC: 108 MMOL/L (ref 98–107)
CHLORIDE BLD-SCNC: 109 MMOL/L (ref 98–107)
CHLORIDE BLD-SCNC: 110 MMOL/L (ref 98–107)
CHLORIDE BLD-SCNC: 111 MMOL/L (ref 98–107)
CHLORIDE BLD-SCNC: 113 MMOL/L (ref 98–107)
CHLORIDE BLD-SCNC: 114 MMOL/L (ref 98–107)
CHLORIDE SERPL-SCNC: 105 MMOL/L (ref 98–107)
CHLORIDE SERPL-SCNC: 106 MMOL/L (ref 98–107)
CHLORIDE SERPL-SCNC: 107 MMOL/L (ref 98–107)
CHLORIDE SERPL-SCNC: 109 MMOL/L (ref 98–107)
CK SERPL-CCNC: 85 U/L (ref 39–308)
CO2 SERPL-SCNC: 24 MMOL/L (ref 22–31)
CO2 SERPL-SCNC: 25 MMOL/L (ref 22–31)
CO2 SERPL-SCNC: 25 MMOL/L (ref 22–31)
CO2 SERPL-SCNC: 26 MMOL/L (ref 22–31)
CO2 SERPL-SCNC: 27 MMOL/L (ref 22–31)
CO2 SERPL-SCNC: 27 MMOL/L (ref 22–31)
CO2 SERPL-SCNC: 28 MMOL/L (ref 22–31)
CO2 SERPL-SCNC: 29 MMOL/L (ref 22–31)
CO2 SERPL-SCNC: 31 MMOL/L (ref 22–31)
COLOR UR AUTO: COLORLESS
COLOR UR AUTO: YELLOW
CREAT SERPL-MCNC: 0.98 MG/DL (ref 0.7–1.3)
CREAT SERPL-MCNC: 1.02 MG/DL (ref 0.7–1.3)
CREAT SERPL-MCNC: 1.02 MG/DL (ref 0.7–1.3)
CREAT SERPL-MCNC: 1.03 MG/DL (ref 0.67–1.17)
CREAT SERPL-MCNC: 1.04 MG/DL (ref 0.7–1.3)
CREAT SERPL-MCNC: 1.05 MG/DL (ref 0.7–1.3)
CREAT SERPL-MCNC: 1.08 MG/DL (ref 0.7–1.3)
CREAT SERPL-MCNC: 1.08 MG/DL (ref 0.7–1.3)
CREAT SERPL-MCNC: 1.11 MG/DL (ref 0.7–1.3)
CREAT SERPL-MCNC: 1.13 MG/DL (ref 0.67–1.17)
CREAT SERPL-MCNC: 1.18 MG/DL (ref 0.67–1.17)
CREAT SERPL-MCNC: 1.18 MG/DL (ref 0.7–1.3)
CREAT SERPL-MCNC: 1.24 MG/DL (ref 0.67–1.17)
CREAT SERPL-MCNC: 1.24 MG/DL (ref 0.67–1.17)
CREAT SERPL-MCNC: 1.42 MG/DL (ref 0.7–1.3)
CREAT SERPL-MCNC: 1.43 MG/DL (ref 0.7–1.3)
CREAT SERPL-MCNC: 1.45 MG/DL (ref 0.7–1.3)
CREAT SERPL-MCNC: 1.49 MG/DL (ref 0.67–1.17)
CREAT SERPL-MCNC: 1.55 MG/DL (ref 0.7–1.3)
CREAT SERPL-MCNC: 1.56 MG/DL (ref 0.7–1.3)
DEPRECATED HCO3 PLAS-SCNC: 24 MMOL/L (ref 22–29)
DEPRECATED HCO3 PLAS-SCNC: 26 MMOL/L (ref 22–29)
DEPRECATED HCO3 PLAS-SCNC: 29 MMOL/L (ref 22–29)
DEPRECATED HCO3 PLAS-SCNC: 30 MMOL/L (ref 22–29)
DIASTOLIC BLOOD PRESSURE - MUSE: 115 MMHG
DIASTOLIC BLOOD PRESSURE - MUSE: NORMAL MMHG
DIASTOLIC BLOOD PRESSURE - MUSE: NORMAL MMHG
EOSINOPHIL # BLD AUTO: 0.1 10E3/UL (ref 0–0.7)
EOSINOPHIL # BLD AUTO: 0.3 10E3/UL (ref 0–0.7)
EOSINOPHIL # BLD AUTO: 0.3 10E3/UL (ref 0–0.7)
EOSINOPHIL NFR BLD AUTO: 1 %
EOSINOPHIL NFR BLD AUTO: 1 %
EOSINOPHIL NFR BLD AUTO: 2 %
EOSINOPHIL NFR BLD AUTO: 4 %
EOSINOPHIL NFR BLD AUTO: 5 %
ERYTHROCYTE [DISTWIDTH] IN BLOOD BY AUTOMATED COUNT: 13.7 % (ref 10–15)
ERYTHROCYTE [DISTWIDTH] IN BLOOD BY AUTOMATED COUNT: 13.8 % (ref 10–15)
ERYTHROCYTE [DISTWIDTH] IN BLOOD BY AUTOMATED COUNT: 13.8 % (ref 10–15)
ERYTHROCYTE [DISTWIDTH] IN BLOOD BY AUTOMATED COUNT: 14.1 % (ref 10–15)
ERYTHROCYTE [DISTWIDTH] IN BLOOD BY AUTOMATED COUNT: 14.2 % (ref 10–15)
ERYTHROCYTE [DISTWIDTH] IN BLOOD BY AUTOMATED COUNT: 14.4 % (ref 10–15)
ERYTHROCYTE [DISTWIDTH] IN BLOOD BY AUTOMATED COUNT: 14.5 % (ref 10–15)
ERYTHROCYTE [DISTWIDTH] IN BLOOD BY AUTOMATED COUNT: 14.6 % (ref 10–15)
ERYTHROCYTE [DISTWIDTH] IN BLOOD BY AUTOMATED COUNT: 14.7 % (ref 10–15)
ETHANOL SERPL-MCNC: <10 MG/DL
FLUAV RNA SPEC QL NAA+PROBE: NEGATIVE
FLUBV RNA RESP QL NAA+PROBE: NEGATIVE
GFR SERPL CREATININE-BSD FRML MDRD: 44 ML/MIN/1.73M2
GFR SERPL CREATININE-BSD FRML MDRD: 45 ML/MIN/1.73M2
GFR SERPL CREATININE-BSD FRML MDRD: 47 ML/MIN/1.73M2
GFR SERPL CREATININE-BSD FRML MDRD: 48 ML/MIN/1.73M2
GFR SERPL CREATININE-BSD FRML MDRD: 49 ML/MIN/1.73M2
GFR SERPL CREATININE-BSD FRML MDRD: 50 ML/MIN/1.73M2
GFR SERPL CREATININE-BSD FRML MDRD: 58 ML/MIN/1.73M2
GFR SERPL CREATININE-BSD FRML MDRD: 58 ML/MIN/1.73M2
GFR SERPL CREATININE-BSD FRML MDRD: 62 ML/MIN/1.73M2
GFR SERPL CREATININE-BSD FRML MDRD: 62 ML/MIN/1.73M2
GFR SERPL CREATININE-BSD FRML MDRD: 65 ML/MIN/1.73M2
GFR SERPL CREATININE-BSD FRML MDRD: 67 ML/MIN/1.73M2
GFR SERPL CREATININE-BSD FRML MDRD: 69 ML/MIN/1.73M2
GFR SERPL CREATININE-BSD FRML MDRD: 69 ML/MIN/1.73M2
GFR SERPL CREATININE-BSD FRML MDRD: 71 ML/MIN/1.73M2
GFR SERPL CREATININE-BSD FRML MDRD: 72 ML/MIN/1.73M2
GFR SERPL CREATININE-BSD FRML MDRD: 73 ML/MIN/1.73M2
GFR SERPL CREATININE-BSD FRML MDRD: 74 ML/MIN/1.73M2
GFR SERPL CREATININE-BSD FRML MDRD: 74 ML/MIN/1.73M2
GFR SERPL CREATININE-BSD FRML MDRD: 77 ML/MIN/1.73M2
GLUCOSE BLD-MCNC: 163 MG/DL (ref 70–125)
GLUCOSE BLD-MCNC: 70 MG/DL (ref 70–125)
GLUCOSE BLD-MCNC: 71 MG/DL (ref 70–125)
GLUCOSE BLD-MCNC: 81 MG/DL (ref 70–125)
GLUCOSE BLD-MCNC: 82 MG/DL (ref 70–125)
GLUCOSE BLD-MCNC: 83 MG/DL (ref 70–125)
GLUCOSE BLD-MCNC: 85 MG/DL (ref 70–125)
GLUCOSE BLD-MCNC: 85 MG/DL (ref 70–125)
GLUCOSE BLD-MCNC: 87 MG/DL (ref 70–125)
GLUCOSE BLD-MCNC: 89 MG/DL (ref 70–125)
GLUCOSE BLD-MCNC: 89 MG/DL (ref 70–125)
GLUCOSE BLD-MCNC: 92 MG/DL (ref 70–125)
GLUCOSE BLD-MCNC: 92 MG/DL (ref 70–125)
GLUCOSE BLD-MCNC: 96 MG/DL (ref 70–125)
GLUCOSE BLDC GLUCOMTR-MCNC: 85 MG/DL (ref 70–99)
GLUCOSE SERPL-MCNC: 69 MG/DL (ref 70–99)
GLUCOSE SERPL-MCNC: 81 MG/DL (ref 70–99)
GLUCOSE SERPL-MCNC: 88 MG/DL (ref 70–99)
GLUCOSE SERPL-MCNC: 97 MG/DL (ref 70–99)
GLUCOSE UR STRIP-MCNC: NEGATIVE MG/DL
GRANULAR CAST: 1 /LPF
HCT VFR BLD AUTO: 31.1 % (ref 40–53)
HCT VFR BLD AUTO: 31.4 % (ref 40–53)
HCT VFR BLD AUTO: 31.7 % (ref 40–53)
HCT VFR BLD AUTO: 31.8 % (ref 40–53)
HCT VFR BLD AUTO: 32 % (ref 40–53)
HCT VFR BLD AUTO: 33 % (ref 40–53)
HCT VFR BLD AUTO: 33.2 % (ref 40–53)
HCT VFR BLD AUTO: 34.4 % (ref 40–53)
HCT VFR BLD AUTO: 35.1 % (ref 40–53)
HCT VFR BLD AUTO: 36.5 % (ref 40–53)
HCT VFR BLD AUTO: 38 % (ref 40–53)
HCT VFR BLD AUTO: 38.4 % (ref 40–53)
HCT VFR BLD AUTO: 38.5 % (ref 40–53)
HCT VFR BLD AUTO: 39 % (ref 40–53)
HCT VFR BLD AUTO: 39.4 % (ref 40–53)
HGB BLD-MCNC: 10 G/DL (ref 13.3–17.7)
HGB BLD-MCNC: 10.2 G/DL (ref 13.3–17.7)
HGB BLD-MCNC: 10.3 G/DL (ref 13.3–17.7)
HGB BLD-MCNC: 10.3 G/DL (ref 13.3–17.7)
HGB BLD-MCNC: 10.4 G/DL (ref 13.3–17.7)
HGB BLD-MCNC: 10.6 G/DL (ref 13.3–17.7)
HGB BLD-MCNC: 10.9 G/DL (ref 13.3–17.7)
HGB BLD-MCNC: 11 G/DL (ref 13.3–17.7)
HGB BLD-MCNC: 11.1 G/DL (ref 13.3–17.7)
HGB BLD-MCNC: 11.3 G/DL (ref 13.3–17.7)
HGB BLD-MCNC: 11.6 G/DL (ref 13.3–17.7)
HGB BLD-MCNC: 11.8 G/DL (ref 13.3–17.7)
HGB BLD-MCNC: 12.3 G/DL (ref 13.3–17.7)
HGB BLD-MCNC: 12.6 G/DL (ref 13.3–17.7)
HGB BLD-MCNC: 12.6 G/DL (ref 13.3–17.7)
HGB BLD-MCNC: 12.9 G/DL (ref 13.3–17.7)
HGB UR QL STRIP: ABNORMAL
HGB UR QL STRIP: NEGATIVE
HOLD SPECIMEN: NORMAL
HYALINE CASTS: 6 /LPF
IMM GRANULOCYTES # BLD: 0 10E3/UL
IMM GRANULOCYTES # BLD: 0.1 10E3/UL
IMM GRANULOCYTES # BLD: 0.1 10E3/UL
IMM GRANULOCYTES NFR BLD: 0 %
IMM GRANULOCYTES NFR BLD: 0 %
IMM GRANULOCYTES NFR BLD: 1 %
INR PPP: 1.1 (ref 0.85–1.15)
INTERPRETATION ECG - MUSE: NORMAL
KETONES UR STRIP-MCNC: ABNORMAL MG/DL
KETONES UR STRIP-MCNC: NEGATIVE MG/DL
LEUKOCYTE ESTERASE UR QL STRIP: NEGATIVE
LVEF ECHO: NORMAL
LVEF ECHO: NORMAL
LYMPHOCYTES # BLD AUTO: 0.5 10E3/UL (ref 0.8–5.3)
LYMPHOCYTES # BLD AUTO: 1.1 10E3/UL (ref 0.8–5.3)
LYMPHOCYTES # BLD AUTO: 1.1 10E3/UL (ref 0.8–5.3)
LYMPHOCYTES # BLD AUTO: 1.2 10E3/UL (ref 0.8–5.3)
LYMPHOCYTES # BLD AUTO: 1.2 10E3/UL (ref 0.8–5.3)
LYMPHOCYTES NFR BLD AUTO: 14 %
LYMPHOCYTES NFR BLD AUTO: 14 %
LYMPHOCYTES NFR BLD AUTO: 15 %
LYMPHOCYTES NFR BLD AUTO: 18 %
LYMPHOCYTES NFR BLD AUTO: 5 %
MAGNESIUM SERPL-MCNC: 1.8 MG/DL (ref 1.8–2.6)
MAGNESIUM SERPL-MCNC: 1.9 MG/DL (ref 1.8–2.6)
MAGNESIUM SERPL-MCNC: 2 MG/DL (ref 1.8–2.6)
MAGNESIUM SERPL-MCNC: 2.1 MG/DL (ref 1.8–2.6)
MCH RBC QN AUTO: 29.5 PG (ref 26.5–33)
MCH RBC QN AUTO: 29.6 PG (ref 26.5–33)
MCH RBC QN AUTO: 29.8 PG (ref 26.5–33)
MCH RBC QN AUTO: 29.8 PG (ref 26.5–33)
MCH RBC QN AUTO: 29.9 PG (ref 26.5–33)
MCH RBC QN AUTO: 30 PG (ref 26.5–33)
MCH RBC QN AUTO: 30.1 PG (ref 26.5–33)
MCH RBC QN AUTO: 30.1 PG (ref 26.5–33)
MCH RBC QN AUTO: 30.2 PG (ref 26.5–33)
MCH RBC QN AUTO: 30.3 PG (ref 26.5–33)
MCH RBC QN AUTO: 30.4 PG (ref 26.5–33)
MCH RBC QN AUTO: 30.4 PG (ref 26.5–33)
MCH RBC QN AUTO: 30.6 PG (ref 26.5–33)
MCH RBC QN AUTO: 30.7 PG (ref 26.5–33)
MCHC RBC AUTO-ENTMCNC: 31.1 G/DL (ref 31.5–36.5)
MCHC RBC AUTO-ENTMCNC: 31.6 G/DL (ref 31.5–36.5)
MCHC RBC AUTO-ENTMCNC: 31.8 G/DL (ref 31.5–36.5)
MCHC RBC AUTO-ENTMCNC: 31.9 G/DL (ref 31.5–36.5)
MCHC RBC AUTO-ENTMCNC: 31.9 G/DL (ref 31.5–36.5)
MCHC RBC AUTO-ENTMCNC: 32.1 G/DL (ref 31.5–36.5)
MCHC RBC AUTO-ENTMCNC: 32.1 G/DL (ref 31.5–36.5)
MCHC RBC AUTO-ENTMCNC: 32.2 G/DL (ref 31.5–36.5)
MCHC RBC AUTO-ENTMCNC: 32.3 G/DL (ref 31.5–36.5)
MCHC RBC AUTO-ENTMCNC: 32.7 G/DL (ref 31.5–36.5)
MCHC RBC AUTO-ENTMCNC: 32.7 G/DL (ref 31.5–36.5)
MCHC RBC AUTO-ENTMCNC: 32.8 G/DL (ref 31.5–36.5)
MCHC RBC AUTO-ENTMCNC: 33 G/DL (ref 31.5–36.5)
MCV RBC AUTO: 92 FL (ref 78–100)
MCV RBC AUTO: 92 FL (ref 78–100)
MCV RBC AUTO: 93 FL (ref 78–100)
MCV RBC AUTO: 94 FL (ref 78–100)
MCV RBC AUTO: 95 FL (ref 78–100)
MDC_IDC_LEAD_IMPLANT_DT: NORMAL
MDC_IDC_LEAD_LOCATION: NORMAL
MDC_IDC_LEAD_LOCATION_DETAIL_1: NORMAL
MDC_IDC_LEAD_MFG: NORMAL
MDC_IDC_LEAD_MODEL: NORMAL
MDC_IDC_LEAD_POLARITY_TYPE: NORMAL
MDC_IDC_LEAD_SERIAL: NORMAL
MDC_IDC_LEAD_SPECIAL_FUNCTION: NORMAL
MDC_IDC_MSMT_BATTERY_REMAINING_PERCENTAGE: 45 %
MDC_IDC_MSMT_BATTERY_REMAINING_PERCENTAGE: 50 %
MDC_IDC_MSMT_BATTERY_STATUS: NORMAL
MDC_IDC_MSMT_BATTERY_STATUS: NORMAL
MDC_IDC_MSMT_CAP_CHARGE_TYPE: NORMAL
MDC_IDC_MSMT_LEADCHNL_RA_IMPEDANCE_VALUE: 488 OHM
MDC_IDC_MSMT_LEADCHNL_RA_IMPEDANCE_VALUE: 496 OHM
MDC_IDC_MSMT_LEADCHNL_RA_IMPEDANCE_VALUE: 499 OHM
MDC_IDC_MSMT_LEADCHNL_RA_LEAD_CHANNEL_STATUS: NORMAL
MDC_IDC_MSMT_LEADCHNL_RA_LEAD_CHANNEL_STATUS: NORMAL
MDC_IDC_MSMT_LEADCHNL_RA_PACING_THRESHOLD_AMPLITUDE: 1.2 V
MDC_IDC_MSMT_LEADCHNL_RA_PACING_THRESHOLD_AMPLITUDE: 1.3 V
MDC_IDC_MSMT_LEADCHNL_RA_PACING_THRESHOLD_PULSEWIDTH: 0.4 MS
MDC_IDC_MSMT_LEADCHNL_RA_PACING_THRESHOLD_PULSEWIDTH: 0.4 MS
MDC_IDC_MSMT_LEADCHNL_RA_SENSING_INTR_AMPL: 2.4 MV
MDC_IDC_MSMT_LEADCHNL_RV_IMPEDANCE_VALUE: 488 OHM
MDC_IDC_MSMT_LEADCHNL_RV_IMPEDANCE_VALUE: 502 OHM
MDC_IDC_MSMT_LEADCHNL_RV_IMPEDANCE_VALUE: 506 OHM
MDC_IDC_MSMT_LEADCHNL_RV_LEAD_CHANNEL_STATUS: NORMAL
MDC_IDC_MSMT_LEADCHNL_RV_LEAD_CHANNEL_STATUS: NORMAL
MDC_IDC_MSMT_LEADCHNL_RV_PACING_THRESHOLD_AMPLITUDE: 1 V
MDC_IDC_MSMT_LEADCHNL_RV_PACING_THRESHOLD_AMPLITUDE: 1.2 V
MDC_IDC_MSMT_LEADCHNL_RV_PACING_THRESHOLD_PULSEWIDTH: 0.4 MS
MDC_IDC_MSMT_LEADCHNL_RV_PACING_THRESHOLD_PULSEWIDTH: 0.4 MS
MDC_IDC_MSMT_LEADCHNL_RV_SENSING_INTR_AMPL: 9 MV
MDC_IDC_PG_IMPLANT_DTM: NORMAL
MDC_IDC_PG_MFG: NORMAL
MDC_IDC_PG_MODEL: NORMAL
MDC_IDC_PG_SERIAL: NORMAL
MDC_IDC_PG_TYPE: NORMAL
MDC_IDC_SESS_CLINIC_NAME: NORMAL
MDC_IDC_SESS_DTM: NORMAL
MDC_IDC_SESS_REPROGRAMMED: NO
MDC_IDC_SESS_REPROGRAMMED: NO
MDC_IDC_SESS_TYPE: NORMAL
MDC_IDC_SET_BRADY_AT_MODE_SWITCH_MODE: NORMAL
MDC_IDC_SET_BRADY_AT_MODE_SWITCH_RATE: 160 {BEATS}/MIN
MDC_IDC_SET_BRADY_HYSTRATE: 60 {BEATS}/MIN
MDC_IDC_SET_BRADY_LOWRATE: 60 {BEATS}/MIN
MDC_IDC_SET_BRADY_MAX_SENSOR_RATE: 115 {BEATS}/MIN
MDC_IDC_SET_BRADY_MAX_TRACKING_RATE: 120 {BEATS}/MIN
MDC_IDC_SET_BRADY_MODE: NORMAL
MDC_IDC_SET_BRADY_NIGHT_RATE: 60 {BEATS}/MIN
MDC_IDC_SET_BRADY_PAV_DELAY_HIGH: 170 MS
MDC_IDC_SET_BRADY_PAV_DELAY_LOW: 275 MS
MDC_IDC_SET_BRADY_SAV_DELAY_HIGH: 170 MS
MDC_IDC_SET_BRADY_SAV_DELAY_LOW: 275 MS
MDC_IDC_SET_CRT_PACED_CHAMBERS: NORMAL
MDC_IDC_SET_LEADCHNL_LV_PACING_CATHODE_ELECTRODE_1: NORMAL
MDC_IDC_SET_LEADCHNL_LV_PACING_CATHODE_LOCATION_1: NORMAL
MDC_IDC_SET_LEADCHNL_LV_PACING_POLARITY: NORMAL
MDC_IDC_SET_LEADCHNL_LV_SENSING_CATHODE_ELECTRODE_1: NORMAL
MDC_IDC_SET_LEADCHNL_LV_SENSING_CATHODE_LOCATION_1: NORMAL
MDC_IDC_SET_LEADCHNL_LV_SENSING_POLARITY: NORMAL
MDC_IDC_SET_LEADCHNL_RA_PACING_AMPLITUDE: NORMAL
MDC_IDC_SET_LEADCHNL_RA_PACING_POLARITY: NORMAL
MDC_IDC_SET_LEADCHNL_RA_PACING_PULSEWIDTH: 0.4 MS
MDC_IDC_SET_LEADCHNL_RA_SENSING_ADAPTATION_MODE: NORMAL
MDC_IDC_SET_LEADCHNL_RA_SENSING_POLARITY: NORMAL
MDC_IDC_SET_LEADCHNL_RA_SENSING_SENSITIVITY: NORMAL
MDC_IDC_SET_LEADCHNL_RV_PACING_AMPLITUDE: 2.5 V
MDC_IDC_SET_LEADCHNL_RV_PACING_CAPTURE_MODE: NORMAL
MDC_IDC_SET_LEADCHNL_RV_PACING_POLARITY: NORMAL
MDC_IDC_SET_LEADCHNL_RV_PACING_PULSEWIDTH: 0.4 MS
MDC_IDC_SET_LEADCHNL_RV_SENSING_ADAPTATION_MODE: NORMAL
MDC_IDC_SET_LEADCHNL_RV_SENSING_POLARITY: NORMAL
MDC_IDC_SET_LEADCHNL_RV_SENSING_SENSITIVITY: NORMAL
MDC_IDC_STAT_AT_BURDEN_PERCENT: 0 %
MDC_IDC_STAT_AT_BURDEN_PERCENT: 0 %
MDC_IDC_STAT_AT_DTM_END: NORMAL
MDC_IDC_STAT_AT_DTM_END: NORMAL
MDC_IDC_STAT_AT_DTM_START: NORMAL
MDC_IDC_STAT_AT_DTM_START: NORMAL
MDC_IDC_STAT_AT_MODE_SW_COUNT_PER_DAY: 0
MDC_IDC_STAT_AT_MODE_SW_COUNT_PER_DAY: 0
MDC_IDC_STAT_AT_MODE_SW_PERCENT_TIME_PER_DAY: 0 %
MDC_IDC_STAT_AT_MODE_SW_PERCENT_TIME_PER_DAY: 0 %
MDC_IDC_STAT_BRADY_AP_VP_PERCENT: 0 %
MDC_IDC_STAT_BRADY_AP_VP_PERCENT: 0 %
MDC_IDC_STAT_BRADY_AP_VS_PERCENT: 96 %
MDC_IDC_STAT_BRADY_AP_VS_PERCENT: 98 %
MDC_IDC_STAT_BRADY_AS_VP_PERCENT: 0 %
MDC_IDC_STAT_BRADY_AS_VP_PERCENT: 0 %
MDC_IDC_STAT_BRADY_AS_VS_PERCENT: 2 %
MDC_IDC_STAT_BRADY_AS_VS_PERCENT: 4 %
MDC_IDC_STAT_BRADY_DTM_END: NORMAL
MDC_IDC_STAT_BRADY_DTM_END: NORMAL
MDC_IDC_STAT_BRADY_DTM_START: NORMAL
MDC_IDC_STAT_BRADY_DTM_START: NORMAL
MDC_IDC_STAT_BRADY_RA_PERCENT_PACED: 98 %
MDC_IDC_STAT_BRADY_RA_PERCENT_PACED: 98 %
MDC_IDC_STAT_BRADY_RA_PERCENT_PACED: 99 %
MDC_IDC_STAT_BRADY_RV_PERCENT_PACED: 0 %
MDC_IDC_STAT_CRT_DTM_END: NORMAL
MDC_IDC_STAT_CRT_DTM_END: NORMAL
MDC_IDC_STAT_CRT_DTM_START: NORMAL
MDC_IDC_STAT_CRT_DTM_START: NORMAL
MONOCYTES # BLD AUTO: 0.5 10E3/UL (ref 0–1.3)
MONOCYTES # BLD AUTO: 0.6 10E3/UL (ref 0–1.3)
MONOCYTES # BLD AUTO: 0.6 10E3/UL (ref 0–1.3)
MONOCYTES # BLD AUTO: 0.7 10E3/UL (ref 0–1.3)
MONOCYTES # BLD AUTO: 0.7 10E3/UL (ref 0–1.3)
MONOCYTES NFR BLD AUTO: 10 %
MONOCYTES NFR BLD AUTO: 6 %
MONOCYTES NFR BLD AUTO: 7 %
MONOCYTES NFR BLD AUTO: 9 %
MONOCYTES NFR BLD AUTO: 9 %
MUCOUS THREADS #/AREA URNS LPF: PRESENT /LPF
MUCOUS THREADS #/AREA URNS LPF: PRESENT /LPF
NEUTROPHILS # BLD AUTO: 4.3 10E3/UL (ref 1.6–8.3)
NEUTROPHILS # BLD AUTO: 5.6 10E3/UL (ref 1.6–8.3)
NEUTROPHILS # BLD AUTO: 5.7 10E3/UL (ref 1.6–8.3)
NEUTROPHILS # BLD AUTO: 6.4 10E3/UL (ref 1.6–8.3)
NEUTROPHILS # BLD AUTO: 7.6 10E3/UL (ref 1.6–8.3)
NEUTROPHILS NFR BLD AUTO: 65 %
NEUTROPHILS NFR BLD AUTO: 71 %
NEUTROPHILS NFR BLD AUTO: 74 %
NEUTROPHILS NFR BLD AUTO: 76 %
NEUTROPHILS NFR BLD AUTO: 87 %
NITRATE UR QL: NEGATIVE
NRBC # BLD AUTO: 0 10E3/UL
NRBC BLD AUTO-RTO: 0 /100
P AXIS - MUSE: -14 DEGREES
P AXIS - MUSE: -20 DEGREES
P AXIS - MUSE: 50 DEGREES
PH UR STRIP: 5.5 [PH] (ref 5–7)
PH UR STRIP: 5.5 [PH] (ref 5–7)
PH UR STRIP: 7 [PH] (ref 5–7)
PH UR STRIP: 7 [PH] (ref 5–7)
PLATELET # BLD AUTO: 170 10E3/UL (ref 150–450)
PLATELET # BLD AUTO: 170 10E3/UL (ref 150–450)
PLATELET # BLD AUTO: 171 10E3/UL (ref 150–450)
PLATELET # BLD AUTO: 175 10E3/UL (ref 150–450)
PLATELET # BLD AUTO: 186 10E3/UL (ref 150–450)
PLATELET # BLD AUTO: 187 10E3/UL (ref 150–450)
PLATELET # BLD AUTO: 190 10E3/UL (ref 150–450)
PLATELET # BLD AUTO: 190 10E3/UL (ref 150–450)
PLATELET # BLD AUTO: 195 10E3/UL (ref 150–450)
PLATELET # BLD AUTO: 196 10E3/UL (ref 150–450)
PLATELET # BLD AUTO: 201 10E3/UL (ref 150–450)
PLATELET # BLD AUTO: 203 10E3/UL (ref 150–450)
PLATELET # BLD AUTO: 204 10E3/UL (ref 150–450)
PLATELET # BLD AUTO: 204 10E3/UL (ref 150–450)
PLATELET # BLD AUTO: 213 10E3/UL (ref 150–450)
PLATELET # BLD AUTO: 223 10E3/UL (ref 150–450)
PLATELET # BLD AUTO: 225 10E3/UL (ref 150–450)
PLATELET # BLD AUTO: 235 10E3/UL (ref 150–450)
PLATELET # BLD AUTO: 245 10E3/UL (ref 150–450)
POTASSIUM BLD-SCNC: 3.2 MMOL/L (ref 3.5–5)
POTASSIUM BLD-SCNC: 3.3 MMOL/L (ref 3.5–5)
POTASSIUM BLD-SCNC: 3.4 MMOL/L (ref 3.5–5)
POTASSIUM BLD-SCNC: 3.4 MMOL/L (ref 3.5–5)
POTASSIUM BLD-SCNC: 3.5 MMOL/L (ref 3.5–5)
POTASSIUM BLD-SCNC: 3.6 MMOL/L (ref 3.5–5)
POTASSIUM BLD-SCNC: 3.6 MMOL/L (ref 3.5–5)
POTASSIUM BLD-SCNC: 3.7 MMOL/L (ref 3.5–5)
POTASSIUM BLD-SCNC: 3.7 MMOL/L (ref 3.5–5)
POTASSIUM BLD-SCNC: 3.8 MMOL/L (ref 3.5–5)
POTASSIUM BLD-SCNC: 3.8 MMOL/L (ref 3.5–5)
POTASSIUM BLD-SCNC: 3.9 MMOL/L (ref 3.5–5)
POTASSIUM BLD-SCNC: 4.1 MMOL/L (ref 3.5–5)
POTASSIUM BLD-SCNC: 4.3 MMOL/L (ref 3.5–5)
POTASSIUM SERPL-SCNC: 3.7 MMOL/L (ref 3.4–5.3)
POTASSIUM SERPL-SCNC: 3.7 MMOL/L (ref 3.4–5.3)
POTASSIUM SERPL-SCNC: 4.1 MMOL/L (ref 3.4–5.3)
POTASSIUM SERPL-SCNC: 4.3 MMOL/L (ref 3.4–5.3)
PR INTERVAL - MUSE: 106 MS
PR INTERVAL - MUSE: 154 MS
PR INTERVAL - MUSE: 206 MS
PROCALCITONIN SERPL IA-MCNC: 0.06 NG/ML
PROCALCITONIN SERPL-MCNC: 0.03 NG/ML (ref 0–0.49)
PROCALCITONIN SERPL-MCNC: 0.09 NG/ML (ref 0–0.49)
PROT SERPL-MCNC: 5 G/DL (ref 6–8)
PROT SERPL-MCNC: 5.8 G/DL (ref 6–8)
PROT SERPL-MCNC: 6.1 G/DL (ref 6–8)
PROT SERPL-MCNC: 6.4 G/DL (ref 6.4–8.3)
QRS DURATION - MUSE: 80 MS
QRS DURATION - MUSE: 82 MS
QRS DURATION - MUSE: 86 MS
QT - MUSE: 372 MS
QT - MUSE: 386 MS
QT - MUSE: 390 MS
QTC - MUSE: 449 MS
QTC - MUSE: 450 MS
QTC - MUSE: 479 MS
R AXIS - MUSE: -13 DEGREES
R AXIS - MUSE: -14 DEGREES
R AXIS - MUSE: -9 DEGREES
RBC # BLD AUTO: 3.34 10E6/UL (ref 4.4–5.9)
RBC # BLD AUTO: 3.35 10E6/UL (ref 4.4–5.9)
RBC # BLD AUTO: 3.4 10E6/UL (ref 4.4–5.9)
RBC # BLD AUTO: 3.43 10E6/UL (ref 4.4–5.9)
RBC # BLD AUTO: 3.45 10E6/UL (ref 4.4–5.9)
RBC # BLD AUTO: 3.52 10E6/UL (ref 4.4–5.9)
RBC # BLD AUTO: 3.53 10E6/UL (ref 4.4–5.9)
RBC # BLD AUTO: 3.56 10E6/UL (ref 4.4–5.9)
RBC # BLD AUTO: 3.56 10E6/UL (ref 4.4–5.9)
RBC # BLD AUTO: 3.74 10E6/UL (ref 4.4–5.9)
RBC # BLD AUTO: 3.76 10E6/UL (ref 4.4–5.9)
RBC # BLD AUTO: 3.87 10E6/UL (ref 4.4–5.9)
RBC # BLD AUTO: 3.99 10E6/UL (ref 4.4–5.9)
RBC # BLD AUTO: 4.13 10E6/UL (ref 4.4–5.9)
RBC # BLD AUTO: 4.14 10E6/UL (ref 4.4–5.9)
RBC # BLD AUTO: 4.16 10E6/UL (ref 4.4–5.9)
RBC # BLD AUTO: 4.25 10E6/UL (ref 4.4–5.9)
RBC URINE: 1 /HPF
RBC URINE: <1 /HPF
RBC URINE: >182 /HPF
RSV RNA SPEC NAA+PROBE: NEGATIVE
SARS-COV-2 RNA RESP QL NAA+PROBE: NEGATIVE
SARS-COV-2 RNA RESP QL NAA+PROBE: NEGATIVE
SARS-COV-2 RNA RESP QL NAA+PROBE: POSITIVE
SODIUM SERPL-SCNC: 140 MMOL/L (ref 136–145)
SODIUM SERPL-SCNC: 141 MMOL/L (ref 136–145)
SODIUM SERPL-SCNC: 142 MMOL/L (ref 136–145)
SODIUM SERPL-SCNC: 143 MMOL/L (ref 136–145)
SODIUM SERPL-SCNC: 144 MMOL/L (ref 136–145)
SP GR UR STRIP: 1.01 (ref 1–1.03)
SP GR UR STRIP: 1.01 (ref 1–1.03)
SP GR UR STRIP: 1.02 (ref 1–1.03)
SP GR UR STRIP: 1.02 (ref 1–1.03)
SQUAMOUS EPITHELIAL: 1 /HPF
SQUAMOUS EPITHELIAL: 2 /HPF
SYSTOLIC BLOOD PRESSURE - MUSE: 233 MMHG
SYSTOLIC BLOOD PRESSURE - MUSE: NORMAL MMHG
SYSTOLIC BLOOD PRESSURE - MUSE: NORMAL MMHG
T AXIS - MUSE: -11 DEGREES
T AXIS - MUSE: -51 DEGREES
T AXIS - MUSE: 67 DEGREES
TROPONIN I SERPL-MCNC: 0.02 NG/ML (ref 0–0.29)
TROPONIN I SERPL-MCNC: 0.02 NG/ML (ref 0–0.29)
TROPONIN T SERPL HS-MCNC: 39 NG/L
TROPONIN T SERPL HS-MCNC: 39 NG/L
TSH SERPL DL<=0.005 MIU/L-ACNC: 3.98 UIU/ML (ref 0.3–5)
UROBILINOGEN UR STRIP-MCNC: <2 MG/DL
VENTRICULAR RATE- MUSE: 80 BPM
VENTRICULAR RATE- MUSE: 88 BPM
VENTRICULAR RATE- MUSE: 93 BPM
WBC # BLD AUTO: 5.7 10E3/UL (ref 4–11)
WBC # BLD AUTO: 5.7 10E3/UL (ref 4–11)
WBC # BLD AUTO: 6.1 10E3/UL (ref 4–11)
WBC # BLD AUTO: 6.2 10E3/UL (ref 4–11)
WBC # BLD AUTO: 6.4 10E3/UL (ref 4–11)
WBC # BLD AUTO: 6.4 10E3/UL (ref 4–11)
WBC # BLD AUTO: 6.5 10E3/UL (ref 4–11)
WBC # BLD AUTO: 6.5 10E3/UL (ref 4–11)
WBC # BLD AUTO: 6.6 10E3/UL (ref 4–11)
WBC # BLD AUTO: 6.8 10E3/UL (ref 4–11)
WBC # BLD AUTO: 7.5 10E3/UL (ref 4–11)
WBC # BLD AUTO: 7.7 10E3/UL (ref 4–11)
WBC # BLD AUTO: 7.8 10E3/UL (ref 4–11)
WBC # BLD AUTO: 8.3 10E3/UL (ref 4–11)
WBC # BLD AUTO: 8.8 10E3/UL (ref 4–11)
WBC URINE: 3 /HPF
WBC URINE: 3 /HPF
WBC URINE: <1 /HPF

## 2022-01-01 PROCEDURE — G0378 HOSPITAL OBSERVATION PER HR: HCPCS

## 2022-01-01 PROCEDURE — 97530 THERAPEUTIC ACTIVITIES: CPT | Mod: GP

## 2022-01-01 PROCEDURE — 99213 OFFICE O/P EST LOW 20 MIN: CPT | Performed by: NURSE PRACTITIONER

## 2022-01-01 PROCEDURE — P9604 ONE-WAY ALLOW PRORATED TRIP: HCPCS | Performed by: NURSE PRACTITIONER

## 2022-01-01 PROCEDURE — 250N000013 HC RX MED GY IP 250 OP 250 PS 637: Performed by: INTERNAL MEDICINE

## 2022-01-01 PROCEDURE — 99310 SBSQ NF CARE HIGH MDM 45: CPT | Performed by: NURSE PRACTITIONER

## 2022-01-01 PROCEDURE — 93294 REM INTERROG EVL PM/LDLS PM: CPT | Performed by: INTERNAL MEDICINE

## 2022-01-01 PROCEDURE — 96372 THER/PROPH/DIAG INJ SC/IM: CPT | Mod: XU | Performed by: INTERNAL MEDICINE

## 2022-01-01 PROCEDURE — C9803 HOPD COVID-19 SPEC COLLECT: HCPCS

## 2022-01-01 PROCEDURE — 81001 URINALYSIS AUTO W/SCOPE: CPT | Performed by: EMERGENCY MEDICINE

## 2022-01-01 PROCEDURE — 85049 AUTOMATED PLATELET COUNT: CPT | Performed by: EMERGENCY MEDICINE

## 2022-01-01 PROCEDURE — G1010 CDSM STANSON: HCPCS

## 2022-01-01 PROCEDURE — 96372 THER/PROPH/DIAG INJ SC/IM: CPT | Performed by: STUDENT IN AN ORGANIZED HEALTH CARE EDUCATION/TRAINING PROGRAM

## 2022-01-01 PROCEDURE — 250N000011 HC RX IP 250 OP 636: Performed by: INTERNAL MEDICINE

## 2022-01-01 PROCEDURE — 36415 COLL VENOUS BLD VENIPUNCTURE: CPT | Performed by: NURSE PRACTITIONER

## 2022-01-01 PROCEDURE — 99217 PR OBSERVATION CARE DISCHARGE: CPT | Performed by: HOSPITALIST

## 2022-01-01 PROCEDURE — 70450 CT HEAD/BRAIN W/O DYE: CPT

## 2022-01-01 PROCEDURE — 99225 PR SUBSEQUENT OBSERVATION CARE,LEVEL II: CPT | Performed by: FAMILY MEDICINE

## 2022-01-01 PROCEDURE — 95816 EEG AWAKE AND DROWSY: CPT

## 2022-01-01 PROCEDURE — 36415 COLL VENOUS BLD VENIPUNCTURE: CPT | Performed by: EMERGENCY MEDICINE

## 2022-01-01 PROCEDURE — 80048 BASIC METABOLIC PNL TOTAL CA: CPT | Performed by: EMERGENCY MEDICINE

## 2022-01-01 PROCEDURE — 99309 SBSQ NF CARE MODERATE MDM 30: CPT | Performed by: NURSE PRACTITIONER

## 2022-01-01 PROCEDURE — 99214 OFFICE O/P EST MOD 30 MIN: CPT | Performed by: NURSE PRACTITIONER

## 2022-01-01 PROCEDURE — 85027 COMPLETE CBC AUTOMATED: CPT | Performed by: INTERNAL MEDICINE

## 2022-01-01 PROCEDURE — 85027 COMPLETE CBC AUTOMATED: CPT | Performed by: NURSE PRACTITIONER

## 2022-01-01 PROCEDURE — 97110 THERAPEUTIC EXERCISES: CPT | Mod: GP

## 2022-01-01 PROCEDURE — 83735 ASSAY OF MAGNESIUM: CPT | Performed by: INTERNAL MEDICINE

## 2022-01-01 PROCEDURE — 85025 COMPLETE CBC W/AUTO DIFF WBC: CPT | Performed by: EMERGENCY MEDICINE

## 2022-01-01 PROCEDURE — 99215 OFFICE O/P EST HI 40 MIN: CPT | Performed by: PSYCHIATRY & NEUROLOGY

## 2022-01-01 PROCEDURE — 80048 BASIC METABOLIC PNL TOTAL CA: CPT | Performed by: NURSE PRACTITIONER

## 2022-01-01 PROCEDURE — 250N000013 HC RX MED GY IP 250 OP 250 PS 637: Performed by: STUDENT IN AN ORGANIZED HEALTH CARE EDUCATION/TRAINING PROGRAM

## 2022-01-01 PROCEDURE — 97166 OT EVAL MOD COMPLEX 45 MIN: CPT | Mod: GO

## 2022-01-01 PROCEDURE — 84145 PROCALCITONIN (PCT): CPT | Performed by: EMERGENCY MEDICINE

## 2022-01-01 PROCEDURE — 81003 URINALYSIS AUTO W/O SCOPE: CPT | Performed by: FAMILY MEDICINE

## 2022-01-01 PROCEDURE — 85025 COMPLETE CBC W/AUTO DIFF WBC: CPT | Performed by: INTERNAL MEDICINE

## 2022-01-01 PROCEDURE — 96372 THER/PROPH/DIAG INJ SC/IM: CPT | Performed by: INTERNAL MEDICINE

## 2022-01-01 PROCEDURE — 93005 ELECTROCARDIOGRAM TRACING: CPT | Performed by: EMERGENCY MEDICINE

## 2022-01-01 PROCEDURE — 120N000001 HC R&B MED SURG/OB

## 2022-01-01 PROCEDURE — 80053 COMPREHEN METABOLIC PANEL: CPT | Performed by: INTERNAL MEDICINE

## 2022-01-01 PROCEDURE — 258N000003 HC RX IP 258 OP 636: Performed by: INTERNAL MEDICINE

## 2022-01-01 PROCEDURE — 93296 REM INTERROG EVL PM/IDS: CPT | Performed by: INTERNAL MEDICINE

## 2022-01-01 PROCEDURE — 85049 AUTOMATED PLATELET COUNT: CPT | Performed by: STUDENT IN AN ORGANIZED HEALTH CARE EDUCATION/TRAINING PROGRAM

## 2022-01-01 PROCEDURE — 96361 HYDRATE IV INFUSION ADD-ON: CPT

## 2022-01-01 PROCEDURE — 82565 ASSAY OF CREATININE: CPT | Performed by: STUDENT IN AN ORGANIZED HEALTH CARE EDUCATION/TRAINING PROGRAM

## 2022-01-01 PROCEDURE — 250N000013 HC RX MED GY IP 250 OP 250 PS 637: Performed by: FAMILY MEDICINE

## 2022-01-01 PROCEDURE — 71046 X-RAY EXAM CHEST 2 VIEWS: CPT

## 2022-01-01 PROCEDURE — 258N000001 HC RX 258: Performed by: STUDENT IN AN ORGANIZED HEALTH CARE EDUCATION/TRAINING PROGRAM

## 2022-01-01 PROCEDURE — 82565 ASSAY OF CREATININE: CPT | Performed by: NURSE PRACTITIONER

## 2022-01-01 PROCEDURE — 97112 NEUROMUSCULAR REEDUCATION: CPT | Mod: GP

## 2022-01-01 PROCEDURE — 87040 BLOOD CULTURE FOR BACTERIA: CPT | Performed by: STUDENT IN AN ORGANIZED HEALTH CARE EDUCATION/TRAINING PROGRAM

## 2022-01-01 PROCEDURE — 87635 SARS-COV-2 COVID-19 AMP PRB: CPT | Performed by: FAMILY MEDICINE

## 2022-01-01 PROCEDURE — 99220 PR INITIAL OBSERVATION CARE,LEVEL III: CPT | Performed by: INTERNAL MEDICINE

## 2022-01-01 PROCEDURE — 36415 COLL VENOUS BLD VENIPUNCTURE: CPT | Performed by: STUDENT IN AN ORGANIZED HEALTH CARE EDUCATION/TRAINING PROGRAM

## 2022-01-01 PROCEDURE — 80048 BASIC METABOLIC PNL TOTAL CA: CPT | Performed by: INTERNAL MEDICINE

## 2022-01-01 PROCEDURE — 250N000013 HC RX MED GY IP 250 OP 250 PS 637: Performed by: EMERGENCY MEDICINE

## 2022-01-01 PROCEDURE — 250N000011 HC RX IP 250 OP 636: Performed by: STUDENT IN AN ORGANIZED HEALTH CARE EDUCATION/TRAINING PROGRAM

## 2022-01-01 PROCEDURE — 82248 BILIRUBIN DIRECT: CPT | Performed by: EMERGENCY MEDICINE

## 2022-01-01 PROCEDURE — 82310 ASSAY OF CALCIUM: CPT | Performed by: NURSE PRACTITIONER

## 2022-01-01 PROCEDURE — 84132 ASSAY OF SERUM POTASSIUM: CPT | Mod: 91 | Performed by: INTERNAL MEDICINE

## 2022-01-01 PROCEDURE — 99225 PR SUBSEQUENT OBSERVATION CARE,LEVEL II: CPT | Performed by: INTERNAL MEDICINE

## 2022-01-01 PROCEDURE — 96376 TX/PRO/DX INJ SAME DRUG ADON: CPT

## 2022-01-01 PROCEDURE — 86140 C-REACTIVE PROTEIN: CPT | Performed by: INTERNAL MEDICINE

## 2022-01-01 PROCEDURE — 99214 OFFICE O/P EST MOD 30 MIN: CPT | Performed by: PSYCHIATRY & NEUROLOGY

## 2022-01-01 PROCEDURE — 92610 EVALUATE SWALLOWING FUNCTION: CPT | Mod: GN

## 2022-01-01 PROCEDURE — 99214 OFFICE O/P EST MOD 30 MIN: CPT | Mod: 95 | Performed by: PSYCHIATRY & NEUROLOGY

## 2022-01-01 PROCEDURE — 97116 GAIT TRAINING THERAPY: CPT | Mod: GP

## 2022-01-01 PROCEDURE — 96374 THER/PROPH/DIAG INJ IV PUSH: CPT | Mod: XU

## 2022-01-01 PROCEDURE — 250N000011 HC RX IP 250 OP 636: Performed by: EMERGENCY MEDICINE

## 2022-01-01 PROCEDURE — 82310 ASSAY OF CALCIUM: CPT | Performed by: INTERNAL MEDICINE

## 2022-01-01 PROCEDURE — 99226 PR SUBSEQUENT OBSERVATION CARE,LEVEL III: CPT | Performed by: FAMILY MEDICINE

## 2022-01-01 PROCEDURE — 93005 ELECTROCARDIOGRAM TRACING: CPT | Performed by: STUDENT IN AN ORGANIZED HEALTH CARE EDUCATION/TRAINING PROGRAM

## 2022-01-01 PROCEDURE — 84132 ASSAY OF SERUM POTASSIUM: CPT | Performed by: INTERNAL MEDICINE

## 2022-01-01 PROCEDURE — 250N000013 HC RX MED GY IP 250 OP 250 PS 637

## 2022-01-01 PROCEDURE — 95816 EEG AWAKE AND DROWSY: CPT | Mod: 26 | Performed by: PSYCHIATRY & NEUROLOGY

## 2022-01-01 PROCEDURE — 84484 ASSAY OF TROPONIN QUANT: CPT | Performed by: EMERGENCY MEDICINE

## 2022-01-01 PROCEDURE — 36415 COLL VENOUS BLD VENIPUNCTURE: CPT | Performed by: INTERNAL MEDICINE

## 2022-01-01 PROCEDURE — 99215 OFFICE O/P EST HI 40 MIN: CPT | Performed by: NURSE PRACTITIONER

## 2022-01-01 PROCEDURE — 96360 HYDRATION IV INFUSION INIT: CPT | Mod: XU

## 2022-01-01 PROCEDURE — 84145 PROCALCITONIN (PCT): CPT | Performed by: INTERNAL MEDICINE

## 2022-01-01 PROCEDURE — 71045 X-RAY EXAM CHEST 1 VIEW: CPT

## 2022-01-01 PROCEDURE — 85014 HEMATOCRIT: CPT | Performed by: STUDENT IN AN ORGANIZED HEALTH CARE EDUCATION/TRAINING PROGRAM

## 2022-01-01 PROCEDURE — 99223 1ST HOSP IP/OBS HIGH 75: CPT | Mod: AI | Performed by: STUDENT IN AN ORGANIZED HEALTH CARE EDUCATION/TRAINING PROGRAM

## 2022-01-01 PROCEDURE — 83735 ASSAY OF MAGNESIUM: CPT

## 2022-01-01 PROCEDURE — 96361 HYDRATE IV INFUSION ADD-ON: CPT | Mod: XU

## 2022-01-01 PROCEDURE — 250N000013 HC RX MED GY IP 250 OP 250 PS 637: Performed by: HOSPITALIST

## 2022-01-01 PROCEDURE — P9603 ONE-WAY ALLOW PRORATED MILES: HCPCS | Performed by: NURSE PRACTITIONER

## 2022-01-01 PROCEDURE — 85610 PROTHROMBIN TIME: CPT | Performed by: EMERGENCY MEDICINE

## 2022-01-01 PROCEDURE — 85004 AUTOMATED DIFF WBC COUNT: CPT | Performed by: EMERGENCY MEDICINE

## 2022-01-01 PROCEDURE — 99225 PR SUBSEQUENT OBSERVATION CARE,LEVEL II: CPT | Performed by: HOSPITALIST

## 2022-01-01 PROCEDURE — 258N000003 HC RX IP 258 OP 636: Performed by: EMERGENCY MEDICINE

## 2022-01-01 PROCEDURE — 84443 ASSAY THYROID STIM HORMONE: CPT | Performed by: EMERGENCY MEDICINE

## 2022-01-01 PROCEDURE — 82962 GLUCOSE BLOOD TEST: CPT

## 2022-01-01 PROCEDURE — 99285 EMERGENCY DEPT VISIT HI MDM: CPT | Mod: 25

## 2022-01-01 PROCEDURE — 999N000127 HC STATISTIC PERIPHERAL IV START W US GUIDANCE

## 2022-01-01 PROCEDURE — 87637 SARSCOV2&INF A&B&RSV AMP PRB: CPT | Performed by: EMERGENCY MEDICINE

## 2022-01-01 PROCEDURE — 255N000002 HC RX 255 OP 636: Performed by: INTERNAL MEDICINE

## 2022-01-01 PROCEDURE — 83735 ASSAY OF MAGNESIUM: CPT | Performed by: EMERGENCY MEDICINE

## 2022-01-01 PROCEDURE — 80053 COMPREHEN METABOLIC PANEL: CPT | Performed by: EMERGENCY MEDICINE

## 2022-01-01 PROCEDURE — 99284 EMERGENCY DEPT VISIT MOD MDM: CPT | Mod: 25

## 2022-01-01 PROCEDURE — 99217 PR OBSERVATION CARE DISCHARGE: CPT | Performed by: FAMILY MEDICINE

## 2022-01-01 PROCEDURE — 85049 AUTOMATED PLATELET COUNT: CPT | Performed by: INTERNAL MEDICINE

## 2022-01-01 PROCEDURE — 99226 PR SUBSEQUENT OBSERVATION CARE,LEVEL III: CPT | Performed by: INTERNAL MEDICINE

## 2022-01-01 PROCEDURE — 82310 ASSAY OF CALCIUM: CPT | Performed by: STUDENT IN AN ORGANIZED HEALTH CARE EDUCATION/TRAINING PROGRAM

## 2022-01-01 PROCEDURE — 82550 ASSAY OF CK (CPK): CPT | Performed by: NURSE PRACTITIONER

## 2022-01-01 PROCEDURE — 99417 PROLNG OP E/M EACH 15 MIN: CPT | Performed by: PSYCHIATRY & NEUROLOGY

## 2022-01-01 PROCEDURE — 97535 SELF CARE MNGMENT TRAINING: CPT | Mod: GO

## 2022-01-01 PROCEDURE — 99285 EMERGENCY DEPT VISIT HI MDM: CPT | Mod: CS,25

## 2022-01-01 PROCEDURE — 96376 TX/PRO/DX INJ SAME DRUG ADON: CPT | Mod: XU

## 2022-01-01 PROCEDURE — 83880 ASSAY OF NATRIURETIC PEPTIDE: CPT | Performed by: EMERGENCY MEDICINE

## 2022-01-01 PROCEDURE — 93306 TTE W/DOPPLER COMPLETE: CPT | Mod: 26 | Performed by: INTERNAL MEDICINE

## 2022-01-01 PROCEDURE — 87635 SARS-COV-2 COVID-19 AMP PRB: CPT | Performed by: EMERGENCY MEDICINE

## 2022-01-01 PROCEDURE — 83880 ASSAY OF NATRIURETIC PEPTIDE: CPT | Mod: GZ | Performed by: EMERGENCY MEDICINE

## 2022-01-01 PROCEDURE — 97162 PT EVAL MOD COMPLEX 30 MIN: CPT | Mod: GP

## 2022-01-01 PROCEDURE — 85018 HEMOGLOBIN: CPT | Performed by: INTERNAL MEDICINE

## 2022-01-01 PROCEDURE — 86140 C-REACTIVE PROTEIN: CPT | Performed by: EMERGENCY MEDICINE

## 2022-01-01 PROCEDURE — 82077 ASSAY SPEC XCP UR&BREATH IA: CPT | Performed by: EMERGENCY MEDICINE

## 2022-01-01 PROCEDURE — 99207 CARDIAC DEVICE CHECK - REMOTE: CPT | Performed by: INTERNAL MEDICINE

## 2022-01-01 PROCEDURE — 99316 NF DSCHRG MGMT 30 MIN+: CPT | Performed by: NURSE PRACTITIONER

## 2022-01-01 PROCEDURE — 96374 THER/PROPH/DIAG INJ IV PUSH: CPT

## 2022-01-01 PROCEDURE — 93880 EXTRACRANIAL BILAT STUDY: CPT

## 2022-01-01 RX ORDER — POTASSIUM CHLORIDE 1500 MG/1
40 TABLET, EXTENDED RELEASE ORAL ONCE
Status: COMPLETED | OUTPATIENT
Start: 2022-01-01 | End: 2022-01-01

## 2022-01-01 RX ORDER — METOPROLOL SUCCINATE 25 MG/1
37.5 TABLET, EXTENDED RELEASE ORAL DAILY
DISCHARGE
Start: 2022-01-01 | End: 2022-01-01

## 2022-01-01 RX ORDER — METOPROLOL SUCCINATE 25 MG/1
37.5 TABLET, EXTENDED RELEASE ORAL DAILY
Status: ON HOLD | COMMUNITY
End: 2022-01-01

## 2022-01-01 RX ORDER — HYDRALAZINE HCL 10 MG
5 TABLET ORAL ONCE
Status: COMPLETED | OUTPATIENT
Start: 2022-01-01 | End: 2022-01-01

## 2022-01-01 RX ORDER — ENOXAPARIN SODIUM 100 MG/ML
40 INJECTION SUBCUTANEOUS EVERY 24 HOURS
Status: DISCONTINUED | OUTPATIENT
Start: 2022-01-01 | End: 2022-01-01 | Stop reason: HOSPADM

## 2022-01-01 RX ORDER — METOPROLOL SUCCINATE 50 MG/1
50 TABLET, EXTENDED RELEASE ORAL ONCE
Status: COMPLETED | OUTPATIENT
Start: 2022-01-01 | End: 2022-01-01

## 2022-01-01 RX ORDER — TAMSULOSIN HYDROCHLORIDE 0.4 MG/1
0.4 CAPSULE ORAL DAILY
COMMUNITY
End: 2022-01-01

## 2022-01-01 RX ORDER — METOPROLOL SUCCINATE 25 MG/1
50 TABLET, EXTENDED RELEASE ORAL DAILY
Start: 2022-01-01 | End: 2022-01-01

## 2022-01-01 RX ORDER — BENZTROPINE MESYLATE 0.5 MG/1
1 TABLET ORAL 3 TIMES DAILY PRN
Status: DISCONTINUED | OUTPATIENT
Start: 2022-01-01 | End: 2022-01-01 | Stop reason: HOSPADM

## 2022-01-01 RX ORDER — ACETAMINOPHEN 500 MG
500-1000 TABLET ORAL DAILY PRN
COMMUNITY

## 2022-01-01 RX ORDER — TAMSULOSIN HYDROCHLORIDE 0.4 MG/1
0.4 CAPSULE ORAL DAILY
Status: DISCONTINUED | OUTPATIENT
Start: 2022-01-01 | End: 2022-01-01 | Stop reason: HOSPADM

## 2022-01-01 RX ORDER — ONDANSETRON 4 MG/1
4 TABLET, ORALLY DISINTEGRATING ORAL EVERY 6 HOURS PRN
Status: DISCONTINUED | OUTPATIENT
Start: 2022-01-01 | End: 2022-01-01 | Stop reason: HOSPADM

## 2022-01-01 RX ORDER — HYDRALAZINE HYDROCHLORIDE 20 MG/ML
10 INJECTION INTRAMUSCULAR; INTRAVENOUS EVERY 4 HOURS PRN
Status: DISCONTINUED | OUTPATIENT
Start: 2022-01-01 | End: 2022-01-01 | Stop reason: HOSPADM

## 2022-01-01 RX ORDER — ATORVASTATIN CALCIUM 40 MG/1
40 TABLET, FILM COATED ORAL DAILY
Qty: 30 TABLET | Refills: 0 | Status: SHIPPED | OUTPATIENT
Start: 2022-01-01

## 2022-01-01 RX ORDER — MIRTAZAPINE 7.5 MG/1
7.5 TABLET, FILM COATED ORAL AT BEDTIME
Status: DISCONTINUED | OUTPATIENT
Start: 2022-01-01 | End: 2022-01-01 | Stop reason: HOSPADM

## 2022-01-01 RX ORDER — LANOLIN ALCOHOL/MO/W.PET/CERES
1000 CREAM (GRAM) TOPICAL DAILY
Qty: 30 TABLET | Refills: 0 | Status: SHIPPED | OUTPATIENT
Start: 2022-01-01

## 2022-01-01 RX ORDER — SENNOSIDES 8.6 MG
2 TABLET ORAL 2 TIMES DAILY
Status: DISCONTINUED | OUTPATIENT
Start: 2022-01-01 | End: 2022-01-01 | Stop reason: HOSPADM

## 2022-01-01 RX ORDER — PYRIDOSTIGMINE BROMIDE 60 MG/1
30 TABLET ORAL 3 TIMES DAILY
Qty: 90 TABLET | Refills: 0 | Status: SHIPPED | OUTPATIENT
Start: 2022-01-01

## 2022-01-01 RX ORDER — TOLTERODINE 4 MG/1
4 CAPSULE, EXTENDED RELEASE ORAL DAILY
Qty: 30 CAPSULE | Refills: 0 | Status: SHIPPED | OUTPATIENT
Start: 2022-01-01

## 2022-01-01 RX ORDER — NYSTATIN 100000 U/G
OINTMENT TOPICAL 2 TIMES DAILY
COMMUNITY
End: 2022-01-01

## 2022-01-01 RX ORDER — AZELASTINE 1 MG/ML
1 SPRAY, METERED NASAL DAILY PRN
COMMUNITY
End: 2022-01-01

## 2022-01-01 RX ORDER — ATORVASTATIN CALCIUM 40 MG/1
40 TABLET, FILM COATED ORAL DAILY
Status: DISCONTINUED | OUTPATIENT
Start: 2022-01-01 | End: 2022-01-01 | Stop reason: HOSPADM

## 2022-01-01 RX ORDER — TOLTERODINE 2 MG/1
4 CAPSULE, EXTENDED RELEASE ORAL DAILY
Status: DISCONTINUED | OUTPATIENT
Start: 2022-01-01 | End: 2022-01-01 | Stop reason: HOSPADM

## 2022-01-01 RX ORDER — POLYETHYLENE GLYCOL 3350 17 G/17G
17 POWDER, FOR SOLUTION ORAL DAILY PRN
Status: DISCONTINUED | OUTPATIENT
Start: 2022-01-01 | End: 2022-01-01 | Stop reason: HOSPADM

## 2022-01-01 RX ORDER — POLYETHYLENE GLYCOL 3350 17 G/17G
17 POWDER, FOR SOLUTION ORAL DAILY
Qty: 510 G | DISCHARGE
Start: 2022-01-01 | End: 2022-01-01

## 2022-01-01 RX ORDER — VITAMIN B COMPLEX
50 TABLET ORAL DAILY
Status: DISCONTINUED | OUTPATIENT
Start: 2022-01-01 | End: 2022-01-01 | Stop reason: HOSPADM

## 2022-01-01 RX ORDER — LISINOPRIL 2.5 MG/1
2.5 TABLET ORAL DAILY
Status: DISCONTINUED | OUTPATIENT
Start: 2022-01-01 | End: 2022-01-01 | Stop reason: HOSPADM

## 2022-01-01 RX ORDER — FLUDROCORTISONE ACETATE 0.1 MG/1
0.1 TABLET ORAL DAILY
Qty: 30 TABLET | Refills: 0 | Status: SHIPPED | OUTPATIENT
Start: 2022-01-01 | End: 2022-01-01

## 2022-01-01 RX ORDER — NITROGLYCERIN 0.4 MG/1
0.4 TABLET SUBLINGUAL EVERY 5 MIN PRN
Status: DISCONTINUED | OUTPATIENT
Start: 2022-01-01 | End: 2022-01-01 | Stop reason: HOSPADM

## 2022-01-01 RX ORDER — HYDRALAZINE HYDROCHLORIDE 20 MG/ML
10 INJECTION INTRAMUSCULAR; INTRAVENOUS EVERY 6 HOURS PRN
Status: DISCONTINUED | OUTPATIENT
Start: 2022-01-01 | End: 2022-01-01

## 2022-01-01 RX ORDER — ASPIRIN 81 MG/1
81 TABLET ORAL DAILY
Status: DISCONTINUED | OUTPATIENT
Start: 2022-01-01 | End: 2022-01-01 | Stop reason: HOSPADM

## 2022-01-01 RX ORDER — CARBIDOPA AND LEVODOPA 25; 100 MG/1; MG/1
2 TABLET ORAL 4 TIMES DAILY
Status: DISCONTINUED | OUTPATIENT
Start: 2022-01-01 | End: 2022-01-01 | Stop reason: HOSPADM

## 2022-01-01 RX ORDER — MIRTAZAPINE 7.5 MG/1
7.5 TABLET, FILM COATED ORAL AT BEDTIME
Qty: 30 TABLET | Refills: 0 | Status: SHIPPED | OUTPATIENT
Start: 2022-01-01

## 2022-01-01 RX ORDER — MAGNESIUM OXIDE 400 MG/1
400 TABLET ORAL DAILY
Qty: 30 TABLET | Refills: 0 | Status: SHIPPED | OUTPATIENT
Start: 2022-01-01

## 2022-01-01 RX ORDER — MAGNESIUM OXIDE 400 MG/1
400 TABLET ORAL DAILY
Status: DISCONTINUED | OUTPATIENT
Start: 2022-01-01 | End: 2022-01-01 | Stop reason: HOSPADM

## 2022-01-01 RX ORDER — LANOLIN ALCOHOL/MO/W.PET/CERES
1000 CREAM (GRAM) TOPICAL DAILY
COMMUNITY
End: 2022-01-01

## 2022-01-01 RX ORDER — PANTOPRAZOLE SODIUM 40 MG/1
40 TABLET, DELAYED RELEASE ORAL
Status: DISCONTINUED | OUTPATIENT
Start: 2022-01-01 | End: 2022-01-01 | Stop reason: HOSPADM

## 2022-01-01 RX ORDER — MIDODRINE HYDROCHLORIDE 10 MG/1
10 TABLET ORAL 2 TIMES DAILY
Qty: 180 TABLET | Refills: 0
Start: 2022-01-01 | End: 2022-01-01

## 2022-01-01 RX ORDER — HYDRALAZINE HYDROCHLORIDE 20 MG/ML
20 INJECTION INTRAMUSCULAR; INTRAVENOUS ONCE
Status: DISCONTINUED | OUTPATIENT
Start: 2022-01-01 | End: 2022-01-01

## 2022-01-01 RX ORDER — HYDRALAZINE HCL 10 MG
5 TABLET ORAL
Status: COMPLETED | OUTPATIENT
Start: 2022-01-01 | End: 2022-01-01

## 2022-01-01 RX ORDER — POLYETHYLENE GLYCOL 3350 17 G/17G
17 POWDER, FOR SOLUTION ORAL DAILY
Status: DISCONTINUED | OUTPATIENT
Start: 2022-01-01 | End: 2022-01-01 | Stop reason: HOSPADM

## 2022-01-01 RX ORDER — LANOLIN ALCOHOL/MO/W.PET/CERES
1000 CREAM (GRAM) TOPICAL DAILY
Status: DISCONTINUED | OUTPATIENT
Start: 2022-01-01 | End: 2022-01-01 | Stop reason: HOSPADM

## 2022-01-01 RX ORDER — SODIUM CHLORIDE 9 MG/ML
INJECTION, SOLUTION INTRAVENOUS CONTINUOUS
Status: DISCONTINUED | OUTPATIENT
Start: 2022-01-01 | End: 2022-01-01

## 2022-01-01 RX ORDER — MAGNESIUM OXIDE 400 MG/1
400 TABLET ORAL DAILY
COMMUNITY
End: 2022-01-01

## 2022-01-01 RX ORDER — MIDODRINE HYDROCHLORIDE 2.5 MG/1
5 TABLET ORAL 3 TIMES DAILY
Qty: 180 TABLET | Refills: 0 | Status: SHIPPED | OUTPATIENT
Start: 2022-01-01 | End: 2022-01-01

## 2022-01-01 RX ORDER — MIDODRINE HYDROCHLORIDE 5 MG/1
10 TABLET ORAL
Status: DISCONTINUED | OUTPATIENT
Start: 2022-01-01 | End: 2022-01-01 | Stop reason: HOSPADM

## 2022-01-01 RX ORDER — MULTIVITAMIN WITH IRON
1 TABLET ORAL DAILY
Status: DISCONTINUED | OUTPATIENT
Start: 2022-01-01 | End: 2022-01-01 | Stop reason: HOSPADM

## 2022-01-01 RX ORDER — NYSTATIN 100000 U/G
OINTMENT TOPICAL 2 TIMES DAILY
Status: DISCONTINUED | OUTPATIENT
Start: 2022-01-01 | End: 2022-01-01 | Stop reason: HOSPADM

## 2022-01-01 RX ORDER — PANTOPRAZOLE SODIUM 20 MG/1
40 TABLET, DELAYED RELEASE ORAL DAILY
Status: DISCONTINUED | OUTPATIENT
Start: 2022-01-01 | End: 2022-01-01 | Stop reason: HOSPADM

## 2022-01-01 RX ORDER — LISINOPRIL 2.5 MG/1
2.5 TABLET ORAL DAILY
Qty: 30 TABLET | Refills: 0 | Status: SHIPPED | OUTPATIENT
Start: 2022-01-01

## 2022-01-01 RX ORDER — CARBIDOPA AND LEVODOPA 25; 100 MG/1; MG/1
2 TABLET ORAL ONCE
Status: COMPLETED | OUTPATIENT
Start: 2022-01-01 | End: 2022-01-01

## 2022-01-01 RX ORDER — POTASSIUM CHLORIDE 1500 MG/1
20 TABLET, EXTENDED RELEASE ORAL DAILY
Qty: 30 TABLET | Refills: 0 | Status: SHIPPED | OUTPATIENT
Start: 2022-01-01 | End: 2022-01-01

## 2022-01-01 RX ORDER — NYSTATIN 100000 U/G
OINTMENT TOPICAL 2 TIMES DAILY
Qty: 30 G | Refills: 0 | Status: SHIPPED | OUTPATIENT
Start: 2022-01-01

## 2022-01-01 RX ORDER — PYRIDOSTIGMINE BROMIDE 60 MG/1
30 TABLET ORAL 3 TIMES DAILY
COMMUNITY
End: 2022-01-01

## 2022-01-01 RX ORDER — FLUDROCORTISONE ACETATE 0.1 MG/1
0.1 TABLET ORAL EVERY MORNING
Status: DISCONTINUED | OUTPATIENT
Start: 2022-01-01 | End: 2022-01-01 | Stop reason: HOSPADM

## 2022-01-01 RX ORDER — NITROGLYCERIN 0.4 MG/1
0.4 TABLET SUBLINGUAL EVERY 5 MIN PRN
Qty: 30 TABLET | Refills: 0 | Status: SHIPPED | OUTPATIENT
Start: 2022-01-01

## 2022-01-01 RX ORDER — CARBIDOPA AND LEVODOPA 25; 100 MG/1; MG/1
2 TABLET ORAL 4 TIMES DAILY
Qty: 240 TABLET | Refills: 0 | Status: SHIPPED | OUTPATIENT
Start: 2022-01-01 | End: 2022-01-01

## 2022-01-01 RX ORDER — AZELASTINE 1 MG/ML
1 SPRAY, METERED NASAL DAILY PRN
Status: DISCONTINUED | OUTPATIENT
Start: 2022-01-01 | End: 2022-01-01 | Stop reason: HOSPADM

## 2022-01-01 RX ORDER — PHENAZOPYRIDINE HYDROCHLORIDE 100 MG/1
100 TABLET, FILM COATED ORAL
Status: COMPLETED | OUTPATIENT
Start: 2022-01-01 | End: 2022-01-01

## 2022-01-01 RX ORDER — LISINOPRIL 2.5 MG/1
2.5 TABLET ORAL DAILY
Qty: 30 TABLET | Refills: 0 | Status: SHIPPED | OUTPATIENT
Start: 2022-01-01 | End: 2022-01-01

## 2022-01-01 RX ORDER — FLUDROCORTISONE ACETATE 0.1 MG/1
0.1 TABLET ORAL DAILY
COMMUNITY
End: 2022-01-01

## 2022-01-01 RX ORDER — MIDODRINE HYDROCHLORIDE 2.5 MG/1
5 TABLET ORAL 3 TIMES DAILY PRN
Qty: 60 TABLET | Refills: 0 | Status: SHIPPED | OUTPATIENT
Start: 2022-01-01 | End: 2022-01-01

## 2022-01-01 RX ORDER — SENNOSIDES 8.6 MG
2 TABLET ORAL 2 TIMES DAILY
Qty: 240 TABLET | Refills: 3 | Status: SHIPPED | OUTPATIENT
Start: 2022-01-01 | End: 2022-01-01

## 2022-01-01 RX ORDER — ASPIRIN 81 MG/1
81 TABLET ORAL DAILY
Qty: 30 TABLET | Refills: 0 | Status: SHIPPED | OUTPATIENT
Start: 2022-01-01

## 2022-01-01 RX ORDER — BISACODYL 10 MG
10 SUPPOSITORY, RECTAL RECTAL DAILY PRN
Qty: 6 SUPPOSITORY | Refills: 0 | Status: SHIPPED | OUTPATIENT
Start: 2022-01-01

## 2022-01-01 RX ORDER — AZELASTINE 1 MG/ML
1 SPRAY, METERED NASAL DAILY PRN
Qty: 30 ML | Refills: 0 | Status: SHIPPED | OUTPATIENT
Start: 2022-01-01

## 2022-01-01 RX ORDER — FLUDROCORTISONE ACETATE 0.1 MG/1
0.1 TABLET ORAL EVERY MORNING
Qty: 90 TABLET | Refills: 3 | Status: SHIPPED | OUTPATIENT
Start: 2022-01-01

## 2022-01-01 RX ORDER — HYDRALAZINE HYDROCHLORIDE 10 MG/1
10 TABLET, FILM COATED ORAL 2 TIMES DAILY
Status: DISCONTINUED | OUTPATIENT
Start: 2022-01-01 | End: 2022-01-01 | Stop reason: HOSPADM

## 2022-01-01 RX ORDER — MIDODRINE HYDROCHLORIDE 5 MG/1
10 TABLET ORAL 2 TIMES DAILY
Status: DISCONTINUED | OUTPATIENT
Start: 2022-01-01 | End: 2022-01-01

## 2022-01-01 RX ORDER — ATORVASTATIN CALCIUM 40 MG/1
40 TABLET, FILM COATED ORAL DAILY
COMMUNITY
End: 2022-01-01

## 2022-01-01 RX ORDER — TAMSULOSIN HYDROCHLORIDE 0.4 MG/1
0.4 CAPSULE ORAL DAILY
Qty: 30 CAPSULE | Refills: 0 | Status: SHIPPED | OUTPATIENT
Start: 2022-01-01

## 2022-01-01 RX ORDER — FLUDROCORTISONE ACETATE 0.1 MG/1
0.1 TABLET ORAL DAILY
Status: DISCONTINUED | OUTPATIENT
Start: 2022-01-01 | End: 2022-01-01 | Stop reason: HOSPADM

## 2022-01-01 RX ORDER — TOLTERODINE 4 MG/1
4 CAPSULE, EXTENDED RELEASE ORAL DAILY
COMMUNITY
End: 2022-01-01

## 2022-01-01 RX ORDER — CHOLECALCIFEROL (VITAMIN D3) 50 MCG
1 TABLET ORAL DAILY
Qty: 30 TABLET | Refills: 0 | Status: SHIPPED | OUTPATIENT
Start: 2022-01-01

## 2022-01-01 RX ORDER — MIRTAZAPINE 7.5 MG/1
7.5 TABLET, FILM COATED ORAL AT BEDTIME
COMMUNITY
End: 2022-01-01

## 2022-01-01 RX ORDER — ACETAMINOPHEN 650 MG/1
650 SUPPOSITORY RECTAL EVERY 6 HOURS PRN
Status: DISCONTINUED | OUTPATIENT
Start: 2022-01-01 | End: 2022-01-01 | Stop reason: HOSPADM

## 2022-01-01 RX ORDER — ACETAMINOPHEN 325 MG/1
650 TABLET ORAL EVERY 6 HOURS PRN
Status: DISCONTINUED | OUTPATIENT
Start: 2022-01-01 | End: 2022-01-01 | Stop reason: HOSPADM

## 2022-01-01 RX ORDER — LANOLIN ALCOHOL/MO/W.PET/CERES
3 CREAM (GRAM) TOPICAL AT BEDTIME
Qty: 60 TABLET | Refills: 3 | Status: SHIPPED | OUTPATIENT
Start: 2022-01-01 | End: 2022-01-01

## 2022-01-01 RX ORDER — METOPROLOL SUCCINATE 25 MG/1
50 TABLET, EXTENDED RELEASE ORAL DAILY
Status: DISCONTINUED | OUTPATIENT
Start: 2022-01-01 | End: 2022-01-01 | Stop reason: HOSPADM

## 2022-01-01 RX ORDER — SENNOSIDES 8.6 MG
2 TABLET ORAL 2 TIMES DAILY
Qty: 240 TABLET | Refills: 0 | Status: SHIPPED | OUTPATIENT
Start: 2022-01-01

## 2022-01-01 RX ORDER — ONDANSETRON 2 MG/ML
4 INJECTION INTRAMUSCULAR; INTRAVENOUS EVERY 6 HOURS PRN
Status: DISCONTINUED | OUTPATIENT
Start: 2022-01-01 | End: 2022-01-01

## 2022-01-01 RX ORDER — AMOXICILLIN 250 MG
1 CAPSULE ORAL 2 TIMES DAILY
Status: DISCONTINUED | OUTPATIENT
Start: 2022-01-01 | End: 2022-01-01 | Stop reason: HOSPADM

## 2022-01-01 RX ORDER — POLYETHYLENE GLYCOL 3350 17 G/17G
17 POWDER, FOR SOLUTION ORAL DAILY
Qty: 510 G | Refills: 0 | Status: SHIPPED | OUTPATIENT
Start: 2022-01-01

## 2022-01-01 RX ORDER — HYDRALAZINE HYDROCHLORIDE 10 MG/1
10 TABLET, FILM COATED ORAL 3 TIMES DAILY PRN
Qty: 30 TABLET | Refills: 0 | Status: SHIPPED | OUTPATIENT
Start: 2022-01-01

## 2022-01-01 RX ORDER — MIDODRINE HYDROCHLORIDE 5 MG/1
10 TABLET ORAL 3 TIMES DAILY
Status: DISCONTINUED | OUTPATIENT
Start: 2022-01-01 | End: 2022-01-01 | Stop reason: HOSPADM

## 2022-01-01 RX ORDER — MIDODRINE HYDROCHLORIDE 10 MG/1
10 TABLET ORAL 3 TIMES DAILY
Qty: 270 TABLET | Refills: 3 | Status: SHIPPED | OUTPATIENT
Start: 2022-01-01

## 2022-01-01 RX ORDER — BISACODYL 10 MG
10 SUPPOSITORY, RECTAL RECTAL DAILY PRN
Status: DISCONTINUED | OUTPATIENT
Start: 2022-01-01 | End: 2022-01-01 | Stop reason: HOSPADM

## 2022-01-01 RX ORDER — MIDODRINE HYDROCHLORIDE 10 MG/1
10 TABLET ORAL 3 TIMES DAILY PRN
Qty: 90 TABLET | Refills: 0 | Status: SHIPPED | OUTPATIENT
Start: 2022-01-01 | End: 2022-01-01

## 2022-01-01 RX ORDER — SENNOSIDES 8.6 MG
2 TABLET ORAL DAILY
COMMUNITY
End: 2022-01-01

## 2022-01-01 RX ORDER — ASPIRIN 81 MG/1
81 TABLET ORAL DAILY
COMMUNITY
End: 2022-01-01

## 2022-01-01 RX ORDER — MIDODRINE HYDROCHLORIDE 2.5 MG/1
2.5 TABLET ORAL 2 TIMES DAILY
DISCHARGE
Start: 2022-01-01 | End: 2022-01-01

## 2022-01-01 RX ORDER — POTASSIUM CHLORIDE 1.5 G/1.58G
40 POWDER, FOR SOLUTION ORAL ONCE
Status: COMPLETED | OUTPATIENT
Start: 2022-01-01 | End: 2022-01-01

## 2022-01-01 RX ORDER — HYDRALAZINE HYDROCHLORIDE 25 MG/1
25 TABLET, FILM COATED ORAL 2 TIMES DAILY
Status: DISCONTINUED | OUTPATIENT
Start: 2022-01-01 | End: 2022-01-01

## 2022-01-01 RX ORDER — LIDOCAINE 40 MG/G
CREAM TOPICAL
Status: DISCONTINUED | OUTPATIENT
Start: 2022-01-01 | End: 2022-01-01 | Stop reason: HOSPADM

## 2022-01-01 RX ORDER — MIDODRINE HYDROCHLORIDE 10 MG/1
10 TABLET ORAL 3 TIMES DAILY PRN
Qty: 90 TABLET | Refills: 3 | Status: SHIPPED | OUTPATIENT
Start: 2022-01-01 | End: 2022-01-01

## 2022-01-01 RX ORDER — CHOLECALCIFEROL (VITAMIN D3) 50 MCG
1 TABLET ORAL DAILY
COMMUNITY
End: 2022-01-01

## 2022-01-01 RX ORDER — CARBIDOPA AND LEVODOPA 25; 100 MG/1; MG/1
2 TABLET ORAL 4 TIMES DAILY
COMMUNITY
End: 2022-01-01

## 2022-01-01 RX ORDER — HEPARIN SODIUM 5000 [USP'U]/.5ML
5000 INJECTION, SOLUTION INTRAVENOUS; SUBCUTANEOUS EVERY 8 HOURS
Status: DISCONTINUED | OUTPATIENT
Start: 2022-01-01 | End: 2022-01-01 | Stop reason: HOSPADM

## 2022-01-01 RX ORDER — POTASSIUM CHLORIDE 1500 MG/1
20 TABLET, EXTENDED RELEASE ORAL DAILY
Qty: 30 TABLET | Refills: 0 | Status: ON HOLD | OUTPATIENT
Start: 2022-01-01 | End: 2022-01-01

## 2022-01-01 RX ORDER — NYSTATIN 100000/ML
500000 SUSPENSION, ORAL (FINAL DOSE FORM) ORAL 4 TIMES DAILY
Qty: 200 ML | Refills: 0 | Status: SHIPPED | OUTPATIENT
Start: 2022-01-01 | End: 2022-01-01

## 2022-01-01 RX ORDER — HYDRALAZINE HYDROCHLORIDE 10 MG/1
10 TABLET, FILM COATED ORAL 3 TIMES DAILY PRN
Status: DISCONTINUED | OUTPATIENT
Start: 2022-01-01 | End: 2022-01-01 | Stop reason: HOSPADM

## 2022-01-01 RX ORDER — MAGNESIUM CARB/ALUMINUM HYDROX 105-160MG
296 TABLET,CHEWABLE ORAL DAILY PRN
Status: DISCONTINUED | OUTPATIENT
Start: 2022-01-01 | End: 2022-01-01 | Stop reason: HOSPADM

## 2022-01-01 RX ORDER — AMOXICILLIN 250 MG
2 CAPSULE ORAL 2 TIMES DAILY
Status: DISCONTINUED | OUTPATIENT
Start: 2022-01-01 | End: 2022-01-01 | Stop reason: HOSPADM

## 2022-01-01 RX ORDER — BISACODYL 10 MG
10 SUPPOSITORY, RECTAL RECTAL DAILY PRN
DISCHARGE
Start: 2022-01-01 | End: 2022-01-01

## 2022-01-01 RX ORDER — HYDRALAZINE HYDROCHLORIDE 10 MG/1
10 TABLET, FILM COATED ORAL 3 TIMES DAILY PRN
Start: 2022-01-01 | End: 2022-01-01

## 2022-01-01 RX ORDER — CARBIDOPA AND LEVODOPA 25; 100 MG/1; MG/1
2 TABLET ORAL 4 TIMES DAILY
Qty: 720 TABLET | Refills: 3 | Status: SHIPPED | OUTPATIENT
Start: 2022-01-01

## 2022-01-01 RX ORDER — HYDRALAZINE HCL 10 MG
5 TABLET ORAL EVERY 8 HOURS PRN
Status: DISCONTINUED | OUTPATIENT
Start: 2022-01-01 | End: 2022-01-01 | Stop reason: HOSPADM

## 2022-01-01 RX ORDER — LISINOPRIL 2.5 MG/1
2.5 TABLET ORAL ONCE
Status: COMPLETED | OUTPATIENT
Start: 2022-01-01 | End: 2022-01-01

## 2022-01-01 RX ORDER — MIDODRINE HYDROCHLORIDE 2.5 MG/1
2.5 TABLET ORAL 2 TIMES DAILY
Status: DISCONTINUED | OUTPATIENT
Start: 2022-01-01 | End: 2022-01-01 | Stop reason: HOSPADM

## 2022-01-01 RX ORDER — METOPROLOL SUCCINATE 25 MG/1
50 TABLET, EXTENDED RELEASE ORAL DAILY
Qty: 60 TABLET | Refills: 0 | Status: SHIPPED | OUTPATIENT
Start: 2022-01-01

## 2022-01-01 RX ORDER — ACETAMINOPHEN 325 MG/1
650 TABLET ORAL EVERY 4 HOURS PRN
Status: DISCONTINUED | OUTPATIENT
Start: 2022-01-01 | End: 2022-01-01 | Stop reason: HOSPADM

## 2022-01-01 RX ORDER — MIDODRINE HYDROCHLORIDE 10 MG/1
10 TABLET ORAL 2 TIMES DAILY
Status: ON HOLD | COMMUNITY
End: 2022-01-01

## 2022-01-01 RX ADMIN — CARBIDOPA AND LEVODOPA 2 TABLET: 25; 100 TABLET ORAL at 12:09

## 2022-01-01 RX ADMIN — MAGNESIUM HYDROXIDE 30 ML: 400 SUSPENSION ORAL at 09:19

## 2022-01-01 RX ADMIN — NYSTATIN OINTMENT: 100000 OINTMENT TOPICAL at 21:02

## 2022-01-01 RX ADMIN — MIDODRINE HYDROCHLORIDE 10 MG: 5 TABLET ORAL at 20:48

## 2022-01-01 RX ADMIN — HEPARIN SODIUM 5000 UNITS: 10000 INJECTION, SOLUTION INTRAVENOUS; SUBCUTANEOUS at 21:01

## 2022-01-01 RX ADMIN — NYSTATIN OINTMENT: 100000 OINTMENT TOPICAL at 08:11

## 2022-01-01 RX ADMIN — METOPROLOL SUCCINATE 37.5 MG: 25 TABLET, EXTENDED RELEASE ORAL at 18:25

## 2022-01-01 RX ADMIN — TOLTERODINE 4 MG: 2 CAPSULE, EXTENDED RELEASE ORAL at 08:21

## 2022-01-01 RX ADMIN — Medication 1000 MCG: at 09:07

## 2022-01-01 RX ADMIN — MIDODRINE HYDROCHLORIDE 10 MG: 5 TABLET ORAL at 16:43

## 2022-01-01 RX ADMIN — ATORVASTATIN CALCIUM 40 MG: 40 TABLET, FILM COATED ORAL at 07:46

## 2022-01-01 RX ADMIN — CARBIDOPA AND LEVODOPA 2 TABLET: 25; 100 TABLET ORAL at 16:47

## 2022-01-01 RX ADMIN — CARBIDOPA AND LEVODOPA 2 TABLET: 25; 100 TABLET ORAL at 08:17

## 2022-01-01 RX ADMIN — TAMSULOSIN HYDROCHLORIDE 0.4 MG: 0.4 CAPSULE ORAL at 18:47

## 2022-01-01 RX ADMIN — METOPROLOL SUCCINATE ER TABLETS 50 MG: 50 TABLET, FILM COATED, EXTENDED RELEASE ORAL at 08:38

## 2022-01-01 RX ADMIN — Medication 400 MG: at 09:32

## 2022-01-01 RX ADMIN — PYRIDOSTIGMINE BROMIDE 30 MG: 60 TABLET ORAL at 21:40

## 2022-01-01 RX ADMIN — CARBIDOPA AND LEVODOPA 2 TABLET: 25; 100 TABLET ORAL at 09:17

## 2022-01-01 RX ADMIN — PYRIDOSTIGMINE BROMIDE 30 MG: 60 TABLET ORAL at 08:03

## 2022-01-01 RX ADMIN — SENNOSIDES AND DOCUSATE SODIUM 2 TABLET: 8.6; 5 TABLET ORAL at 20:32

## 2022-01-01 RX ADMIN — HEPARIN SODIUM 5000 UNITS: 10000 INJECTION, SOLUTION INTRAVENOUS; SUBCUTANEOUS at 06:33

## 2022-01-01 RX ADMIN — ACETAMINOPHEN 650 MG: 325 TABLET, COATED ORAL at 20:35

## 2022-01-01 RX ADMIN — MIDODRINE HYDROCHLORIDE 10 MG: 5 TABLET ORAL at 16:47

## 2022-01-01 RX ADMIN — METOPROLOL SUCCINATE 37.5 MG: 25 TABLET, EXTENDED RELEASE ORAL at 08:09

## 2022-01-01 RX ADMIN — PYRIDOSTIGMINE BROMIDE 30 MG: 60 TABLET ORAL at 10:05

## 2022-01-01 RX ADMIN — Medication 1 TABLET: at 08:21

## 2022-01-01 RX ADMIN — CARBIDOPA AND LEVODOPA 2 TABLET: 25; 100 TABLET ORAL at 12:10

## 2022-01-01 RX ADMIN — PANTOPRAZOLE SODIUM 40 MG: 40 TABLET, DELAYED RELEASE ORAL at 06:41

## 2022-01-01 RX ADMIN — MIDODRINE HYDROCHLORIDE 10 MG: 5 TABLET ORAL at 23:00

## 2022-01-01 RX ADMIN — TOLTERODINE 4 MG: 2 CAPSULE, EXTENDED RELEASE ORAL at 08:24

## 2022-01-01 RX ADMIN — TOLTERODINE 4 MG: 2 CAPSULE, EXTENDED RELEASE ORAL at 09:07

## 2022-01-01 RX ADMIN — SENNOSIDES 2 TABLET: 8.6 TABLET, FILM COATED ORAL at 08:15

## 2022-01-01 RX ADMIN — HYDRALAZINE HYDROCHLORIDE 10 MG: 10 TABLET, FILM COATED ORAL at 09:19

## 2022-01-01 RX ADMIN — HEPARIN SODIUM 5000 UNITS: 10000 INJECTION, SOLUTION INTRAVENOUS; SUBCUTANEOUS at 05:50

## 2022-01-01 RX ADMIN — ATORVASTATIN CALCIUM 40 MG: 40 TABLET, FILM COATED ORAL at 10:04

## 2022-01-01 RX ADMIN — Medication 1000 MCG: at 18:50

## 2022-01-01 RX ADMIN — HEPARIN SODIUM 5000 UNITS: 10000 INJECTION, SOLUTION INTRAVENOUS; SUBCUTANEOUS at 22:23

## 2022-01-01 RX ADMIN — NYSTATIN: 100000 OINTMENT TOPICAL at 20:49

## 2022-01-01 RX ADMIN — CARBIDOPA AND LEVODOPA 2 TABLET: 25; 100 TABLET ORAL at 10:03

## 2022-01-01 RX ADMIN — SODIUM CHLORIDE 1000 ML: 9 INJECTION, SOLUTION INTRAVENOUS at 14:05

## 2022-01-01 RX ADMIN — METOPROLOL SUCCINATE ER TABLETS 50 MG: 50 TABLET, FILM COATED, EXTENDED RELEASE ORAL at 18:33

## 2022-01-01 RX ADMIN — NYSTATIN OINTMENT: 100000 OINTMENT TOPICAL at 21:47

## 2022-01-01 RX ADMIN — MIDODRINE HYDROCHLORIDE 10 MG: 5 TABLET ORAL at 07:47

## 2022-01-01 RX ADMIN — TOLTERODINE 4 MG: 2 CAPSULE, EXTENDED RELEASE ORAL at 08:07

## 2022-01-01 RX ADMIN — NYSTATIN OINTMENT: 100000 OINTMENT TOPICAL at 09:19

## 2022-01-01 RX ADMIN — MIDODRINE HYDROCHLORIDE 10 MG: 5 TABLET ORAL at 08:44

## 2022-01-01 RX ADMIN — PYRIDOSTIGMINE BROMIDE 30 MG: 60 TABLET ORAL at 14:49

## 2022-01-01 RX ADMIN — Medication 1 TABLET: at 10:04

## 2022-01-01 RX ADMIN — PYRIDOSTIGMINE BROMIDE 30 MG: 60 TABLET ORAL at 20:18

## 2022-01-01 RX ADMIN — HEPARIN SODIUM 5000 UNITS: 10000 INJECTION, SOLUTION INTRAVENOUS; SUBCUTANEOUS at 21:40

## 2022-01-01 RX ADMIN — METOPROLOL SUCCINATE 37.5 MG: 25 TABLET, EXTENDED RELEASE ORAL at 07:58

## 2022-01-01 RX ADMIN — NYSTATIN OINTMENT: 100000 OINTMENT TOPICAL at 20:09

## 2022-01-01 RX ADMIN — CARBIDOPA AND LEVODOPA 2 TABLET: 25; 100 TABLET ORAL at 16:26

## 2022-01-01 RX ADMIN — PANTOPRAZOLE SODIUM 40 MG: 40 TABLET, DELAYED RELEASE ORAL at 06:32

## 2022-01-01 RX ADMIN — ATORVASTATIN CALCIUM 40 MG: 40 TABLET, FILM COATED ORAL at 08:39

## 2022-01-01 RX ADMIN — HEPARIN SODIUM 5000 UNITS: 10000 INJECTION, SOLUTION INTRAVENOUS; SUBCUTANEOUS at 13:46

## 2022-01-01 RX ADMIN — Medication 81 MG: at 08:21

## 2022-01-01 RX ADMIN — ATORVASTATIN CALCIUM 40 MG: 40 TABLET, FILM COATED ORAL at 08:10

## 2022-01-01 RX ADMIN — CARBIDOPA AND LEVODOPA 2 TABLET: 25; 100 TABLET ORAL at 08:42

## 2022-01-01 RX ADMIN — Medication 1000 MCG: at 09:15

## 2022-01-01 RX ADMIN — NYSTATIN OINTMENT: 100000 OINTMENT TOPICAL at 20:18

## 2022-01-01 RX ADMIN — Medication 1 TABLET: at 07:49

## 2022-01-01 RX ADMIN — TAMSULOSIN HYDROCHLORIDE 0.4 MG: 0.4 CAPSULE ORAL at 08:41

## 2022-01-01 RX ADMIN — LISINOPRIL 2.5 MG: 2.5 TABLET ORAL at 08:19

## 2022-01-01 RX ADMIN — Medication 81 MG: at 08:15

## 2022-01-01 RX ADMIN — PYRIDOSTIGMINE BROMIDE 30 MG: 60 TABLET ORAL at 09:07

## 2022-01-01 RX ADMIN — Medication 50 MCG: at 08:14

## 2022-01-01 RX ADMIN — CARBIDOPA AND LEVODOPA 2 TABLET: 25; 100 TABLET ORAL at 13:21

## 2022-01-01 RX ADMIN — PYRIDOSTIGMINE BROMIDE 30 MG: 60 TABLET ORAL at 20:49

## 2022-01-01 RX ADMIN — NYSTATIN OINTMENT: 100000 OINTMENT TOPICAL at 21:52

## 2022-01-01 RX ADMIN — FLUDROCORTISONE ACETATE 0.1 MG: 0.1 TABLET ORAL at 08:20

## 2022-01-01 RX ADMIN — CARBIDOPA AND LEVODOPA 2 TABLET: 25; 100 TABLET ORAL at 13:06

## 2022-01-01 RX ADMIN — Medication 1 MG: at 01:30

## 2022-01-01 RX ADMIN — NYSTATIN: 100000 OINTMENT TOPICAL at 21:19

## 2022-01-01 RX ADMIN — NYSTATIN OINTMENT: 100000 OINTMENT TOPICAL at 10:06

## 2022-01-01 RX ADMIN — CARBIDOPA AND LEVODOPA 2 TABLET: 25; 100 TABLET ORAL at 08:10

## 2022-01-01 RX ADMIN — MIRTAZAPINE 7.5 MG: 7.5 TABLET, FILM COATED ORAL at 22:23

## 2022-01-01 RX ADMIN — Medication 1 TABLET: at 09:14

## 2022-01-01 RX ADMIN — Medication 400 MG: at 08:09

## 2022-01-01 RX ADMIN — CARBIDOPA AND LEVODOPA 2 TABLET: 25; 100 TABLET ORAL at 08:15

## 2022-01-01 RX ADMIN — PHENAZOPYRIDINE HYDROCHLORIDE 100 MG: 100 TABLET ORAL at 13:08

## 2022-01-01 RX ADMIN — FLUDROCORTISONE ACETATE 0.1 MG: 0.1 TABLET ORAL at 08:24

## 2022-01-01 RX ADMIN — Medication 400 MG: at 08:01

## 2022-01-01 RX ADMIN — ATORVASTATIN CALCIUM 40 MG: 40 TABLET, FILM COATED ORAL at 08:21

## 2022-01-01 RX ADMIN — SENNOSIDES AND DOCUSATE SODIUM 1 TABLET: 8.6; 5 TABLET ORAL at 20:08

## 2022-01-01 RX ADMIN — MIDODRINE HYDROCHLORIDE 10 MG: 5 TABLET ORAL at 14:10

## 2022-01-01 RX ADMIN — PANTOPRAZOLE SODIUM 40 MG: 40 TABLET, DELAYED RELEASE ORAL at 06:36

## 2022-01-01 RX ADMIN — CARBIDOPA AND LEVODOPA 2 TABLET: 25; 100 TABLET ORAL at 12:21

## 2022-01-01 RX ADMIN — PHENAZOPYRIDINE HYDROCHLORIDE 100 MG: 100 TABLET ORAL at 08:40

## 2022-01-01 RX ADMIN — MAGNESIUM HYDROXIDE 30 ML: 400 SUSPENSION ORAL at 08:03

## 2022-01-01 RX ADMIN — FLUDROCORTISONE ACETATE 0.1 MG: 0.1 TABLET ORAL at 08:44

## 2022-01-01 RX ADMIN — PYRIDOSTIGMINE BROMIDE 30 MG: 60 TABLET ORAL at 21:18

## 2022-01-01 RX ADMIN — FLUDROCORTISONE ACETATE 0.1 MG: 0.1 TABLET ORAL at 08:22

## 2022-01-01 RX ADMIN — CARBIDOPA AND LEVODOPA 2 TABLET: 25; 100 TABLET ORAL at 21:43

## 2022-01-01 RX ADMIN — HEPARIN SODIUM 5000 UNITS: 10000 INJECTION, SOLUTION INTRAVENOUS; SUBCUTANEOUS at 06:40

## 2022-01-01 RX ADMIN — ATORVASTATIN CALCIUM 40 MG: 40 TABLET, FILM COATED ORAL at 18:46

## 2022-01-01 RX ADMIN — TAMSULOSIN HYDROCHLORIDE 0.4 MG: 0.4 CAPSULE ORAL at 09:32

## 2022-01-01 RX ADMIN — Medication 1000 MCG: at 08:13

## 2022-01-01 RX ADMIN — TAMSULOSIN HYDROCHLORIDE 0.4 MG: 0.4 CAPSULE ORAL at 07:44

## 2022-01-01 RX ADMIN — PYRIDOSTIGMINE BROMIDE 30 MG: 60 TABLET ORAL at 14:20

## 2022-01-01 RX ADMIN — HEPARIN SODIUM 5000 UNITS: 10000 INJECTION, SOLUTION INTRAVENOUS; SUBCUTANEOUS at 07:01

## 2022-01-01 RX ADMIN — HYDRALAZINE HYDROCHLORIDE 10 MG: 20 INJECTION, SOLUTION INTRAMUSCULAR; INTRAVENOUS at 06:20

## 2022-01-01 RX ADMIN — SODIUM CHLORIDE: 9 INJECTION, SOLUTION INTRAVENOUS at 04:50

## 2022-01-01 RX ADMIN — PYRIDOSTIGMINE BROMIDE 30 MG: 60 TABLET ORAL at 08:21

## 2022-01-01 RX ADMIN — Medication 1000 MCG: at 08:43

## 2022-01-01 RX ADMIN — POLYETHYLENE GLYCOL 3350 17 G: 17 POWDER, FOR SOLUTION ORAL at 08:14

## 2022-01-01 RX ADMIN — FLUDROCORTISONE ACETATE 0.1 MG: 0.1 TABLET ORAL at 08:11

## 2022-01-01 RX ADMIN — SENNOSIDES 2 TABLET: 8.6 TABLET, FILM COATED ORAL at 10:18

## 2022-01-01 RX ADMIN — HEPARIN SODIUM 5000 UNITS: 10000 INJECTION, SOLUTION INTRAVENOUS; SUBCUTANEOUS at 05:57

## 2022-01-01 RX ADMIN — SODIUM CHLORIDE: 9 INJECTION, SOLUTION INTRAVENOUS at 16:52

## 2022-01-01 RX ADMIN — HEPARIN SODIUM 5000 UNITS: 10000 INJECTION, SOLUTION INTRAVENOUS; SUBCUTANEOUS at 15:18

## 2022-01-01 RX ADMIN — PYRIDOSTIGMINE BROMIDE 30 MG: 60 TABLET ORAL at 21:52

## 2022-01-01 RX ADMIN — METOPROLOL SUCCINATE 37.5 MG: 25 TABLET, EXTENDED RELEASE ORAL at 10:06

## 2022-01-01 RX ADMIN — CARBIDOPA AND LEVODOPA 2 TABLET: 25; 100 TABLET ORAL at 20:08

## 2022-01-01 RX ADMIN — PYRIDOSTIGMINE BROMIDE 30 MG: 60 TABLET ORAL at 08:22

## 2022-01-01 RX ADMIN — ATORVASTATIN CALCIUM 40 MG: 40 TABLET, FILM COATED ORAL at 10:18

## 2022-01-01 RX ADMIN — HEPARIN SODIUM 5000 UNITS: 10000 INJECTION, SOLUTION INTRAVENOUS; SUBCUTANEOUS at 21:42

## 2022-01-01 RX ADMIN — ENOXAPARIN SODIUM 40 MG: 40 INJECTION SUBCUTANEOUS at 21:18

## 2022-01-01 RX ADMIN — PYRIDOSTIGMINE BROMIDE 30 MG: 60 TABLET ORAL at 20:08

## 2022-01-01 RX ADMIN — MIDODRINE HYDROCHLORIDE 10 MG: 5 TABLET ORAL at 13:21

## 2022-01-01 RX ADMIN — MIDODRINE HYDROCHLORIDE 10 MG: 5 TABLET ORAL at 08:20

## 2022-01-01 RX ADMIN — POLYETHYLENE GLYCOL 3350 17 GRAM ORAL POWDER PACKET 17 G: at 11:28

## 2022-01-01 RX ADMIN — CARBIDOPA AND LEVODOPA 2 TABLET: 25; 100 TABLET ORAL at 21:51

## 2022-01-01 RX ADMIN — MIRTAZAPINE 7.5 MG: 7.5 TABLET, FILM COATED ORAL at 21:29

## 2022-01-01 RX ADMIN — Medication 1000 MCG: at 10:07

## 2022-01-01 RX ADMIN — HYDRALAZINE HYDROCHLORIDE 10 MG: 20 INJECTION, SOLUTION INTRAMUSCULAR; INTRAVENOUS at 07:00

## 2022-01-01 RX ADMIN — ACETAMINOPHEN 650 MG: 325 TABLET, FILM COATED ORAL at 19:51

## 2022-01-01 RX ADMIN — CARBIDOPA AND LEVODOPA 2 TABLET: 25; 100 TABLET ORAL at 20:36

## 2022-01-01 RX ADMIN — HEPARIN SODIUM 5000 UNITS: 10000 INJECTION, SOLUTION INTRAVENOUS; SUBCUTANEOUS at 06:28

## 2022-01-01 RX ADMIN — PANTOPRAZOLE SODIUM 40 MG: 20 TABLET, DELAYED RELEASE ORAL at 10:18

## 2022-01-01 RX ADMIN — Medication 81 MG: at 10:04

## 2022-01-01 RX ADMIN — Medication 1000 MCG: at 08:32

## 2022-01-01 RX ADMIN — HYDRALAZINE HYDROCHLORIDE 25 MG: 25 TABLET, FILM COATED ORAL at 08:14

## 2022-01-01 RX ADMIN — CARBIDOPA AND LEVODOPA 2 TABLET: 25; 100 TABLET ORAL at 12:53

## 2022-01-01 RX ADMIN — Medication 1 TABLET: at 20:16

## 2022-01-01 RX ADMIN — Medication 81 MG: at 18:45

## 2022-01-01 RX ADMIN — ENOXAPARIN SODIUM 40 MG: 40 INJECTION SUBCUTANEOUS at 20:49

## 2022-01-01 RX ADMIN — Medication 81 MG: at 08:39

## 2022-01-01 RX ADMIN — HYDRALAZINE HYDROCHLORIDE 5 MG: 10 TABLET, FILM COATED ORAL at 00:01

## 2022-01-01 RX ADMIN — TAMSULOSIN HYDROCHLORIDE 0.4 MG: 0.4 CAPSULE ORAL at 07:59

## 2022-01-01 RX ADMIN — Medication 81 MG: at 08:09

## 2022-01-01 RX ADMIN — PYRIDOSTIGMINE BROMIDE 30 MG: 60 TABLET ORAL at 13:58

## 2022-01-01 RX ADMIN — TAMSULOSIN HYDROCHLORIDE 0.4 MG: 0.4 CAPSULE ORAL at 09:15

## 2022-01-01 RX ADMIN — CARBIDOPA AND LEVODOPA 2 TABLET: 25; 100 TABLET ORAL at 23:00

## 2022-01-01 RX ADMIN — HYDRALAZINE HYDROCHLORIDE 5 MG: 10 TABLET, FILM COATED ORAL at 03:59

## 2022-01-01 RX ADMIN — TAMSULOSIN HYDROCHLORIDE 0.4 MG: 0.4 CAPSULE ORAL at 10:03

## 2022-01-01 RX ADMIN — MAGNESIUM HYDROXIDE 30 ML: 400 SUSPENSION ORAL at 20:30

## 2022-01-01 RX ADMIN — CARBIDOPA AND LEVODOPA 2 TABLET: 25; 100 TABLET ORAL at 21:17

## 2022-01-01 RX ADMIN — TAMSULOSIN HYDROCHLORIDE 0.4 MG: 0.4 CAPSULE ORAL at 08:14

## 2022-01-01 RX ADMIN — METOPROLOL SUCCINATE ER TABLETS 50 MG: 50 TABLET, FILM COATED, EXTENDED RELEASE ORAL at 08:39

## 2022-01-01 RX ADMIN — PYRIDOSTIGMINE BROMIDE 30 MG: 60 TABLET ORAL at 13:49

## 2022-01-01 RX ADMIN — PANTOPRAZOLE SODIUM 40 MG: 20 TABLET, DELAYED RELEASE ORAL at 08:39

## 2022-01-01 RX ADMIN — CARBIDOPA AND LEVODOPA 2 TABLET: 25; 100 TABLET ORAL at 20:48

## 2022-01-01 RX ADMIN — TOLTERODINE 4 MG: 2 CAPSULE, EXTENDED RELEASE ORAL at 08:20

## 2022-01-01 RX ADMIN — PANTOPRAZOLE SODIUM 40 MG: 40 TABLET, DELAYED RELEASE ORAL at 07:01

## 2022-01-01 RX ADMIN — PANTOPRAZOLE SODIUM 40 MG: 40 TABLET, DELAYED RELEASE ORAL at 05:57

## 2022-01-01 RX ADMIN — PYRIDOSTIGMINE BROMIDE 30 MG: 60 TABLET ORAL at 20:36

## 2022-01-01 RX ADMIN — PYRIDOSTIGMINE BROMIDE 30 MG: 60 TABLET ORAL at 21:47

## 2022-01-01 RX ADMIN — CARBIDOPA AND LEVODOPA 2 TABLET: 25; 100 TABLET ORAL at 07:57

## 2022-01-01 RX ADMIN — PERFLUTREN 3 ML: 6.52 INJECTION, SUSPENSION INTRAVENOUS at 10:20

## 2022-01-01 RX ADMIN — HYDRALAZINE HYDROCHLORIDE 10 MG: 20 INJECTION, SOLUTION INTRAMUSCULAR; INTRAVENOUS at 00:09

## 2022-01-01 RX ADMIN — PYRIDOSTIGMINE BROMIDE 30 MG: 60 TABLET ORAL at 13:55

## 2022-01-01 RX ADMIN — CARBIDOPA AND LEVODOPA 2 TABLET: 25; 100 TABLET ORAL at 21:38

## 2022-01-01 RX ADMIN — HEPARIN SODIUM 5000 UNITS: 10000 INJECTION, SOLUTION INTRAVENOUS; SUBCUTANEOUS at 13:50

## 2022-01-01 RX ADMIN — HEPARIN SODIUM 5000 UNITS: 10000 INJECTION, SOLUTION INTRAVENOUS; SUBCUTANEOUS at 05:40

## 2022-01-01 RX ADMIN — FLUDROCORTISONE ACETATE 0.1 MG: 0.1 TABLET ORAL at 09:32

## 2022-01-01 RX ADMIN — POTASSIUM CHLORIDE 40 MEQ: 20 TABLET, EXTENDED RELEASE ORAL at 14:46

## 2022-01-01 RX ADMIN — SENNOSIDES AND DOCUSATE SODIUM 2 TABLET: 8.6; 5 TABLET ORAL at 19:39

## 2022-01-01 RX ADMIN — CARBIDOPA AND LEVODOPA 2 TABLET: 25; 100 TABLET ORAL at 19:40

## 2022-01-01 RX ADMIN — SENNOSIDES AND DOCUSATE SODIUM 1 TABLET: 8.6; 5 TABLET ORAL at 08:43

## 2022-01-01 RX ADMIN — PANTOPRAZOLE SODIUM 40 MG: 20 TABLET, DELAYED RELEASE ORAL at 08:15

## 2022-01-01 RX ADMIN — Medication 400 MG: at 09:16

## 2022-01-01 RX ADMIN — MIRTAZAPINE 7.5 MG: 7.5 TABLET, FILM COATED ORAL at 21:51

## 2022-01-01 RX ADMIN — TOLTERODINE 4 MG: 2 CAPSULE, EXTENDED RELEASE ORAL at 09:15

## 2022-01-01 RX ADMIN — Medication 81 MG: at 07:48

## 2022-01-01 RX ADMIN — HYDRALAZINE HYDROCHLORIDE 10 MG: 20 INJECTION, SOLUTION INTRAMUSCULAR; INTRAVENOUS at 23:30

## 2022-01-01 RX ADMIN — METOPROLOL SUCCINATE 37.5 MG: 25 TABLET, EXTENDED RELEASE ORAL at 08:42

## 2022-01-01 RX ADMIN — POLYETHYLENE GLYCOL 3350 17 GRAM ORAL POWDER PACKET 17 G: at 08:03

## 2022-01-01 RX ADMIN — HEPARIN SODIUM 5000 UNITS: 10000 INJECTION, SOLUTION INTRAVENOUS; SUBCUTANEOUS at 21:41

## 2022-01-01 RX ADMIN — CARBIDOPA AND LEVODOPA 2 TABLET: 25; 100 TABLET ORAL at 16:04

## 2022-01-01 RX ADMIN — HEPARIN SODIUM 5000 UNITS: 10000 INJECTION, SOLUTION INTRAVENOUS; SUBCUTANEOUS at 23:31

## 2022-01-01 RX ADMIN — METOPROLOL SUCCINATE 37.5 MG: 25 TABLET, EXTENDED RELEASE ORAL at 08:28

## 2022-01-01 RX ADMIN — NYSTATIN: 100000 OINTMENT TOPICAL at 22:59

## 2022-01-01 RX ADMIN — Medication 81 MG: at 08:00

## 2022-01-01 RX ADMIN — PHENAZOPYRIDINE HYDROCHLORIDE 100 MG: 100 TABLET ORAL at 09:16

## 2022-01-01 RX ADMIN — ACETAMINOPHEN 650 MG: 325 TABLET, FILM COATED ORAL at 12:04

## 2022-01-01 RX ADMIN — SENNOSIDES 2 TABLET: 8.6 TABLET, FILM COATED ORAL at 21:17

## 2022-01-01 RX ADMIN — PYRIDOSTIGMINE BROMIDE 30 MG: 60 TABLET ORAL at 13:06

## 2022-01-01 RX ADMIN — PHENAZOPYRIDINE HYDROCHLORIDE 100 MG: 100 TABLET ORAL at 12:18

## 2022-01-01 RX ADMIN — MAGNESIUM CITRATE 296 ML: 1.75 LIQUID ORAL at 19:40

## 2022-01-01 RX ADMIN — CARBIDOPA AND LEVODOPA 2 TABLET: 25; 100 TABLET ORAL at 18:45

## 2022-01-01 RX ADMIN — TAMSULOSIN HYDROCHLORIDE 0.4 MG: 0.4 CAPSULE ORAL at 08:40

## 2022-01-01 RX ADMIN — TOLTERODINE 4 MG: 2 CAPSULE, EXTENDED RELEASE ORAL at 20:13

## 2022-01-01 RX ADMIN — TAMSULOSIN HYDROCHLORIDE 0.4 MG: 0.4 CAPSULE ORAL at 08:10

## 2022-01-01 RX ADMIN — PANTOPRAZOLE SODIUM 40 MG: 40 TABLET, DELAYED RELEASE ORAL at 08:39

## 2022-01-01 RX ADMIN — MAGNESIUM OXIDE TAB 400 MG (241.3 MG ELEMENTAL MG) 400 MG: 400 (241.3 MG) TAB at 08:40

## 2022-01-01 RX ADMIN — HYDRALAZINE HYDROCHLORIDE 5 MG: 10 TABLET, FILM COATED ORAL at 05:57

## 2022-01-01 RX ADMIN — HEPARIN SODIUM 5000 UNITS: 10000 INJECTION, SOLUTION INTRAVENOUS; SUBCUTANEOUS at 06:32

## 2022-01-01 RX ADMIN — Medication 1 TABLET: at 09:32

## 2022-01-01 RX ADMIN — HYDRALAZINE HYDROCHLORIDE 5 MG: 10 TABLET, FILM COATED ORAL at 05:31

## 2022-01-01 RX ADMIN — Medication 81 MG: at 09:14

## 2022-01-01 RX ADMIN — HEPARIN SODIUM 5000 UNITS: 10000 INJECTION, SOLUTION INTRAVENOUS; SUBCUTANEOUS at 13:53

## 2022-01-01 RX ADMIN — FLUDROCORTISONE ACETATE 0.1 MG: 0.1 TABLET ORAL at 10:04

## 2022-01-01 RX ADMIN — SENNOSIDES AND DOCUSATE SODIUM 2 TABLET: 8.6; 5 TABLET ORAL at 09:07

## 2022-01-01 RX ADMIN — CARBIDOPA AND LEVODOPA 2 TABLET: 25; 100 TABLET ORAL at 11:42

## 2022-01-01 RX ADMIN — MIRTAZAPINE 7.5 MG: 7.5 TABLET, FILM COATED ORAL at 21:42

## 2022-01-01 RX ADMIN — METOPROLOL SUCCINATE 37.5 MG: 25 TABLET, EXTENDED RELEASE ORAL at 09:07

## 2022-01-01 RX ADMIN — MIRTAZAPINE 7.5 MG: 7.5 TABLET, FILM COATED ORAL at 23:30

## 2022-01-01 RX ADMIN — PANTOPRAZOLE SODIUM 40 MG: 40 TABLET, DELAYED RELEASE ORAL at 06:33

## 2022-01-01 RX ADMIN — SENNOSIDES AND DOCUSATE SODIUM 1 TABLET: 8.6; 5 TABLET ORAL at 08:28

## 2022-01-01 RX ADMIN — CARBIDOPA AND LEVODOPA 2 TABLET: 25; 100 TABLET ORAL at 12:49

## 2022-01-01 RX ADMIN — SENNOSIDES 2 TABLET: 8.6 TABLET, FILM COATED ORAL at 22:59

## 2022-01-01 RX ADMIN — Medication 1000 MCG: at 09:32

## 2022-01-01 RX ADMIN — ATORVASTATIN CALCIUM 40 MG: 40 TABLET, FILM COATED ORAL at 08:01

## 2022-01-01 RX ADMIN — Medication 1000 MCG: at 08:01

## 2022-01-01 RX ADMIN — NYSTATIN: 100000 OINTMENT TOPICAL at 08:45

## 2022-01-01 RX ADMIN — Medication 1000 MCG: at 08:37

## 2022-01-01 RX ADMIN — TAMSULOSIN HYDROCHLORIDE 0.4 MG: 0.4 CAPSULE ORAL at 09:07

## 2022-01-01 RX ADMIN — FLUDROCORTISONE ACETATE 0.1 MG: 0.1 TABLET ORAL at 20:16

## 2022-01-01 RX ADMIN — ATORVASTATIN CALCIUM 40 MG: 40 TABLET, FILM COATED ORAL at 08:40

## 2022-01-01 RX ADMIN — Medication 400 MG: at 08:21

## 2022-01-01 RX ADMIN — SODIUM CHLORIDE: 9 INJECTION, SOLUTION INTRAVENOUS at 02:12

## 2022-01-01 RX ADMIN — SENNOSIDES AND DOCUSATE SODIUM 2 TABLET: 8.6; 5 TABLET ORAL at 20:36

## 2022-01-01 RX ADMIN — HEPARIN SODIUM 5000 UNITS: 10000 INJECTION, SOLUTION INTRAVENOUS; SUBCUTANEOUS at 13:09

## 2022-01-01 RX ADMIN — MAGNESIUM HYDROXIDE 30 ML: 400 SUSPENSION ORAL at 08:42

## 2022-01-01 RX ADMIN — LISINOPRIL 2.5 MG: 2.5 TABLET ORAL at 08:21

## 2022-01-01 RX ADMIN — Medication 50 MCG: at 10:18

## 2022-01-01 RX ADMIN — MIDODRINE HYDROCHLORIDE 10 MG: 5 TABLET ORAL at 10:07

## 2022-01-01 RX ADMIN — MIRTAZAPINE 7.5 MG: 7.5 TABLET, FILM COATED ORAL at 21:40

## 2022-01-01 RX ADMIN — FLUDROCORTISONE ACETATE 0.1 MG: 0.1 TABLET ORAL at 08:40

## 2022-01-01 RX ADMIN — POLYETHYLENE GLYCOL 3350 17 G: 17 POWDER, FOR SOLUTION ORAL at 10:18

## 2022-01-01 RX ADMIN — Medication 1 TABLET: at 08:37

## 2022-01-01 RX ADMIN — POLYETHYLENE GLYCOL 3350 17 GRAM ORAL POWDER PACKET 17 G: at 09:14

## 2022-01-01 RX ADMIN — PYRIDOSTIGMINE BROMIDE 30 MG: 60 TABLET ORAL at 09:32

## 2022-01-01 RX ADMIN — PYRIDOSTIGMINE BROMIDE 30 MG: 60 TABLET ORAL at 07:51

## 2022-01-01 RX ADMIN — MIDODRINE HYDROCHLORIDE 10 MG: 5 TABLET ORAL at 08:15

## 2022-01-01 RX ADMIN — HYDRALAZINE HYDROCHLORIDE 5 MG: 10 TABLET, FILM COATED ORAL at 04:20

## 2022-01-01 RX ADMIN — FLUDROCORTISONE ACETATE 0.1 MG: 0.1 TABLET ORAL at 09:07

## 2022-01-01 RX ADMIN — CARBIDOPA AND LEVODOPA 2 TABLET: 25; 100 TABLET ORAL at 08:38

## 2022-01-01 RX ADMIN — TAMSULOSIN HYDROCHLORIDE 0.4 MG: 0.4 CAPSULE ORAL at 08:21

## 2022-01-01 RX ADMIN — Medication 1000 MCG: at 08:21

## 2022-01-01 RX ADMIN — Medication 50 MCG: at 12:05

## 2022-01-01 RX ADMIN — TOLTERODINE 4 MG: 2 CAPSULE, EXTENDED RELEASE ORAL at 09:32

## 2022-01-01 RX ADMIN — Medication 400 MG: at 10:04

## 2022-01-01 RX ADMIN — Medication 81 MG: at 09:32

## 2022-01-01 RX ADMIN — PYRIDOSTIGMINE BROMIDE 30 MG: 60 TABLET ORAL at 08:12

## 2022-01-01 RX ADMIN — MAGNESIUM OXIDE TAB 400 MG (241.3 MG ELEMENTAL MG) 400 MG: 400 (241.3 MG) TAB at 08:15

## 2022-01-01 RX ADMIN — PYRIDOSTIGMINE BROMIDE 30 MG: 60 TABLET ORAL at 15:58

## 2022-01-01 RX ADMIN — CARBIDOPA AND LEVODOPA 2 TABLET: 25; 100 TABLET ORAL at 09:07

## 2022-01-01 RX ADMIN — SENNOSIDES AND DOCUSATE SODIUM 1 TABLET: 8.6; 5 TABLET ORAL at 21:51

## 2022-01-01 RX ADMIN — Medication 400 MG: at 08:40

## 2022-01-01 RX ADMIN — CARBIDOPA AND LEVODOPA 2 TABLET: 25; 100 TABLET ORAL at 12:27

## 2022-01-01 RX ADMIN — HYDRALAZINE HYDROCHLORIDE 10 MG: 20 INJECTION, SOLUTION INTRAMUSCULAR; INTRAVENOUS at 18:25

## 2022-01-01 RX ADMIN — SENNOSIDES AND DOCUSATE SODIUM 1 TABLET: 8.6; 5 TABLET ORAL at 09:14

## 2022-01-01 RX ADMIN — MIDODRINE HYDROCHLORIDE 10 MG: 5 TABLET ORAL at 13:53

## 2022-01-01 RX ADMIN — HYDRALAZINE HYDROCHLORIDE 10 MG: 20 INJECTION, SOLUTION INTRAMUSCULAR; INTRAVENOUS at 03:43

## 2022-01-01 RX ADMIN — ATORVASTATIN CALCIUM 40 MG: 40 TABLET, FILM COATED ORAL at 09:32

## 2022-01-01 RX ADMIN — PYRIDOSTIGMINE BROMIDE 30 MG: 60 TABLET ORAL at 13:36

## 2022-01-01 RX ADMIN — CARBIDOPA AND LEVODOPA 2 TABLET: 25; 100 TABLET ORAL at 13:46

## 2022-01-01 RX ADMIN — HYDRALAZINE HYDROCHLORIDE 10 MG: 10 TABLET, FILM COATED ORAL at 21:38

## 2022-01-01 RX ADMIN — SENNOSIDES AND DOCUSATE SODIUM 1 TABLET: 8.6; 5 TABLET ORAL at 10:03

## 2022-01-01 RX ADMIN — CARBIDOPA AND LEVODOPA 2 TABLET: 25; 100 TABLET ORAL at 20:18

## 2022-01-01 RX ADMIN — SENNOSIDES AND DOCUSATE SODIUM 2 TABLET: 8.6; 5 TABLET ORAL at 20:17

## 2022-01-01 RX ADMIN — PYRIDOSTIGMINE BROMIDE 30 MG: 60 TABLET ORAL at 21:02

## 2022-01-01 RX ADMIN — MIDODRINE HYDROCHLORIDE 10 MG: 5 TABLET ORAL at 21:17

## 2022-01-01 RX ADMIN — HYDRALAZINE HYDROCHLORIDE 10 MG: 10 TABLET, FILM COATED ORAL at 08:41

## 2022-01-01 RX ADMIN — Medication 400 MG: at 18:51

## 2022-01-01 RX ADMIN — CARBIDOPA AND LEVODOPA 2 TABLET: 25; 100 TABLET ORAL at 08:21

## 2022-01-01 RX ADMIN — TOLTERODINE 4 MG: 2 CAPSULE, EXTENDED RELEASE ORAL at 08:36

## 2022-01-01 RX ADMIN — NYSTATIN OINTMENT: 100000 OINTMENT TOPICAL at 09:15

## 2022-01-01 RX ADMIN — Medication 1000 MCG: at 08:16

## 2022-01-01 RX ADMIN — CARBIDOPA AND LEVODOPA 2 TABLET: 25; 100 TABLET ORAL at 09:32

## 2022-01-01 RX ADMIN — TOLTERODINE 4 MG: 2 CAPSULE, EXTENDED RELEASE ORAL at 10:04

## 2022-01-01 RX ADMIN — NYSTATIN OINTMENT: 100000 OINTMENT TOPICAL at 20:36

## 2022-01-01 RX ADMIN — PYRIDOSTIGMINE BROMIDE 30 MG: 60 TABLET ORAL at 23:01

## 2022-01-01 RX ADMIN — METOPROLOL SUCCINATE 37.5 MG: 25 TABLET, EXTENDED RELEASE ORAL at 04:31

## 2022-01-01 RX ADMIN — NYSTATIN OINTMENT: 100000 OINTMENT TOPICAL at 20:17

## 2022-01-01 RX ADMIN — PHENAZOPYRIDINE HYDROCHLORIDE 100 MG: 100 TABLET ORAL at 16:35

## 2022-01-01 RX ADMIN — METOPROLOL SUCCINATE 37.5 MG: 25 TABLET, EXTENDED RELEASE ORAL at 09:32

## 2022-01-01 RX ADMIN — TAMSULOSIN HYDROCHLORIDE 0.4 MG: 0.4 CAPSULE ORAL at 10:18

## 2022-01-01 RX ADMIN — CARBIDOPA AND LEVODOPA 2 TABLET: 25; 100 TABLET ORAL at 16:44

## 2022-01-01 RX ADMIN — MIRTAZAPINE 7.5 MG: 7.5 TABLET, FILM COATED ORAL at 23:02

## 2022-01-01 RX ADMIN — PYRIDOSTIGMINE BROMIDE 30 MG: 60 TABLET ORAL at 09:16

## 2022-01-01 RX ADMIN — MIDODRINE HYDROCHLORIDE 10 MG: 5 TABLET ORAL at 18:08

## 2022-01-01 RX ADMIN — PYRIDOSTIGMINE BROMIDE 30 MG: 60 TABLET ORAL at 20:31

## 2022-01-01 RX ADMIN — MAGNESIUM HYDROXIDE 30 ML: 400 SUSPENSION ORAL at 08:10

## 2022-01-01 RX ADMIN — HEPARIN SODIUM 5000 UNITS: 10000 INJECTION, SOLUTION INTRAVENOUS; SUBCUTANEOUS at 21:51

## 2022-01-01 RX ADMIN — Medication 1 TABLET: at 08:43

## 2022-01-01 RX ADMIN — CARBIDOPA AND LEVODOPA 2 TABLET: 25; 100 TABLET ORAL at 11:53

## 2022-01-01 RX ADMIN — CARBIDOPA AND LEVODOPA 2 TABLET: 25; 100 TABLET ORAL at 07:46

## 2022-01-01 RX ADMIN — SENNOSIDES AND DOCUSATE SODIUM 1 TABLET: 8.6; 5 TABLET ORAL at 21:01

## 2022-01-01 RX ADMIN — CARBIDOPA AND LEVODOPA 2 TABLET: 25; 100 TABLET ORAL at 16:11

## 2022-01-01 RX ADMIN — MIRTAZAPINE 7.5 MG: 7.5 TABLET, FILM COATED ORAL at 22:35

## 2022-01-01 RX ADMIN — NYSTATIN OINTMENT: 100000 OINTMENT TOPICAL at 20:35

## 2022-01-01 RX ADMIN — CARBIDOPA AND LEVODOPA 2 TABLET: 25; 100 TABLET ORAL at 16:35

## 2022-01-01 RX ADMIN — TOLTERODINE 4 MG: 2 CAPSULE, EXTENDED RELEASE ORAL at 08:12

## 2022-01-01 RX ADMIN — Medication 1 TABLET: at 12:21

## 2022-01-01 RX ADMIN — PANTOPRAZOLE SODIUM 40 MG: 40 TABLET, DELAYED RELEASE ORAL at 06:30

## 2022-01-01 RX ADMIN — PYRIDOSTIGMINE BROMIDE 30 MG: 60 TABLET ORAL at 08:39

## 2022-01-01 RX ADMIN — MIRTAZAPINE 7.5 MG: 7.5 TABLET, FILM COATED ORAL at 21:38

## 2022-01-01 RX ADMIN — HEPARIN SODIUM 5000 UNITS: 10000 INJECTION, SOLUTION INTRAVENOUS; SUBCUTANEOUS at 14:46

## 2022-01-01 RX ADMIN — HYDRALAZINE HYDROCHLORIDE 5 MG: 10 TABLET, FILM COATED ORAL at 14:20

## 2022-01-01 RX ADMIN — DEXTROSE AND SODIUM CHLORIDE: 5; 450 INJECTION, SOLUTION INTRAVENOUS at 22:59

## 2022-01-01 RX ADMIN — SODIUM CHLORIDE 1000 ML: 9 INJECTION, SOLUTION INTRAVENOUS at 16:14

## 2022-01-01 RX ADMIN — SENNOSIDES AND DOCUSATE SODIUM 1 TABLET: 8.6; 5 TABLET ORAL at 09:55

## 2022-01-01 RX ADMIN — Medication 1 TABLET: at 08:13

## 2022-01-01 RX ADMIN — POTASSIUM CHLORIDE 40 MEQ: 1.5 POWDER, FOR SOLUTION ORAL at 11:38

## 2022-01-01 RX ADMIN — CARBIDOPA AND LEVODOPA 2 TABLET: 25; 100 TABLET ORAL at 15:58

## 2022-01-01 RX ADMIN — SODIUM CHLORIDE: 9 INJECTION, SOLUTION INTRAVENOUS at 09:26

## 2022-01-01 RX ADMIN — POLYETHYLENE GLYCOL 3350 17 G: 17 POWDER, FOR SOLUTION ORAL at 08:38

## 2022-01-01 RX ADMIN — ACETAMINOPHEN 650 MG: 325 TABLET, FILM COATED ORAL at 13:48

## 2022-01-01 RX ADMIN — Medication 1 TABLET: at 08:08

## 2022-01-01 RX ADMIN — CARBIDOPA AND LEVODOPA 2 TABLET: 25; 100 TABLET ORAL at 20:31

## 2022-01-01 RX ADMIN — CARBIDOPA AND LEVODOPA 2 TABLET: 25; 100 TABLET ORAL at 16:31

## 2022-01-01 RX ADMIN — Medication 81 MG: at 08:37

## 2022-01-01 RX ADMIN — CARBIDOPA AND LEVODOPA 2 TABLET: 25; 100 TABLET ORAL at 16:53

## 2022-01-01 RX ADMIN — Medication 400 MG: at 09:07

## 2022-01-01 RX ADMIN — Medication 81 MG: at 09:06

## 2022-01-01 RX ADMIN — HEPARIN SODIUM 5000 UNITS: 10000 INJECTION, SOLUTION INTRAVENOUS; SUBCUTANEOUS at 14:20

## 2022-01-01 RX ADMIN — HEPARIN SODIUM 5000 UNITS: 10000 INJECTION, SOLUTION INTRAVENOUS; SUBCUTANEOUS at 13:36

## 2022-01-01 RX ADMIN — CARBIDOPA AND LEVODOPA 2 TABLET: 25; 100 TABLET ORAL at 16:54

## 2022-01-01 RX ADMIN — MIRTAZAPINE 7.5 MG: 7.5 TABLET, FILM COATED ORAL at 21:18

## 2022-01-01 RX ADMIN — PANTOPRAZOLE SODIUM 40 MG: 40 TABLET, DELAYED RELEASE ORAL at 10:10

## 2022-01-01 RX ADMIN — Medication 1 TABLET: at 08:32

## 2022-01-01 RX ADMIN — TOLTERODINE 4 MG: 2 CAPSULE, EXTENDED RELEASE ORAL at 08:14

## 2022-01-01 RX ADMIN — SENNOSIDES 2 TABLET: 8.6 TABLET, FILM COATED ORAL at 08:40

## 2022-01-01 RX ADMIN — HEPARIN SODIUM 5000 UNITS: 10000 INJECTION, SOLUTION INTRAVENOUS; SUBCUTANEOUS at 13:56

## 2022-01-01 RX ADMIN — NYSTATIN: 100000 OINTMENT TOPICAL at 10:24

## 2022-01-01 RX ADMIN — MAGNESIUM HYDROXIDE 30 ML: 400 SUSPENSION ORAL at 08:23

## 2022-01-01 RX ADMIN — MIDODRINE HYDROCHLORIDE 10 MG: 5 TABLET ORAL at 16:04

## 2022-01-01 RX ADMIN — POLYETHYLENE GLYCOL 3350 17 GRAM ORAL POWDER PACKET 17 G: at 08:38

## 2022-01-01 RX ADMIN — ACETAMINOPHEN 650 MG: 325 TABLET, COATED ORAL at 08:28

## 2022-01-01 RX ADMIN — LISINOPRIL 2.5 MG: 2.5 TABLET ORAL at 18:33

## 2022-01-01 RX ADMIN — FLUDROCORTISONE ACETATE 0.1 MG: 0.1 TABLET ORAL at 07:49

## 2022-01-01 RX ADMIN — HYDRALAZINE HYDROCHLORIDE 10 MG: 20 INJECTION, SOLUTION INTRAMUSCULAR; INTRAVENOUS at 21:44

## 2022-01-01 RX ADMIN — HEPARIN SODIUM 5000 UNITS: 10000 INJECTION, SOLUTION INTRAVENOUS; SUBCUTANEOUS at 22:35

## 2022-01-01 RX ADMIN — CARBIDOPA AND LEVODOPA 2 TABLET: 25; 100 TABLET ORAL at 21:02

## 2022-01-01 RX ADMIN — CARBIDOPA AND LEVODOPA 2 TABLET: 25; 100 TABLET ORAL at 16:39

## 2022-01-01 RX ADMIN — MAGNESIUM OXIDE TAB 400 MG (241.3 MG ELEMENTAL MG) 400 MG: 400 (241.3 MG) TAB at 10:17

## 2022-01-01 RX ADMIN — PYRIDOSTIGMINE BROMIDE 30 MG: 60 TABLET ORAL at 13:22

## 2022-01-01 RX ADMIN — Medication 81 MG: at 13:49

## 2022-01-01 RX ADMIN — SENNOSIDES AND DOCUSATE SODIUM 2 TABLET: 8.6; 5 TABLET ORAL at 07:48

## 2022-01-01 RX ADMIN — NYSTATIN OINTMENT: 100000 OINTMENT TOPICAL at 08:08

## 2022-01-01 RX ADMIN — PYRIDOSTIGMINE BROMIDE 30 MG: 60 TABLET ORAL at 08:19

## 2022-01-01 RX ADMIN — ATORVASTATIN CALCIUM 40 MG: 40 TABLET, FILM COATED ORAL at 09:07

## 2022-01-01 RX ADMIN — Medication 1000 MCG: at 08:09

## 2022-01-01 RX ADMIN — ACETAMINOPHEN 650 MG: 325 TABLET, COATED ORAL at 00:58

## 2022-01-01 RX ADMIN — CARBIDOPA AND LEVODOPA 2 TABLET: 25; 100 TABLET ORAL at 12:18

## 2022-01-01 RX ADMIN — FLUDROCORTISONE ACETATE 0.1 MG: 0.1 TABLET ORAL at 09:16

## 2022-01-01 RX ADMIN — MIRTAZAPINE 7.5 MG: 7.5 TABLET, FILM COATED ORAL at 21:02

## 2022-01-01 RX ADMIN — PHENAZOPYRIDINE HYDROCHLORIDE 100 MG: 100 TABLET ORAL at 18:10

## 2022-01-01 RX ADMIN — FLUDROCORTISONE ACETATE 0.1 MG: 0.1 TABLET ORAL at 08:06

## 2022-01-01 RX ADMIN — NYSTATIN OINTMENT: 100000 OINTMENT TOPICAL at 10:23

## 2022-01-01 RX ADMIN — PYRIDOSTIGMINE BROMIDE 30 MG: 60 TABLET ORAL at 19:40

## 2022-01-01 RX ADMIN — NYSTATIN OINTMENT: 100000 OINTMENT TOPICAL at 08:20

## 2022-01-01 RX ADMIN — ATORVASTATIN CALCIUM 40 MG: 40 TABLET, FILM COATED ORAL at 09:16

## 2022-01-01 RX ADMIN — METOPROLOL SUCCINATE 37.5 MG: 25 TABLET, EXTENDED RELEASE ORAL at 07:45

## 2022-01-01 RX ADMIN — ACETAMINOPHEN 650 MG: 325 TABLET, COATED ORAL at 06:41

## 2022-01-01 RX ADMIN — METOPROLOL SUCCINATE ER TABLETS 50 MG: 50 TABLET, FILM COATED, EXTENDED RELEASE ORAL at 08:15

## 2022-01-01 RX ADMIN — PYRIDOSTIGMINE BROMIDE 30 MG: 60 TABLET ORAL at 08:45

## 2022-01-01 RX ADMIN — SENNOSIDES AND DOCUSATE SODIUM 1 TABLET: 8.6; 5 TABLET ORAL at 08:19

## 2022-01-01 RX ADMIN — PYRIDOSTIGMINE BROMIDE 30 MG: 60 TABLET ORAL at 15:18

## 2022-01-01 RX ADMIN — SENNOSIDES AND DOCUSATE SODIUM 2 TABLET: 8.6; 5 TABLET ORAL at 09:32

## 2022-01-01 RX ADMIN — ATORVASTATIN CALCIUM 40 MG: 40 TABLET, FILM COATED ORAL at 08:15

## 2022-01-01 RX ADMIN — NYSTATIN OINTMENT: 100000 OINTMENT TOPICAL at 19:40

## 2022-01-01 RX ADMIN — PYRIDOSTIGMINE BROMIDE 30 MG: 60 TABLET ORAL at 13:46

## 2022-01-01 RX ADMIN — ENOXAPARIN SODIUM 40 MG: 40 INJECTION SUBCUTANEOUS at 22:58

## 2022-01-01 RX ADMIN — Medication 400 MG: at 07:48

## 2022-01-01 RX ADMIN — SENNOSIDES AND DOCUSATE SODIUM 2 TABLET: 8.6; 5 TABLET ORAL at 21:38

## 2022-01-01 RX ADMIN — LISINOPRIL 2.5 MG: 2.5 TABLET ORAL at 08:44

## 2022-01-01 RX ADMIN — Medication 1 TABLET: at 08:19

## 2022-01-01 RX ADMIN — NYSTATIN: 100000 OINTMENT TOPICAL at 08:16

## 2022-01-01 ASSESSMENT — COLUMBIA-SUICIDE SEVERITY RATING SCALE - C-SSRS
3. HAVE YOU BEEN THINKING ABOUT HOW YOU MIGHT KILL YOURSELF?: NO
2. HAVE YOU ACTUALLY HAD ANY THOUGHTS OF KILLING YOURSELF IN THE PAST MONTH?: NO
6. HAVE YOU EVER DONE ANYTHING, STARTED TO DO ANYTHING, OR PREPARED TO DO ANYTHING TO END YOUR LIFE?: NO
4. HAVE YOU HAD THESE THOUGHTS AND HAD SOME INTENTION OF ACTING ON THEM?: NO
1. IN THE PAST MONTH, HAVE YOU WISHED YOU WERE DEAD OR WISHED YOU COULD GO TO SLEEP AND NOT WAKE UP?: NO
IS THE PATIENT NOT ABLE TO COMPLETE C-SSRS: UNABLE TO VERBALIZE
5. HAVE YOU STARTED TO WORK OUT OR WORKED OUT THE DETAILS OF HOW TO KILL YOURSELF? DO YOU INTEND TO CARRY OUT THIS PLAN?: NO

## 2022-01-01 ASSESSMENT — ACTIVITIES OF DAILY LIVING (ADL)
ADLS_ACUITY_SCORE: 43
ADLS_ACUITY_SCORE: 43
ADLS_ACUITY_SCORE: 41
ADLS_ACUITY_SCORE: 35
ADLS_ACUITY_SCORE: 39
ADLS_ACUITY_SCORE: 45
ADLS_ACUITY_SCORE: 43
ADLS_ACUITY_SCORE: 35
ADLS_ACUITY_SCORE: 43
ADLS_ACUITY_SCORE: 37
ADLS_ACUITY_SCORE: 43
ADLS_ACUITY_SCORE: 45
ADLS_ACUITY_SCORE: 43
ADLS_ACUITY_SCORE: 41
ADLS_ACUITY_SCORE: 43
ADLS_ACUITY_SCORE: 35
ADLS_ACUITY_SCORE: 41
ADLS_ACUITY_SCORE: 43
ADLS_ACUITY_SCORE: 39
ADLS_ACUITY_SCORE: 39
ADLS_ACUITY_SCORE: 35
ADLS_ACUITY_SCORE: 35
DEPENDENT_IADLS:: CLEANING;COOKING;LAUNDRY;SHOPPING;MEAL PREPARATION;MEDICATION MANAGEMENT;MONEY MANAGEMENT;TRANSPORTATION
ADLS_ACUITY_SCORE: 43
ADLS_ACUITY_SCORE: 43
ADLS_ACUITY_SCORE: 37
ADLS_ACUITY_SCORE: 43
ADLS_ACUITY_SCORE: 45
ADLS_ACUITY_SCORE: 43
ADLS_ACUITY_SCORE: 45
ADLS_ACUITY_SCORE: 43
ADLS_ACUITY_SCORE: 35
ADLS_ACUITY_SCORE: 43
ADLS_ACUITY_SCORE: 35

## 2022-01-01 ASSESSMENT — ENCOUNTER SYMPTOMS
CHILLS: 0
VOMITING: 0
NAUSEA: 0
BACK PAIN: 0
VOMITING: 0
COUGH: 0
SPEECH DIFFICULTY: 0
DIARRHEA: 0
FEVER: 0
NAUSEA: 0
TREMORS: 1
FEVER: 0
ABDOMINAL PAIN: 0
NECK PAIN: 0
COUGH: 0
SORE THROAT: 0
HALLUCINATIONS: 1
DIARRHEA: 0
HEADACHES: 0
RHINORRHEA: 0

## 2022-01-12 DIAGNOSIS — I95.1 ORTHOSTATIC HYPOTENSION DYSAUTONOMIC SYNDROME: ICD-10-CM

## 2022-01-12 DIAGNOSIS — G90.3 PARKINSON'S DISEASE WITH NEUROGENIC ORTHOSTATIC HYPOTENSION (H): Chronic | ICD-10-CM

## 2022-01-12 DIAGNOSIS — G20.A1 PARKINSON DISEASE (H): ICD-10-CM

## 2022-01-12 RX ORDER — PYRIDOSTIGMINE BROMIDE 60 MG/1
TABLET ORAL
Qty: 135 TABLET | Refills: 0 | Status: SHIPPED | OUTPATIENT
Start: 2022-01-12 | End: 2022-04-19

## 2022-01-12 RX ORDER — FLUDROCORTISONE ACETATE 0.1 MG/1
TABLET ORAL
Qty: 90 TABLET | Refills: 0 | Status: SHIPPED | OUTPATIENT
Start: 2022-01-12 | End: 2022-04-05

## 2022-01-12 RX ORDER — MIDODRINE HYDROCHLORIDE 10 MG/1
TABLET ORAL
Qty: 180 TABLET | Refills: 0 | Status: SHIPPED | OUTPATIENT
Start: 2022-01-12 | End: 2022-04-05

## 2022-01-12 RX ORDER — CARBIDOPA AND LEVODOPA 25; 100 MG/1; MG/1
TABLET ORAL
Qty: 720 TABLET | Refills: 0 | Status: SHIPPED | OUTPATIENT
Start: 2022-01-12 | End: 2022-03-23

## 2022-01-12 NOTE — TELEPHONE ENCOUNTER
Refill requests for carbidopa-levodopa 25/100, midrodrine, pyridostigmine, and fludrocortisone.  Last seen 06/25/21.  Pt has upcoming appt on 4/4/22.  Medications T'd for review and signature    Nathaly Patel LPN on 1/12/2022 at 12:12 PM

## 2022-01-26 ENCOUNTER — ANCILLARY PROCEDURE (OUTPATIENT)
Dept: CARDIOLOGY | Facility: CLINIC | Age: 82
End: 2022-01-26
Attending: INTERNAL MEDICINE
Payer: MEDICARE

## 2022-01-26 DIAGNOSIS — Z95.0 CARDIAC PACEMAKER IN SITU: ICD-10-CM

## 2022-01-26 DIAGNOSIS — I49.5 SICK SINUS SYNDROME (H): ICD-10-CM

## 2022-01-26 PROCEDURE — 93296 REM INTERROG EVL PM/IDS: CPT | Performed by: INTERNAL MEDICINE

## 2022-01-26 PROCEDURE — 93294 REM INTERROG EVL PM/LDLS PM: CPT | Performed by: INTERNAL MEDICINE

## 2022-01-27 LAB
MDC_IDC_LEAD_IMPLANT_DT: NORMAL
MDC_IDC_LEAD_IMPLANT_DT: NORMAL
MDC_IDC_LEAD_LOCATION: NORMAL
MDC_IDC_LEAD_LOCATION: NORMAL
MDC_IDC_LEAD_LOCATION_DETAIL_1: NORMAL
MDC_IDC_LEAD_LOCATION_DETAIL_1: NORMAL
MDC_IDC_LEAD_MFG: NORMAL
MDC_IDC_LEAD_MFG: NORMAL
MDC_IDC_LEAD_MODEL: NORMAL
MDC_IDC_LEAD_MODEL: NORMAL
MDC_IDC_LEAD_POLARITY_TYPE: NORMAL
MDC_IDC_LEAD_POLARITY_TYPE: NORMAL
MDC_IDC_LEAD_SERIAL: NORMAL
MDC_IDC_LEAD_SERIAL: NORMAL
MDC_IDC_LEAD_SPECIAL_FUNCTION: NORMAL
MDC_IDC_MSMT_BATTERY_REMAINING_PERCENTAGE: 50 %
MDC_IDC_MSMT_BATTERY_STATUS: NORMAL
MDC_IDC_MSMT_LEADCHNL_RA_IMPEDANCE_VALUE: 501 OHM
MDC_IDC_MSMT_LEADCHNL_RA_LEAD_CHANNEL_STATUS: NORMAL
MDC_IDC_MSMT_LEADCHNL_RA_PACING_THRESHOLD_AMPLITUDE: 1.3 V
MDC_IDC_MSMT_LEADCHNL_RA_PACING_THRESHOLD_PULSEWIDTH: 0.4 MS
MDC_IDC_MSMT_LEADCHNL_RV_IMPEDANCE_VALUE: 503 OHM
MDC_IDC_MSMT_LEADCHNL_RV_LEAD_CHANNEL_STATUS: NORMAL
MDC_IDC_MSMT_LEADCHNL_RV_PACING_THRESHOLD_AMPLITUDE: 0.9 V
MDC_IDC_MSMT_LEADCHNL_RV_PACING_THRESHOLD_PULSEWIDTH: 0.4 MS
MDC_IDC_PG_IMPLANT_DTM: NORMAL
MDC_IDC_PG_MFG: NORMAL
MDC_IDC_PG_MODEL: NORMAL
MDC_IDC_PG_SERIAL: NORMAL
MDC_IDC_PG_TYPE: NORMAL
MDC_IDC_SESS_CLINIC_NAME: NORMAL
MDC_IDC_SESS_DTM: NORMAL
MDC_IDC_SESS_REPROGRAMMED: NO
MDC_IDC_SESS_TYPE: NORMAL
MDC_IDC_SET_BRADY_AT_MODE_SWITCH_MODE: NORMAL
MDC_IDC_SET_BRADY_AT_MODE_SWITCH_RATE: 160 {BEATS}/MIN
MDC_IDC_SET_BRADY_LOWRATE: 60 {BEATS}/MIN
MDC_IDC_SET_BRADY_MAX_SENSOR_RATE: 115 {BEATS}/MIN
MDC_IDC_SET_BRADY_MAX_TRACKING_RATE: 120 {BEATS}/MIN
MDC_IDC_SET_BRADY_MODE: NORMAL
MDC_IDC_SET_BRADY_PAV_DELAY_HIGH: 170 MS
MDC_IDC_SET_BRADY_PAV_DELAY_LOW: 275 MS
MDC_IDC_SET_BRADY_SAV_DELAY_HIGH: 170 MS
MDC_IDC_SET_BRADY_SAV_DELAY_LOW: 275 MS
MDC_IDC_SET_LEADCHNL_RA_PACING_POLARITY: NORMAL
MDC_IDC_SET_LEADCHNL_RA_PACING_PULSEWIDTH: 0.4 MS
MDC_IDC_SET_LEADCHNL_RA_SENSING_ADAPTATION_MODE: NORMAL
MDC_IDC_SET_LEADCHNL_RA_SENSING_POLARITY: NORMAL
MDC_IDC_SET_LEADCHNL_RV_PACING_AMPLITUDE: 2.5 V
MDC_IDC_SET_LEADCHNL_RV_PACING_POLARITY: NORMAL
MDC_IDC_SET_LEADCHNL_RV_PACING_PULSEWIDTH: 0.4 MS
MDC_IDC_SET_LEADCHNL_RV_SENSING_ADAPTATION_MODE: NORMAL
MDC_IDC_SET_LEADCHNL_RV_SENSING_POLARITY: NORMAL
MDC_IDC_STAT_AT_BURDEN_PERCENT: 0 %
MDC_IDC_STAT_AT_DTM_END: NORMAL
MDC_IDC_STAT_AT_DTM_START: NORMAL
MDC_IDC_STAT_AT_MODE_SW_COUNT_PER_DAY: 1
MDC_IDC_STAT_AT_MODE_SW_PERCENT_TIME_PER_DAY: 0 %
MDC_IDC_STAT_BRADY_AP_VP_PERCENT: 0 %
MDC_IDC_STAT_BRADY_AP_VS_PERCENT: 99 %
MDC_IDC_STAT_BRADY_AS_VP_PERCENT: 0 %
MDC_IDC_STAT_BRADY_AS_VS_PERCENT: 1 %
MDC_IDC_STAT_BRADY_DTM_END: NORMAL
MDC_IDC_STAT_BRADY_DTM_START: NORMAL
MDC_IDC_STAT_BRADY_RA_PERCENT_PACED: 99 %
MDC_IDC_STAT_BRADY_RV_PERCENT_PACED: 0 %
MDC_IDC_STAT_CRT_DTM_END: NORMAL
MDC_IDC_STAT_CRT_DTM_START: NORMAL

## 2022-03-01 ENCOUNTER — HOSPITAL ENCOUNTER (EMERGENCY)
Facility: HOSPITAL | Age: 82
Discharge: HOME | End: 2022-03-01
Attending: EMERGENCY MEDICINE | Admitting: EMERGENCY MEDICINE
Payer: MEDICARE

## 2022-03-01 ENCOUNTER — APPOINTMENT (OUTPATIENT)
Dept: RADIOLOGY | Facility: HOSPITAL | Age: 82
End: 2022-03-01
Attending: EMERGENCY MEDICINE
Payer: MEDICARE

## 2022-03-01 VITALS
RESPIRATION RATE: 20 BRPM | TEMPERATURE: 98.1 F | HEART RATE: 106 BPM | OXYGEN SATURATION: 96 % | SYSTOLIC BLOOD PRESSURE: 186 MMHG | DIASTOLIC BLOOD PRESSURE: 96 MMHG

## 2022-03-01 DIAGNOSIS — R53.83 OTHER FATIGUE: ICD-10-CM

## 2022-03-01 DIAGNOSIS — Z86.69 HISTORY OF PARKINSON'S DISEASE: ICD-10-CM

## 2022-03-01 PROBLEM — R55 PRE-SYNCOPE: Status: ACTIVE | Noted: 2020-02-18

## 2022-03-01 PROBLEM — Z95.5 S/P CORONARY ARTERY STENT PLACEMENT: Status: ACTIVE | Noted: 2019-11-18

## 2022-03-01 PROBLEM — G47.9 SLEEP DISORDER: Status: ACTIVE | Noted: 2022-03-01

## 2022-03-01 PROBLEM — D22.9 NUMEROUS MOLES: Status: ACTIVE | Noted: 2018-10-05

## 2022-03-01 PROBLEM — Z95.0 PRESENCE OF CARDIAC PACEMAKER: Status: ACTIVE | Noted: 2020-02-18

## 2022-03-01 PROBLEM — R55 SYNCOPE AND COLLAPSE: Status: ACTIVE | Noted: 2019-11-10

## 2022-03-01 PROBLEM — E53.9 BURNING FEET SYNDROME: Status: ACTIVE | Noted: 2022-03-01

## 2022-03-01 PROBLEM — R79.89 LOW VITAMIN B12 LEVEL: Status: ACTIVE | Noted: 2017-10-25

## 2022-03-01 PROBLEM — Z66 DNAR (DO NOT ATTEMPT RESUSCITATION): Status: ACTIVE | Noted: 2019-11-11

## 2022-03-01 PROBLEM — R55 RECURRENT SYNCOPE: Status: ACTIVE | Noted: 2021-05-05

## 2022-03-01 PROBLEM — R09.89 LABILE HYPERTENSION: Status: ACTIVE | Noted: 2018-01-24

## 2022-03-01 PROBLEM — G47.09 OTHER INSOMNIA: Status: ACTIVE | Noted: 2019-12-20

## 2022-03-01 PROBLEM — G46.3 BRAINSTEM STROKE SYNDROME: Status: ACTIVE | Noted: 2022-03-01

## 2022-03-01 PROBLEM — R35.0 INCREASED FREQUENCY OF URINATION: Status: ACTIVE | Noted: 2022-03-01

## 2022-03-01 PROBLEM — S22.42XD CLOSED FRACTURE OF MULTIPLE RIBS OF LEFT SIDE WITH ROUTINE HEALING: Status: ACTIVE | Noted: 2020-02-18

## 2022-03-01 PROBLEM — F32.9 REACTIVE DEPRESSION: Status: ACTIVE | Noted: 2020-01-09

## 2022-03-01 PROBLEM — E78.5 DYSLIPIDEMIA: Status: ACTIVE | Noted: 2019-11-11

## 2022-03-01 LAB
ALBUMIN SERPL-MCNC: 3 G/DL (ref 3.5–5)
ALBUMIN UR-MCNC: NEGATIVE MG/DL
ALP SERPL-CCNC: 57 U/L (ref 45–120)
ALT SERPL W P-5'-P-CCNC: <9 U/L (ref 0–45)
ANION GAP SERPL CALCULATED.3IONS-SCNC: 12 MMOL/L (ref 5–18)
APPEARANCE UR: CLEAR
AST SERPL W P-5'-P-CCNC: 21 U/L (ref 0–40)
BASOPHILS # BLD AUTO: 0.1 10E3/UL (ref 0–0.2)
BASOPHILS NFR BLD AUTO: 1 %
BILIRUB SERPL-MCNC: 0.8 MG/DL (ref 0–1)
BILIRUB UR QL STRIP: NEGATIVE
BUN SERPL-MCNC: 24 MG/DL (ref 8–28)
CALCIUM SERPL-MCNC: 8.2 MG/DL (ref 8.5–10.5)
CHLORIDE BLD-SCNC: 106 MMOL/L (ref 98–107)
CO2 SERPL-SCNC: 23 MMOL/L (ref 22–31)
COLOR UR AUTO: ABNORMAL
CREAT SERPL-MCNC: 1.36 MG/DL (ref 0.7–1.3)
EOSINOPHIL # BLD AUTO: 0.1 10E3/UL (ref 0–0.7)
EOSINOPHIL NFR BLD AUTO: 2 %
ERYTHROCYTE [DISTWIDTH] IN BLOOD BY AUTOMATED COUNT: 13.3 % (ref 10–15)
FLUAV RNA SPEC QL NAA+PROBE: NEGATIVE
FLUBV RNA RESP QL NAA+PROBE: NEGATIVE
GFR SERPL CREATININE-BSD FRML MDRD: 53 ML/MIN/1.73M2
GLUCOSE BLD-MCNC: 88 MG/DL (ref 70–125)
GLUCOSE UR STRIP-MCNC: NEGATIVE MG/DL
HCT VFR BLD AUTO: 36.4 % (ref 40–53)
HGB BLD-MCNC: 11.8 G/DL (ref 13.3–17.7)
HGB UR QL STRIP: NEGATIVE
HYALINE CASTS: 4 /LPF
IMM GRANULOCYTES # BLD: 0 10E3/UL
IMM GRANULOCYTES NFR BLD: 0 %
KETONES UR STRIP-MCNC: NEGATIVE MG/DL
LEUKOCYTE ESTERASE UR QL STRIP: NEGATIVE
LYMPHOCYTES # BLD AUTO: 1 10E3/UL (ref 0.8–5.3)
LYMPHOCYTES NFR BLD AUTO: 15 %
MAGNESIUM SERPL-MCNC: 1.8 MG/DL (ref 1.8–2.6)
MCH RBC QN AUTO: 30 PG (ref 26.5–33)
MCHC RBC AUTO-ENTMCNC: 32.4 G/DL (ref 31.5–36.5)
MCV RBC AUTO: 93 FL (ref 78–100)
MONOCYTES # BLD AUTO: 0.6 10E3/UL (ref 0–1.3)
MONOCYTES NFR BLD AUTO: 9 %
NEUTROPHILS # BLD AUTO: 4.6 10E3/UL (ref 1.6–8.3)
NEUTROPHILS NFR BLD AUTO: 73 %
NITRATE UR QL: NEGATIVE
NRBC # BLD AUTO: 0 10E3/UL
NRBC BLD AUTO-RTO: 0 /100
PH UR STRIP: 6.5 [PH] (ref 5–7)
PLATELET # BLD AUTO: 206 10E3/UL (ref 150–450)
POTASSIUM BLD-SCNC: 3.6 MMOL/L (ref 3.5–5)
PROT SERPL-MCNC: 5.7 G/DL (ref 6–8)
RBC # BLD AUTO: 3.93 10E6/UL (ref 4.4–5.9)
RBC URINE: 1 /HPF
SARS-COV-2 RNA RESP QL NAA+PROBE: NEGATIVE
SODIUM SERPL-SCNC: 141 MMOL/L (ref 136–145)
SP GR UR STRIP: 1.01 (ref 1–1.03)
SQUAMOUS EPITHELIAL: <1 /HPF
TROPONIN I SERPL-MCNC: 0.04 NG/ML (ref 0–0.29)
UROBILINOGEN UR STRIP-MCNC: <2 MG/DL
WBC # BLD AUTO: 6.3 10E3/UL (ref 4–11)
WBC URINE: 2 /HPF

## 2022-03-01 PROCEDURE — 96361 HYDRATE IV INFUSION ADD-ON: CPT

## 2022-03-01 PROCEDURE — 82040 ASSAY OF SERUM ALBUMIN: CPT | Performed by: EMERGENCY MEDICINE

## 2022-03-01 PROCEDURE — 36415 COLL VENOUS BLD VENIPUNCTURE: CPT | Performed by: EMERGENCY MEDICINE

## 2022-03-01 PROCEDURE — 96360 HYDRATION IV INFUSION INIT: CPT

## 2022-03-01 PROCEDURE — 93005 ELECTROCARDIOGRAM TRACING: CPT | Performed by: EMERGENCY MEDICINE

## 2022-03-01 PROCEDURE — 99285 EMERGENCY DEPT VISIT HI MDM: CPT | Mod: 25

## 2022-03-01 PROCEDURE — 81001 URINALYSIS AUTO W/SCOPE: CPT | Performed by: EMERGENCY MEDICINE

## 2022-03-01 PROCEDURE — 80053 COMPREHEN METABOLIC PANEL: CPT | Performed by: EMERGENCY MEDICINE

## 2022-03-01 PROCEDURE — 258N000003 HC RX IP 258 OP 636: Performed by: EMERGENCY MEDICINE

## 2022-03-01 PROCEDURE — 71046 X-RAY EXAM CHEST 2 VIEWS: CPT

## 2022-03-01 PROCEDURE — 87636 SARSCOV2 & INF A&B AMP PRB: CPT | Performed by: EMERGENCY MEDICINE

## 2022-03-01 PROCEDURE — 83735 ASSAY OF MAGNESIUM: CPT | Performed by: EMERGENCY MEDICINE

## 2022-03-01 PROCEDURE — 85025 COMPLETE CBC W/AUTO DIFF WBC: CPT | Performed by: EMERGENCY MEDICINE

## 2022-03-01 PROCEDURE — C9803 HOPD COVID-19 SPEC COLLECT: HCPCS

## 2022-03-01 PROCEDURE — 84484 ASSAY OF TROPONIN QUANT: CPT | Performed by: EMERGENCY MEDICINE

## 2022-03-01 RX ORDER — SODIUM CHLORIDE 9 MG/ML
INJECTION, SOLUTION INTRAVENOUS CONTINUOUS
Status: DISCONTINUED | OUTPATIENT
Start: 2022-03-01 | End: 2022-03-02 | Stop reason: HOSPADM

## 2022-03-01 RX ADMIN — SODIUM CHLORIDE 1000 ML: 9 INJECTION, SOLUTION INTRAVENOUS at 20:32

## 2022-03-01 RX ADMIN — SODIUM CHLORIDE: 9 INJECTION, SOLUTION INTRAVENOUS at 21:37

## 2022-03-01 RX ADMIN — SODIUM CHLORIDE 500 ML: 9 INJECTION, SOLUTION INTRAVENOUS at 18:53

## 2022-03-01 ASSESSMENT — ENCOUNTER SYMPTOMS
COUGH: 0
NECK STIFFNESS: 0
FEVER: 0
ABDOMINAL PAIN: 0
ARTHRALGIAS: 0
EYE REDNESS: 0
DIFFICULTY URINATING: 0
CONFUSION: 0
VOMITING: 0
WEAKNESS: 1
SHORTNESS OF BREATH: 0
HEADACHES: 0
COLOR CHANGE: 0

## 2022-03-02 LAB
ATRIAL RATE - MUSE: 76 BPM
DIASTOLIC BLOOD PRESSURE - MUSE: NORMAL MMHG
INTERPRETATION ECG - MUSE: NORMAL
P AXIS - MUSE: NORMAL DEGREES
PR INTERVAL - MUSE: 120 MS
QRS DURATION - MUSE: 86 MS
QT - MUSE: 408 MS
QTC - MUSE: 459 MS
R AXIS - MUSE: 20 DEGREES
SYSTOLIC BLOOD PRESSURE - MUSE: NORMAL MMHG
T AXIS - MUSE: 29 DEGREES
VENTRICULAR RATE- MUSE: 76 BPM

## 2022-03-02 NOTE — ED PROVIDER NOTES
EMERGENCY DEPARTMENT ENCOUNTER      NAME: Canelo Cook  AGE: 81 year old male  YOB: 1940  MRN: [id number]  EVALUATION DATE & TIME: No admission date for patient encounter.    PCP: Hung Camacho    ED PROVIDER: Marco Jerry M.D.      Chief Complaint   Patient presents with     Generalized Weakness         IMPRESSION  1. Other fatigue    2. History of Parkinson's disease        PLAN  - close PCP follow up  - discharge to home      ED COURSE & MEDICAL DECISION MAKING    ED Course as of 03/01/22 2205   Tue Mar 01, 2022   2010 81yM with history of Parkinson's, CAD s/p stent, s/p pacemaker, DNR code status, uses wheelchair at baseline presenting with his wife for evaluation of 3 days of increased fatigue and generalized weakness. Reports sleeping more than usual and not eating or drinking much; will eat & drink when he does want to. Denies any abdominal pain, nausea, vomiting, diarrhea. No other symptoms either such as cough, runny nose, nasal congestion, shortness of breath, rash, fevers, sweats, chills. Came to ED for evaluation; wife concerned about dehydration. Sitting in ED bed now, patient states he feels fine. Has no complaints.    BP 180s/100s, HR 100s otherwise normal vitals on presentation. Calm on exam with mild dry mucous membranes, clear lungs, normal work of breathing, benign abdomen, nonfocal neuro exam with baseline Parkinson's tremor, no meningismus. Will give IVF while obtaining rest of workup from triage (EKG, CXR, blood, urine). Doubt SBO or surgical abdominal process with no pain or tenderness on exam. Patient & wife comfortable with this plan; no further questions at this time.   2117 EKG with paced rhythm; no ST changes; doubt ACS or primary cardiac etiology. Troponin negative. CXR with no acute abnormality or explanatory pathology such as infiltrate, edema, pneumothorax, fracture; pacemaker in place. Labs to this point overall reassuring with negative COVID-19/influenza swab,  creatinine 1.3 with no glaring electrolyte abnormality, WBC 6, hemoglobin baseline at 11.8. Urine yet to be collected.   2202 UA with no UTI, blood, acute abnormality. Patient feeling well on recheck with stable vitals. Wants to go home and wife comfortable with this as well. Continues to have nonfocal neuro exam. Doubt ongoing emergent life-threatening etiology or need for antibiotics. Ok for outpatient follow up. Return precautions and need for PCP follow up discussed and understood. No further questions at the time of discharge.       8:11 PM I met with the patient for the initial interview and physical examination. Discussed plan for treatment and workup in the ED.        This patient involved a high degree of complexity in medical decision making, as significant risks were present and assessed.      I wore the following PPE during this patient encounter:  N95 mask, face shield w/ eye protection, gloves, gown    MEDICATIONS GIVEN IN THE EMERGENCY DEPARTMENT  Medications   0.9% sodium chloride BOLUS (0 mLs Intravenous Stopped 3/1/22 2033)     Followed by   sodium chloride 0.9% infusion ( Intravenous New Bag 3/1/22 2137)   0.9% sodium chloride BOLUS (0 mLs Intravenous Stopped 3/1/22 2141)       NEW PRESCRIPTIONS STARTED AT TODAY'S ER VISIT  Current Discharge Medication List      CONTINUE these medications which have NOT CHANGED    Details   aspirin (ASA) 81 MG EC tablet Take 81 mg by mouth      atorvastatin (LIPITOR) 40 MG tablet Take 40 mg by mouth      azelastine (ASTELIN) 0.1 % nasal spray Spray 1 spray in nostril      carbidopa-levodopa (SINEMET)  MG tablet TAKE 2 TABLETS 4 TIMES     DAILY  Qty: 720 tablet, Refills: 0    Associated Diagnoses: Orthostatic hypotension dysautonomic syndrome; 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)      cholecalciferol 50 MCG (2000 UT) tablet Take 2,000 Units by mouth      fludrocortisone (FLORINEF) 0.1 MG tablet TAKE 1 TABLET DAILY  Qty: 90 tablet,  Refills: 0    Associated Diagnoses: Orthostatic hypotension dysautonomic syndrome; 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)      magnesium oxide (MAG-OX) 400 (241.3 Mg) MG tablet Take 400 mg by mouth      Metoprolol Succinate 25 MG CS24 Take 1.5 mg by mouth      midodrine (PROAMATINE) 10 MG tablet TAKE 1 TABLET TWICE A DAY  Qty: 180 tablet, Refills: 0    Associated Diagnoses: Orthostatic hypotension dysautonomic syndrome; 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)      mirtazapine (REMERON) 7.5 MG tablet Take 7.5 mg by mouth      nitroGLYcerin (NITROSTAT) 0.4 MG sublingual tablet Place 1 tablet (0.4 mg) under the tongue every 5 minutes as needed for chest pain  Qty: 30 tablet, Refills: 4    Associated Diagnoses: Coronary artery disease involving native coronary artery of native heart without angina pectoris      omeprazole (PRILOSEC) 20 MG DR capsule Take 20 mg by mouth      pyridostigmine (MESTINON) 60 MG tablet TAKE 1/2 TABLET THREE TIMESDAILY  Qty: 135 tablet, Refills: 0    Associated Diagnoses: Orthostatic hypotension dysautonomic syndrome; Parkinson disease (H)      senna (SENOKOT) 8.6 MG tablet Take 2 tablets by mouth      tamsulosin (FLOMAX) 0.4 MG capsule Take 0.4 mg by mouth      tolterodine ER (DETROL LA) 4 MG 24 hr capsule Take 4 mg by mouth      vitamin B complex with vitamin C (VITAMIN  B COMPLEX) tablet Take 1 tablet by mouth daily      vitamin B-12 (CYANOCOBALAMIN) 1000 MCG tablet Take 1,000 mcg by mouth                 =================================================================      HPI  Patient information was obtained from: Patient and wife    Use of : N/A         Canelo Cook is a 81 year old male with a pertinent history of parkinson disease, hypertension, brainstem stroke syndrome, and CAD who presents to this ED by EMS for evaluation of generalized weakness.     Wife reports that he has been weaker than normal the last few days  "and has been sleeping often. It is hard for her to wake him up and he needs help getting to the bathroom which is hard for her to do. Has not ate or drank much. Is concerned for dehydration. Patient gets around by wheelchair. Patient cannot stand but this is not new. Patient denies any pain, cough, vomiting or any other complaints at this time.       REVIEW OF SYSTEMS   Review of Systems   Constitutional: Negative for fever.   HENT: Negative for congestion.    Eyes: Negative for redness.   Respiratory: Negative for cough and shortness of breath.    Cardiovascular: Negative for chest pain.   Gastrointestinal: Negative for abdominal pain and vomiting.   Genitourinary: Negative for difficulty urinating.   Musculoskeletal: Negative for arthralgias and neck stiffness.   Skin: Negative for color change.   Neurological: Positive for weakness. Negative for headaches.   Psychiatric/Behavioral: Negative for confusion.   All other systems reviewed and are negative.            --------------- MEDICAL HISTORY ---------------  PAST MEDICAL HISTORY:  Past Medical History:   Diagnosis Date     Acute chest pain 11/10/2019     Acute non-Q wave non-ST elevation myocardial infarction (NSTEMI) (H) 11/11/2019     Anemia 11/15/2013     Closed fracture of multiple ribs of left side with routine healing 2/18/2020     Coronary artery disease due to lipid rich plaque 11/11/2019     DNAR (do not attempt resuscitation) 11/11/2019     Essential hypertension      Fall at home, sequela 12/6/2019     GERD (gastroesophageal reflux disease)      HLD (hyperlipidemia)      Near syncope 10/12/2015     Parkinson's disease (H)      SA node dysfunction (H) 07/29/2015    \"chronotropic incompetence\" per [facility] EP notes     Syncope, unspecified syncope type 11/10/2019     Vitamin D deficiency      Patient Active Problem List   Diagnosis     Parkinson disease (H)     Coronary artery disease involving native coronary artery of native heart without angina " "pectoris     Orthostatic hypotension     Dyslipidemia, goal LDL below 100     S/P placement of cardiac pacemaker     Anemia     Bilateral leg edema     Brainstem stroke syndrome     Burning feet syndrome     Chronic low back pain without sciatica     Closed fracture of multiple ribs of left side with routine healing     DNAR (do not attempt resuscitation)     Dyslipidemia     Essential hypertension     GERD (gastroesophageal reflux disease)     Hypogonadism, testicular     Increased frequency of urination     Labile hypertension     Low vitamin B12 level     Male erectile dysfunction, unspecified     Nocturia     Numerous moles     OAB (overactive bladder)     Other insomnia     Presence of cardiac pacemaker     Pre-syncope     Reactive depression     Recurrent syncope     S/P coronary artery stent placement     PD (perceptive deafness), asymmetrical     Sleep disorder     Syncope and collapse     Vitamin D deficiency       PAST SURGICAL HISTORY:  Past Surgical History:   Procedure Laterality Date     BACK SURGERY       CATARACT EXTRACTION       CORONARY STENT PLACEMENT  11/11/2019    stent to bifurcation of LAD and DG     CV CORONARY ANGIOGRAM N/A 11/11/2019    Procedure: Coronary Angiogram;  Surgeon: Suzan Pineda MD;  Location: [facility] Cath Lab;  Service: Cardiology     CV LEFT HEART CATHETERIZATION WITH LEFT VENTRICULOGRAM N/A 11/11/2019    Procedure: Left Heart Catheterization with Left Ventriculogram;  Surgeon: Suzan Pineda MD;  Location: [facility] Cath Lab;  Service: Cardiology     ESOPHAGOSCOPY, GASTROSCOPY, DUODENOSCOPY (EGD), COMBINED N/A 3/20/2016    Procedure: ESOPHAGOGASTRODUODENOSCOPY with biopsy;  Surgeon: Melissa Castellano MD;  Location: [facility];  Service:      IMPLANT PACEMAKER  08/10/2015    Biotronik AV pacer by Dr. Vidal at [facility] for \"chronotropic incompetence\", syncope has persisted since then     IMPLANT PROSTHESIS PENIS INFLATABLE       VT EDG US EXAM SURGICAL ALTER " STOM DUODENUM/JEJUNUM N/A 3/23/2016    Procedure: EGD and ENDOSCOPIC ULTRASOUND with brushing and biopsies;  Surgeon: Nj Howe MD;  Location: [facility];  Service: Gastroenterology       CURRENT MEDICATIONS:      Current Facility-Administered Medications:      [COMPLETED] 0.9% sodium chloride BOLUS, 500 mL, Intravenous, Once, Stopped at 03/01/22 2033 **FOLLOWED BY** sodium chloride 0.9% infusion, , Intravenous, Continuous, Demond Bliss MD, Last Rate: 125 mL/hr at 03/01/22 2137, New Bag at 03/01/22 2137    Current Outpatient Medications:      aspirin (ASA) 81 MG EC tablet, Take 81 mg by mouth, Disp: , Rfl:      atorvastatin (LIPITOR) 40 MG tablet, Take 40 mg by mouth, Disp: , Rfl:      azelastine (ASTELIN) 0.1 % nasal spray, Spray 1 spray in nostril, Disp: , Rfl:      carbidopa-levodopa (SINEMET)  MG tablet, TAKE 2 TABLETS 4 TIMES     DAILY, Disp: 720 tablet, Rfl: 0     cholecalciferol 50 MCG (2000 UT) tablet, Take 2,000 Units by mouth, Disp: , Rfl:      fludrocortisone (FLORINEF) 0.1 MG tablet, TAKE 1 TABLET DAILY, Disp: 90 tablet, Rfl: 0     magnesium oxide (MAG-OX) 400 (241.3 Mg) MG tablet, Take 400 mg by mouth, Disp: , Rfl:      Metoprolol Succinate 25 MG CS24, Take 1.5 mg by mouth, Disp: , Rfl:      midodrine (PROAMATINE) 10 MG tablet, TAKE 1 TABLET TWICE A DAY, Disp: 180 tablet, Rfl: 0     mirtazapine (REMERON) 7.5 MG tablet, Take 7.5 mg by mouth, Disp: , Rfl:      nitroGLYcerin (NITROSTAT) 0.4 MG sublingual tablet, Place 1 tablet (0.4 mg) under the tongue every 5 minutes as needed for chest pain, Disp: 30 tablet, Rfl: 4     omeprazole (PRILOSEC) 20 MG DR capsule, Take 20 mg by mouth, Disp: , Rfl:      pyridostigmine (MESTINON) 60 MG tablet, TAKE 1/2 TABLET THREE TIMESDAILY, Disp: 135 tablet, Rfl: 0     senna (SENOKOT) 8.6 MG tablet, Take 2 tablets by mouth, Disp: , Rfl:      tamsulosin (FLOMAX) 0.4 MG capsule, Take 0.4 mg by mouth, Disp: , Rfl:      tolterodine ER (DETROL LA) 4 MG  24 hr capsule, Take 4 mg by mouth, Disp: , Rfl:      vitamin B complex with vitamin C (VITAMIN  B COMPLEX) tablet, Take 1 tablet by mouth daily, Disp: , Rfl:      vitamin B-12 (CYANOCOBALAMIN) 1000 MCG tablet, Take 1,000 mcg by mouth, Disp: , Rfl:     ALLERGIES:  Allergies   Allergen Reactions     Sulfa Drugs      Acetaminophen      Other reaction(s): Agitation  Restless  Feeling - skin crawling         FAMILY HISTORY:  Family History   Problem Relation Age of Onset     Heart Disease Mother      Myocardial Infarction Father      Myocardial Infarction Sister      Lung Cancer Brother      Heart Disease Brother      Cancer Sister      Heart Disease Brother      Lung Cancer Brother      Heart Disease Brother      Lung Cancer Brother      Heart Disease Brother      Heart Disease Father        SOCIAL HISTORY:   Social History     Socioeconomic History     Marital status:      Spouse name: Not on file     Number of children: Not on file     Years of education: Not on file     Highest education level: Not on file   Occupational History     Not on file   Tobacco Use     Smoking status: Former Smoker     Smokeless tobacco: Never Used   Substance and Sexual Activity     Alcohol use: Yes     Comment: rare 1-2 times per year     Drug use: Not on file     Sexual activity: Not on file   Other Topics Concern     Not on file   Social History Narrative     Not on file     Social Determinants of Health     Financial Resource Strain: Not on file   Food Insecurity: Not on file   Transportation Needs: Not on file   Physical Activity: Not on file   Stress: Not on file   Social Connections: Not on file   Intimate Partner Violence: Not on file   Housing Stability: Not on file         --------------- PHYSICAL EXAM ---------------  Nursing notes and vitals reviewed by me.  VITALS:  Vitals:    03/01/22 1823 03/01/22 2110 03/01/22 2115 03/01/22 2130   BP: (!) 182/107 (!) 218/110 (!) 217/93 (!) 209/101   Pulse: 107 72 70 73   Resp: 20       Temp: 98.1  F (36.7  C)      TempSrc: Oral      SpO2: 96% 99% 99% 99%       PHYSICAL EXAM:    General:  alert, interactive, no distress  Eyes:  conjunctivae clear, conjugate gaze  HENT:  atraumatic, nose with no rhinorrhea, oropharynx clear. Mild dry mucous membranes.   Neck:  no meningismus  Cardiovascular:  HR 80s during exam, regular rhythm, no murmurs, brisk cap refill  Chest:  no chest wall tenderness  Pulmonary:  no stridor, normal phonation, normal work of breathing, clear lungs bilaterally  Abdomen:  soft, nondistended, nontender  :  no CVA tenderness  Back:  no midline spinal tenderness  Musculoskeletal:  no pretibial edema, no calf tenderness. Gross ROM intact to joints of extremities with no obvious deformities.  Skin:  warm, dry, no rash  Neuro:  awake, alert, answers questions appropriately, follows commands, moves all limbs. Baseline pill rolling tremor of right arm. CN 2-12 intact, negative pronator drift, 5/5 strength to all extremities with sensation to light touch intact  Psych:  calm, normal affect      --------------- RESULTS ---------------  EKG:    Reviewed and interpreted by me.  - atrial paced at 76bpm, no ST or T wave changes, normal intervals  My read.    LAB:  Reviewed and interpreted by me.  Results for orders placed or performed during the hospital encounter of 03/01/22   XR Chest 2 Views    Impression    IMPRESSION: Minimal change. Heart size normal with dual-chamber pacemaker present. Lungs may be mildly hyperinflated but no definite focal infiltrate. Small bilateral pleural effusions seen on lateral view.   Comprehensive metabolic panel   Result Value Ref Range    Sodium 141 136 - 145 mmol/L    Potassium 3.6 3.5 - 5.0 mmol/L    Chloride 106 98 - 107 mmol/L    Carbon Dioxide (CO2) 23 22 - 31 mmol/L    Anion Gap 12 5 - 18 mmol/L    Urea Nitrogen 24 8 - 28 mg/dL    Creatinine 1.36 (H) 0.70 - 1.30 mg/dL    Calcium 8.2 (L) 8.5 - 10.5 mg/dL    Glucose 88 70 - 125 mg/dL    Alkaline  Phosphatase 57 45 - 120 U/L    AST 21 0 - 40 U/L    ALT <9 0 - 45 U/L    Protein Total 5.7 (L) 6.0 - 8.0 g/dL    Albumin 3.0 (L) 3.5 - 5.0 g/dL    Bilirubin Total 0.8 0.0 - 1.0 mg/dL    GFR Estimate 53 (L) >60 mL/min/1.73m2   Result Value Ref Range    Troponin I 0.04 0.00 - 0.29 ng/mL   Result Value Ref Range    Magnesium 1.8 1.8 - 2.6 mg/dL   UA with Microscopic reflex to Culture    Specimen: Urine, Midstream   Result Value Ref Range    Color Urine Light Yellow Colorless, Straw, Light Yellow, Yellow    Appearance Urine Clear Clear    Glucose Urine Negative Negative mg/dL    Bilirubin Urine Negative Negative    Ketones Urine Negative Negative mg/dL    Specific Gravity Urine 1.012 1.001 - 1.030    Blood Urine Negative Negative    pH Urine 6.5 5.0 - 7.0    Protein Albumin Urine Negative Negative mg/dL    Urobilinogen Urine <2.0 <2.0 mg/dL    Nitrite Urine Negative Negative    Leukocyte Esterase Urine Negative Negative    RBC Urine 1 <=2 /HPF    WBC Urine 2 <=5 /HPF    Squamous Epithelials Urine <1 <=1 /HPF    Hyaline Casts Urine 4 (H) <=2 /LPF   Symptomatic; Unknown Influenza A/B & SARS-CoV2 (COVID-19) Virus PCR Multiplex Nasopharyngeal    Specimen: Nasopharyngeal; Swab   Result Value Ref Range    Influenza A PCR Negative Negative    Influenza B PCR Negative Negative    SARS CoV2 PCR Negative Negative   CBC with platelets and differential   Result Value Ref Range    WBC Count 6.3 4.0 - 11.0 10e3/uL    RBC Count 3.93 (L) 4.40 - 5.90 10e6/uL    Hemoglobin 11.8 (L) 13.3 - 17.7 g/dL    Hematocrit 36.4 (L) 40.0 - 53.0 %    MCV 93 78 - 100 fL    MCH 30.0 26.5 - 33.0 pg    MCHC 32.4 31.5 - 36.5 g/dL    RDW 13.3 10.0 - 15.0 %    Platelet Count 206 150 - 450 10e3/uL    % Neutrophils 73 %    % Lymphocytes 15 %    % Monocytes 9 %    % Eosinophils 2 %    % Basophils 1 %    % Immature Granulocytes 0 %    NRBCs per 100 WBC 0 <1 /100    Absolute Neutrophils 4.6 1.6 - 8.3 10e3/uL    Absolute Lymphocytes 1.0 0.8 - 5.3 10e3/uL     Absolute Monocytes 0.6 0.0 - 1.3 10e3/uL    Absolute Eosinophils 0.1 0.0 - 0.7 10e3/uL    Absolute Basophils 0.1 0.0 - 0.2 10e3/uL    Absolute Immature Granulocytes 0.0 <=0.4 10e3/uL    Absolute NRBCs 0.0 10e3/uL       RADIOLOGY:  Reviewed by me. Please see official radiology report.  Recent Results (from the past 24 hour(s))   XR Chest 2 Views    Narrative    EXAM: XR CHEST 2 VW  LOCATION: [facility]  DATE/TIME: 3/1/2022 7:09 PM    INDICATION: cp  COMPARISON: 11/15/2021      Impression    IMPRESSION: Minimal change. Heart size normal with dual-chamber pacemaker present. Lungs may be mildly hyperinflated but no definite focal infiltrate. Small bilateral pleural effusions seen on lateral view.           I, Tommy Paredes, am serving as a scribe to document services personally performed by Dr. Marco Jerry based on my observation and the provider's statements to me. I, Marco Jerry MD attest that Tommy Paredes is acting in a scribe capacity, has observed my performance of the services and has documented them in accordance with my direction.      Marco Jerry MD  03/01/22  Emergency Medicine  [facility] EMERGENCY DEPARTMENT  [facility] [facility] [address] [phone]  Dept: [phone]     Marco Jerry MD  03/01/22 3117

## 2022-03-02 NOTE — ED TRIAGE NOTES
Per EMS patient states patient has had decreased PO intake x3 days as well as increasing weakness for these three days. BGL for EMS was 119. Patient has history of parkinson's disease.

## 2022-03-02 NOTE — ED NOTES
RN spoke with pt and wife. Pt alert and calm in room. Pt came in to ER for increased weakness. Pt has history of parkinson's disease and wife stated he becomes weak often and comes to ER for increased weakness and receives fluids and feels better after.

## 2022-03-02 NOTE — ED PROVIDER NOTES
ED provider triage note    Patient brought in by ambulance from home. He has a history of Parkinson's. His wife reports generalized illness and weakness. He denies any complaints    He appears chronically ill and disabled. He has some tremor. No distress. He is dehydrated. His vital signs are normal    Diagnostic and therapeutic interventions ordered     Demond Bliss MD  03/01/22 1122

## 2022-03-02 NOTE — DISCHARGE INSTRUCTIONS
Continue all of your previously-prescribed medications.    Drink plenty of fluids to stay hydrated.    Follow up with your Primary Care provider in 2 days for a recheck.    Return to the Emergency Department for any difficulty breathing, severe worsening, or any other concerns.

## 2022-03-23 ENCOUNTER — TELEPHONE (OUTPATIENT)
Dept: NEUROLOGY | Facility: CLINIC | Age: 82
End: 2022-03-23
Payer: MEDICARE

## 2022-03-23 DIAGNOSIS — I95.1 ORTHOSTATIC HYPOTENSION DYSAUTONOMIC SYNDROME: ICD-10-CM

## 2022-03-23 DIAGNOSIS — G90.3 PARKINSON'S DISEASE WITH NEUROGENIC ORTHOSTATIC HYPOTENSION (H): Chronic | ICD-10-CM

## 2022-03-23 RX ORDER — CARBIDOPA AND LEVODOPA 25; 100 MG/1; MG/1
TABLET ORAL
Qty: 720 TABLET | Refills: 0 | Status: SHIPPED | OUTPATIENT
Start: 2022-03-23 | End: 2022-01-01

## 2022-03-23 NOTE — TELEPHONE ENCOUNTER
Health Call Center    Phone Message    May a detailed message be left on voicemail: yes     Reason for Call: Medication Refill Request    Has the patient contacted the pharmacy for the refill? Yes   Name of medication being requested: carbidopa-levodopa  mg  Provider who prescribed the medication: Thompson  Pharmacy: [id number]  Date medication is needed: before end of month.    Also, spouse wants to know if there are any updated brochures or material on parkinson's, she would like to have.     Action Taken: Message routed to:  [facility] ([facility] neurology    Travel Screening: Not Applicable

## 2022-03-23 NOTE — TELEPHONE ENCOUNTER
Refill request for Sinemet. Pt last seen 6/25/21 and has follow up scheduled for 4/4/22. Will send in refills.     Gosia Hernandez RN on 3/23/2022 at 11:08 AM

## 2022-04-04 ENCOUNTER — OFFICE VISIT (OUTPATIENT)
Dept: NEUROLOGY | Facility: CLINIC | Age: 82
End: 2022-04-04
Payer: MEDICARE

## 2022-04-04 VITALS
HEIGHT: 73 IN | DIASTOLIC BLOOD PRESSURE: 94 MMHG | BODY MASS INDEX: 24.52 KG/M2 | SYSTOLIC BLOOD PRESSURE: 163 MMHG | WEIGHT: 185 LBS

## 2022-04-04 DIAGNOSIS — G90.3 PARKINSON'S DISEASE WITH NEUROGENIC ORTHOSTATIC HYPOTENSION (H): ICD-10-CM

## 2022-04-04 DIAGNOSIS — I95.1 ORTHOSTATIC HYPOTENSION DYSAUTONOMIC SYNDROME: Primary | ICD-10-CM

## 2022-04-04 PROCEDURE — 99215 OFFICE O/P EST HI 40 MIN: CPT | Performed by: PSYCHIATRY & NEUROLOGY

## 2022-04-04 NOTE — PROGRESS NOTES
"In person evaluation  Patient accompanied by wife who helps with history and information    HPI  2/25/2020, in person visit  5/19/2020, video visit  12/8/2020, video visit  6/25/2021, in person visit        81-year-old followed neurologically for:  Parkinson's disease going on for 22+ years  Severe autonomic instability/syncope    Patient not seen in almost a year  Patient with end-stage Parkinson's disease  Has significant severe autonomic instability  Patient is lost a lot of muscle mass but not necessarily wake    Significant problems at this time  As possible \"spells of hypotension couple times per day that can last 5 minutes\"  Wife is not doing blood pressures though anymore so we do not know where his blood pressure is  Then she did state that sometimes it is really high right in the morning when he gets up but he has been laying flat  We talked about him needing to sleep at least at 30 degrees    He has chronic edema in his feet but it has been stable  He is on the Florinef and midodrine    Patient sometimes will \"have excessive sleepiness\" and go 2 days without really eating or drinking much or taking his meds  I talked with wife that this could lead to some of the severe constipation he has then she did say no he stays well-hydrated    Patient with significant constipation  Is on 2 senna per day  Prunes in the morning  Citrucel  Wife is pushing hydration    Does have some hallucinations  He is getting people or animals are probably related to his Parkinson's disease and his Sinemet    Patient basically wheelchair-bound  Wife has a transfer belt and is able to transfer him from the chair to the bed but this is getting harder    Discussed end-of-life issues  He does have a living well  Talked about hydration and nutrition and he was not interested in any feeding tube patient is coherent and says that when he gets that point he does not want 1        A.  In regards to the autonomic instability       Florinef " 0.1 mg tablet every morning       midodrine 10 mg tablet twice daily         He did get his lift chair  Is not wearing the abdominal binder  Patient has a hospital bed but I do not think that he is actually elevating it to 30 degrees as he has a.m. hypertension  Does wear knee-high pressure socks but has some edema in his feet  Is basically wheelchair-bound  Transported by wheelchair has a lift chair at home    Seems to pass out a lot when getting up to go to the bathroom or back or if he is on the toilet seat  Also passes out a lot at breakfast so she has been a lift chair so she can leg and down  Talked about if he passes out using a lift chair laying flat  Feels that he is having more trouble and is mainly wheelchair-bound      For the patient's autonomic instability he is on  Florinef 0.1 mg daily  Proamantine 10 mg twice daily  Mestinon 60 mg half a tablet 3 times daily  He is going to try to use the abdominal binder but maybe not have it on so tight so he can tolerate it to put it on in the morning  Sleeps with the head of his bed up we will try having elevated to at least 30 to 45 degrees discussed at length with patient's wife    Have asked the wife to check blood pressures 3 times per day at different times just to see where he is for spot check  If he is having this time where he zoned out then to take a blood pressure to see if is because of low blood pressure  Question whether these episodes of zoning out are from Parkinson's and been tired versus hypotensive    Currently on Sinemet 25/100, 2 tablets 4 times per day    Review of Meds  Aspirin 81 mg once per day  Atorvastatin 40 mg once per day  Brilanta 90 mg twice daily    B12 1000 mcg orally every other day      B.  Parkinson's disease for 22+ years        Very hypophonic voice autonomic instability resting tremor right side greater than left chronic head drop with leaning to the left        Difficulty with rigidity difficulty decrease carbidopa  levodopa medication        No visual hallucinations or vivid dreams        Discussed living well which has been filled out in the past        Discussed power of  which has been filled out in the past        Discussed moving to a apartment that has escalating care available as seniors apartment is not enough and increasing care needs are imminent in the near future        Sinemet 25/100, increase 12/8/2020, (2 tablets 4 times daily)        Remeron 7.5 mg at nighttime    Review of Meds  Aspirin 81 mg once per day  Atorvastatin 40 mg once per day  Brilanta 90 mg twice daily    B12 1000 mcg orally every other day          Past medical history  Parkinson's disease onset prior to 1999  Severe autonomic instability  Pacemaker  MI/NSTEMI 2019  Hypertension  Hyperlipidemia  GERD  Syncope/falls due to autonomic instability    Habits  Past smoker  Past occasional alcohol beverage    Family history  Mom with heart disease  Father with heart disease      Work-up summary:  CT scan had June 2018 no subdural mild chronic small vessel changes mild volume loss  Previous workup included the following which is also outlined in the November 27, 2013 note for evaluation:  a. CTA of the neck vessels normal.  b. CTA of the head vessels no stenosis.  c. Echo 60% ejection fraction, normal left atrium.  d. HDL in the past 34, LDL 83.  CT cervical spine 12/12/2020 spondylolisthesis no fracture  CT head 5/20/2021 moderate volume loss small vessel changes no subdural hematoma or mass  B12 1038 (November 2019)  Labs May 2021  Sodium 142 potassium 4.0 BUN 25 creatinine 1.36  Glucose 94  White blood count 7.3, hemoglobin 11.1, platelets 201,000         exam    Review of systems  See HPI above  Pertinent positives and negatives  Leaning to the left  Hypophonic speech  Can swallow okay not choking on food  Sometimes sees people or animals    Basically wheelchair-bound  Postural instability and severe autonomic instability  Weakness of his  legs  Edema in the feet    Has chronic constipation sometimes alternating with diarrhea but less diarrhea now than in the past is on a bowel program      No diplopia no dysphagia  No visual field changes    No headache or chest pain or shortness of breath    Otherwise review of systems negative    General exam  Blood pressure 163/94, pulse 106  HEENT normal  Lungs clear  Heart rate regular  Abdomen soft  Edema in the feet wearing pressure stockings, 1+ edema up the shin and foot  Decreased range of motion of both shoulders    Neurologic exam  Alert attentive oriented x3  No aphasia  No neglect  Brief memory testing okay    Cranial nerves II through XII except for parkinsonian features normal  No ophthalmoplegia  No nystagmus  Visual fields intact  Face symmetrical  Tongue twisters slow but understandable    Parkinsonian features  Soft hypophonic voice  Head drop posture old/chronic  Mild to moderate decreased blink but reasonable upgaze  No hallucinations or vivid dreams  Mild bradykinesia  Mild resting tremor a little bit more on the right greater than the left today  Has had difficulty with alternating constipation and diarrhea  Orthostatic BP changes     Upper extremities  No drift  Tremor today was a little bit more noticed on the right than the left but on other visits has been the other way around  Mild to moderate bradykinesia  Slow finger tapping  Decreased range of motion of both shoulders getting his arms up    Lower extremities  Diffusely weak  In the seated position can move them weekly  Inability to stand due to weakness    Gait  Flexed posture  Can march in place while seated  Can kick his legs out  Gets fatigued easily when he tries to stand up  Uses most of his energy to transfer in and out of the car when he comes for a visit  Does have a lift chair at home        Assessment/Plan     1.  Autonomic dysfunction (G90.3)       Reviewed medications, stay well-hydrated       2.  Parkinson Disease (G20)          We will not lower the Sinemet any further as he started to get more stiffness and tremor        We discussed progressive nature of Parkinson's disease      Current diagnoses:    1. Parkinson s disease going on for greater than 22 plus years as far back as in 1998 with tremor and bradykinesia.  2. Severe autonomic dysfunction with orthostatic blood pressure trouble, failed black liquorice, failed Florinef, had supine hypertension, has tried Mestinon in the past and midodrine.  3. History of heart disease on beta-blockers with some bradycardia, had a pacemaker placed in August 2015.  4. History of TIAs in August 2013 with hypertension and hyperlipidemia, is supposed to be on an aspirin, has been a past smoker.  5. Autonomic instability, which is resistant to multiple manipulations and activities, medications, and habits.    Patient does have:   Hospital bed. He is unable to sleep at 30 degrees the last time I saw him because he slides around, discussed at length.   Did get an abdominal binder, tried it a little bit, does not like to wear it.  We will try using this more often   Has a lift chair   Has a transfer belt for his wife to use   Does have a 4 wheeled walker with hand brake      Current plan  Midodrine 10 mg 2 times daily refilled  Florinef 0.1 mg every morning refilled  Sinemet 25/100, 2 tablets 4 times daily,   Hospital bed should raise to at least 30 to 45 degrees 3 discussed  Retry the abdominal binder but maybe wear it less tight put it on in the morning to see if he can tolerate it    Mestinon 30 mg 3 times daily     Other physicians  Atorvastatin 40 mg daily  Aspirin 81 mg once per day  Other meds see med list    Discussed gait safety high risk for falls and injuries  Discussed end-of-life issues  Discussed feeding tube and patient is not interested in this    Wife will check blood pressures at different times of the day bring that to follow-up so that we can track whether we can adjust his meds  for the autonomic instability    Rediscussed with patient's wife about caregiver role and whether she needs increasing help at home    Brought some paperwork for [employer] which I will fill out      Total care time today 42  Follow-up in 3 months

## 2022-04-04 NOTE — LETTER
"    4/4/2022         RE: Canelo Cook  [address]  [address]  [place] [address]        Dear Colleague,    Thank you for referring your patient, Canelo Cook, to the [facility] [place]. Please see a copy of my visit note below.    In person evaluation  Patient accompanied by wife who helps with history and information    HPI  2/25/2020, in person visit  5/19/2020, video visit  12/8/2020, video visit  6/25/2021, in person visit        81-year-old followed neurologically for:  Parkinson's disease going on for 22+ years  Severe autonomic instability/syncope    Patient not seen in almost a year  Patient with end-stage Parkinson's disease  Has significant severe autonomic instability  Patient is lost a lot of muscle mass but not necessarily wake    Significant problems at this time  As possible \"spells of hypotension couple times per day that can last 5 minutes\"  Wife is not doing blood pressures though anymore so we do not know where his blood pressure is  Then she did state that sometimes it is really high right in the morning when he gets up but he has been laying flat  We talked about him needing to sleep at least at 30 degrees    He has chronic edema in his feet but it has been stable  He is on the Florinef and midodrine    Patient sometimes will \"have excessive sleepiness\" and go 2 days without really eating or drinking much or taking his meds  I talked with wife that this could lead to some of the severe constipation he has then she did say no he stays well-hydrated    Patient with significant constipation  Is on 2 senna per day  Prunes in the morning  Citrucel  Wife is pushing hydration    Does have some hallucinations  He is getting people or animals are probably related to his Parkinson's disease and his Sinemet    Patient basically wheelchair-bound  Wife has a transfer belt and is able to transfer him from the chair to the bed but this is getting harder    Discussed " end-of-life issues  He does have a living well  Talked about hydration and nutrition and he was not interested in any feeding tube patient is coherent and says that when he gets that point he does not want 1        A.  In regards to the autonomic instability       Florinef 0.1 mg tablet every morning       midodrine 10 mg tablet twice daily         He did get his lift chair  Is not wearing the abdominal binder  Patient has a hospital bed but I do not think that he is actually elevating it to 30 degrees as he has a.m. hypertension  Does wear knee-high pressure socks but has some edema in his feet  Is basically wheelchair-bound  Transported by wheelchair has a lift chair at home    Seems to pass out a lot when getting up to go to the bathroom or back or if he is on the toilet seat  Also passes out a lot at breakfast so she has been a lift chair so she can leg and down  Talked about if he passes out using a lift chair laying flat  Feels that he is having more trouble and is mainly wheelchair-bound      For the patient's autonomic instability he is on  Florinef 0.1 mg daily  Proamantine 10 mg twice daily  Mestinon 60 mg half a tablet 3 times daily  He is going to try to use the abdominal binder but maybe not have it on so tight so he can tolerate it to put it on in the morning  Sleeps with the head of his bed up we will try having elevated to at least 30 to 45 degrees discussed at length with patient's wife    Have asked the wife to check blood pressures 3 times per day at different times just to see where he is for spot check  If he is having this time where he zoned out then to take a blood pressure to see if is because of low blood pressure  Question whether these episodes of zoning out are from Parkinson's and been tired versus hypotensive    Currently on Sinemet 25/100, 2 tablets 4 times per day    Review of Meds  Aspirin 81 mg once per day  Atorvastatin 40 mg once per day  Brilanta 90 mg twice daily    B12 1000  mcg orally every other day      B.  Parkinson's disease for 22+ years        Very hypophonic voice autonomic instability resting tremor right side greater than left chronic head drop with leaning to the left        Difficulty with rigidity difficulty decrease carbidopa levodopa medication        No visual hallucinations or vivid dreams        Discussed living well which has been filled out in the past        Discussed power of  which has been filled out in the past        Discussed moving to a apartment that has escalating care available as seniors apartment is not enough and increasing care needs are imminent in the near future        Sinemet 25/100, increase 12/8/2020, (2 tablets 4 times daily)        Remeron 7.5 mg at nighttime    Review of Meds  Aspirin 81 mg once per day  Atorvastatin 40 mg once per day  Brilanta 90 mg twice daily    B12 1000 mcg orally every other day          Past medical history  Parkinson's disease onset prior to 1999  Severe autonomic instability  Pacemaker  MI/NSTEMI 2019  Hypertension  Hyperlipidemia  GERD  Syncope/falls due to autonomic instability    Habits  Past smoker  Past occasional alcohol beverage    Family history  Mom with heart disease  Father with heart disease      Work-up summary:  CT scan had June 2018 no subdural mild chronic small vessel changes mild volume loss  Previous workup included the following which is also outlined in the November 27, 2013 note for evaluation:  a. CTA of the neck vessels normal.  b. CTA of the head vessels no stenosis.  c. Echo 60% ejection fraction, normal left atrium.  d. HDL in the past 34, LDL 83.  CT cervical spine 12/12/2020 spondylolisthesis no fracture  CT head 5/20/2021 moderate volume loss small vessel changes no subdural hematoma or mass  B12 1038 (November 2019)  Labs May 2021  Sodium 142 potassium 4.0 BUN 25 creatinine 1.36  Glucose 94  White blood count 7.3, hemoglobin 11.1, platelets 201,000         exam    Review of  systems  See HPI above  Pertinent positives and negatives  Leaning to the left  Hypophonic speech  Can swallow okay not choking on food  Sometimes sees people or animals    Basically wheelchair-bound  Postural instability and severe autonomic instability  Weakness of his legs  Edema in the feet    Has chronic constipation sometimes alternating with diarrhea but less diarrhea now than in the past is on a bowel program      No diplopia no dysphagia  No visual field changes    No headache or chest pain or shortness of breath    Otherwise review of systems negative    General exam  Blood pressure 163/94, pulse 106  HEENT normal  Lungs clear  Heart rate regular  Abdomen soft  Edema in the feet wearing pressure stockings, 1+ edema up the shin and foot  Decreased range of motion of both shoulders    Neurologic exam  Alert attentive oriented x3  No aphasia  No neglect  Brief memory testing okay    Cranial nerves II through XII except for parkinsonian features normal  No ophthalmoplegia  No nystagmus  Visual fields intact  Face symmetrical  Tongue twisters slow but understandable    Parkinsonian features  Soft hypophonic voice  Head drop posture old/chronic  Mild to moderate decreased blink but reasonable upgaze  No hallucinations or vivid dreams  Mild bradykinesia  Mild resting tremor a little bit more on the right greater than the left today  Has had difficulty with alternating constipation and diarrhea  Orthostatic BP changes     Upper extremities  No drift  Tremor today was a little bit more noticed on the right than the left but on other visits has been the other way around  Mild to moderate bradykinesia  Slow finger tapping  Decreased range of motion of both shoulders getting his arms up    Lower extremities  Diffusely weak  In the seated position can move them weekly  Inability to stand due to weakness    Gait  Flexed posture  Can march in place while seated  Can kick his legs out  Gets fatigued easily when he tries  to stand up  Uses most of his energy to transfer in and out of the car when he comes for a visit  Does have a lift chair at home        Assessment/Plan     1.  Autonomic dysfunction (G90.3)       Reviewed medications, stay well-hydrated       2.  Parkinson Disease (G20)         We will not lower the Sinemet any further as he started to get more stiffness and tremor        We discussed progressive nature of Parkinson's disease      Current diagnoses:    1. Parkinson s disease going on for greater than 22 plus years as far back as in 1998 with tremor and bradykinesia.  2. Severe autonomic dysfunction with orthostatic blood pressure trouble, failed black liquorice, failed Florinef, had supine hypertension, has tried Mestinon in the past and midodrine.  3. History of heart disease on beta-blockers with some bradycardia, had a pacemaker placed in August 2015.  4. History of TIAs in August 2013 with hypertension and hyperlipidemia, is supposed to be on an aspirin, has been a past smoker.  5. Autonomic instability, which is resistant to multiple manipulations and activities, medications, and habits.    Patient does have:   Hospital bed. He is unable to sleep at 30 degrees the last time I saw him because he slides around, discussed at length.   Did get an abdominal binder, tried it a little bit, does not like to wear it.  We will try using this more often   Has a lift chair   Has a transfer belt for his wife to use   Does have a 4 wheeled walker with hand brake      Current plan  Midodrine 10 mg 2 times daily refilled  Florinef 0.1 mg every morning refilled  Sinemet 25/100, 2 tablets 4 times daily,   Hospital bed should raise to at least 30 to 45 degrees 3 discussed  Retry the abdominal binder but maybe wear it less tight put it on in the morning to see if he can tolerate it    Other physicians  Mestinon 30 mg 2 times daily through her other physicians  Atorvastatin 40 mg daily  Aspirin 81 mg once per day  Other meds see  med list    Discussed gait safety high risk for falls and injuries  Discussed end-of-life issues  Discussed feeding tube and patient is not interested in this    Wife will check blood pressures at different times of the day bring that to follow-up so that we can track whether we can adjust his meds for the autonomic instability    Rediscussed with patient's wife about caregiver role and whether she needs increasing help at home    Brought some paperwork for [employer] which I will fill out      Total care time today 42  Follow-up in 3 months      Again, thank you for allowing me to participate in the care of your patient.        Sincerely,        Tramaine Mackay MD

## 2022-04-04 NOTE — NURSING NOTE
Chief Complaint   Patient presents with     Parkinson     Follow up- weakness that has worsened      Mirella Brown CMA on 4/4/2022 at 3:46 PM

## 2022-04-05 DIAGNOSIS — G20.A1 PARKINSON DISEASE (H): ICD-10-CM

## 2022-04-05 DIAGNOSIS — G90.3 PARKINSON'S DISEASE WITH NEUROGENIC ORTHOSTATIC HYPOTENSION (H): Chronic | ICD-10-CM

## 2022-04-05 DIAGNOSIS — I95.1 ORTHOSTATIC HYPOTENSION DYSAUTONOMIC SYNDROME: ICD-10-CM

## 2022-04-05 RX ORDER — PYRIDOSTIGMINE BROMIDE 60 MG/1
TABLET ORAL
Qty: 135 TABLET | Refills: 0 | OUTPATIENT
Start: 2022-04-05

## 2022-04-05 RX ORDER — MIDODRINE HYDROCHLORIDE 10 MG/1
TABLET ORAL
Qty: 180 TABLET | Refills: 0 | Status: SHIPPED | OUTPATIENT
Start: 2022-04-05 | End: 2022-01-01

## 2022-04-05 RX ORDER — FLUDROCORTISONE ACETATE 0.1 MG/1
TABLET ORAL
Qty: 90 TABLET | Refills: 0 | Status: SHIPPED | OUTPATIENT
Start: 2022-04-05 | End: 2022-01-01

## 2022-04-05 NOTE — TELEPHONE ENCOUNTER
Refill request for Florinef, Mestinon, and Midodrine. Pt last seen 4/4/22. Will send in refills for Midodrine and Florinef. Pt gets Mestinon filled through a different provider.     Gosia Hernandez RN on 4/5/2022 at 1:40 PM

## 2022-05-02 NOTE — ED NOTES
"Pt reported that he experienced incontinence of bladder which he described as \"out of the ordinary.\" Patient is also still experiencing bright red blood draining from his urethra. Advised provider of the new incontinence and the continued blood drainage. No new orders.   "

## 2022-05-02 NOTE — DISCHARGE INSTRUCTIONS
Continue all of your previously-prescribed medications.    Drink plenty of fluids to stay hydrated.    Follow up with your Primary Care provider in 2 days for a recheck.    Return to the Emergency Department for any persistent vomiting, difficulty breathing, or any other concerns.

## 2022-05-02 NOTE — ED PROVIDER NOTES
EMERGENCY DEPARTMENT ENCOUNTER      NAME: Canelo Cook  AGE: 82 year old male  YOB: 1940  MRN: [id number]  EVALUATION DATE & TIME: 5/2/2022  9:25 AM    PCP: Hung Camacho    ED PROVIDER: Marco Jerry M.D.      Chief Complaint   Patient presents with     Hallucinations         IMPRESSION  1. Hallucinations    2. History of Parkinson's disease    3. Hematuria, unspecified type    4. Hypokalemia    5. Elevated blood pressure reading with diagnosis of hypertension        PLAN  - close PCP follow up  - discharge to home    ED COURSE & MEDICAL DECISION MAKING    82yoM with history of Parkinson's, brainstem stroke syndrome, CAD s/p stent, SA node dysfunction (s/p pacemaker), HTN, HLD, DNR code status presenting with his wife from home for several weeks of intermittent hallucinations. States he occasionally sees dogs or thinks he is in a dog park but is not. Denies any auditory hallucinations. Denies any pains, cough, fevers, sweats, chills, any other symptoms. States he feels fine here now in the ED.    SBP 200s on presentation with otherwise normal vitals. Exam unremarkable with mild RUE resting tremor but no other neuro findings, clear lungs, normal work of breathing, benign abdomen, no peripheral edema, no rash, no meningismus. No clinical symptoms concerning for HTN emergency. UA with no UTI; does have blood though. No pain to suggest ureteral stone. Labs with no SYDNEY, K 3.2 (replaced PO), mag 2.0, hemoglobin 12, WBC 7 with CRP 0.9 and negative procal (doubt bacteremia or sepsis), CXR unremarkable. Patient asymptomatic on recheck; exam unchanged. Most notable finding is hematuria which warrants outpatient follow up; likely would benefit from cystoscopy---wife & patient voiced understanding and agreement about this. From subacute hallucinations standpoint, doubt meningitis, encephalitis, other infectious etiology to warrant antibiotics. Do suspect underlying Parkinson's playing a role. Regardless,  no currently hallucinations and patient & wife comfortable with outpatient follow up which is reasonable. doubt benefit to admission at this time. Doubt stroke. Return precautions and need for PCP & Neurology follow up discussed and understood. No further questions at the time of discharge.    --------------------------------------------------------------------------------   --------------------------------------------------------------------------------     9:28 AM I met with the patient for the initial interview and physical examination. Discussed plan for treatment and workup in the ED.    12:27 PM I rechecked and updated the patient. Patient's wife was at bedside with patient. He reports feeling improved, wife is comfortable with discharge to home. We discussed the plan for discharge and the patient is agreeable. Reviewed supportive cares, symptomatic treatment, outpatient follow up, and reasons to return to the Emergency Department. Patient to be discharged by ED RN.     This patient involved a high degree of complexity in medical decision making, as significant risks were present and assessed.    Broad differential considered for this patient presenting, including but not limited to:  Sepsis, SYDNEY, dehydration, electrolyte derangement, thyroid issue, UTI, ACS    I wore the following PPE during this patient encounter:  N95 mask, face shield w/ eye protection, gloves    MEDICATIONS GIVEN IN THE EMERGENCY DEPARTMENT  Medications   potassium chloride (KLOR-CON) Packet 40 mEq (40 mEq Oral Given 5/2/22 5985)       NEW PRESCRIPTIONS STARTED AT TODAY'S ER VISIT  Discharge Medication List as of 5/2/2022 12:58 PM      CONTINUE these medications which have NOT CHANGED    Details   aspirin (ASA) 81 MG EC tablet Take 81 mg by mouth, Historical      atorvastatin (LIPITOR) 40 MG tablet Take 40 mg by mouth, Historical      azelastine (ASTELIN) 0.1 % nasal spray Spray 1 spray in nostril, Historical      carbidopa-levodopa  (SINEMET)  MG tablet TAKE 2 TABLETS 4 TIMES  DAILY, Disp-720 tablet, R-0, E-Prescribe      cholecalciferol 50 MCG (2000 UT) tablet Take 2,000 Units by mouth, Historical      fludrocortisone (FLORINEF) 0.1 MG tablet TAKE 1 TABLET DAILY, Disp-90 tablet, R-0, E-Prescribe      magnesium oxide (MAG-OX) 400 (241.3 Mg) MG tablet Take 400 mg by mouth, Historical      Metoprolol Succinate 25 MG CS24 Take 1.5 mg by mouth, Historical      midodrine (PROAMATINE) 10 MG tablet TAKE 1 TABLET TWICE A DAY, Disp-180 tablet, R-0, E-Prescribe      mirtazapine (REMERON) 7.5 MG tablet Take 7.5 mg by mouth, Historical      nitroGLYcerin (NITROSTAT) 0.4 MG sublingual tablet Place 1 tablet (0.4 mg) under the tongue every 5 minutes as needed for chest pain, Disp-30 tablet, R-4, E-Prescribe      omeprazole (PRILOSEC) 20 MG DR capsule Take 20 mg by mouth, Historical      pyridostigmine (MESTINON) 60 MG tablet TAKE 1/2 TABLET THREE TIMESDAILY, Disp-135 tablet, R-3, E-Prescribe      senna (SENOKOT) 8.6 MG tablet Take 2 tablets by mouth, Historical      tamsulosin (FLOMAX) 0.4 MG capsule Take 0.4 mg by mouth, Historical      tolterodine ER (DETROL LA) 4 MG 24 hr capsule Take 4 mg by mouth, Historical      vitamin B complex with vitamin C (VITAMIN  B COMPLEX) tablet Take 1 tablet by mouth daily, Historical      vitamin B-12 (CYANOCOBALAMIN) 1000 MCG tablet Take 1,000 mcg by mouth, Historical                 =================================================================      HPI  Patient information was obtained from: patient     Use of : N/A       Canelo Cook is a 82 year old male with a pertinent history of Parkinson's disease, CAD s/p stent, s/p pacemaker, and hypertension who presents to this ED via EMS for evaluation of hallucinations.     For the past couple of weeks, patient states he has been hallucinating. He reports hearing noises such as dogs barking and thinks that he is at a park. He states he is not  "currently hallucinating. Patient lives with his wife. He states he has chronic bilateral lower extremity swelling. Denies abdominal pain, chest pain, neck pain, nausea, vomiting, diarrhea, fever, cough, rhinorrhea, sore throat, or any other additional symptoms at this time.       REVIEW OF SYSTEMS   Review of Systems   Constitutional: Negative for fever.   HENT: Negative for rhinorrhea and sore throat.    Respiratory: Negative for cough.    Cardiovascular: Positive for leg swelling (chronic, bilateral). Negative for chest pain.   Gastrointestinal: Negative for abdominal pain, diarrhea, nausea and vomiting.   Musculoskeletal: Negative for neck pain.   Psychiatric/Behavioral: Positive for hallucinations.     All other systems reviewed and are negative except as noted above in HPI.        --------------- MEDICAL HISTORY ---------------  PAST MEDICAL HISTORY:  Past Medical History:   Diagnosis Date     Acute chest pain 11/10/2019     Acute non-Q wave non-ST elevation myocardial infarction (NSTEMI) (H) 11/11/2019     Anemia 11/15/2013     Closed fracture of multiple ribs of left side with routine healing 2/18/2020     Coronary artery disease due to lipid rich plaque 11/11/2019     DNAR (do not attempt resuscitation) 11/11/2019     Essential hypertension      Fall at home, sequela 12/6/2019     GERD (gastroesophageal reflux disease)      HLD (hyperlipidemia)      Near syncope 10/12/2015     Parkinson's disease (H)      SA node dysfunction (H) 07/29/2015    \"chronotropic incompetence\" per [facility] EP notes     Syncope, unspecified syncope type 11/10/2019     Vitamin D deficiency      Patient Active Problem List   Diagnosis     Parkinson disease (H)     Coronary artery disease involving native coronary artery of native heart without angina pectoris     Orthostatic hypotension     Dyslipidemia, goal LDL below 100     S/P placement of cardiac pacemaker     Anemia     Bilateral leg edema     Brainstem stroke syndrome     " "Burning feet syndrome     Chronic low back pain without sciatica     Closed fracture of multiple ribs of left side with routine healing     DNAR (do not attempt resuscitation)     Dyslipidemia     Essential hypertension     GERD (gastroesophageal reflux disease)     Hypogonadism, testicular     Increased frequency of urination     Labile hypertension     Low vitamin B12 level     Male erectile dysfunction, unspecified     Nocturia     Numerous moles     OAB (overactive bladder)     Other insomnia     Presence of cardiac pacemaker     Pre-syncope     Reactive depression     Recurrent syncope     S/P coronary artery stent placement     PD (perceptive deafness), asymmetrical     Sleep disorder     Syncope and collapse     Vitamin D deficiency       PAST SURGICAL HISTORY:  Past Surgical History:   Procedure Laterality Date     BACK SURGERY       CATARACT EXTRACTION       CORONARY STENT PLACEMENT  11/11/2019    stent to bifurcation of LAD and DG     CV CORONARY ANGIOGRAM N/A 11/11/2019    Procedure: Coronary Angiogram;  Surgeon: Suzan Pineda MD;  Location: [facility] Cath Lab;  Service: Cardiology     CV LEFT HEART CATHETERIZATION WITH LEFT VENTRICULOGRAM N/A 11/11/2019    Procedure: Left Heart Catheterization with Left Ventriculogram;  Surgeon: Suzan Pineda MD;  Location: [facility] Cath Lab;  Service: Cardiology     ESOPHAGOSCOPY, GASTROSCOPY, DUODENOSCOPY (EGD), COMBINED N/A 3/20/2016    Procedure: ESOPHAGOGASTRODUODENOSCOPY with biopsy;  Surgeon: Melissa Castellano MD;  Location: [facility];  Service:      IMPLANT PACEMAKER  08/10/2015    Biotronik AV pacer by Dr. Vidal at [facility] for \"chronotropic incompetence\", syncope has persisted since then     IMPLANT PROSTHESIS PENIS INFLATABLE       WA EDG US EXAM SURGICAL ALTER STOM DUODENUM/JEJUNUM N/A 3/23/2016    Procedure: EGD and ENDOSCOPIC ULTRASOUND with brushing and biopsies;  Surgeon: Nj Howe MD;  Location: [facility];  Service: " Gastroenterology       CURRENT MEDICATIONS:    No current facility-administered medications for this encounter.    Current Outpatient Medications:      aspirin (ASA) 81 MG EC tablet, Take 81 mg by mouth, Disp: , Rfl:      atorvastatin (LIPITOR) 40 MG tablet, Take 40 mg by mouth, Disp: , Rfl:      azelastine (ASTELIN) 0.1 % nasal spray, Spray 1 spray in nostril, Disp: , Rfl:      carbidopa-levodopa (SINEMET)  MG tablet, TAKE 2 TABLETS 4 TIMES  DAILY, Disp: 720 tablet, Rfl: 0     cholecalciferol 50 MCG (2000 UT) tablet, Take 2,000 Units by mouth, Disp: , Rfl:      fludrocortisone (FLORINEF) 0.1 MG tablet, TAKE 1 TABLET DAILY, Disp: 90 tablet, Rfl: 0     magnesium oxide (MAG-OX) 400 (241.3 Mg) MG tablet, Take 400 mg by mouth, Disp: , Rfl:      Metoprolol Succinate 25 MG CS24, Take 1.5 mg by mouth, Disp: , Rfl:      midodrine (PROAMATINE) 10 MG tablet, TAKE 1 TABLET TWICE A DAY, Disp: 180 tablet, Rfl: 0     mirtazapine (REMERON) 7.5 MG tablet, Take 7.5 mg by mouth, Disp: , Rfl:      nitroGLYcerin (NITROSTAT) 0.4 MG sublingual tablet, Place 1 tablet (0.4 mg) under the tongue every 5 minutes as needed for chest pain, Disp: 30 tablet, Rfl: 4     omeprazole (PRILOSEC) 20 MG DR capsule, Take 20 mg by mouth, Disp: , Rfl:      pyridostigmine (MESTINON) 60 MG tablet, TAKE 1/2 TABLET THREE TIMESDAILY, Disp: 135 tablet, Rfl: 3     senna (SENOKOT) 8.6 MG tablet, Take 2 tablets by mouth, Disp: , Rfl:      tamsulosin (FLOMAX) 0.4 MG capsule, Take 0.4 mg by mouth, Disp: , Rfl:      tolterodine ER (DETROL LA) 4 MG 24 hr capsule, Take 4 mg by mouth, Disp: , Rfl:      vitamin B complex with vitamin C (VITAMIN  B COMPLEX) tablet, Take 1 tablet by mouth daily, Disp: , Rfl:      vitamin B-12 (CYANOCOBALAMIN) 1000 MCG tablet, Take 1,000 mcg by mouth, Disp: , Rfl:     ALLERGIES:  Allergies   Allergen Reactions     Sulfa Drugs      Acetaminophen      Other reaction(s): Agitation  Restless  Feeling - skin crawling         FAMILY  HISTORY:  Family History   Problem Relation Age of Onset     Heart Disease Mother      Myocardial Infarction Father      Myocardial Infarction Sister      Lung Cancer Brother      Heart Disease Brother      Cancer Sister      Heart Disease Brother      Lung Cancer Brother      Heart Disease Brother      Lung Cancer Brother      Heart Disease Brother      Heart Disease Father          SOCIAL HISTORY:   Social History     Socioeconomic History     Marital status:    Tobacco Use     Smoking status: Former Smoker     Smokeless tobacco: Never Used   Substance and Sexual Activity     Alcohol use: Yes     Comment: rare 1-2 times per year         --------------- PHYSICAL EXAM ---------------  Nursing notes and vitals reviewed by me.  VITALS:  Vitals:    05/02/22 1130 05/02/22 1145 05/02/22 1321 05/02/22 1353   BP: (!) 205/118 (!) 205/115 (!) 200/103 (!) 200/103   Pulse: 82 69 72    Resp:       Temp:       TempSrc:       SpO2: 99% 90% 97%    Weight:           PHYSICAL EXAM:    General:  alert, interactive, no distress  Eyes:  conjunctivae clear, conjugate gaze  HENT:  atraumatic, nose with no rhinorrhea, oropharynx clear  Neck:  no meningismus  Cardiovascular:  HR 70s during exam, regular rhythm, no murmurs, brisk cap refill  Chest:  no chest wall tenderness  Pulmonary:  no stridor, normal phonation, normal work of breathing, clear lungs bilaterally  Abdomen:  soft, nondistended, nontender  :  no CVA tenderness  Back:  no midline spinal tenderness  Musculoskeletal:  2+ nontender pitting edema to bilateral knees (patient states this is baseline), no calf tenderness. Gross ROM intact to joints of extremities with no obvious deformities.  Skin:  warm, dry, no rash  Neuro:  awake, alert, answers questions appropriately, follows commands, moves all limbs, CN 2-12 intact, negative pronator drift, 5/5 strength to all extremities with sensation to light touch intact, mild resting tremor to RUE  Psych:  calm, normal  affect      --------------- RESULTS ---------------  LAB:  Reviewed and interpreted by me.  Results for orders placed or performed during the hospital encounter of 05/02/22   XR Chest 2 Views    Impression    IMPRESSION: Patient's quite kyphotic. Dual-lead left subclavian venous pacer. Leads are intact. Lungs are clear without signs of pneumonia or failure. Trace bilateral pleural effusions are again noted on the lateral view. Heart is of normal size.   Hepatic function panel   Result Value Ref Range    Bilirubin Total 0.8 0.0 - 1.0 mg/dL    Bilirubin Direct 0.3 <=0.5 mg/dL    Protein Total 6.1 6.0 - 8.0 g/dL    Albumin 3.2 (L) 3.5 - 5.0 g/dL    Alkaline Phosphatase 68 45 - 120 U/L    AST 16 0 - 40 U/L    ALT <9 0 - 45 U/L   Basic metabolic panel   Result Value Ref Range    Sodium 143 136 - 145 mmol/L    Potassium 3.2 (L) 3.5 - 5.0 mmol/L    Chloride 106 98 - 107 mmol/L    Carbon Dioxide (CO2) 28 22 - 31 mmol/L    Anion Gap 9 5 - 18 mmol/L    Urea Nitrogen 26 8 - 28 mg/dL    Creatinine 1.08 0.70 - 1.30 mg/dL    Calcium 8.7 8.5 - 10.5 mg/dL    Glucose 92 70 - 125 mg/dL    GFR Estimate 69 >60 mL/min/1.73m2   Result Value Ref Range    Magnesium 2.0 1.8 - 2.6 mg/dL   TSH with free T4 reflex   Result Value Ref Range    TSH 3.98 0.30 - 5.00 uIU/mL   CRP inflammation   Result Value Ref Range    CRP 0.9 (H) 0.0 - 0.8 mg/dL   Result Value Ref Range    Procalcitonin 0.03 0.00 - 0.49 ng/mL   UA with Microscopic reflex to Culture    Specimen: Urine, Clean Catch   Result Value Ref Range    Color Urine Yellow Colorless, Straw, Light Yellow, Yellow    Appearance Urine Clear Clear    Glucose Urine Negative Negative mg/dL    Bilirubin Urine Negative Negative    Ketones Urine Negative Negative mg/dL    Specific Gravity Urine 1.013 1.001 - 1.030    Blood Urine >1.0 mg/dL (A) Negative    pH Urine 7.0 5.0 - 7.0    Protein Albumin Urine Negative Negative mg/dL    Urobilinogen Urine <2.0 <2.0 mg/dL    Nitrite Urine Negative Negative     Leukocyte Esterase Urine Negative Negative    Mucus Urine Present (A) None Seen /LPF    RBC Urine >182 (H) <=2 /HPF    WBC Urine 3 <=5 /HPF    Squamous Epithelials Urine 2 (H) <=1 /HPF   Alcohol level blood   Result Value Ref Range    Alcohol, Blood <10 None detected mg/dL   CBC with platelets and differential   Result Value Ref Range    WBC Count 7.8 4.0 - 11.0 10e3/uL    RBC Count 4.13 (L) 4.40 - 5.90 10e6/uL    Hemoglobin 12.3 (L) 13.3 - 17.7 g/dL    Hematocrit 38.5 (L) 40.0 - 53.0 %    MCV 93 78 - 100 fL    MCH 29.8 26.5 - 33.0 pg    MCHC 31.9 31.5 - 36.5 g/dL    RDW 13.8 10.0 - 15.0 %    Platelet Count 235 150 - 450 10e3/uL    % Neutrophils 71 %    % Lymphocytes 14 %    % Monocytes 9 %    % Eosinophils 4 %    % Basophils 1 %    % Immature Granulocytes 1 %    NRBCs per 100 WBC 0 <1 /100    Absolute Neutrophils 5.6 1.6 - 8.3 10e3/uL    Absolute Lymphocytes 1.1 0.8 - 5.3 10e3/uL    Absolute Monocytes 0.7 0.0 - 1.3 10e3/uL    Absolute Eosinophils 0.3 0.0 - 0.7 10e3/uL    Absolute Basophils 0.1 0.0 - 0.2 10e3/uL    Absolute Immature Granulocytes 0.1 <=0.4 10e3/uL    Absolute NRBCs 0.0 10e3/uL   B-Type Natriuretic Peptide ([facility] Only)   Result Value Ref Range     (H) 0 - 88 pg/mL       RADIOLOGY:  Reviewed by me. Please see official radiology report.  Recent Results (from the past 24 hour(s))   XR Chest 2 Views    Narrative    EXAM: XR CHEST 2 VW  LOCATION: [facility]  DATE/TIME: 5/2/2022 10:36 AM    INDICATION: Altered mental status. hallucinations  COMPARISON: 03/01/2022 and older studies      Impression    IMPRESSION: Patient's quite kyphotic. Dual-lead left subclavian venous pacer. Leads are intact. Lungs are clear without signs of pneumonia or failure. Trace bilateral pleural effusions are again noted on the lateral view. Heart is of normal size.         IGuillermo, am serving as a scribe to document services personally performed by Dr. Marco Jerry based on my  observation and the provider's statements to me. I, Marco Jerry MD attest that Guillermo Raymond is acting in a scribe capacity, has observed my performance of the services and has documented them in accordance with my direction.      Marco Jerry MD  05/02/22  Emergency Medicine  [facility] EMERGENCY DEPARTMENT  [address] [place] [id number]  [phone]  Dept: [phone]     Marco Jerry MD  05/02/22 3738

## 2022-05-02 NOTE — ED TRIAGE NOTES
Arrival  EMS from home, pt states having hallucinations X1 -2 weeks, sees and hears dogs. Pt hx parkinsons. Per ems pt reaching for things.

## 2022-05-03 NOTE — PROGRESS NOTES
Clinic Care Coordination Contact  [facility]/Voicemail       Clinical Data: Care Coordinator Outreach  Outreach attempted x 1.  Left message on patient's voicemail with call back information and requested return call.  Plan: CC will attempt to reach patient again in 1-2 business days.    CATERINA Jackson   Social Work Clinic Care Coordinator   [facility]  PH: [phone]  [email or web address]

## 2022-05-04 NOTE — PROGRESS NOTES
Clinic Care Coordination Contact  [facility]/Voicemail       Clinical Data: Care Coordinator Outreach  Outreach attempted x 2.  Left message on patient's voicemail with call back information and requested return call.  Plan: CC will make no further outreaches at this time.    CATERINA Jackson   Social Work Clinic Care Coordinator   [facility]  PH: [phone]  [email or web address]

## 2022-05-22 PROBLEM — R41.82 ALTERED MENTAL STATUS, UNSPECIFIED ALTERED MENTAL STATUS TYPE: Status: ACTIVE | Noted: 2022-01-01

## 2022-05-22 PROBLEM — R55 SYNCOPE, UNSPECIFIED SYNCOPE TYPE: Status: ACTIVE | Noted: 2022-01-01

## 2022-05-22 NOTE — PHARMACY-ADMISSION MEDICATION HISTORY
Pharmacy Note - Admission Medication History    Pertinent Provider Information: Medication list provided by pts wife who manages medications. States he only had his first dose of Sinemet and his omeprazole today before presenting to the ED.      ______________________________________________________________________    Prior To Admission (PTA) med list completed and updated in EMR.       PTA Med List   Medication Sig Last Dose     aspirin 81 MG EC tablet Take 81 mg by mouth daily 5/21/2022 at Unknown time     atorvastatin (LIPITOR) 40 MG tablet Take 40 mg by mouth daily 5/21/2022 at Unknown time     azelastine (ASTELIN) 0.1 % nasal spray Spray 1 spray into both nostrils daily as needed for rhinitis Past Month at prn     B Complex-C (VITAMIN B COMPLEX W/VITAMIN C) TABS tablet Take 1 tablet by mouth daily 5/21/2022 at Unknown time     carbidopa-levodopa (SINEMET)  MG tablet Take 2 tablets by mouth 4 times daily 5/22/2022 at Unknown time     cyanocobalamin (VITAMIN B-12) 1000 MCG tablet Take 1,000 mcg by mouth daily 5/21/2022 at Unknown time     fludrocortisone (FLORINEF) 0.1 MG tablet Take 0.1 mg by mouth daily 5/21/2022 at Unknown time     magnesium oxide (MAGNESIUM OXIDE) 400 MG tablet Take 400 mg by mouth daily 5/21/2022 at Unknown time     metoprolol succinate ER (TOPROL XL) 25 MG 24 hr tablet Take 37.5 mg by mouth daily 5/21/2022 at Unknown time     midodrine (PROAMATINE) 10 MG tablet Take 10 mg by mouth 2 times daily 5/21/2022 at Unknown time     mirtazapine (REMERON) 7.5 MG tablet Take 7.5 mg by mouth At Bedtime 5/21/2022 at Unknown time     nystatin (MYCOSTATIN) 962930 UNIT/GM external ointment Apply topically 2 times daily 5/21/2022 at Unknown time     omeprazole (PRILOSEC) 20 MG DR capsule Take 20 mg by mouth daily 5/22/2022 at Unknown time     pyridostigmine (MESTINON) 60 MG tablet Take 30 mg by mouth 3 times daily 5/21/2022 at Unknown time     sennosides (SENOKOT) 8.6 MG tablet Take 2 tablets by mouth  daily 5/21/2022 at Unknown time     tamsulosin (FLOMAX) 0.4 MG capsule Take 0.4 mg by mouth daily 5/21/2022 at Unknown time     tolterodine ER (DETROL LA) 4 MG 24 hr capsule Take 4 mg by mouth daily 5/21/2022 at Unknown time     vitamin D3 (CHOLECALCIFEROL) 50 mcg (2000 units) tablet Take 1 tablet by mouth daily 5/21/2022 at Unknown time       Information source(s): Family member  Method of interview communication: in-person    Summary of Changes to PTA Med List  New: N/A  Discontinued: N/A  Changed: N/A    Patient was asked about OTC/herbal products specifically.  PTA med list reflects this.    In the past week, patient estimated taking medication this percent of the time:  greater than 90%.    Allergies were reviewed, assessed, and updated with the patient.      Patient does not anticipate needing any multi-use medications during admission.    The information provided in this note is only as accurate as the sources available at the time of the update(s).    Thank you for the opportunity to participate in the care of this patient.    Alejandra Villagran  5/22/2022 2:22 PM

## 2022-05-22 NOTE — ED PROVIDER NOTES
EMERGENCY DEPARTMENT ENCOUNTER      NAME: Canelo Cook  AGE: 82 year old male  YOB: 1940  MRN: [id number]  EVALUATION DATE & TIME: 5/22/2022 12:59 PM    PCP: No primary care provider on file.    ED PROVIDER: Janna Yepez DO      Chief Complaint   Patient presents with     Syncope         FINAL IMPRESSION:  1. Syncope, unspecified syncope type    2. Altered mental status, unspecified altered mental status type          ED COURSE & MEDICAL DECISION MAKING:    Pertinent Labs & Imaging studies reviewed. (See chart for details)  1:04 PM I met the patient and performed my initial interview and exam.  2:00 PM Discussed with Neurology, Dr. Domínguez.  Recommends fluids, check a urine.  Likely will need to admit  2:30 PM Discussed admission with Hospitalist, Dr. Herbert    82 year old male presents to the Emergency Department for evaluation of altered mental status.  Patient with a history of Parkinson's, lives at home with his wife.  He has significant orthostatic instability with recurrent syncopal episodes.  He does have a pacemaker in place.  Wife reports patient was having a bowel movement, he was not straining heavily.  He went unresponsive.  She reports he normally comes out of it after 2 hours.  He did not fall.  She reports trying check his blood pressure but cannot read it.  She saw that he was breathing.  After 2 hours he was still altered and she called EMS.  On arrival, patient has a grimace to his face.  He keeps his eyes closed.  His pupils are equal.  He is not follow commands.  Is not answer my questions.  On repeat exam he opens his eyes to voice.  He will squeeze my left hand, however not my right but seems to have intact strength and is pulling against me.  He is moving both of his legs.  His vitals are fairly unremarkable.  I did obtain a CT of the head that shows no signs of bleed.  Exam is not consistent with stroke, I did speak with neurology.  He follows with Dr. Savage.    Catrachita recommends IV fluids given worsening kidney function.  He also recommends obtain urinalysis given UTIs could cause his altered mentation.  May require admission if he does not return to baseline.  Patient is not currently at his baseline illness at home with his wife, admit for observation.  Patient is DNR.  Pacemaker was interrogated without any acute events.  EKG pending.    PPE worn: surgical mask, gloves    At the conclusion of the encounter I discussed the results of all of the tests and the disposition. The questions were answered. The patient or family acknowledged understanding and was agreeable with the care plan.       MEDICATIONS GIVEN IN THE EMERGENCY:  Medications   0.9% sodium chloride BOLUS (1,000 mLs Intravenous New Bag 5/22/22 1405)       NEW PRESCRIPTIONS STARTED AT TODAY'S ER VISIT  New Prescriptions    No medications on file          =================================================================    HPI    Patient information was obtained from: Patient's wife     Use of : N/A         Canelo Cook is a 82 year old male with a pertinent history of Parkinson's, orthostatic hypotension, CAD, recurrent syncope, SA node dysfunction s/p pacemaker placement who presents to this ED via private car for evaluation of syncope.    HPI limited due to patient's mental status change    Per nurse, patient had 2 hour vasovagal episode at home while using restroom. Patient's wife says this has happened before and he usual snaps out of it on his own. Patient has orthostatic hypertension. This is not his baseline.     Per patient's wife, patient is not at his baseline. He has a history of parkinson's and likes to lean towards his left side. Patient was on toilet today and had a vasovagal episode at 10 AM. This episode lasted longer than usual. No fall.  Wife attempted to measure blood pressure but monitor Patient's arms were limp after 1.5 hours. Patient started to grimace around 12:30.  Patient has had an off and on headache. He is wheelchair bound, and can transfer himself. Patient has mild intermittent hallucinations.        REVIEW OF SYSTEMS   Review of Systems   Unable to perform ROS: Mental status change        PAST MEDICAL HISTORY:  No past medical history on file.    PAST SURGICAL HISTORY:  No past surgical history on file.        CURRENT MEDICATIONS:    aspirin 81 MG EC tablet  atorvastatin (LIPITOR) 40 MG tablet  azelastine (ASTELIN) 0.1 % nasal spray  B Complex-C (VITAMIN B COMPLEX W/VITAMIN C) TABS tablet  carbidopa-levodopa (SINEMET)  MG tablet  cyanocobalamin (VITAMIN B-12) 1000 MCG tablet  fludrocortisone (FLORINEF) 0.1 MG tablet  magnesium oxide (MAGNESIUM OXIDE) 400 MG tablet  metoprolol succinate ER (TOPROL XL) 25 MG 24 hr tablet  midodrine (PROAMATINE) 10 MG tablet  mirtazapine (REMERON) 7.5 MG tablet  nystatin (MYCOSTATIN) 615775 UNIT/GM external ointment  omeprazole (PRILOSEC) 20 MG DR capsule  pyridostigmine (MESTINON) 60 MG tablet  sennosides (SENOKOT) 8.6 MG tablet  tamsulosin (FLOMAX) 0.4 MG capsule  tolterodine ER (DETROL LA) 4 MG 24 hr capsule  vitamin D3 (CHOLECALCIFEROL) 50 mcg (2000 units) tablet         ALLERGIES:  No Known Allergies    FAMILY HISTORY:  No family history on file.    SOCIAL HISTORY:        VITALS:  BP (!) 169/81   Pulse 85   Temp 97.8  F (36.6  C) (Oral)   Resp 18   SpO2 91%     PHYSICAL EXAM    Physical Exam  Constitutional:       General: He is not in acute distress.  HENT:      Head: Normocephalic and atraumatic.      Mouth/Throat:      Pharynx: Oropharynx is clear.   Eyes:      Pupils: Pupils are equal, round, and reactive to light.   Cardiovascular:      Rate and Rhythm: Normal rate and regular rhythm.      Pulses: Normal pulses.      Heart sounds: Normal heart sounds.   Pulmonary:      Effort: Pulmonary effort is normal.      Breath sounds: Normal breath sounds.   Abdominal:      General: Abdomen is flat. Bowel sounds are normal.       Palpations: Abdomen is soft.      Tenderness: There is no abdominal tenderness.   Musculoskeletal:         General: Normal range of motion.      Right lower leg: Edema present.      Left lower leg: Edema present.   Skin:     General: Skin is warm and dry.      Capillary Refill: Capillary refill takes less than 2 seconds.   Neurological:      Comments: Opened his eyes to voice. Squeezed hand with left upper extremity. Won't squeeze with his right upper but pulls away and moves both his legs spontaneously.            LAB:  All pertinent labs reviewed and interpreted.  Labs Ordered and Resulted from Time of ED Arrival to Time of ED Departure   COMPREHENSIVE METABOLIC PANEL - Abnormal       Result Value    Sodium 143      Potassium 3.9      Chloride 107      Carbon Dioxide (CO2) 26      Anion Gap 10      Urea Nitrogen 26      Creatinine 1.55 (*)     Calcium 8.9      Glucose 163 (*)     Alkaline Phosphatase 67      AST 12      ALT <9      Protein Total 5.8 (*)     Albumin 3.0 (*)     Bilirubin Total 0.8      GFR Estimate 45 (*)    B-TYPE NATRIURETIC PEPTIDE ([facility] ONLY) - Abnormal     (*)    CBC WITH PLATELETS AND DIFFERENTIAL - Abnormal    WBC Count 8.3      RBC Count 4.16 (*)     Hemoglobin 12.6 (*)     Hematocrit 39.0 (*)     MCV 94      MCH 30.3      MCHC 32.3      RDW 13.8      Platelet Count 190      % Neutrophils 76      % Lymphocytes 14      % Monocytes 6      % Eosinophils 2      % Basophils 1      % Immature Granulocytes 1      NRBCs per 100 WBC 0      Absolute Neutrophils 6.4      Absolute Lymphocytes 1.2      Absolute Monocytes 0.5      Absolute Eosinophils 0.1      Absolute Basophils 0.1      Absolute Immature Granulocytes 0.1      Absolute NRBCs 0.0     TROPONIN I - Normal    Troponin I 0.02     INR - Normal    INR 1.10     ROUTINE UA WITH MICROSCOPIC REFLEX TO CULTURE   COVID-19 VIRUS (CORONAVIRUS) BY PCR       RADIOLOGY:  Reviewed all pertinent imaging. Please see official radiology report.  XR  Chest Port 1 View   Preliminary Result   IMPRESSION: Heart size is stable. Pulmonary vascularity is within normal limits. Dual-lead cardiac device left chest wall, with lead tips projected over the RA and RV. Mild infiltrate or atelectasis in the right lower lung could represent a mild    pneumonitis. The lung apices are partially obscured by the patient's head related to kyphosis. Old left eighth rib fracture.         Head CT w/o contrast   Preliminary Result   IMPRESSION:   1.  Exam is moderately degraded by motion artifact despite reacquisitions.      2.  No finding for intracranial hemorrhage, mass, or acute infarct.      3.  Moderate volume loss and mild presumed sequelae of chronic microvascular ischemic change.             I, Alexsander Frost, am serving as a scribe to document services personally performed by Dr. Janna Yepez based on my observation and the provider's statements to me. I, Janna Yepez, DO attest that Alexsander Frost is acting in a scribe capacity, has observed my performance of the services and has documented them in accordance with my direction.    Janna Yepez DO  Emergency Medicine  [facility] EMERGENCY DEPARTMENT  [address] [place] [id number] [phone]  Dept: [phone]     Janna Yepez DO  05/22/22 4452

## 2022-05-22 NOTE — H&P
[facility]    History and Physical - Hospitalist Service      Assessment and Plan  Active Problems:    Altered mental status, unspecified altered mental status type    Syncope, unspecified syncope type    82 year old male with a past medical history of Parkinson's, orthostatic hypotension, CAD, recurrent syncope, SA node dysfunction s/p pacemaker placement who presents to the ED for evaluation of syncope, he was admitted with    Recurrent syncope  - Multifactorial, dehydration, advanced Parkinson and orthostatic  - CT head is negative for significant acute changes  - Patient has a history of orthostatic with similar presentation in the past.  - Unremarkable pacemaker interrogation in the ER  - Continue to monitor on telemetry  - Check echo and carotid ultrasound  - Neurology consult, appreciate input   - Continue midodrine    Acute encephalopathy  - Metabolic, baseline cognitive impairment  - Patient has intermittent hallucinations and as per wife he is now back to his baseline  - Unable to do MRI brain because of pacemaker  - Negative CT head for significant acute changes  - Continue supportive care  - Check UA for UTI    Advanced parkinsonism  - Resume Sinemet  - PT/OT evaluation    Hypertension  - Restart home medications  - Add holding parameters to midodrine    Chronic kidney disease  - Stage III  - Creatinine slightly elevated above baseline  - Gentle IV hydration overnight    Bilateral lymphedema  - PT/OT lymphedema treatment     Elevated BNP  - Of low clinical value with CKD  - Chest x-ray is negative for fluid overload    Weakness and deconditioning  - Progressive  - Patient is able to transfer himself but mainly using wheelchair.  - PT/OT evaluation      COVID STATUS: Negative date: 5/22/2022    VTE prophylaxis:  Heparin SQ  DIET: Orders Placed This Encounter      Regular Diet Adult    Disposition/Barriers to discharge: Syncope work-up  Code Status:No CPR- Do NOT Intubate    HPI:  History is limited by patient mental condition mainly taken from wife who is the caregiver.  Canelo Cook is a 82 year old male with a past medical history of Parkinson's, orthostatic hypotension, CAD, recurrent syncope, SA node dysfunction s/p pacemaker placement who presents to this ED via private car for evaluation of syncope  Basically the patient had 2 hour vasovagal episode at home while using restroom. Patient's wife says this has happened before and he usual snaps out of it on his own. Patient has orthostatic hypertension. This is not his baseline.   Per patient's wife, patient is not at his baseline. He has a history of parkinson's and likes to lean towards his left side. Patient was on toilet today and had a vasovagal episode at 10 AM. This episode lasted longer than usual. No fall.  Wife attempted to measure blood pressure but monitor Patient's arms were limp after 1.5 hours. Patient started to grimace around 12:30. Patient has had an off and on headache. He is wheelchair bound, and can transfer himself. Patient has mild intermittent hallucinations.     No past medical history on file.  No past surgical history on file.  No family history on file.  Social History     Socioeconomic History     Marital status:      Spouse name: Not on file     Number of children: Not on file     Years of education: Not on file     Highest education level: Not on file   Occupational History     Not on file   Tobacco Use     Smoking status: Not on file     Smokeless tobacco: Not on file   Substance and Sexual Activity     Alcohol use: Not on file     Drug use: Not on file     Sexual activity: Not on file   Other Topics Concern     Not on file   Social History Narrative     Not on file     Social Determinants of Health     Financial Resource Strain: Not on file   Food Insecurity: Not on file   Transportation Needs: Not on file   Physical Activity: Not on file   Stress: Not on file   Social Connections: Not on file    Intimate Partner Violence: Not on file   Housing Stability: Not on file     No Known Allergies    PRIOR TO ADMISSION MEDICATIONS   (Not in a hospital admission)       REVIEW OF SYSTEMS:  12 point reviewed pertinent negatives and positives in HPI all others negative     PHYSICAL EXAM  B/P:169/81 T:97.8 P:85 R: 18     Head:    Normocephalic, without obvious abnormality, atraumatic   Eyes:    PERRL, conjunctiva/corneas clear, EOM's intact,both eyes    Ears:    Normal external ear canals no drainage or erythema bilat.   Nose:   Nares normal by gross inspection,  mucosa normal, no drainage or sinus tenderness   Throat:   Lips, mucosa, and tongue normal; teeth and gums normal   Neck:   Supple, symmetrical, trachea midline, no adenopathy;        thyroid:  No enlargement/tenderness/nodules   Back:     Symmetric, no curvature, ROM normal, no CVA tenderness   Lungs:    Decreased breath sounds on lung base with scattered rhonchi.  No wheeze.   Chest wall:    No tenderness or deformity   Heart:    Regular rate and rhythm, S1 and S2 normal, I/VI systolic murmur, no rubs, no JVD, no edema   Abdomen:     Soft, non-tender, bowel sounds active all four quadrants,     no masses, no hepatosplenomegaly   Musculoskeletal:   Extremities are warm and non-tender, atraumatic, no joint swelling or tenderness   Pulses:   2+ and symmetric all extremities   Skin:   Skin color, texture, turgor normal, no rashes or lesions on exposed areas, please see nursing assessment for full skin assessment   Neurologic:  Patient is awake but lethargic, responds to verbal stimuli.  Oriented to self and place only.  Bilateral hands tremors.         PERTINENT LABS and RADIOLOGY   Recent Labs   Lab 05/22/22  1308   WBC 8.3   HGB 12.6*   MCV 94      INR 1.10      POTASSIUM 3.9   CHLORIDE 107   CO2 26   BUN 26   CR 1.55*   ANIONGAP 10   EMMY 8.9   *   ALBUMIN 3.0*   PROTTOTAL 5.8*   BILITOTAL 0.8   ALKPHOS 67   ALT <9   AST 12     Recent  Results (from the past 24 hour(s))   Head CT w/o contrast    Narrative    EXAM: CT HEAD WITHOUT CONTRAST  LOCATION: [facility]  DATE/TIME: 05/22/2022, 1:27 PM    INDICATION: Cerebral hemorrhage suspected. Syncope.  COMPARISON: CT brain 11/15/2021.  TECHNIQUE: Routine CT Head without IV contrast. Multiplanar reformats. Dose reduction techniques were used.    FINDINGS:  INTRACRANIAL CONTENTS: Exam is moderately degraded by motion artifact despite reacquisitions.    No convincing finding for intracranial hemorrhage, mass, or acute infarct. Moderate prominence of the lateral and third ventricles and sulci. Mild presumed sequelae of chronic microvascular ischemic change.    VISUALIZED ORBITS/SINUSES/MASTOIDS: Prior cataract surgeries. Paranasal sinuses, middle ear cavities, and mastoid air cells are clear.    BONES/SOFT TISSUES: Calvarium is intact, without fracture or suspicious lytic or blastic foci. No scalp hematoma.      Impression    IMPRESSION:  1.  Exam is moderately degraded by motion artifact despite reacquisitions.    2.  No finding for intracranial hemorrhage, mass, or acute infarct.    3.  Moderate volume loss and mild presumed sequelae of chronic microvascular ischemic change.     XR Chest Port 1 View    Narrative    EXAM: XR CHEST PORTABLE 1 VIEW  LOCATION: [facility]  DATE/TIME: 05/22/2022, 1:20 PM    INDICATION: Syncope.  COMPARISON: 05/02/2022.      Impression    IMPRESSION: Heart size is stable. Pulmonary vascularity is within normal limits. Dual-lead cardiac device left chest wall, with lead tips projected over the RA and RV. Mild infiltrate or atelectasis in the right lower lung could represent a mild   pneumonitis. The lung apices are partially obscured by the patient's head related to kyphosis. Old left eighth rib fracture.       EKG electronic pacer    Discussed with patient, family, neurology and nursing staff     Cruz Herbert MD  [facility]  Internal Medicine [facility]  [phone]

## 2022-05-22 NOTE — ED TRIAGE NOTES
Patient brought in by [facility] from home, patient was sitting on toilet for 2 hours and was not responding to wife. Wife then called 911. Patient does not answer questions or follow commands. Sitting in a contracted position.     Triage Assessment     Row Name 05/22/22 1301       Triage Assessment (Adult)    Airway WDL WDL       Respiratory WDL    Respiratory WDL WDL       Skin Circulation/Temperature WDL    Skin Circulation/Temperature WDL WDL       Cardiac WDL    Cardiac WDL WDL       Peripheral/Neurovascular WDL    Peripheral Neurovascular WDL WDL       Cognitive/Neuro/Behavioral WDL    Cognitive/Neuro/Behavioral WDL all;X;orientation    Level of Consciousness unresponsive       Solomons Coma Scale    Best Eye Response 3-->(E3) to speech    Best Motor Response 4-->(M4) withdraws from pain    Best Verbal Response 1-->(V1) none    [place] Coma Scale Score 8

## 2022-05-22 NOTE — PROGRESS NOTES
Called by emergency room about this patient.  Follows in neurology clinic has end-stage Parkinson's disease with severe instability.  Had one of his typical episodes of syncope and unresponsiveness per wife, but lasted much longer than normal.  Happened while he was toileting which per Dr. Mackay note is quite common.    CT head is very poor quality but no obvious focal structural lesion seen on my review.  Awaiting radiology interpretation.    End-stage Parkinson's disease patients can be quite susceptible to even mild insults.  He does have a mild SYDNEY so question of dehydration could be contributing.  Would also check a urine to look for UTI.  Would consider fluids, and if patient responds briskly to fluids I do not think additional neurologic work-up is needed.  If patient not improving or nearing baseline despite these measures suspect will need admission and consideration of MRI imaging or possibly EEG pending evaluation.    Mini Domínguez, DO  Neurology

## 2022-05-23 NOTE — PLAN OF CARE
Occupational Therapy Discharge Summary    Reason for therapy discharge:    All goals and outcomes met, no further needs identified.    Progress towards therapy goal(s). See goals on Care Plan in [facility] electronic health record for goal details.  Goals met    Therapy recommendation(s):    Continued therapy is recommended.  Rationale/Recommendations:  defer to physical therapy to address transfers as pt is at baseline for ADLs.

## 2022-05-23 NOTE — PLAN OF CARE
Pt was admitted to  around 1815, very confused. BP high, 206/92, PRN Hydralazine given once with effectiveness. Pt's mentation has been improving. He was able to follow simple commands by the end of shift. Pt only ate one cup of applesauce by the end of shift. Whit Paulino RN

## 2022-05-23 NOTE — PLAN OF CARE
Goal Outcome Evaluation:    Plan of Care Reviewed With: patient, family     Patient is a total care from home with wife.  Patient alert and orientated this am.  This afternoon patient more confused and appears to be hallucinating.  Grabbing at things in the air and pointing at things at the end of the bed.  Wife states this has been normal activity for patient in the evening at home.  Turn and reposition every two hours.  Wife hoping to get TCU placement at discharge.  Difficult to get orthostatic blood pressures, patient does not stand at baseline.  Orthostatic blood pressures done by PT this afternoon.

## 2022-05-23 NOTE — PLAN OF CARE
[facility]      OUTPATIENT OCCUPATIONAL THERAPY  EVALUATION  PLAN OF TREATMENT FOR OUTPATIENT REHABILITATION  (COMPLETE FOR INITIAL CLAIMS ONLY)  Patient's Last Name, First Name, M.I.  YOB: 1940  Canelo Cook                          Provider's Name  [facility] Medical Record No.  [id number]                               Onset Date:  05/22/22   Start of Care Date:  05/23/22     Type:     ___PT   _X_OT   ___SLP Medical Diagnosis:  syncope, AMS                        OT Diagnosis:  dec ADL indep   Visits from SOC:  1   _________________________________________________________________________________  Plan of Treatment/Functional Goals    Planned Interventions: ADL retraining, bed mobility training   Goals: See Occupational Therapy Goals on Care Plan in [employer] electronic health record.    Therapy Frequency: One time eval and treatment  Predicted Duration of Therapy Intervention: 05/23/22  _________________________________________________________________________________    I CERTIFY THE NEED FOR THESE SERVICES FURNISHED UNDER        THIS PLAN OF TREATMENT AND WHILE UNDER MY CARE     (Physician co-signature of this document indicates review and certification of the therapy plan).                Certification date from: 05/23/22, Certification date to: 05/30/22    Referring Physician: Cruz Herbert MD            Initial Assessment        See Occupational Therapy evaluation dated 05/23/22 in [employer] electronic health record.                 Yes

## 2022-05-23 NOTE — PROGRESS NOTES
05/23/22 1100   Quick Adds   Quick Adds Certification   Type of Visit Initial Occupational Therapy Evaluation   Living Environment   People in Home spouse   Current Living Arrangements house   Self-Care   Activity/Exercise/Self-Care Comment assist with dressing, toileting, bathing; mod ind for self-feeding; completed pivot transfers at baseline   Instrumental Activities of Daily Living (IADL)   IADL Comments dependent for IADLs and meds   General Information   Onset of Illness/Injury or Date of Surgery 05/22/22   Referring Physician Cruz Herbert MD   Patient/Family Therapy Goal Statement (OT) go home   Additional Occupational Profile Info/Pertinent History of Current Problem admit for AMS and syncope; hx of parkinson's disease, orthostatic hypotension, autonomic instability, CAD   Cognitive Status Examination   Orientation Status person;place  (oriented to month; states 2020 for year)   Follows Commands follows one-step commands;delayed response/completion   Pain Assessment   Patient Currently in Pain Yes, see Vital Sign flowsheet   Range of Motion Comprehensive   Comment, General Range of Motion WFL for ADLs   Strength Comprehensive (MMT)   Comment, General Manual Muscle Testing (MMT) Assessment generalized weakness   Bed Mobility   Comment (Bed Mobility) max of 2 first attempt, min/mod of 1 second attempt   Transfers   Transfers sit-stand transfer   Sit-Stand Transfer   Sit-Stand [place] (Transfers) maximum assist (25% patient effort);2 person assist   Sit/Stand Transfer Comments arm in arm of 2; significant dizziness and increased headache upon standing   Clinical Impression   Criteria for Skilled Therapeutic Interventions Met (OT) Yes, treatment indicated   OT Diagnosis dec ADL indep   OT Problem List-Impairments impacting ADL problems related to;activity tolerance impaired;motor control;balance;mobility;strength;pain;postural control   Assessment of Occupational Performance 1-3 Performance Deficits    Identified Performance Deficits self-feeding, functional transfers   Planned Therapy Interventions (OT) ADL retraining;bed mobility training   Clinical Decision Making Complexity (OT) moderate complexity   Anticipated Equipment Needs Upon Discharge (OT) feeding equipment   Risk & Benefits of therapy have been explained evaluation/treatment results reviewed;participants included;patient;spouse/significant other   OT Discharge Planning   OT Discharge Recommendation (DC Rec) Transitional Care Facility   OT Rationale for DC Rec assist of 2 for sit > stand. Would require transfer device, such as EZ stand, to safely discharge home.   Therapy Certification   Start of Care Date 05/23/22   Certification date from 05/23/22   Certification date to 05/30/22   Medical Diagnosis syncope, AMS   Total Evaluation Time (Minutes)   Total Evaluation Time (Minutes) 10   OT Goals   Therapy Frequency (OT) One time eval and treatment   OT Predicted Duration/Target Date for Goal Attainment 05/23/22   OT Goals OT Goal 1   OT: Goal 1 Pt and spouse will verbalize understanding of built-up handle use for self-feeding.  (goal met)

## 2022-05-23 NOTE — PROGRESS NOTES
Discussed MOON with patient's wife and therapy recommendation for TCU. Care Management staff discussed current COVID 19 pandemic and possible Medicare waiver of the traditional 3 day stay requirement. Referred patient to website medicare.gov, insurance provider, and admissions department at accepting facility for further questions or concerns on coverage for SNF stay. She prefers close to [place] for TCU. Referrals sent to [facility] and [facility]. More detailed note to follow 5/24.

## 2022-05-23 NOTE — PLAN OF CARE
"PRIMARY DIAGNOSIS: \"GENERIC\" NURSING  OUTPATIENT/OBSERVATION GOALS TO BE MET BEFORE DISCHARGE:  ADLs back to baseline: No    Activity and level of assistance: **not out of bed this shift*    Pain status: Improved-controlled with oral pain medications.    Return to near baseline physical activity: No     Discharge Planner Nurse   Safe discharge environment identified: No  Barriers to discharge: Yes       Entered by: Haylee Simons RN 05/23/2022 7:07 AM     Please review provider order for any additional goals.   Nurse to notify provider when observation goals have been met and patient is ready for discharge.Goal Outcome Evaluation:                      "

## 2022-05-23 NOTE — PROGRESS NOTES
[facility]      OUTPATIENT PHYSICAL THERAPY EVALUATION  PLAN OF TREATMENT FOR OUTPATIENT REHABILITATION  (COMPLETE FOR INITIAL CLAIMS ONLY)  Patient's Last Name, First Name, M.I.  YOB: 1940  Canelo Cook                        Provider's Name  [facility] Medical Record No.  [id number]                               Onset Date:      Start of Care Date:  05/23/22      Type:     _X_PT   ___OT   ___SLP Medical Diagnosis:  Syncope                        PT Diagnosis:      Visits from SOC:  1   _________________________________________________________________________________  Plan of Treatment/Functional Goals    Planned Interventions:       Goals: See Physical Therapy Goals on Care Plan in [employer] electronic health record.    Therapy Frequency:    Predicted Duration of Therapy Intervention:    _________________________________________________________________________________    I CERTIFY THE NEED FOR THESE SERVICES FURNISHED UNDER        THIS PLAN OF TREATMENT AND WHILE UNDER MY CARE     (Physician co-signature of this document indicates review and certification of the therapy plan).              Certification date from: 05/23/22, Certification date to: 05/31/22    Referring Physician: Cruz Herbert MD (P)            Initial Assessment        See Physical Therapy evaluation dated 05/23/22 in Epic electronic health record.

## 2022-05-23 NOTE — UTILIZATION REVIEW
Concurrent stay review; Secondary Review Determination     Under the authority of the Utilization Management Committee, the utilization review process indicated a secondary review on Canelo Cook.  The review outcome is based on review of the medical records, discussions with staff, and applying clinical experience noted on the date of the review.        (x) Observation Status Appropriate - Concurrent stay review    RATIONALE FOR DETERMINATION   82-year-old male with Parkinson's, orthostatic hypotension, coronary artery disease admitted for acute on chronic recurrent syncope.  Also has history of pacemaker placement for SA node dysfunction.  Diagnostic testing thus far negative for acute etiology.  EEG and echocardiogram pending.  Neurology following.  Awaiting TCU placement and EEG/echo findings.    Patient is clinically improving and there is no clear indication to change patient's status to inpatient. The severity of illness, intensity of service provided, expected LOS and risk for adverse outcome make the care appropriate for observation.    The information on this document is developed by the utilization review team in order for the business office to ensure compliance.  This only denotes the appropriateness of proper admission status and does not reflect the quality of care rendered.         The definitions of Inpatient Status and Observation Status used in making the determination above are those provided in the CMS Coverage Manual, Chapter 1 and Chapter 6, section 70.4.      Sincerely,   Popeye Klein MD  Utilization Review  Physician Advisor  [facility]

## 2022-05-23 NOTE — CONSULTS
[facility]  Neurology Consultation    Patient Name:  Canelo Cook  MRN:  [id number]    :  1940  Date of Service:  May 23, 2022  Primary care provider:  Hung Camacho      Neurology consultation service was asked to see Canelo Cook by Dr. Herbert to evaluate syncope.    Chief Complaint:  syncope    History of Present Illness:   Canelo Cook is a 82 year old male with history of Parkinson's disease with severe autonomic instability, coronary artery disease, GERD, leg swelling who presents with a spell of unresponsiveness.   History is obtained primarily through patient's wife and review of medical record.    At baseline patient has severe autonomic instability and end-stage Parkinson's disease.  He was last seen by Dr. Mackay 22.  At that visit noted patient had continued severe autonomic instability.  Was having near daily events of syncope.  Many of them associated with eating or bowel movements.  Can range anywhere from few seconds to 10 minutes or more.  He is essentially nonambulatory and is using a lift and wheelchair for most of his gait.  His neurologist has him on 3 different pharmacologic agents for orthostatic hypotension including fludrocortisone, midodrine, pyridostigmine.  He is also on Sinemet 2 tablets 4 times daily.       Yesterday, his wife states that he was having a bowel movement when he had a typical spell of unresponsiveness.  She states what was unusual is the link that which it persisted.  States that he was out for about an hour.  Denies any seizure activity.  Denies any gaze deviation.  Denies any incontinence or tongue biting.  States he has never had a seizure before and denies any seizure risk factors such as brain tumors, family history of seizures, childhood seizures.    Currently they feel he is essentially back to baseline.  She states this is a significant improvement of yesterday.  She shows me blood pressures over  the past week that have ranged from the 220s systolic down to 70 systolic.  She does take her blood pressure 3-4 times daily.  They do have compression stockings but go below the knee.  They have been prescribed an abdominal binder but are not using it.  Currently performs a moderate right-sided headache.  But denies any confusion, weakness, numbness, nausea, vomiting    ROS  A comprehensive ROS was performed and pertinent findings were included in HPI.     PMH  Parkinson's disease, orthostatic hypotension, autonomic instability, coronary artery disease, leg swelling, GERD      Medications   I have personally reviewed the patient's medication list.     Allergies  I have personally reviewed the patient's allergy list.     Social History  Denies tobacco, alcohol, drug use.   Used to work as a , and selling cars    Family History    Denies any family history of Parkinson's disease or seizures.      Physical Examination   Vitals: BP (!) 197/95 (BP Location: Right arm)   Pulse 65   Temp 98  F (36.7  C) (Axillary)   Resp 17   SpO2 98%   General: Lying in bed, NAD  Head: NC/AT  Eyes: no icterus,  Cardiac: RRR. Extremities warm, moderate edema more on the right foot.  No murmurs or  Respiratory: non-labored on RA, no wheezing dialysis  GI: S/NT/ND  Skin: No rash or lesion on exposed skin  Psych: Mood pleasant  Neuro:  Mental status: Awake, alert, attentive, oriented to self, time, place, and circumstance. Language is fluent and coherent with intact comprehension of complex commands, naming and repetition.  Able to follow three-step cross commands, able to tell time.  Able to name objects easily.  Cranial nerves:  PERRL, conjugate gaze, EOMI, facial sensation intact, face symmetric, shoulder shrug strong, tongue/uvula midline, no dysarthria.   Motor: Tone significantly increased diffusely, right does seem slightly worse than left..  Resting tremor right greater than left, low-frequency. 5/5 strength  bilaterally in deltoids, biceps, triceps, hand , hip flexors, hip extensors, knee flexion, knee extension, plantarflexion, dorsiflexion.   Reflexes: Very brisk and symmetric in bilateral upper and lower extremities including, biceps, brachioradialis, patella, Achilles.  Toes upgoing.    Sensory: Intact to light touch throughout upper and lower extremities.  No sensory EXTR  Coordination: Finger-nose-finger without dysmetria   gait: Wheelchair-bound at baseline per notes, do not ambulate    Investigations   I have personally reviewed pertinent labs, tests, and radiological imaging. Discussion of notable findings is included under Impression.     Head CT: Personally reviewed, very poor quality.  No acute intracranial pathology seen.  No obvious new strokes.  Does have small vessel ischemic disease and moderate generalized atrophy    INTRACRANIAL CONTENTS: Exam is moderately degraded by motion artifact despite reacquisitions.     No convincing finding for intracranial hemorrhage, mass, or acute infarct. Moderate prominence of the lateral and third ventricles and sulci. Mild presumed sequelae of chronic microvascular ischemic change.     VISUALIZED ORBITS/SINUSES/MASTOIDS: Prior cataract surgeries. Paranasal sinuses, middle ear cavities, and mastoid air cells are clear.     BONES/SOFT TISSUES: Calvarium is intact, without fracture or suspicious lytic or blastic foci. No scalp hematoma.                                                                      IMPRESSION:  1.  Exam is moderately degraded by motion artifact despite reacquisitions.     2.  No finding for intracranial hemorrhage, mass, or acute infarct.     3.  Moderate volume loss and mild presumed sequelae of chronic microvascular ischemic change.    Was patient transferred from outside hospital?   No    Impression  #Parkinsons disease  #Autonomic instablility  #Orthostatic hypotension  #Syncope  #Headache    Mr. Cook is a 82-year-old male presenting  with a spell of unresponsiveness.  He has not returned to baseline per himself and wife.  Highly suspect this was syncopal in nature.  He has a long history of nearly daily syncopal spells.  She states he was upright on the toilet for at least an hour during his unresponsive spell which can cause prolonged symptoms.  Primary team is ordered an EEG, which we will follow-up on, but I have a low suspicion that this represented a seizure.  Unfortunately, he has documented end-stage Parkinson's disease and autonomic stability can be extremely difficult to control.  Wife shows me very labile blood pressures at home.  He is already on 3 orthostatic blood pressure agents.  I do not think there is any significant change I would make to his pharmacologic regimen at this time.  However would push nonpharmacologic measures including use of the abdominal binder.  Would also check orthostatic vitals, to document.  No further work-up from neurology at this time.  I do not think patient can get an MRI due to his pacemaker, and given that he is returned to baseline I do not think is indicated currently.    Regarding his headache his neurologic exam is at baseline he is a negative head CT.  Would consider agents such as Tylenol and ibuprofen to treat.  At home he states he uses aspirin.      Recommendations  -Orthostatic vitals  -Encourage nonpharmacological measures including thigh-high compression stockings, abdominal binder.  Can loosen abdominal binder a little bit if he is having difficulty with discomfort as he was in past  -Consider ibuprofen if Tylenol not effective for headache  -We will follow results of EEG  -Neurology will sign off at this time, no further work-up from neurologic perspective, can discharge when cleared from PT/OT standpoint which is ordered    Thank you for involving Neurology in the care of aCnelo Cook.     Mini Domínguez,

## 2022-05-23 NOTE — PROVIDER NOTIFICATION
Pt had several runs of V-tach, Notified Resident, Dr. Aleman. She came to see the pt and ordered some labs to check electrolytes, which came back WNL. Will continue to monitor.

## 2022-05-23 NOTE — PROGRESS NOTES
[facility]    PROGRESS NOTE - Hospitalist Service    Assessment and Plan    82 year old male with a past medical history of Parkinson's, orthostatic hypotension, CAD, recurrent syncope, SA node dysfunction s/p pacemaker placement who presents to the ED for evaluation of syncope, he was admitted with     Recurrent syncope  - Multifactorial, dehydration, advanced Parkinson and orthostatic  - CT head is negative for significant acute changes  - Patient has a history of orthostatic with similar presentation in the past.  - Unremarkable pacemaker interrogation in the ER  - D/Continue monitor on telemetry  - Pending check echo   - Unremarkable carotid ultrasound for significant stenosis  - Neurology consult, appreciate input   - Pending EEG  - Continue midodrine  - PT/OT evaluate     Acute encephalopathy  - Metabolic, baseline cognitive impairment  - Patient has intermittent hallucinations and as per wife he is now back to his baseline  - Unable to do MRI brain because of pacemaker  - Negative CT head for significant acute changes  - Improving but not completely back to baseline as per wife as he still has mild lethargic  - Continue supportive care  - Gentle IV hydration overnight  - Negative UA for UTI     Advanced parkinsonism  - Resume Sinemet  - PT/OT evaluation     Hypertension  - Restart home medications  - Add holding parameters to midodrine     Chronic kidney disease  - Stage III  - Creatinine slightly elevated above baseline  - Continue gentle IV hydration overnight     Bilateral lymphedema  - PT/OT lymphedema treatment      Elevated BNP  - Of low clinical value with CKD  - Chest x-ray is negative for fluid overload  - Pending echo     Weakness and deconditioning  - Progressive  - Patient is able to transfer himself but mainly using wheelchair.  - PT/OT evaluation  - Patient needs TCU, wife in agreement  - Pending placement       Active Problems:    Altered mental status, unspecified  altered mental status type    Syncope, unspecified syncope type      VTE prophylaxis:  Heparin SQ  DIET: Orders Placed This Encounter      Regular Diet Adult      Disposition/Barriers to discharge: Pending echo, TCU placement  Code Status: No CPR- Do NOT Intubate    Subjective:  Canelo is feeling better today, improving mental condition but not completely back to his baseline as per wife.  Still lethargic and weak.  No fever chills overnight.  Blood pressure is stable this morning, patient is awaiting oral diet.    PHYSICAL EXAM  There were no vitals filed for this visit.  B/P:117/79 T:97.8 P:99 R:18     Intake/Output Summary (Last 24 hours) at 5/23/2022 1428  Last data filed at 5/23/2022 1400  Gross per 24 hour   Intake 120 ml   Output 350 ml   Net -230 ml      There is no height or weight on file to calculate BMI.    Constitutional: awake, alert, cooperative, no apparent distress, and appears stated age  Eyes: Lids and lashes normal, pupils equal, round and reactive to light, extra ocular muscles intact, sclera clear, conjunctiva normal  ENT: Normocephalic, without obvious abnormality, atraumatic, sinuses nontender on palpation, external ears without lesions, oral pharynx with moist mucous membranes, tonsils without erythema or exudates, gums normal and good dentition.  Respiratory: No increased work of breathing, good air exchange, clear to auscultation bilaterally, no crackles or wheezing  Cardiovascular: Normal apical impulse, regular rate and rhythm, normal S1 and S2, no S3 or S4, and no murmur noted  GI: No scars, normal bowel sounds, soft, non-distended, non-tender, no masses palpated, no hepatosplenomegally  Skin: no bruising or bleeding and normal skin color, texture, turgor  Musculoskeletal: There is no redness, warmth, or swelling of the joints.  Full range of motion noted.  no lower extremity pitting edema present  Neurologic: Awake, mild lethargic, oriented to place and person only.  Bilateral  tremors.  Neuropsychiatric: Appropriate with examiner      PERTINENT LABS/IMAGING:  Recent Labs   Lab 05/23/22  0814 05/23/22  0351 05/22/22  1308   WBC 7.7  --  8.3   HGB 10.9*  --  12.6*   MCV 93  --  94     --  190   INR  --   --  1.10   NA  --  140 143   POTASSIUM  --  3.5 3.9   CHLORIDE  --  106 107   CO2  --  27 26   BUN  --  29* 26   CR  --  1.56* 1.55*   ANIONGAP  --  7 10   EMMY  --  8.1* 8.9   GLC  --  87 163*   ALBUMIN  --  2.7* 3.0*   PROTTOTAL  --  5.0* 5.8*   BILITOTAL  --  0.8 0.8   ALKPHOS  --  53 67   ALT  --  <9 <9   AST  --  14 12     Recent Results (from the past 24 hour(s))   US Carotid Bilateral    Narrative    EXAM: US CAROTID BILATERAL  LOCATION: [facility]  DATE/TIME: 5/22/2022 5:20 PM    INDICATION: Syncope  COMPARISON: None.  TECHNIQUE: Duplex exam performed utilizing 2D gray-scale imaging, Doppler interrogation with color-flow and spectral waveform analysis. The percent diameter stenosis is determined using NASCET criteria and Society of Radiologists in Ultrasound Consensus   Criteria.    FINDINGS:    RIGHT: Mild plaque at the bifurcation. The peak systolic velocity in the ICA is less than 125 cm/sec, consistent with less than 50% stenosis. Normal velocities in the ECA. Antegrade flow within the vertebral artery.     LEFT: Mild plaque at the bifurcation. The peak systolic velocity in the ICA is less than 125 cm/sec, consistent with less than 50% stenosis. Normal velocities in the ECA. Antegrade flow within the vertebral artery.    VELOCITY CHART:  CCA   Right: 30/8 cm/s   Left: 49/13 cm/s  ICA   Right: 70/22 cm/s   Left: 78/12 cm/s  ECA   Right: 106/19 cm/s   Left: 49/20 cm/s  ICA/CCA PSV Ratio   Right: 2.3   Left: 1.6      Impression    IMPRESSION:  1.  Mild plaque formation, velocities consistent with less than 50% stenosis in the right internal carotid artery.  2.  Mild plaque formation, velocities consistent with less than 50% stenosis in the left  internal carotid artery.  3.  Flow within the vertebral arteries is antegrade.       Discussed with patient, family, nursing staff and discharge planner    Cruz Herbert MD  [facility] Medicine Service  [phone]

## 2022-05-23 NOTE — PLAN OF CARE
"PRIMARY DIAGNOSIS: \"GENERIC\" NURSING  OUTPATIENT/OBSERVATION GOALS TO BE MET BEFORE DISCHARGE:  ADLs back to baseline: No    Activity and level of assistance: **Not out of bed this shift*    Pain status: Improved-controlled with oral pain medications.    Return to near baseline physical activity: No     Discharge Planner Nurse   Safe discharge environment identified: No  Barriers to discharge: Yes       Entered by: Haylee Simons RN 05/23/2022 7:05 AM     Please review provider order for any additional goals.   Nurse to notify provider when observation goals have been met and patient is ready for discharge.Goal Outcome Evaluation:                      "

## 2022-05-23 NOTE — PROGRESS NOTES
Physical Therapy        05/23/22 1040   Quick Adds   Quick Adds Certification   Type of Visit Initial PT Evaluation   Living Environment   People in Home spouse   Current Living Arrangements house   Home Accessibility no concerns   Self-Care   Usual Activity Tolerance fair   Current Activity Tolerance poor   Regular Exercise Yes   Activity/Exercise Type   (seated LE HEP)   Equipment Currently Used at Home wheelchair, manual   Activity/Exercise/Self-Care Comment wife assists with transfers and ADLs, performs all IADLs   General Information   Onset of Illness/Injury or Date of Surgery 05/22/22   Referring Physician Cruz Herbert MD   Patient/Family Therapy Goals Statement (PT) keep pt home as long as possible   Pertinent History of Current Problem (include personal factors and/or comorbidities that impact the POC) end stage parkinson disease, autonomic dysfunction   Existing Precautions/Restrictions fall   Cognition   Cognitive Status Comments cooperative, flat affect   Pain Assessment   Patient Currently in Pain Yes, see Vital Sign flowsheet   Posture    Posture Forward head position;Protracted shoulders   Range of Motion (ROM)   ROM Comment limited hip and knee extension   Strength (Manual Muscle Testing)   Strength (Manual Muscle Testing) Deficits observed during functional mobility   Bed Mobility   Comment, (Bed Mobility) mod-maxA x2 for all   Transfers   Comment, (Transfers) maxAx2 for sit<>stand   Gait/Stairs (Locomotion)   Comment, (Gait/Stairs) unable   Balance   Balance Comments poor   Sensory Examination   Sensory Perception Comments light touch diminished in bilateral feet   Coordination   Coordination no deficits were identified   Muscle Tone   Muscle Tone Comments mild rigidity throughout   Clinical Impression   Criteria for Skilled Therapeutic Intervention Yes, treatment indicated   PT Diagnosis (PT) inability to transfer   Influenced by the following impairments weakness and dizziness   Functional  limitations due to impairments limited household mobility   Clinical Presentation (PT Evaluation Complexity) Stable/Uncomplicated   Clinical Presentation Rationale presents as medically diagnosed   Clinical Decision Making (Complexity) moderate complexity   Planned Therapy Interventions (PT) balance training;bed mobility training;gait training;home exercise program;neuromuscular re-education;patient/family education;ROM (range of motion);strengthening;transfer training;wheelchair management/propulsion training;progressive activity/exercise   Anticipated Equipment Needs at Discharge (PT) wheelchair;lift device  (Does not own left device)   Risk & Benefits of therapy have been explained evaluation/treatment results reviewed;care plan/treatment goals reviewed;participants voiced agreement with care plan;participants included;patient;spouse/significant other   PT Discharge Planning   PT Discharge Recommendation (DC Rec) Transitional Care Facility   PT Rationale for DC Rec assist of 2 for transfers. In order to d/c home, would need mechanical lift such as EZ stand.   Therapy Certification   Start of care date 05/23/22   Certification date from 05/23/22   Certification date to 05/31/22   Medical Diagnosis Syncope   Total Evaluation Time   Total Evaluation Time (Minutes) 12   Physical Therapy Goals   PT Frequency Daily   PT Predicted Duration/Target Date for Goal Attainment 05/30/22   PT Goals Bed Mobility;Transfers   PT: Bed Mobility Moderate assist;Rolling;Supine to/from sit   PT: Transfers Moderate assist;Sit to/from stand;Bed to/from chair       Dahlia Maxwell DPT 5/23/2022

## 2022-05-23 NOTE — PROGRESS NOTES
05/23/22 1058   Quick Adds   Quick Adds Orthostatic BP   Vital Signs   /60   BP Location Right arm   Patient Position Supine   Lying Orthostatic BP   Lying Orthostatic /59   Lying Orthostatic Pulse 69 bpm   Sitting Orthostatic BP   Sitting Orthostatic /59   Sitting Orthostatic Pulse 71 bpm   Standing Orthostatic BP   Standing Orthostatic BP   (unreadable)     First BP above was without compression  Orthostatic BPs above were completed with pt wearing BLE tetragrip and abdominal binder   Pt symptomatic in standing, c/o increase in headache and lightheadedness    Pt is mod-max assist of 2 for basic functional mobility at this time; not at baseline and not safe to return home today. In order to return home, pt would need a mechanical lift such as an EZ Stand, as spouse will not be able to manage transfers with only a transfer belt and w/c.    Pt/spouse's goal is to keep him at home as long as possible. They may benefit from a hospice consult to discuss hospice philosophy and potential benefits, as well as to evaluate his eligibility.     -Dahlia Maxwell, DPT 5/23/2022 11:30 AM

## 2022-05-23 NOTE — PROGRESS NOTES
Bedside EEG was performed that included photic stimulation.  omitted dute to patient state. The patient was both awake and asleep during the recording.  EEG #   EEG system was used. [id number]  Skins intact.

## 2022-05-24 NOTE — PLAN OF CARE
Goal Outcome Evaluation:    Plan of Care Reviewed With: patient, spouse          Outcome Evaluation: pt transferred to & from recliner with 2 assist with good tolerance but slouched to his left side quite a bit while in recliner, needing to be padded with pillows to keep upright. took meds well with either water or applesauce. offered no c/o pain. peripheral iv started leaking then pt got it caught & came out. writer unable to restart piv due to rolling veins. awaiting 2nd nurse. b/p stable with dose of midodrine given.

## 2022-05-24 NOTE — PROGRESS NOTES
"PRIMARY DIAGNOSIS: \"GENERIC\" NURSING  OUTPATIENT/OBSERVATION GOALS TO BE MET BEFORE DISCHARGE:  1. ADLs back to baseline: No    2. Activity and level of assistance: Not OOB this shift    3. Pain status: Improved-controlled with oral pain medications.    4. Return to near baseline physical activity: N/A- No change/remains the same     Discharge Planner Nurse   Safe discharge environment identified: No  Barriers to discharge: Yes       Entered by: Denise Johns RN 05/24/2022 3:47 AM     Please review provider order for any additional goals.   Nurse to notify provider when observation goals have been met and patient is ready for discharge.  "

## 2022-05-24 NOTE — PROGRESS NOTES
Physical Therapy     Orthostatic Bps with BLE compression and abdominal binder in place     05/24/22 1026   Lying Orthostatic BP   Lying Orthostatic /78   Lying Orthostatic Pulse 67 bpm   Sitting Orthostatic BP   Sitting Orthostatic /85   Sitting Orthostatic Pulse 114 bpm   Standing Orthostatic BP   Standing Orthostatic /51   Standing Orthostatic Pulse 115 bpm   Height and Weight   Height 1.829 m (6')   Weight 79.1 kg (174 lb 6.4 oz)   Weight Method Standing scale   BSA (Calculated - sq m) 2   BMI (Calculated) 23.65       Dahlia Maxwell, DPT 5/24/2022

## 2022-05-24 NOTE — CONSULTS
Care Management Initial Consult    General Information  Assessment completed with: Spouse or significant other, wife Susanne  Type of CM/SW Visit: Compliance (discharge planning)    Primary Care Provider verified and updated as needed:   yes  Readmission within the last 30 days:   no        Advance Care Planning: Advance Care Planning Reviewed: no concerns identified (requested copy from wife)          Communication Assessment  Patient's communication style: spoken language (English or Bilingual)    Hearing Difficulty or Deaf: no        Cognitive  Cognitive/Neuro/Behavioral: .WDL except, level of consciousness  Level of Consciousness: alert  Arousal Level: opens eyes spontaneously  Orientation: oriented x 4  Mood/Behavior: cooperative, calm     Speech: logical, spontaneous, clear    Living Environment:   People in home: spouse     Current living Arrangements: apartment      Able to return to prior arrangements:         Family/Social Support:  Care provided by: spouse/significant other  Provides care for: no one, unable/limited ability to care for self  Marital Status:              Description of Support System:  Involved, supportive           Current Resources:   Patient receiving home care services: No     Community Resources: None  Equipment currently used at home: wheelchair, manual  Supplies currently used at home:      Employment/Financial:  Employment Status:          Financial Concerns:             Lifestyle & Psychosocial Needs:  Social Determinants of Health     Tobacco Use: Not on file   Alcohol Use: Not on file   Financial Resource Strain: Not on file   Food Insecurity: Not on file   Transportation Needs: Not on file   Physical Activity: Not on file   Stress: Not on file   Social Connections: Not on file   Intimate Partner Violence: Not on file   Depression: Not on file   Housing Stability: Not on file       Functional Status:  Prior to admission patient needed assistance:              Mental Health  Status:          Chemical Dependency Status:                Values/Beliefs:  Spiritual, Cultural Beliefs, Sabianism Practices, Values that affect care:                 Additional Information:  5/24/2022 Reviewed Medicare Outpatient Observation Notice (MOON) and COVID 19 waiver with patient's wife, Susanne. No return calls from TCU admissions yet. Wife verbalized she would like to keep patient at home as long as possible so goal would be for patient to get stronger if possible before returning home. Patient has Parkinson's at baseline and wife, Susanne, noted patient is not boouncing back like he has in the past.     Plan for Palliative team to follow up with patient and his wife.     CM following.

## 2022-05-24 NOTE — PROGRESS NOTES
[facility]    Medicine Progress Note - Hospitalist Service    Date of Admission:  5/22/2022    Assessment & Plan          82 year old male with a past medical history of end stage Parkinson's, orthostatic hypotension, CAD, recurrent syncope, SA node dysfunction s/p pacemaker placement who presents to the ED for evaluation of syncope.     1.Recurrent syncope  - Multifactorial, dehydration, advanced Parkinson and orthostatic  - CT head is negative for significant acute changes  - Patient has a history of orthostatic with similar presentation in the past.  - Unremarkable pacemaker interrogation in the ER  -  echo 5/23  Left ventricular size, wall motion and function are normal. The ejection  fraction is > 65%.  Normal right ventricle size and systolic function.  There is moderate to severe eccentric left ventricular hypertrophy.  No obvious valvular disease.  - Unremarkable carotid ultrasound for significant stenosis  - Neurology consult, appreciate input   - EEG  abnormal EEG due to diffusely slow background in theta and delta range that suggests a nonspecific generalized cerebral dysfunction.  Such slowing can be seen in metabolic or toxic abnormalities, clinical correlation is recommended.  No sharp discharges or spikes were recorded during this recording to suggest a seizure disorder.    - Continue midodrine  - PT/OT evaluate--needs TCU     2.Acute encephalopathy  - Metabolic, baseline cognitive impairment  - Patient has intermittent hallucinations and as per wife   - Unable to do MRI brain because of pacemaker  - Negative CT head for significant acute changes  - Improving but not completely back to baseline as per wife as he still  Lethargic--wife notes wheel chair bound x last 2 years at home  - Continue supportive care  - Gentle IV hydration   - Negative UA for UTI     3.Advanced parkinsonism  - Resume Sinemet  - PT/OT evaluation  -needs TCU  -also strongly rec Pall care for goals of  care here     4.Hypertension  - Restart home medications  - Added holding parameters to midodrine     5.Chronic kidney disease  - Stage III  - Creatinine slightly elevated above baseline  - Continue gentle IV hydration     6.Bilateral lymphedema  - PT/OT lymphedema treatment      7.Elevated BNP  - Chest x-ray is negative for fluid overload  -echo ok EF     8.Weakness and deconditioning  - Progressive  - PTA , Patient was able to transfer himself but mainly using wheelchair.  - PT/OT evaluation  - Patient needs TCU, wife in agreement  - Pending placement    9.Hypokalemia  -replace and make sure mag ok     Goals of care  -DNR  -needs Pall care eval--I am concerned that he is very end stage Parkinson and need to give wife chance to discuss issues           Diet: Regular Diet Adult    DVT Prophylaxis: Heparin SQ  Negrete Catheter: Not present  Central Lines: None  Cardiac Monitoring: None  Code Status: No CPR- Do NOT Intubate      Disposition Plan   Expected Discharge: 05/25/2022     Anticipated discharge location:  Awaiting care coordination huddle  Delays:     Lab Result Pending (enter specific test & time in comments)  Specialist Consult (enter specialist & decision needed in comments)            The patient's care was discussed with the Patient and Patient's Family.    Mary Shukla MD  Hospitalist Service  [facility]  Securely message with the [employer] Web Console (learn more here)  Text page via [employer] Paging/Directory           ______________________________________________________________________    Interval History   He was sleeping and did not wake when I went to see early this am  Wife at bedside and notes last 2 years using wheel chair at home  She notes overall recent decline has been fast  She agreed TCU  She notes she has had to feed him here each day    Data reviewed today: I reviewed all medications, new labs and imaging results over the last 24 hours. I personally reviewed no  images or EKG's today.    Physical Exam   Vital Signs: Temp: 97.6  F (36.4  C) Temp src: Axillary BP: (!) 147/75 Pulse: 70   Resp: 20 SpO2: 98 % O2 Device: None (Room air)    Weight: 174 lbs 6.4 oz  Constitutional: fatigued and sleeping  Respiratory: No increased work of breathing, good air exchange, clear to auscultation bilaterally, no crackles or wheezing  Cardiovascular: Normal apical impulse, regular rate and rhythm, normal S1 and S2, no S3 or S4, and no murmur noted  GI: normal bowel sounds, soft, non-distended and non-tender  Skin: no bruising or bleeding  Musculoskeletal: no lower extremity pitting edema present  Neurologic: Mental Status Exam:  Level of Alertness:   sleeeping  Neuropsychiatric: sleeping    Data   Recent Labs   Lab 05/24/22  0823 05/24/22  0656 05/23/22  0814 05/23/22  0351 05/22/22  1308   WBC  --   --  7.7  --  8.3   HGB  --  11.0* 10.9*  --  12.6*   MCV  --   --  93  --  94   PLT  --  175 186  --  190   INR  --   --   --   --  1.10   NA  --  142  --  140 143   POTASSIUM 3.8 3.4*  --  3.5 3.9   CHLORIDE  --  110*  --  106 107   CO2  --  26  --  27 26   BUN  --  31*  --  29* 26   CR  --  1.43*  --  1.56* 1.55*   ANIONGAP  --  6  --  7 10   EMMY  --  8.3*  --  8.1* 8.9   GLC  --  89  --  87 163*   ALBUMIN  --   --   --  2.7* 3.0*   PROTTOTAL  --   --   --  5.0* 5.8*   BILITOTAL  --   --   --  0.8 0.8   ALKPHOS  --   --   --  53 67   ALT  --   --   --  <9 <9   AST  --   --   --  14 12     No results found for this or any previous visit (from the past 24 hour(s)).

## 2022-05-24 NOTE — PROGRESS NOTES
"PRIMARY DIAGNOSIS: \"GENERIC\" NURSING    OUTPATIENT/OBSERVATION GOALS TO BE MET BEFORE DISCHARGE:  1. ADLs back to baseline: No    2. Activity and level of assistance: Not OOB this shift    3. Pain status: Improved-controlled with oral pain medications.    4. Return to near baseline physical activity: N/A- No change/remains the same     Discharge Planner Nurse   Safe discharge environment identified: No  Barriers to discharge: Yes       Entered by: Denise Johns RN 05/23/2022 11:49 PM     Please review provider order for any additional goals.   Nurse to notify provider when observation goals have been met and patient is ready for discharge.    "

## 2022-05-24 NOTE — CONSULTS
[facility] - Palliative Care Consultation Note    Patient: Canelo Cook  Date of Admission:  5/22/2022    Requesting Clinician / Team: Dr Shukla  Reason for consult: Goals of Care    Summary/Recommendations:    Goals of care  Met initially with pt, Susanne, and granddaughter Lisa. Pt was fatigued and a headache, wanting to get back into bed.    Met with spouse Susanne separately while pt was getting back into bed to gather background information (later joined by granddaughter Lisa). Susanne and Lisa indicate Darryl has had Parkinson's for 25 years.   Susanne has been pts primary caregiver at home without additional resources or much respite (pt has been in a wheelchair x 2-3 years). Susanne has appreciate the assistance from [facility] (as well as help from their son), several times.   Both Susanne and Lisa do indicate that Yessis blood pressure can fluctuate greatly suddenly and can also impact mentation, with rapid changes, at times very lucid and at times altered.   Darryl sometimes has visual hallucinations (x 1 month).     Susanne does mention that during Darryl's last Neurology visit 1 month ago, it was mentioned that Yessis Parkinson's was end stage (but the details of this were not elaborated). Susanne does believe that Darryl understood what this meant, but have not discussed it great depth (as ? makes him sad, understandably).  At this time, the plan is to see how Darryl does over the next few days (has improved since admission).     Current plan for TCU.   Did spend moderate amount of time talking about various additional resources that could be arranged for at home to offer more support for both Darryl and Susanne including home care/PT/OT, hospice (discussed informational hospice consult, gather information), support for Susanne so she can take care of herself, family/grandchildren.  Encouraged family to take some time, think things over.   Palliative Care will be following up with pt and family at 100 on  Wednesday.       Advanced care planning  -Previous Healthcare Directive: Has reportedly completed an advance directive in the past. Spouse will bring in a copy his advance directive.   -Surrogate Medical Decision Maker: Spouse Susanne and daughter Sravani Nova  -CODE STATUS: DNR/DNI    Symptom management  Headache, has reportedly been present for a few weeks, describes as moderate, spouse thinks it is positional. At home, takes occ aspirin. Spouse says Tylenol can cause a GI upset. Pt confirms this, but also indicates he is ok to leave it on his medication list.   -Headache improved with repositioning back in bed.   -Healing Touch and Acupuncture    Psychosocial and support needs  -Appreciate Spiritual Care (Muslim berto)  Will ask Palliative Care LICSW to connect with Susanne tomorrow for caregiver support as well as with pt for coping with a serious condition.     Case discussed with MD.      Thank you for the opportunity to participate in the care of this patient and family.      During regular M-F work hours -- if you are not sure who specifically to contact -- please contact us by calling [phone].      Palliative Care Assessment    Information gathered today from: MD sarah, family, pt.  Canelo Cook is a 82 year old male with a history of Parkinson's, orthostatic hypotension, CAD, recurrent syncope, SA node dysfunction s/p pacemaker who presented to the ED on May 22nd with altered mental status.  Admitted to the hospital with altered mental status.   Previous hospitalizations: Several ER visits this year  Other specialities following this admission: Neurology  Recent changes: Per ER,  patient hx of Parkinson's, lives at home with his wife, hx of significant orthostatic instability with recurrent syncopal episodes (has PPM). Wife reports patient was having a bowel movement, he was not straining heavily.  He went unresponsive.  She reports he normally comes out of it after 2 hours. He did not fall.  She  reports trying check his blood pressure but cannot read it.  She saw that he was breathing.  After 2 hours he was still altered and she called EMS.      Today, the patient was seen for: Goals of care    Previous Palliative Care Encounters:      Recent Neurology Note:  Impression  #Parkinsons disease  #Autonomic instablility  #Orthostatic hypotension  #Syncope  #Headache     Mr. Cook is a 82-year-old male presenting with a spell of unresponsiveness.  He has not returned to baseline per himself and wife.  Highly suspect this was syncopal in nature.  He has a long history of nearly daily syncopal spells.  She states he was upright on the toilet for at least an hour during his unresponsive spell which can cause prolonged symptoms.  Primary team is ordered an EEG, which we will follow-up on, but I have a low suspicion that this represented a seizure.  Unfortunately, he has documented end-stage Parkinson's disease and autonomic stability can be extremely difficult to control.  Wife shows me very labile blood pressures at home.  He is already on 3 orthostatic blood pressure agents.  I do not think there is any significant change I would make to his pharmacologic regimen at this time.  However would push nonpharmacologic measures including use of the abdominal binder.  Would also check orthostatic vitals, to document.  No further work-up from neurology at this time.  I do not think patient can get an MRI due to his pacemaker, and given that he is returned to baseline I do not think is indicated currently.     Regarding his headache his neurologic exam is at baseline he is a negative head CT.  Would consider agents such as Tylenol and ibuprofen to treat.  At home he states he uses aspirin.        Recommendations  -Orthostatic vitals  -Encourage nonpharmacological measures including thigh-high compression stockings, abdominal binder.  Can loosen abdominal binder a little bit if he is having difficulty with discomfort as  he was in past  -Consider ibuprofen if Tylenol not effective for headache  -We will follow results of EEG  -Neurology will sign off at this time, no further work-up from neurologic perspective, can discharge when cleared from PT/OT standpoint which is ordered    Summary of Palliative Encounter:  I  discussed the reason for Palliative Care Referral and our role in symptom management, patient family communication and understanding their choices for medical treatment and providing  guidance in making difficult decisions in the framework of focusing on patient comfort and quality of life.    Reviewed current conditions and treatments including clinical condition/specialty evaluations recently, function, goals of care, surrogate decision maker, advance directive, support at home, new information Re advanced neurodegenerative condition.     Prognosis, Goals, and/or Advance Care Planning were addressed today: Yes  Mood, coping, and/or meaning in the context of serious illness were addressed today: Yes    Functional Status:     Functional Status two weeks prior to hospitalization: , PPS:   40%- 1. Mainly in bed; 2. Unable to do most activity, extensive disease; 3. Mainly assistance; 4. Normal or reduced; 5. Full or drowsy +/- confusion    Functional status Current:  , PPS:   30%- 1. Totally bed bound; 2. Unable to do any activity, extensive disease; 3. Total care; 4. Normal or reduced; 5. Full or drowsy +/- confusion    Prognosis, Goals, & Planning:   Prognosis (quality & quantity): Report of end stage parkinson's disease  High risk of death within one year? Believe yes    Patient's decision making preferences: With spouse/daughter.         Capacity evaluation:    Pt Darryl does seem to have capacity to make a decision regarding his medical care as he seemed  appropriate with symptom questions today. Plan to see again tomorrow and involve more in care.    I have concerns about the patient/family's health literacy today:  No    Patient has a completed Health Care Directive: Yes    Code status: DNR/DNI    Social:     Occupation:  Former new , was very good, beloved. Is a great , has a great sense of humor.     Relationships:   to Susanne x 61 years, has 3 children 6 grand children    Spirituality: Jain      Key Palliative Symptom Data:  SOB-None  Pain-Headache, no vision changes related to it, has had over the last few weeks, describes as moderate, resolved with position change  Nausea-None  Anxiety-describes mood as doing ok    ROS:  Comprehensive ROS is reviewed and is negative except as here & per HPI:      Past Medical History:  No past medical history on file.     Past Surgical History:  No past surgical history on file.      Family History:  No family history on file.      Allergies:  No Known Allergies     Medications:  I have reviewed this patient's medication profile and medications from this hospitalization.       aspirin  81 mg Oral Daily     atorvastatin  40 mg Oral Daily     carbidopa-levodopa  2 tablet Oral 4x Daily     cyanocobalamin  1,000 mcg Oral Daily     fludrocortisone  0.1 mg Oral Daily     heparin ANTICOAGULANT  5,000 Units Subcutaneous Q8H     magnesium oxide  400 mg Oral Daily     metoprolol succinate ER  37.5 mg Oral Daily     midodrine  10 mg Oral BID     mirtazapine  7.5 mg Oral At Bedtime     nystatin   Topical BID     pantoprazole  40 mg Oral QAM AC     pyridostigmine  30 mg Oral TID     senna-docusate  1 tablet Oral BID    Or     senna-docusate  2 tablet Oral BID     tamsulosin  0.4 mg Oral Daily     tolterodine ER  4 mg Oral Daily     vitamin  1 tablet Oral Daily        PRN MEDS:   acetaminophen **OR** acetaminophen, azelastine, hydrALAZINE, melatonin, ondansetron **OR** ondansetron     Morphine Equivalent Daily Dose: 0 mg    Physical Exam:  /59 (BP Location: Right arm, Patient Position: Sitting, Cuff Size: Adult Regular)   Pulse 72   Temp 97.6  F (36.4  C)  (Axillary)   Resp 20   Ht 1.829 m (6')   Wt 79.1 kg (174 lb 6.4 oz)   SpO2 98%   BMI 23.65 kg/m        General Appearance: Alert, sitting in the recliner, slumped over, able to answer questions   Deferred full physical exam today    Data reviewed:    Data   EXAM: US CAROTID BILATERAL  LOCATION: [facility]  DATE/TIME: 5/22/2022 5:20 PM     INDICATION: Syncope  COMPARISON: None.  TECHNIQUE: Duplex exam performed utilizing 2D gray-scale imaging, Doppler interrogation with color-flow and spectral waveform analysis. The percent diameter stenosis is determined using NASCET criteria and Society of Radiologists in Ultrasound Consensus   Criteria.     FINDINGS:     RIGHT: Mild plaque at the bifurcation. The peak systolic velocity in the ICA is less than 125 cm/sec, consistent with less than 50% stenosis. Normal velocities in the ECA. Antegrade flow within the vertebral artery.      LEFT: Mild plaque at the bifurcation. The peak systolic velocity in the ICA is less than 125 cm/sec, consistent with less than 50% stenosis. Normal velocities in the ECA. Antegrade flow within the vertebral artery.     VELOCITY CHART:  CCA   Right: 30/8 cm/s   Left: 49/13 cm/s  ICA   Right: 70/22 cm/s   Left: 78/12 cm/s  ECA   Right: 106/19 cm/s   Left: 49/20 cm/s  ICA/CCA PSV Ratio   Right: 2.3   Left: 1.6                                                                      IMPRESSION:  1.  Mild plaque formation, velocities consistent with less than 50% stenosis in the right internal carotid artery.  2.  Mild plaque formation, velocities consistent with less than 50% stenosis in the left internal carotid artery.  3.  Flow within the vertebral arteries is antegrade.      Lab Results   Component Value Date/Time    ALBUMIN 2.7 (L) 05/23/2022 03:51 AM     Wt Readings from Last 3 Encounters:   05/24/22 79.1 kg (174 lb 6.4 oz)           TTS: I have personally spent a total of 80 minutes  today on the unit in review of  medical record, consultation with the medical providers and assessment of patient today, with more than 50% of this time spent in counseling, coordination of care, and conversation with family (x 60 minutes, and with pt x 20 minutes) in meeting clinical status, recent changes, diagnostic results, prognosis, symptom management, emotional support, risks and benefits of management options, and development of plan of care.     TOVA Mercedes, FNP-BC, PMHNP-BC  Palliative Care Nurse Practitioner  [facility]  [phone]      During regular M-F work hours -- if you are not sure who specifically to contact -- please contact us by calling [phone].

## 2022-05-25 NOTE — PLAN OF CARE
PRIMARY DIAGNOSIS: SYNCOPE  OUTPATIENT/OBSERVATION GOALS TO BE MET BEFORE DISCHARGE:  1. Orthostatic performed: N/A. Done earlier.     2. Diagnostic testing complete & at baseline neurologic testing: Yes    3. Cleared by consultants (if involved): Yes    4. Interpretation of cardiac rhythm per telemetry tech: None      5. Tolerating adequate PO diet and medications: Yes    6. Return to near baseline physical activity or neurologic status: Yes    Discharge Planner Nurse   Safe discharge environment identified: Yes  Barriers to discharge: Yes       Entered by: Alfred Sandra RN 05/25/2022 4:21 AM     Please review provider order for any additional goals.   Nurse to notify provider when observation goals have been met and patient is ready for discharge.Goal Outcome Evaluation:    Second BP reading this shift higher than the first one: 199/99. Given prn iv hydralazine 10 mg at 0343. Pt has been running relatively higher blood pressure readings. Continues with hydration iv fluids NS 50 ml/hr.

## 2022-05-25 NOTE — PROGRESS NOTES
PRIMARY DIAGNOSIS: SYNCOPE  OUTPATIENT/OBSERVATION GOALS TO BE MET BEFORE DISCHARGE:  1. Orthostatic performed: No, q shift will complete next VS.     2. Diagnostic testing complete & at baseline neurologic testing: Yes    3. Cleared by consultants (if involved): No    4. Interpretation of cardiac rhythm per telemetry tech: not on tele    5. Tolerating adequate PO diet and medications: Yes    6. Return to near baseline physical activity or neurologic status: Yes    Discharge Planner Nurse   Safe discharge environment identified: Yes  Barriers to discharge: Yes       Entered by: Syl Peterson RN 05/24/2022 10:08 PM     Please review provider order for any additional goals.   Nurse to notify provider when observation goals have been met and patient is ready for discharge.

## 2022-05-25 NOTE — PROGRESS NOTES
[facility] Palliative Care Social Work Note    SW met with Darryl, wife Susanne, daughter Sravani at bedside to introduce self/role on palliative care team and offer support. Family expressed they are coping better now, knowing that Darryl is doing better. They were focused on discharge planning to TCU and then hoping to get him DME and services at home. SW provided active listening and validated their thoughts and concerns. Susanne did express that Darryl tends to bounce back but these recoveries are getting less and less. She feels like there is a good plan in place and understands that we are also available outpatient as needed for psychosocial support.     SW to remain available as needed.     Jessica Goldberg, [facility]  Palliative Care  Office # [phone]

## 2022-05-25 NOTE — PROGRESS NOTES
[facility]    Medicine Progress Note - Hospitalist Service    Date of Admission:  5/22/2022    Assessment & Plan          82 year old male with a past medical history of end stage Parkinson's, orthostatic hypotension, CAD, recurrent syncope, SA node dysfunction s/p pacemaker placement who presents to the ED for evaluation of syncope.     1.Recurrent syncope  - Multifactorial, dehydration, advanced Parkinson and orthostatic bp  - CT head is negative for significant acute changes  - Patient has a history of orthostatic with similar presentation in the past.  - Unremarkable pacemaker interrogation in the ER  -  echo 5/23  Left ventricular size, wall motion and function are normal. The ejection  fraction is > 65%.  Normal right ventricle size and systolic function.  There is moderate to severe eccentric left ventricular hypertrophy.  No obvious valvular disease.  - Unremarkable carotid ultrasound for significant stenosis  - Neurology consult, appreciate input   - EEG  abnormal EEG due to diffusely slow background in theta and delta range that suggests a nonspecific generalized cerebral dysfunction.  Such slowing can be seen in metabolic or toxic abnormalities, clinical correlation is recommended.  No sharp discharges or spikes were recorded during this recording to suggest a seizure disorder.     - Continue midodrine  - PT/OT evaluate--needs TCU     2.Acute encephalopathy--improving today  - Metabolic, baseline cognitive impairment  - Patient has intermittent hallucinations and as per wife   - Unable to do MRI brain because of pacemaker  - Negative CT head for significant acute changes  - Improving but not completely back to baseline as per wife as he still  Lethargic--wife notes wheel chair bound x last 2 years at home  - Continue supportive care  - Gentle IV hydration   - Negative UA for UTI     3.Advanced parkinsonism  - Resume Sinemet  - PT/OT evaluation  -needs TCU  -also strongly rec Pall  care for goals of care here     4.Hypertension  - Restarted home medications  - Added holding parameters to midodrine     5.Chronic kidney disease  - Stage III  - Creatinine slightly elevated above baseline  - Continue gentle IV hydration     6.Bilateral lymphedema  - PT/OT lymphedema treatment      7.Elevated BNP  - Chest x-ray is negative for fluid overload  -echo ok EF     8.Weakness and deconditioning  - Progressive  - PTA , Patient was able to transfer himself but mainly using wheelchair.  - PT/OT evaluation  - Patient needs TCU, wife in agreement  - Pending placement     9.Hypokalemia  -replace and make sure mag ok--still needs more k    10.Possible wheezing intermit today  -wife noted  -he was feeding self, new today  -I did not hear wheezing  -cxr ok  -IS  -but will also ask speech to see, at risk for aspiration with Parkinson     Goals of care  -DNR  -appreciate Pall care eval    Social- suspect will go to TCU                    Diet: Regular Diet Adult    DVT Prophylaxis: Heparin SQ  Negrete Catheter: Not present  Central Lines: None  Cardiac Monitoring: None  Code Status: No CPR- Do NOT Intubate      Disposition Plan   Expected Discharge: 05/26/2022     Anticipated discharge location:  Awaiting care coordination huddle  Delays:     Lab Result Pending (enter specific test & time in comments)  Specialist Consult (enter specialist & decision needed in comments)  Placement - TCU            The patient's care was discussed with the Care Coordinator/, Patient and Patient's Family.    Mary Shukla MD  Hospitalist Service  [facility]  Securely message with the Vocera Web Console (learn more here)  Text page via [employer] Paging/Directory           ______________________________________________________________________    Interval History   He was awake and feeding self--this was big improvement since yesterday  He has no pain  Wife and staff were concerned earlier some  wheezing  Denied cough with eating  Wife notes more alert    Data reviewed today: I reviewed all medications, new labs and imaging results over the last 24 hours. I personally reviewed no images or EKG's today.    Physical Exam   Vital Signs: Temp: 97.9  F (36.6  C) Temp src: Oral BP: 98/55 Pulse: 74   Resp: 20 SpO2: 97 % O2 Device: None (Room air)    Weight: 174 lbs 6.4 oz  Constitutional: awake, alert, cooperative and no apparent distress  Respiratory: No increased work of breathing, good air exchange, clear to auscultation bilaterally, no crackles or wheezing  Cardiovascular: Normal apical impulse, regular rate and rhythm, normal S1 and S2, no S3 or S4, and no murmur noted  GI: normal bowel sounds, soft, non-distended and non-tender  Skin: no bruising or bleeding  Musculoskeletal: no lower extremity pitting edema present  Neurologic: Mental Status Exam:  Level of Alertness:   awake  Language:  normal  Motor Exam:  Bradykinesia noted  Neuropsychiatric: General: normal, calm and normal eye contact    Data   Recent Labs   Lab 05/25/22  0750 05/24/22  0823 05/24/22  0656 05/23/22  0814 05/23/22  0351 05/22/22  1308   WBC 6.2  --   --  7.7  --  8.3   HGB 10.3*  --  11.0* 10.9*  --  12.6*   MCV 93  --   --  93  --  94     --  175 186  --  190   INR  --   --   --   --   --  1.10     --  142  --  140 143   POTASSIUM 3.4* 3.8 3.4*  --  3.5 3.9   CHLORIDE 111*  --  110*  --  106 107   CO2 29  --  26  --  27 26   BUN 34*  --  31*  --  29* 26   CR 1.45*  --  1.43*  --  1.56* 1.55*   ANIONGAP 0*  --  6  --  7 10   EMMY 8.2*  --  8.3*  --  8.1* 8.9   GLC 89  --  89  --  87 163*   ALBUMIN  --   --   --   --  2.7* 3.0*   PROTTOTAL  --   --   --   --  5.0* 5.8*   BILITOTAL  --   --   --   --  0.8 0.8   ALKPHOS  --   --   --   --  53 67   ALT  --   --   --   --  <9 <9   AST  --   --   --   --  14 12     Recent Results (from the past 24 hour(s))   XR Chest Port 1 View    Narrative    EXAM: XR CHEST PORT 1  VIEW  LOCATION: [facility]  DATE/TIME: 5/25/2022 10:43 AM    INDICATION: 82 y o man with parkison , intermit wheezing, check for infiltrate, aspiration  COMPARISON: Chest x-ray 05/02/2022      Impression    IMPRESSION: Stable dual-lead left-sided cardiac conduction device. Minimal right basilar pulmonary opacities likely reflect atelectasis. No focal pneumonic consolidation or pleural effusion. Normal heart size. No overt vascular congestion or pulmonary   edema.

## 2022-05-25 NOTE — PLAN OF CARE
PRIMARY DIAGNOSIS: SYNCOPE/TIA  OUTPATIENT/OBSERVATION GOALS TO BE MET BEFORE DISCHARGE:  1. Orthostatic performed: Yes:          Lying Orthostatic BP: 182/89        Sitting Orthostatic BP: 163/82         Standing Orthostatic BP: Pt unable to stand.    2. Diagnostic testing complete & at baseline neurologic testing: Yes    3. Cleared by consultants (if involved): Yes    4. Interpretation of cardiac rhythm per telemetry tech: No tele     5. Tolerating adequate PO diet and medications: Yes    6. Return to near baseline physical activity or neurologic status: No    Discharge Planner Nurse   Safe discharge environment identified: Yes  Barriers to discharge: Yes       Entered by: Alfred Sandra RN 05/25/2022 2:25 AM   Due to drop in his orthostatic BP from lying to sitting, did not meet parameters to receive iv hydralazine. Pt denies dizziness or lightheadedness. Has PrimoFit male external catheter to manage urinary incontinence.   Please review provider order for any additional goals.   Nurse to notify provider when observation goals have been met and patient is ready for discharge.Goal Outcome Evaluation:

## 2022-05-25 NOTE — PLAN OF CARE
"PRIMARY DIAGNOSIS: \"GENERIC\" NURSING  OUTPATIENT/OBSERVATION GOALS TO BE MET BEFORE DISCHARGE:  ADLs back to baseline: No    Activity and level of assistance: Up with maximum assistance. Consider SW and/or PT evaluation.     Pain status: Pain free.    Return to near baseline physical activity: No     Discharge Planner Nurse   Safe discharge environment identified: No  Barriers to discharge: Yes       Entered by: Kathleen Domínguez RN 05/25/2022 4:01 PM     Please review provider order for any additional goals.   Nurse to notify provider when observation goals have been met and patient is ready for discharge.Goal Outcome Evaluation:                      "

## 2022-05-25 NOTE — PROGRESS NOTES
05/25/22 1545   General Information   Onset of Illness/Injury or Date of Surgery 05/22/22   Referring Physician Dr Shukla   Patient/Family Therapy Goal Statement (SLP) Patient denies any swallow difficulty   Pertinent History of Current Problem 82 year old male with a past medical history of end stage Parkinson's, orthostatic hypotension, CAD, recurrent syncope, SA node dysfunction s/p pacemaker placement who presents to the ED for evaluation of syncope.   General Observations patient is awake, verbal, his wife is present   Past History of Dysphagia none reported   Type of Evaluation   Type of Evaluation Swallow Evaluation   Oral Motor   Oral Musculature generally intact   Structural Abnormalities none present   Dentition (Oral Motor)   Dentition (Oral Motor) dental appliance/dentures   Dental Appliance/Denture (Oral Motor) dentures, full;dentures, partial   Facial Symmetry (Oral Motor)   Facial Symmetry (Oral Motor) WNL   Lip Function (Oral Motor)   Lip Range of Motion (Oral Motor) WNL   Lip Strength (Oral Motor) mild impairment   Lip Coordination (Oral Motor) minimal impairment   Comment, Lip Function (Oral Motor) reduced due to Parkinson's Disease   Tongue Function (Oral Motor)   Tongue Strength (Oral Motor) minimal impairment   Tongue Coordination/Speed (Oral Motor) slows down progressively;reduced rate   Tongue ROM (Oral Motor) lateralization is impaired   Comment, Tongue Function (Oral Motor) reduced due to Parkinson's Disease   Lateralization, Tongue ROM Impairment (Oral Motor) minimal impairment   Vocal Quality/Secretion Management (Oral Motor)   Vocal Quality (Oral Motor) WNL   Secretion Management (Oral Motor) WNL   Comment, Vocal Quality/Secretion Management (Oral Motor) somewhat softer volume but overall good breath support for speech   General Swallowing Observations   Respiratory Support (General Swallowing Observations) none   Current Diet/Method of Nutritional Intake (General Swallowing  Observations, NIS) thin liquids (level 0);regular diet   Swallowing Evaluation Clinical swallow evaluation   Clinical Swallow Evaluation   Feeding Assistance minimal assistance required   Clinical Swallow Evaluation Textures Trialed thin liquids;solid foods   Clinical Swallow Eval: Thin Liquid Texture Trial   Mode of Presentation, Thin Liquids straw;self-fed   Volume of Liquid or Food Presented 4 oz   Oral Phase of Swallow WFL   Pharyngeal Phase of Swallow intact   Diagnostic Statement no overt s/s aspiration, good oral control   Clinical Swallow Evaluation: Solid Food Texture Trial   Mode of Presentation self-fed   Volume Presented 1 cookie   Oral Phase WFL   Pharyngeal Phase intact   Diagnostic Statement good mastication and control, no overt s/s aspiration   Esophageal Phase of Swallow   Patient reports or presents with symptoms of esophageal dysphagia No   Swallowing Recommendations   Diet Consistency Recommendations thin liquids (level 0);regular diet   Recommended Feeding/Eating Techniques (Swallow Eval) maintain upright sitting position for eating   Comment, Swallowing Recommendations Continue current diet, set up or assist as needed   Clinical Impression   Criteria for Skilled Therapeutic Interventions Met (SLP Eval) Evaluation only   Clinical Impression Comments patient presents with no oral dysphagia and no overt s/s aspiration during or after intake.  Recommend continue current diet, monitor for overt s/s aspiration or respiratory decline. patient and spouse agreeable to this plan.   SLP Discharge Planning   SLP Rationale for DC Rec no SLP needs at this time   SLP Brief overview of current status  regular diet w/thin liquids, monitor for s/s aspiration or respiratory decline    Total Evaluation Time   Total Evaluation Time (Minutes) 20

## 2022-05-25 NOTE — PROGRESS NOTES
[facility]  Palliative Care Daily Progress Note      Summary/Recommendations:     Goals of care  Yesterday, met initially with pt, Susanne, and granddaughter Lisa. Pt was fatigued and a headache, wanting to get back into bed. Susanne and Lisa indicate Darryl has had Parkinson's for 25 years.   Susanne has been pts primary caregiver at home without additional resources or much respite (pt has been in a wheelchair x 2-3 years). Susanne has appreciate the assistance from [facility] (as well as help from their son), several times.   Both Susanne and Lisa do indicate that Darryl's blood pressure can fluctuate greatly suddenly and can also impact mentation, with rapid changes, at times very lucid and at times altered.   Darryl sometimes has visual hallucinations (x 1 month).      Susanne shares that at Darryl's last Neurology visit 1 month ago, it was mentioned that Yessis Parkinson's was end stage (but the details of this were not elaborated). Susanne believes Darryl understood what this meant, but have not discussed it great depth (as ? makes him sad, understandably).  At this time, the plan is to see how Darryl does over the next few days (has improved since admission).     Current plan for TCU.   Spent time talking re: various additional resources that could be arranged for at home to offer more support for both Darryl and Susanne including home care/PT/OT, hospice (discussed informational hospice consult, gather information), support for Susanne so she can take care of herself, family/grandchildren.  Encouraged family to take some time, think things over.     Today, follow up visit with Palliative Care, family, as well as Darryl. Darryl is very alert and able to participate in the conversation. He is open to hearing information about his condition and appreciates being involved. He is onboard with the plan for TCU, as well as is open to an informational hospice consult, now, inpatient, to gather  information about resources (family are aware have choice in hospice; are ok with [facility] consult), and then will think it over. No clear decision yet on whether would want to do hospice, if eligible. Family are very interested in care in the home, to offer spouse some additional assistance.          Advanced care planning  -Previous Healthcare Directive: Has reportedly completed an advance directive in the past. Spouse will bring in a copy his advance directive.   -Surrogate Medical Decision Maker: Spouse Susanne and daughter Sravani Nova  -CODE STATUS: DNR/DNI     Symptom management  Headache, has reportedly been present for a few weeks, describes as moderate, spouse thinks it is positional. At home, takes occ aspirin. Spouse says Tylenol can cause a GI upset. Pt confirms this, but also indicates he is ok to leave it on his medication list.   -Headache improved with repositioning back in bed.   -Healing Touch and Acupuncture ordered     Psychosocial and support needs  -Appreciate Spiritual Care (Zoroastrian berto)  -Appreciate Palliative Care LICSW connecting with Susanne tomorrow for caregiver support (down the road pt might benefit too). Did mention outpatient pall clinic (if does not enroll in hospice), but have not ordered.       Case discussed with MD rodriguez, CM, palliative care LICSW.       Thank you for the opportunity to participate in the care of this patient and family.      During regular M-F work hours -- if you are not sure who specifically to contact -- please contact us by calling [phone].        Palliative Care Assessment     Information gathered today from: MD sarah, family, pt.  Canelo Cook is a 82 year old male with a history of Parkinson's, orthostatic hypotension, CAD, recurrent syncope, SA node dysfunction s/p pacemaker who presented to the ED on May 22nd with altered mental status.  Admitted to the hospital with altered mental status.   Previous hospitalizations: Several ER visits this  year  Other specialities following this admission: Neurology  Recent changes: Per ER,  patient hx of Parkinson's, lives at home with his wife, hx of significant orthostatic instability with recurrent syncopal episodes (has PPM). Wife reports patient was having a bowel movement, he was not straining heavily.  He went unresponsive.  She reports he normally comes out of it after 2 hours. He did not fall.  She reports trying check his blood pressure but cannot read it.  She saw that he was breathing.  After 2 hours he was still altered and she called EMS.       Today, the patient was seen for: Goals of care     Previous Palliative Care Encounters:       Recent Neurology Note:  Impression  #Parkinsons disease  #Autonomic instablility  #Orthostatic hypotension  #Syncope  #Headache     Mr. Cook is a 82-year-old male presenting with a spell of unresponsiveness.  He has not returned to baseline per himself and wife.  Highly suspect this was syncopal in nature.  He has a long history of nearly daily syncopal spells.  She states he was upright on the toilet for at least an hour during his unresponsive spell which can cause prolonged symptoms.  Primary team is ordered an EEG, which we will follow-up on, but I have a low suspicion that this represented a seizure.  Unfortunately, he has documented end-stage Parkinson's disease and autonomic stability can be extremely difficult to control.  Wife shows me very labile blood pressures at home.  He is already on 3 orthostatic blood pressure agents.  I do not think there is any significant change I would make to his pharmacologic regimen at this time.  However would push nonpharmacologic measures including use of the abdominal binder.  Would also check orthostatic vitals, to document.  No further work-up from neurology at this time.  I do not think patient can get an MRI due to his pacemaker, and given that he is returned to baseline I do not think is indicated  currently.     Regarding his headache his neurologic exam is at baseline he is a negative head CT.  Would consider agents such as Tylenol and ibuprofen to treat.  At home he states he uses aspirin.   Recommendations  -Orthostatic vitals  -Encourage nonpharmacological measures including thigh-high compression stockings, abdominal binder.  Can loosen abdominal binder a little bit if he is having difficulty with discomfort as he was in past  -Consider ibuprofen if Tylenol not effective for headache  -We will follow results of EEG  -Neurology will sign off at this time, no further work-up from neurologic perspective, can discharge when cleared from PT/OT standpoint which is ordered     Summary of Palliative Encounter:  I  discussed the reason for Palliative Care Referral and our role in symptom management, patient family communication and understanding their choices for medical treatment and providing  guidance in making difficult decisions in the framework of focusing on patient comfort and quality of life.    Reviewed current conditions and treatments including clinical condition/specialty evaluations recently, function, goals of care, surrogate decision maker, advance directive, support at home, new information Re advanced neurodegenerative condition. Today, with pt, spouse, and dtr, reviewed pts understanding of his neurologic condition and care wishes. He is open to TCU. He is unsure what to think about his stage of parkinson's. He is very open to more support at home. Family have very good questions re: various resources, types of home support, insurance questions.      Prognosis, Goals, and/or Advance Care Planning were addressed today: Yes  Mood, coping, and/or meaning in the context of serious illness were addressed today: Yes     Functional Status:     Functional Status two weeks prior to hospitalization: , PPS:   40%- 1. Mainly in bed; 2. Unable to do most activity, extensive disease; 3. Mainly assistance; 4.  Normal or reduced; 5. Full or drowsy +/- confusion    Functional status Current:  , PPS:   30%- 1. Totally bed bound; 2. Unable to do any activity, extensive disease; 3. Total care; 4. Normal or reduced; 5. Full or drowsy +/- confusion     Prognosis, Goals, & Planning:   Prognosis (quality & quantity): Report of end stage parkinson's disease  High risk of death within one year? Believe yes     Patient's decision making preferences: With spouse/daughter.         Capacity evaluation:    Pt Darryl does seem to have capacity to make a decision regarding his medical care as he seemed     appropriate with symptom questions today. Plan to see again tomorrow and involve more in care.    I have concerns about the patient/family's health literacy today: No    Patient has a completed Health Care Directive: Yes    Code status: DNR/DNI     Social:     Occupation:  Former new , was very good, beloved. Is a great , has a great sense of humor. Went to [employer].    Relationships:   to Susanne x 61 years, has 3 children 6 grand children    Spirituality: Protestant        Key Palliative Symptom Data:  SOB-None; occ wheezy but not SOB  Pain-Headache, comes and goes, gone now   Nausea-None  Anxiety-describes mood as doing ok       Review of Systems:     Besides above, an additional ROS system was not done          Medications:     I have reviewed this patient's medication profile and medications during this hospitalization.           Physical Exam:   Vitals were reviewed  Temp: 97.9  F (36.6  C) Temp src: Oral BP: 98/55 Pulse: 74   Resp: 20 SpO2: 97 % O2 Device: None (Room air)    Constitutional:   Awake, alert, cooperative, no apparent distress, and appears stated age                Data Reviewed:     Reviewed recent pertinent imaging, comments:   Chest x-ray      TTS: I have personally spent a total of 35 minutes  today on the unit in review of medical record, consultation with the medical providers  and assessment of patient, with more than 50% of this time spent in counseling, coordination of care, and discussion in a family meeting re: diagnostic results (xray), prognosis (end stage parkinson's per neurology, ? Hospice eligibility evaluation), risks and benefits of management options (more home support), emotional support and development of plan of care (TCU, informational hospice).    TOVA Mercedes, FNP-BC, PMHNP-BC  Palliative Care Nurse Practitioner  [facility]  [phone]      During regular M-F work hours -- if you are not sure who specifically to contact -- please contact us by calling [phone].

## 2022-05-25 NOTE — PROGRESS NOTES
PRIMARY DIAGNOSIS: SYNCOPE  OUTPATIENT/OBSERVATION GOALS TO BE MET BEFORE DISCHARGE:  1. Orthostatic performed: Yes:          Lying Orthostatic BP: 165/78         Sitting Orthostatic BP: 105/64         Standing Orthostatic BP: 114/66     2. Diagnostic testing complete & at baseline neurologic testing: Yes    3. Cleared by consultants (if involved): No, palliative and therapy following.     4. Interpretation of cardiac rhythm per telemetry tech: n/a    5. Tolerating adequate PO diet and medications: Yes    6. Return to near baseline physical activity or neurologic status: Yes    Discharge Planner Nurse   Safe discharge environment identified: Yes  Barriers to discharge: Yes       Entered by: Syl Peterson RN 05/24/2022 10:09 PM     Please review provider order for any additional goals.   Nurse to notify provider when observation goals have been met and patient is ready for discharge.

## 2022-05-25 NOTE — PLAN OF CARE
"PRIMARY DIAGNOSIS: \"GENERIC\" NURSING  OUTPATIENT/OBSERVATION GOALS TO BE MET BEFORE DISCHARGE:  ADLs back to baseline: No    Activity and level of assistance: Up with maximum assistance. Consider SW and/or PT evaluation.     Pain status: Pain free.    Return to near baseline physical activity: No     Discharge Planner Nurse   Safe discharge environment identified: No  Barriers to discharge: Yes       Entered by: Ktahleen Domínguez RN 05/25/2022 4:03 PM     Please review provider order for any additional goals.   Nurse to notify provider when observation goals have been met and patient is ready for discharge.Goal Outcome Evaluation:                      "

## 2022-05-26 NOTE — PLAN OF CARE
Occupational Therapy     Orders received. Pt evaluated and discharged on 5/23. Pt's deficits from baseline included transfers, which could be addressed by physical therapy, and self-feeding, which was addressed by issuing built-up utensil .    Discussed with care team and completed additional chart review.? Occupational Therapy not indicated due to deficits being addressed by previously implemented self-feeding equipment and physical therapy.? Defer discharge recommendations to physical therapy.? Will complete orders.    MARS Lugo/OLEKSANDR

## 2022-05-26 NOTE — PLAN OF CARE
Patient tolerating diet. Denies pain. Wife here at bedside. Takes pills in applesauce. Assist of 2 with transfers. Up in chair tonight.    Kim Kendall RN

## 2022-05-26 NOTE — PLAN OF CARE
PRIMARY DIAGNOSIS: GENERALIZED WEAKNESS    OUTPATIENT/OBSERVATION GOALS TO BE MET BEFORE DISCHARGE  1. Orthostatic performed: N/A    2. Tolerating PO medications: Yes    3. Return to near baseline physical activity: Yes    4. Cleared for discharge by consultants (if involved):     Discharge Planner Nurse   Safe discharge environment identified: Yes  Barriers to discharge: Yes       Entered by: Aurelia Carroll RN 05/26/2022 2:15 PM     Please review provider order for any additional goals.   Nurse to notify provider when observation goals have been met and patient is ready for discharge.Goal Outcome Evaluation:    Plan of Care Reviewed With: patient, spouse     Overall Patient Progress: improving    Outcome Evaluation: Pt rolls well in the bed.  He is feeding himself with minimal assist now.  PT is working with him now and OT was ordered.  Pt BP improved after morning meds.

## 2022-05-26 NOTE — PLAN OF CARE
PRIMARY DIAGNOSIS: GENERALIZED WEAKNESS    OUTPATIENT/OBSERVATION GOALS TO BE MET BEFORE DISCHARGE  1. Orthostatic performed: N/A    2. Tolerating PO medications: Yes    3. Return to near baseline physical activity: Yes    4. Cleared for discharge by consultants (if involved): N/A    Discharge Planner Nurse   Safe discharge environment identified: Yes  Barriers to discharge: Yes       Entered by: Aurelia Carroll RN 05/26/2022 12:46 PM     Please review provider order for any additional goals.   Nurse to notify provider when observation goals have been met and patient is ready for discharge.Goal Outcome Evaluation:    Pt is doing well.  He is feeding himself with assistance.  He rolls in bed well.  Pt BP came down after morning meds.  Held Mididrine this am due to high BP.  Wife is at the bedside.

## 2022-05-26 NOTE — PROGRESS NOTES
"PRIMARY DIAGNOSIS: \"GENERIC\" NURSING  OUTPATIENT/OBSERVATION GOALS TO BE MET BEFORE DISCHARGE:  1. ADLs back to baseline: No    2. Activity and level of assistance: Up with standby assistance.    3. Pain status: Pain free.    4. Return to near baseline physical activity: No     Discharge Planner Nurse   Safe discharge environment identified: No  Barriers to discharge: Yes       Entered by: Kosta Hall RN 05/26/2022 7:48 AM      No complaints of pain, no complaints of dizziness.   Please review provider order for any additional goals.   Nurse to notify provider when observation goals have been met and patient is ready for discharge.  "

## 2022-05-26 NOTE — PLAN OF CARE
Problem: Plan of Care - These are the overarching goals to be used throughout the patient stay.    Goal: Absence of Hospital-Acquired Illness or Injury  Intervention: Prevent Skin Injury  Recent Flowsheet Documentation  Taken 5/25/2022 1908 by Rafa Melchor, RN  Body Position: right  Taken 5/25/2022 1630 by Rafa Melchor RN  Body Position: left   Goal Outcome Evaluation:    Patient alert.  No c/o pain.  TCDB performed.  IS in use.  Audible expritory wheezes.  Bowel sounds hypoactive.  MOM given and effective with small soft BM.  Patient refused male purewick at .

## 2022-05-26 NOTE — PROGRESS NOTES
[facility]    Medicine Progress Note - Hospitalist Service    Date of Admission:  5/22/2022    Assessment & Plan          82 year old male with a past medical history of end stage Parkinson's, orthostatic hypotension, CAD, recurrent syncope, SA node dysfunction s/p pacemaker placement who presents to the ED for evaluation of syncope.     1.Recurrent syncope  - Multifactorial, dehydration, advanced Parkinson and orthostatic bp  - CT head is negative for significant acute changes  - Patient has a history of orthostatic with similar presentation in the past.  - Unremarkable pacemaker interrogation in the ER  -  echo 5/23  Left ventricular size, wall motion and function are normal. The ejection  fraction is > 65%.  Normal right ventricle size and systolic function.  There is moderate to severe eccentric left ventricular hypertrophy.  No obvious valvular disease.  - Unremarkable carotid ultrasound for significant stenosis  - Neurology consult, appreciate input   - EEG  abnormal EEG due to diffusely slow background in theta and delta range that suggests a nonspecific generalized cerebral dysfunction.  Such slowing can be seen in metabolic or toxic abnormalities, clinical correlation is recommended.  No sharp discharges or spikes were recorded during this recording to suggest a seizure disorder.     - Continue midodrine  - PT/OT evaluate--needs TCU waiting for placement     2.Acute encephalopathy--improving   - Metabolic, baseline cognitive impairment  - Patient has intermittent hallucinations and as per wife   - Unable to do MRI brain because of pacemaker  - Negative CT head for significant acute changes  - Improving but not completely back to baseline as per wife as he still  Lethargic--wife notes wheel chair bound x last 2 years at home  - Continue supportive care  - ok stop ivf  - Negative UA for UTI     3.Advanced parkinsonism  - Resume Sinemet  - PT/OT evaluation  -needs TCU  -also strongly  rec Pall care for goals of care here     4.Hypertension  - Restarted home medications  - Added holding parameters to midodrine     5.Chronic kidney disease  - Stage III  - Creatinine -at baseline today 1.18  -ok stop ivf-     6.Bilateral lymphedema  - PT/OT lymphedema treatment      7.Elevated BNP  - Chest x-ray is negative for fluid overload  -echo ok EF     8.Weakness and deconditioning  - Progressive  - PTA , Patient was able to transfer himself but mainly using wheelchair.  - PT/OT evaluation  - Patient needs TCU, wife in agreement  - Pending placement     9.Hypokalemia  -replaced  -mag ok     10.Possible wheezing intermit 5/25  -wife noted  -he was feeding self, new 5/25  -I did not hear wheezing  -cxr ok  -IS  -but will also ask speech to see, at risk for aspiration with Parkinson--speech felt he was doing ok  -no further episodes    11.Constipation  -has meds  -had BM 5/25     Goals of care  -DNR  -appreciate Pall care eval     Social- suspect will go to TCU --needs place                Diet: Regular Diet Adult    DVT Prophylaxis: Heparin SQ  Negrete Catheter: Not present  Central Lines: None  Cardiac Monitoring: None  Code Status: No CPR- Do NOT Intubate      Disposition Plan   Expected Discharge: 05/26/2022     Anticipated discharge location:  Awaiting care coordination huddle  Delays:     Lab Result Pending (enter specific test & time in comments)  Specialist Consult (enter specialist & decision needed in comments)  Placement - TCU            The patient's care was discussed with the Care Coordinator/, Patient and Patient's Family.    Mary Shukla MD  Hospitalist Service  [facility]  Securely message with the Vocera Web Console (learn more here)  Text page via [employer] Paging/Directory         ______________________________________________________________________    Interval History   He was awake'  No pain  Feeding self  Had BM  He has no new complaints  Wife here  and notes just looking for TCU    Data reviewed today: I reviewed all medications, new labs and imaging results over the last 24 hours. I personally reviewed no images or EKG's today.    Physical Exam   Vital Signs: Temp: 98.5  F (36.9  C) Temp src: Oral BP: (!) 161/77 Pulse: 73   Resp: 18 SpO2: 97 % O2 Device: None (Room air)    Weight: 174 lbs 6.4 oz  Constitutional: awake, alert, cooperative and no apparent distress  Respiratory: No increased work of breathing, good air exchange, clear to auscultation bilaterally, no crackles or wheezing  Cardiovascular: Normal apical impulse, regular rate and rhythm, normal S1 and S2, no S3 or S4, and no murmur noted  GI: normal bowel sounds, soft, non-distended and non-tender  Skin: no bruising or bleeding  Musculoskeletal: no lower extremity pitting edema present  Neurologic: Mental Status Exam:  Level of Alertness:   awake  Motor Exam:  baradykinesia  Neuropsychiatric: General: normal, calm and normal eye contact  Affect: flat    Data   Recent Labs   Lab 05/26/22  0558 05/25/22  1925 05/25/22  0750 05/24/22  0823 05/24/22  0656 05/23/22  0814 05/23/22  0351 05/22/22  1308   WBC 6.2  --  6.2  --   --  7.7  --  8.3   HGB 10.0*  --  10.3*  --  11.0* 10.9*  --  12.6*   MCV 93  --  93  --   --  93  --  94     --  170  --  175 186  --  190   INR  --   --   --   --   --   --   --  1.10     --  140  --  142  --  140 143   POTASSIUM 3.9 4.1 3.4*   < > 3.4*  --  3.5 3.9   CHLORIDE 114*  --  111*  --  110*  --  106 107   CO2 25  --  29  --  26  --  27 26   BUN 34*  --  34*  --  31*  --  29* 26   CR 1.18  --  1.45*  --  1.43*  --  1.56* 1.55*   ANIONGAP 4*  --  0*  --  6  --  7 10   EMMY 8.0*  --  8.2*  --  8.3*  --  8.1* 8.9   GLC 92  --  89  --  89  --  87 163*   ALBUMIN  --   --   --   --   --   --  2.7* 3.0*   PROTTOTAL  --   --   --   --   --   --  5.0* 5.8*   BILITOTAL  --   --   --   --   --   --  0.8 0.8   ALKPHOS  --   --   --   --   --   --  53 67   ALT  --   --    --   --   --   --  <9 <9   AST  --   --   --   --   --   --  14 12    < > = values in this interval not displayed.     Recent Results (from the past 24 hour(s))   XR Chest Port 1 View    Narrative    EXAM: XR CHEST PORT 1 VIEW  LOCATION: [facility]  DATE/TIME: 5/25/2022 10:43 AM    INDICATION: 82 y o man with parkison , intermit wheezing, check for infiltrate, aspiration  COMPARISON: Chest x-ray 05/02/2022      Impression    IMPRESSION: Stable dual-lead left-sided cardiac conduction device. Minimal right basilar pulmonary opacities likely reflect atelectasis. No focal pneumonic consolidation or pleural effusion. Normal heart size. No overt vascular congestion or pulmonary   edema.

## 2022-05-26 NOTE — PROGRESS NOTES
"PRIMARY DIAGNOSIS: \"GENERIC\" NURSING  OUTPATIENT/OBSERVATION GOALS TO BE MET BEFORE DISCHARGE:  1. ADLs back to baseline: No    2. Activity and level of assistance: Up with standby assistance.    3. Pain status: Pain free.    4. Return to near baseline physical activity: No     Discharge Planner Nurse   Safe discharge environment identified: No  Barriers to discharge: Yes       Entered by: Kosta Hall RN 05/26/2022 2:20 AM   Pt has no complaints of syncope. Had high blood pressure, treated with hydralazine with effective results. No complaints of pain,sleeping well.  Please review provider order for any additional goals.   Nurse to notify provider when observation goals have been met and patient is ready for discharge.  "

## 2022-05-26 NOTE — PLAN OF CARE
"PRIMARY DIAGNOSIS: \"GENERIC\" NURSING  OUTPATIENT/OBSERVATION GOALS TO BE MET BEFORE DISCHARGE:  ADLs back to baseline: No    Activity and level of assistance: Up with maximum assistance. Consider SW and/or PT evaluation.     Pain status: Pain free.    Return to near baseline physical activity: Yes     Discharge Planner Nurse   Safe discharge environment identified: No  Barriers to discharge: Yes       Entered by: Kim Kendall RN 05/26/2022 6:16 PM     Please review provider order for any additional goals.   Nurse to notify provider when observation goals have been met and patient is ready for discharge.Goal Outcome Evaluation:                      "

## 2022-05-27 NOTE — PLAN OF CARE
"PRIMARY DIAGNOSIS: \"GENERIC\" NURSING  OUTPATIENT/OBSERVATION GOALS TO BE MET BEFORE DISCHARGE:  ADLs back to baseline: No    Activity and level of assistance: Up with maximum assistance. Consider SW and/or PT evaluation.     Pain status: Pain free.    Return to near baseline physical activity: No     Discharge Planner Nurse   Safe discharge environment identified: No  Barriers to discharge: Yes       Entered by: Geoffrey Razo RN 05/27/2022 2:05 PM     Please review provider order for any additional goals.   Nurse to notify provider when observation goals have been met and patient is ready for discharge.Goal Outcome Evaluation:      Pt. Pleasant and cooperative, denies pain, ate fair for breakfast, wife visiting and updated, to meet with hospice today.                 "

## 2022-05-27 NOTE — PROGRESS NOTES
Care Management Follow Up    Length of Stay (days): 0    Expected Discharge Date: 05/28/2022     Concerns to be Addressed:     TCU Placement  Patient plan of care discussed at interdisciplinary rounds: Yes    Anticipated Discharge Disposition: Transitional Care     Anticipated Discharge Services:    Anticipated Discharge DME:      Patient/family educated on Medicare website which has current facility and service quality ratings:    Education Provided on the Discharge Plan:    Patient/Family in Agreement with the Plan:      Referrals Placed by CM/SW:    Private pay costs discussed: Not applicable    Additional Information:  Plan is for pt to go to TCU to get stronger and then home with Home care he had an information hospice consult today that a lines with this goal for TCU and if pt does not progress in TCU he can look into hospice. CM to follow for medical progression recommendations and final discharge plans.    Ashley Lemon RN

## 2022-05-27 NOTE — PROGRESS NOTES
[facility]  Palliative Care Daily Progress Note      Summary/Recommendations:     Goals of care-TCU, informational hospice consult    Background  Met with pt, Susanne, and granddaughter Lisa. Darryl has had Parkinson's for 25 years. Susanne has been pts primary caregiver at home without additional resources or much respite (pt has been in a wheelchair x 2-3 years). Susanne has appreciate the assistance from [facility] (as well as help from their son), several times. Both Susanne and Lisa do indicate that Darryl's blood pressure can fluctuate greatly suddenly and can also impact mentation, with rapid changes, at times very lucid and at times altered. Darryl sometimes has visual hallucinations (x 1 month).   Susanne shares that at Darryl's last Neurology visit 1 month ago, it was mentioned that Yessis Parkinson's was end stage (but the details of this were not elaborated). Susanne believes Darryl understood what this meant, but have not discussed it great depth (as ? makes him sad, understandably).    Plan/goals:  At this time, current plan for TCU, as Darryl wants to get stronger. He is aware that if it is not going well at TCU, he could change his mind and can have additional conversations, re: various additional resources that could be arranged for at home to offer more support for both Darryl and Susanne including home care/PT/OT, hospice (plan for informational hospice consult, gather information), support for Susanne so she can take care of herself, family/grandchildren.  Provided spouse with app2you information for virtual caregiver support resources.   Reviewed Palliative Care not involved over the week-end. If Darryl is still at [facility] on Tuesday, [facility] for both of them.       Advanced care planning  -Previous Healthcare Directive: Spouse brought in a copy his advance directive. Spoke with LORAINE re: getting it over to  [employer] for review/uploading in chart.   -Surrogate Medical Decision Maker: Primary decision maker, spouse Susanne  -CODE STATUS: DNR/DNI     Symptom management  Headache, hx of.   -Headache improved with repositioning back in bed.   -Healing Touch and Acupuncture ordered     Psychosocial and support needs  -Appreciate Spiritual Care (Jain berto)  -Appreciate Palliative Care [facility] connecting with Susanne tomorrow for caregiver support (down the road pt might benefit too). Did mention outpatient pall clinic (if does not enroll in hospice), but have not ordered.       Case discussed with BETO FARFAN, RN, message left for hospice.       Thank you for the opportunity to participate in the care of this patient and family.      During regular M-F work hours -- if you are not sure who specifically to contact -- please contact us by calling [phone].        Palliative Care Assessment     Information gathered today from: sarah, MD, family, pt.  Canelo Cook is a 82 year old male with a history of Parkinson's, orthostatic hypotension, CAD, recurrent syncope, SA node dysfunction s/p pacemaker who presented to the ED on May 22nd with altered mental status.  Admitted to the hospital with altered mental status.   Previous hospitalizations: Several ER visits this year  Other specialities following this admission: Neurology  Recent changes: Per ER,  patient hx of Parkinson's, lives at home with his wife, hx of significant orthostatic instability with recurrent syncopal episodes (has PPM). Wife reports patient was having a bowel movement, he was not straining heavily.  He went unresponsive.  She reports he normally comes out of it after 2 hours. He did not fall.  She reports trying check his blood pressure but cannot read it.  She saw that he was breathing.  After 2 hours he was still altered and she called EMS.       Today, the patient was seen for: Goals of care     Previous Palliative Care Encounters:       Recent  Neurology Note:  Impression  #Parkinsons disease  #Autonomic instablility  #Orthostatic hypotension  #Syncope  #Headache     Mr. Cook is a 82-year-old male presenting with a spell of unresponsiveness.  He has not returned to baseline per himself and wife.  Highly suspect this was syncopal in nature.  He has a long history of nearly daily syncopal spells.  She states he was upright on the toilet for at least an hour during his unresponsive spell which can cause prolonged symptoms.  Primary team is ordered an EEG, which we will follow-up on, but I have a low suspicion that this represented a seizure.  Unfortunately, he has documented end-stage Parkinson's disease and autonomic stability can be extremely difficult to control.  Wife shows me very labile blood pressures at home.  He is already on 3 orthostatic blood pressure agents.  I do not think there is any significant change I would make to his pharmacologic regimen at this time.  However would push nonpharmacologic measures including use of the abdominal binder.  Would also check orthostatic vitals, to document.  No further work-up from neurology at this time.  I do not think patient can get an MRI due to his pacemaker, and given that he is returned to baseline I do not think is indicated currently.     Regarding his headache his neurologic exam is at baseline he is a negative head CT.  Would consider agents such as Tylenol and ibuprofen to treat.  At home he states he uses aspirin.   Recommendations  -Orthostatic vitals  -Encourage nonpharmacological measures including thigh-high compression stockings, abdominal binder.  Can loosen abdominal binder a little bit if he is having difficulty with discomfort as he was in past  -Consider ibuprofen if Tylenol not effective for headache  -We will follow results of EEG  -Neurology will sign off at this time, no further work-up from neurologic perspective, can discharge when cleared from PT/OT standpoint which is  ordered     Summary of Palliative Encounter:  I  discussed the reason for Palliative Care Referral and our role in symptom management, patient family communication and understanding their choices for medical treatment and providing  guidance in making difficult decisions in the framework of focusing on patient comfort and quality of life.    Reviewed current conditions and treatments including clinical condition/specialty evaluations recently, function, goals of care, surrogate decision maker, advance directive, support at home, new information Re advanced neurodegenerative condition. Today, with pt, spouse, and dtr, reviewed pts understanding of his neurologic condition and care wishes. He is open to TCU. He is unsure what to think about his stage of parkinson's. He is very open to more support at home. Family have very good questions re: various resources, types of home support, insurance questions.      Prognosis, Goals, and/or Advance Care Planning were addressed today: Yes, this week, have addressed.   Mood, coping, and/or meaning in the context of serious illness were addressed today: Yes     Functional Status:     Functional Status two weeks prior to hospitalization: , PPS:   40%- 1. Mainly in bed; 2. Unable to do most activity, extensive disease; 3. Mainly assistance; 4. Normal or reduced; 5. Full or drowsy +/- confusion    Functional status Current:  , PPS:   40%- 1. Totally bed bound; 2. Unable to do any activity, extensive disease; 3. Total care; 4. Normal or reduced; 5. Full or drowsy +/- confusion     Prognosis, Goals, & Planning:   Prognosis (quality & quantity): Report of end stage parkinson's disease  High risk of death within one year? Believe yes     Patient's decision making preferences: With spouse/daughter.         Capacity evaluation:    Pt Darryl does seem to have capacity to make a decision regarding his medical care as he seemed     appropriate with symptom questions today. Plan to see again  tomorrow and involve more in care.    I have concerns about the patient/family's health literacy today: No    Patient has a completed Health Care Directive: Yes    Code status: DNR/DNI     Social:     Occupation:  Former new , was very good, beloved. Is a great , has a great sense of humor. Went to [employer].    Relationships:   to Susanne x 61 years, has 3 children 6 grand children    Spirituality: Rastafari        Key Palliative Symptom Data:-Indicates is comfortable, no physical complaints. Wishes he could get more leg strength.          Review of Systems:     Besides above, an additional ROS system was not done today.           Medications:     I have reviewed this patient's medication profile and medications during this hospitalization.             Physical Exam:   Vitals were reviewed  Temp: 97  F (36.1  C) Temp src: Oral BP: 98/57 Pulse: 69   Resp: 18 SpO2: 97 % O2 Device: None (Room air)    Constitutional:   Awake, alert, cooperative, no apparent distress, and appears stated age                Data Reviewed:     Reviewed recent pertinent imaging, comments:   EXAM: XR CHEST PORT 1 VIEW  LOCATION: [facility]  DATE/TIME: 5/25/2022 10:43 AM     INDICATION: 82 y o man with parkison , intermit wheezing, check for infiltrate, aspiration  COMPARISON: Chest x-ray 05/02/2022                                                                      IMPRESSION: Stable dual-lead left-sided cardiac conduction device. Minimal right basilar pulmonary opacities likely reflect atelectasis. No focal pneumonic consolidation or pleural effusion. Normal heart size. No overt vascular congestion or pulmonary   edema.    Reviewed recent labs, comments:   Reviewed.       TTS: I have personally spent a total of 25 minutes  today on the unit in review of medical record, consultation with the medical providers and assessment of patient, with more than 50% of this time spent in  counseling, coordination of care, and discussion with in a meeting with spouse and pt re: current symptoms, plan for TCU, plan for informational hospice consult, caregiver support resources, and emotional support and review of plan of care.    TOVA Mercedes, FNP-BC, PMHNP-BC  Palliative Care Nurse Practitioner  [facility]  [phone]      During regular M-F work hours -- if you are not sure who specifically to contact -- please contact us by calling [phone].

## 2022-05-27 NOTE — PLAN OF CARE
"PRIMARY DIAGNOSIS: \"GENERIC\" NURSING  OUTPATIENT/OBSERVATION GOALS TO BE MET BEFORE DISCHARGE:  ADLs back to baseline: No    Activity and level of assistance: Up with maximum assistance. Consider SW and/or PT evaluation.     Pain status: Pain free.    Return to near baseline physical activity: No     Discharge Planner Nurse   Safe discharge environment identified: Yes  Barriers to discharge: Yes       Entered by: Geoffrey Razo RN 05/27/2022 5:37 PM     Please review provider order for any additional goals.   Nurse to notify provider when observation goals have been met and patient is ready for discharge.Goal Outcome Evaluation:      Pt. Remains pleasant and cooperative, wife present at bedside, denies pain, eating dinner, will cont to monitor.                 "

## 2022-05-27 NOTE — PROGRESS NOTES
"PRIMARY DIAGNOSIS: \"GENERIC\" NURSING  OUTPATIENT/OBSERVATION GOALS TO BE MET BEFORE DISCHARGE:  1. ADLs back to baseline: No    2. Activity and level of assistance: Up with maximum assistance. Consider SW and/or PT evaluation.     3. Pain status: Pain free.    4. Return to near baseline physical activity: No     Discharge Planner Nurse   Safe discharge environment identified: No  Barriers to discharge: Yes       Entered by: Kosta Hall RN 05/26/2022 10:57 PM      Pt had not urinated for the shift so was bladder scanned and there had been around 250 in the bladder. Pt was urged to urinate and was successful having 150 out. No complaints of syncope and no complaints of pain.  Please review provider order for any additional goals.   Nurse to notify provider when observation goals have been met and patient is ready for discharge.  "

## 2022-05-27 NOTE — PROGRESS NOTES
Daily Progress Note        CODE STATUS:  No CPR- Do NOT Intubate    05/27/22  Assessment/Plan:  82 year old male with a past medical history of end stage Parkinson's, orthostatic hypotension, CAD, recurrent syncope, SA node dysfunction s/p pacemaker placement who presents to the ED for evaluation of syncope.     Recurrent syncope  - Multifactorial, dehydration, advanced Parkinson and orthostatic bp  - CT head is negative for significant acute changes  - Patient has a history of orthostatic with similar presentation in the past.  - Unremarkable pacemaker interrogation in the ER  - Echo 5/23:  Left ventricular size, wall motion and function are normal. The ejection  fraction is > 65%.  Normal right ventricle size and systolic function.  There is moderate to severe eccentric left ventricular hypertrophy.  No obvious valvular disease.  - Unremarkable carotid ultrasound for significant stenosis  - Neurology consult, appreciate input   - EEG:  Abnormal EEG due to diffusely slow background in theta and delta range that suggests a nonspecific generalized cerebral dysfunction.  Such slowing can be seen in metabolic or toxic abnormalities, clinical correlation is recommended.  No sharp discharges or spikes were recorded during this recording to suggest a seizure disorder.   - Continue midodrine  - PT/OT evaluate--needs TCU waiting for placement     Acute encephalopathy--improving   - Metabolic, baseline cognitive impairment  - Patient has intermittent hallucinations and as per wife   - Unable to do MRI brain because of pacemaker  - Negative CT head for significant acute changes  - Improving but not completely back to baseline as per wife as he still  Lethargic--wife notes wheel chair bound x last 2 years at home  - Continue supportive care  - Negative UA for UTI     Advanced parkinsonism  - Resumed Sinemet  - PT/OT evaluation  - Needs TCU  - Also strongly rec Pall care for goals of care here. Outpatient hospice consult placed  for information purpose. Family doesn't feel ready for hospice yet.     Hypertension  - Restarted home medications  - Added holding parameters to midodrine     Chronic kidney disease  - Stage III  - Creatinine -at baseline today 1.18     Bilateral lymphedema  - PT/OT lymphedema treatment      Elevated BNP  - Chest x-ray is negative for fluid overload     Weakness and deconditioning  - Progressive  - PTA , Patient was able to transfer himself but mainly using wheelchair.  - PT/OT evaluation  - Patient needs TCU, wife in agreement  - Pending placement     Hypokalemia  - Replaced  - Mag ok     Constipation  - Cont with bowel meds     Goals of care  -DNR  -Appreciate Pall care eval     Social- suspect will go to TCU --needs place     Diet: Regular Diet Adult    DVT Prophylaxis: Heparin SQ    Subjective:  Interval History: Patient seen and examined. Notes, labs, imaging reports personally reviewed. Patient is new to me. Chart reviewed. No new issues reported overnight.     Review of Systems:   As mentioned in subjective.    Patient Active Problem List   Diagnosis     Altered mental status, unspecified altered mental status type     Syncope, unspecified syncope type       Scheduled Meds:    aspirin  81 mg Oral Daily     atorvastatin  40 mg Oral Daily     carbidopa-levodopa  2 tablet Oral 4x Daily     cyanocobalamin  1,000 mcg Oral Daily     fludrocortisone  0.1 mg Oral Daily     heparin ANTICOAGULANT  5,000 Units Subcutaneous Q8H     magnesium hydroxide  30 mL Oral Daily     magnesium oxide  400 mg Oral Daily     metoprolol succinate ER  37.5 mg Oral Daily     midodrine  10 mg Oral BID     mirtazapine  7.5 mg Oral At Bedtime     nystatin   Topical BID     pantoprazole  40 mg Oral QAM AC     pyridostigmine  30 mg Oral TID     senna-docusate  1 tablet Oral BID    Or     senna-docusate  2 tablet Oral BID     tamsulosin  0.4 mg Oral Daily     tolterodine ER  4 mg Oral Daily     vitamin  1 tablet Oral Daily     Continuous  Infusions:  PRN Meds:.acetaminophen **OR** acetaminophen, azelastine, bisacodyl, hydrALAZINE, magnesium citrate, melatonin, ondansetron **OR** ondansetron    Objective:  Vital signs in last 24 hours:  Temp:  [97  F (36.1  C)-98.2  F (36.8  C)] 97  F (36.1  C)  Pulse:  [65-78] 69  Resp:  [16-18] 18  BP: (118-192)/(68-96) 190/94  SpO2:  [95 %-98 %] 97 %        Intake/Output Summary (Last 24 hours) at 5/27/2022 0835  Last data filed at 5/26/2022 2100  Gross per 24 hour   Intake 720 ml   Output 325 ml   Net 395 ml       Physical Exam:    General: Not in obvious distress. Flat affect  HEENT: NC, AT   Chest: Clear to auscultation bilaterally  Heart: S1S2 normal, regular. No M/R/G  Abdomen: Soft. NT, ND. Bowel sounds- active.  Extremities: No legs swelling  Neuro: Awake, grossly non-focal. Some bradykinesia noted.      Lab Results:(I have personally reviewed the results)    Recent Results (from the past 24 hour(s))   Basic metabolic panel    Collection Time: 05/27/22  6:36 AM   Result Value Ref Range    Sodium 141 136 - 145 mmol/L    Potassium 3.8 3.5 - 5.0 mmol/L    Chloride 113 (H) 98 - 107 mmol/L    Carbon Dioxide (CO2) 25 22 - 31 mmol/L    Anion Gap 3 (L) 5 - 18 mmol/L    Urea Nitrogen 32 (H) 8 - 28 mg/dL    Creatinine 1.08 0.70 - 1.30 mg/dL    Calcium 8.3 (L) 8.5 - 10.5 mg/dL    Glucose 82 70 - 125 mg/dL    GFR Estimate 69 >60 mL/min/1.73m2   Magnesium    Collection Time: 05/27/22  6:36 AM   Result Value Ref Range    Magnesium 1.8 1.8 - 2.6 mg/dL   CBC with platelets    Collection Time: 05/27/22  6:36 AM   Result Value Ref Range    WBC Count 5.7 4.0 - 11.0 10e3/uL    RBC Count 3.35 (L) 4.40 - 5.90 10e6/uL    Hemoglobin 10.3 (L) 13.3 - 17.7 g/dL    Hematocrit 31.4 (L) 40.0 - 53.0 %    MCV 94 78 - 100 fL    MCH 30.7 26.5 - 33.0 pg    MCHC 32.8 31.5 - 36.5 g/dL    RDW 14.6 10.0 - 15.0 %    Platelet Count 170 150 - 450 10e3/uL       All laboratory and imaging data in the past 24 hours reviewed  Serum Glucose range:    Recent Labs   Lab 22  0636 22  0558 22  0750 22  0656   GLC 82 92 89 89     ABG: No lab results found in last 7 days.  CBC:   Recent Labs   Lab 22  0636 22  0558 22  0750 22  0656 22  0814 22  1308   WBC 5.7 6.2 6.2  --  7.7 8.3   HGB 10.3* 10.0* 10.3*   < > 10.9* 12.6*   HCT 31.4* 31.1* 32.0*  --  33.0* 39.0*   MCV 94 93 93  --  93 94    171 170   < > 186 190   NEUTROPHIL  --   --   --   --  74 76   LYMPH  --   --   --   --  15 14   MONOCYTE  --   --   --   --  9 6   EOSINOPHIL  --   --   --   --  1 2    < > = values in this interval not displayed.     Chemistry:   Recent Labs   Lab 22  0636 22  0558 22  1925 22  0750 22  0656 22  0351 22  1308    143  --  140   < > 140 143   POTASSIUM 3.8 3.9 4.1 3.4*   < > 3.5 3.9   CHLORIDE 113* 114*  --  111*   < > 106 107   CO2 25 25  --  29   < > 27 26   BUN 32* 34*  --  34*   < > 29* 26   CR 1.08 1.18  --  1.45*   < > 1.56* 1.55*   GFRESTIMATED 69 62  --  48*   < > 44* 45*   EMMY 8.3* 8.0*  --  8.2*   < > 8.1* 8.9   MAG 1.8 1.8  --  1.8   < > 1.8  --    PROTTOTAL  --   --   --   --   --  5.0* 5.8*   ALBUMIN  --   --   --   --   --  2.7* 3.0*   AST  --   --   --   --   --  14 12   ALT  --   --   --   --   --  <9 <9   ALKPHOS  --   --   --   --   --  53 67   BILITOTAL  --   --   --   --   --  0.8 0.8    < > = values in this interval not displayed.     Coags:  Recent Labs   Lab 22  1308   INR 1.10     Cardiac Markers:  Recent Labs   Lab 22  1308   TROPONINI 0.02          Echocardiogram Complete    Result Date: 2022  [id number]^[address] [address]  Name: LUPE HUFFMAN MRN: [id number] : 1940 Study Date: 2022 10:01 AM Age: 82 yrs Gender: Male Patient Location: [facility] Reason For Study: Syncope Ordering Physician: BUTCH MURRAY Performed By: NEETU  HR: 72  ______________________________________________________________________________ Procedure Complete Echo Adult. ______________________________________________________________________________ Interpretation Summary  Left ventricular size, wall motion and function are normal. The ejection fraction is > 65%. Normal right ventricle size and systolic function. There is moderate to severe eccentric left ventricular hypertrophy. No obvious valvular disease.  No previous study for comparison.  ______________________________________________________________________________ I      WMSI = 1.00     % Normal = 100  X - Cannot   0 -                      (2) - Mildly 2 -          Segments  Size Interpret    Hyperkinetic 1 - Normal  Hypokinetic  Hypokinetic  1-2     small                                                    7 -          3-5    moderate 3 - Akinetic 4 -          5 -         6 - Akinetic Dyskinetic   6-14    large              Dyskinetic   Aneurysmal  w/scar       w/scar       15-16   diffuse  Left Ventricle Left ventricular size, wall motion and function are normal. The ejection fraction is > 65%. There is moderate to severe eccentric left ventricular hypertrophy. The visual ejection fraction is >70%. Left ventricular diastolic function is indeterminate. Normal left ventricular wall motion.  Right Ventricle Normal right ventricle size and systolic function.  Atria The left atrium is mildly dilated. Right atrial size is normal. There is no atrial shunt seen.  Mitral Valve The mitral valve leaflets appear thickened, but open well. There is mild (1+) mitral regurgitation. There is no mitral valve stenosis.  Tricuspid Valve Tricuspid valve leaflets appear normal. There is no evidence of tricuspid stenosis or clinically significant tricuspid regurgitation.  Aortic Valve Aortic valve leaflets appear normal. There is no evidence of aortic stenosis or clinically significant aortic regurgitation.  Pulmonic Valve The pulmonic  valve is normal in structure and function.  Vessels The aorta root is normal. IVC diameter <2.1 cm collapsing >50% with sniff suggests a normal RA pressure of 3 mmHg.  Pericardium There is no pericardial effusion. ______________________________________________________________________________ MMode/2D Measurements & Calculations IVSd: 1.8 cm LVIDd: 4.3 cm LVIDs: 3.4 cm LVPWd: 1.5 cm FS: 20.9 % LV mass(C)d: 285.4 grams Ao root diam: 3.8 cm LA dimension: 4.0 cm asc Aorta Diam: 3.5 cm LA/Ao: 1.1 LVOT diam: 2.5 cm LVOT area: 4.8 cm2 RWT: 0.68  Doppler Measurements & Calculations MV E max ganesh: 82.6 cm/sec MV A max ganesh: 70.4 cm/sec MV E/A: 1.2 MV max PG: 3.3 mmHg MV mean P.6 mmHg MV V2 VTI: 30.5 cm MVA(VTI): 3.9 cm2 MV dec slope: 411.7 cm/sec2 MV dec time: 0.20 sec Ao V2 max: 172.4 cm/sec Ao max P.0 mmHg Ao V2 mean: 111.3 cm/sec Ao mean P.8 mmHg Ao V2 VTI: 32.5 cm JAE(I,D): 3.7 cm2 JAE(V,D): 3.1 cm2 LV V1 max P.0 mmHg LV V1 max: 111.4 cm/sec LV V1 VTI: 25.1 cm SV(LVOT): 120.1 ml PA acc time: 0.14 sec AV Ganesh Ratio (DI): 0.65 E/E' av.0 Lateral E/e': 13.8 Medial E/e': 12.2  ______________________________________________________________________________ Report approved by: Chilo Pete 2022 04:04 PM       US Carotid Bilateral    Result Date: 2022  EXAM: US CAROTID BILATERAL LOCATION: [facility] DATE/TIME: 2022 5:20 PM INDICATION: Syncope COMPARISON: None. TECHNIQUE: Duplex exam performed utilizing 2D gray-scale imaging, Doppler interrogation with color-flow and spectral waveform analysis. The percent diameter stenosis is determined using NASCET criteria and Society of Radiologists in Ultrasound Consensus Criteria. FINDINGS: RIGHT: Mild plaque at the bifurcation. The peak systolic velocity in the ICA is less than 125 cm/sec, consistent with less than 50% stenosis. Normal velocities in the ECA. Antegrade flow within the vertebral artery. LEFT: Mild plaque at the  bifurcation. The peak systolic velocity in the ICA is less than 125 cm/sec, consistent with less than 50% stenosis. Normal velocities in the ECA. Antegrade flow within the vertebral artery. VELOCITY CHART: CCA   Right: 30/8 cm/s   Left: 49/13 cm/s ICA   Right: 70/22 cm/s   Left: 78/12 cm/s ECA   Right: 106/19 cm/s   Left: 49/20 cm/s ICA/CCA PSV Ratio   Right: 2.3   Left: 1.6     IMPRESSION: 1.  Mild plaque formation, velocities consistent with less than 50% stenosis in the right internal carotid artery. 2.  Mild plaque formation, velocities consistent with less than 50% stenosis in the left internal carotid artery. 3.  Flow within the vertebral arteries is antegrade.    XR Chest Port 1 View    Result Date: 5/25/2022  EXAM: XR CHEST PORT 1 VIEW LOCATION: [facility] DATE/TIME: 5/25/2022 10:43 AM INDICATION: 82 y o man with parkison , intermit wheezing, check for infiltrate, aspiration COMPARISON: Chest x-ray 05/02/2022     IMPRESSION: Stable dual-lead left-sided cardiac conduction device. Minimal right basilar pulmonary opacities likely reflect atelectasis. No focal pneumonic consolidation or pleural effusion. Normal heart size. No overt vascular congestion or pulmonary edema.    XR Chest Port 1 View    Result Date: 5/22/2022  EXAM: XR CHEST PORTABLE 1 VIEW LOCATION: [facility] DATE/TIME: 05/22/2022, 1:20 PM INDICATION: Syncope. COMPARISON: 05/02/2022.     IMPRESSION: Heart size is stable. Pulmonary vascularity is within normal limits. Dual-lead cardiac device left chest wall, with lead tips projected over the RA and RV. Mild infiltrate or atelectasis in the right lower lung could represent a mild pneumonitis. The lung apices are partially obscured by the patient's head related to kyphosis. Old left eighth rib fracture.     Head CT w/o contrast    Result Date: 5/22/2022  EXAM: CT HEAD WITHOUT CONTRAST LOCATION: [facility] DATE/TIME:  2022, 1:27 PM INDICATION: Cerebral hemorrhage suspected. Syncope. COMPARISON: CT brain 11/15/2021. TECHNIQUE: Routine CT Head without IV contrast. Multiplanar reformats. Dose reduction techniques were used. FINDINGS: INTRACRANIAL CONTENTS: Exam is moderately degraded by motion artifact despite reacquisitions. No convincing finding for intracranial hemorrhage, mass, or acute infarct. Moderate prominence of the lateral and third ventricles and sulci. Mild presumed sequelae of chronic microvascular ischemic change. VISUALIZED ORBITS/SINUSES/MASTOIDS: Prior cataract surgeries. Paranasal sinuses, middle ear cavities, and mastoid air cells are clear. BONES/SOFT TISSUES: Calvarium is intact, without fracture or suspicious lytic or blastic foci. No scalp hematoma.     IMPRESSION: 1.  Exam is moderately degraded by motion artifact despite reacquisitions. 2.  No finding for intracranial hemorrhage, mass, or acute infarct. 3.  Moderate volume loss and mild presumed sequelae of chronic microvascular ischemic change.     EEG Awake or Drowsy Routine    Result Date: 2022  ELECTROENCEPHALOGRAM (EEG) REPORT [facility] NEUROLOGY [address] [place] [address] Tel: [phone]  Fax: [phone] [email or web address] JOANNA Maradiaga 1940, MRN [id number] PCP: Hung Camacho Date: 2022 Principal Diagnosis: Syncope History : Patient is being evaluated for syncope. Medication listed includes no anticonvulsants Description of the Recording:  This is a multichannel digital EEG recording done using the international 10-20 placement system. Background of the recording consists of an irregular 6-7 hz activity with an amplitude of 20-30  V.  In addition, occasionally there is 3-4 hz activity with similar amplitude.  This diffusely slow background attenuates with alerting procedures.  Photic stimulation performed with eyes open, according to the standard protocol, minimal change.   Hyperventilation not performed.  Sleep not obtained. During this recording, no sharp discharges, spikes or electrographic seizure activity was recorded. Impression: This is an abnormal EEG due to diffusely slow background in theta and delta range that suggests a nonspecific generalized cerebral dysfunction.  Such slowing can be seen in metabolic or toxic abnormalities, clinical correlation is recommended.  No sharp discharges or spikes were recorded during this recording to suggest a seizure disorder. Classification: Dysrhythmia grade II generalized                          Delta grade I generalized Giovanni Phelps MD [facility] This note was dictated using voice recognition software.  Any grammatical or context distortions are unintentional and inherent to the software.       Latest radiology report personally reviewed.    Note created using dragon voice recognition software so sounds alike errors may have escaped editing.      05/27/2022   Wing Watson MD  Hospitalist, [facility]  Pager: [phone]

## 2022-05-27 NOTE — CONSULTS
[facility]    Consult Note - [facility]    ___________________________________    Plan of Care Discussed with the Following:   - Nurse: Geoffrey WRIGHT      - Canelo's Family/Preferred Contact: spouse Susanne      Summary of Visit (includes assessment, medications and any new orders):  Met with spouse, Susanne, and Sonam KIRBY with  Hospice.  Informational meeting only.  Covered Hospice benefit/philosophy and eligibility.  Per spouse, plan is for TCU to rehab and then home with additional Home Health/therapy.   phone number provided for any additional questions.      Thank you for the referral      Trudi Turk RN    [phone]

## 2022-05-27 NOTE — PROGRESS NOTES
"PRIMARY DIAGNOSIS: \"GENERIC\" NURSING  OUTPATIENT/OBSERVATION GOALS TO BE MET BEFORE DISCHARGE:  1. ADLs back to baseline: No    2. Activity and level of assistance: Up with maximum assistance. Consider SW and/or PT evaluation.     3. Pain status: Pain free.    4. Return to near baseline physical activity: No     Discharge Planner Nurse   Safe discharge environment identified: No  Barriers to discharge: Yes       Entered by: Kosta Hall RN 05/27/2022 7:40 AM      Pt had a high BP, when writer was made aware writer rechecked and it was within normal limits so no treatment was required.  Please review provider order for any additional goals.   Nurse to notify provider when observation goals have been met and patient is ready for discharge.  "

## 2022-05-27 NOTE — PLAN OF CARE
" PRIMARY DIAGNOSIS: \"GENERIC\" NURSING  OUTPATIENT/OBSERVATION GOALS TO BE MET BEFORE DISCHARGE:  ADLs back to baseline: No    Activity and level of assistance: Up with maximum assistance. Consider SW and/or PT evaluation.     Pain status: Pain free.    Return to near baseline physical activity: No     Discharge Planner Nurse   Safe discharge environment identified: No  Barriers to discharge: Yes       Entered by: Geoffrey Razo RN 05/27/2022 2:08 PM     Please review provider order for any additional goals.   Nurse to notify provider when observation goals have been met and patient is ready for discharge.Goal Outcome Evaluation:      Pt. Up to chair for lunch, ate well, denies pain, hospice met with wife and pt. In room, (see hospice note), pleasant and cooperative, will cont to monitor.                 "

## 2022-05-27 NOTE — PROGRESS NOTES
"PRIMARY DIAGNOSIS: \"GENERIC\" NURSING  OUTPATIENT/OBSERVATION GOALS TO BE MET BEFORE DISCHARGE:  1. ADLs back to baseline: No    2. Activity and level of assistance: Up with maximum assistance. Consider SW and/or PT evaluation.     3. Pain status: Pain free.    4. Return to near baseline physical activity: No     Discharge Planner Nurse   Safe discharge environment identified: No  Barriers to discharge: Yes       Entered by: Kosta Hall RN 05/27/2022 7:39 AM      Please review provider order for any additional goals.   Nurse to notify provider when observation goals have been met and patient is ready for discharge.  "

## 2022-05-28 PROBLEM — G20.A1 PARKINSON'S DISEASE (H): Status: ACTIVE | Noted: 2022-01-01

## 2022-05-28 PROBLEM — N17.9 AKI (ACUTE KIDNEY INJURY) (H): Status: ACTIVE | Noted: 2022-01-01

## 2022-05-28 PROBLEM — N18.30 CKD (CHRONIC KIDNEY DISEASE) STAGE 3, GFR 30-59 ML/MIN (H): Status: ACTIVE | Noted: 2022-01-01

## 2022-05-28 PROBLEM — E87.6 HYPOKALEMIA: Status: ACTIVE | Noted: 2022-01-01

## 2022-05-28 PROBLEM — R30.0 DYSURIA: Status: ACTIVE | Noted: 2022-01-01

## 2022-05-28 PROBLEM — G90.9 AUTONOMIC INSTABILITY: Status: ACTIVE | Noted: 2022-01-01

## 2022-05-28 PROBLEM — K59.00 CONSTIPATION: Status: ACTIVE | Noted: 2022-01-01

## 2022-05-28 PROBLEM — R62.7 ADULT FAILURE TO THRIVE: Status: ACTIVE | Noted: 2022-01-01

## 2022-05-28 PROBLEM — I10 BENIGN ESSENTIAL HYPERTENSION: Status: ACTIVE | Noted: 2022-01-01

## 2022-05-28 PROBLEM — E78.5 HLD (HYPERLIPIDEMIA): Status: ACTIVE | Noted: 2022-01-01

## 2022-05-28 PROBLEM — K21.9 GASTROESOPHAGEAL REFLUX DISEASE WITHOUT ESOPHAGITIS: Status: ACTIVE | Noted: 2022-01-01

## 2022-05-28 PROBLEM — G93.41 METABOLIC ENCEPHALOPATHY: Status: ACTIVE | Noted: 2022-01-01

## 2022-05-28 PROBLEM — D64.9 NORMOCYTIC ANEMIA: Status: ACTIVE | Noted: 2022-01-01

## 2022-05-28 NOTE — PROGRESS NOTES
"PRIMARY DIAGNOSIS: \"GENERIC\" NURSING  OUTPATIENT/OBSERVATION GOALS TO BE MET BEFORE DISCHARGE:  1.  ADLs back to baseline: No  Had a BM today.  2. Activity and level of assistance: Bed rest. Assist of two w Ko.    3. Pain status: Pain free.    4. Return to near baseline physical activity: No Was wheelchair bound and wife assisted with transfers. Pt is assist of two with ko.      Discharge Planner Nurse   Safe discharge environment identified: Yes  Barriers to discharge: Yes       Entered by: Gina Faith RN 05/28/2022 2:38 PM       "

## 2022-05-28 NOTE — PLAN OF CARE
Spoke with HO regarding pt's elevated BP. PRN parameters for >190, 5 mg hydralazine PO given, recheck of bp is still high: Blood pressure (!) 201/107, pulse 66, temperature 97.9  F (36.6  C), temperature source Oral, resp. rate 18, height 1.829 m (6'), weight 79.1 kg (174 lb 6.4 oz), SpO2 97 %.  Dr. Aleman placed an order for another one-time dose of hydralazine 5 mg PO.     Relayed to Dr. Aleman that pt was alert and oriented x4, not oriented x3, started to grab at air. Neuro is intact. Spot check of blood sugar: 85. Will continue to monitor.

## 2022-05-28 NOTE — PLAN OF CARE
Problem: Hypertension Comorbidity  Goal: Blood Pressure in Desired Range  Outcome: Ongoing, Progressing   Goal Outcome Evaluation:    Patient's blood pressure fluctuated from the 200s to 130s back to 190s, gave hydralizine PRN. Denies pain or nausea. Complains of urinary urgency. Order for a UA, cup in room. Wife aware of need for a catch.

## 2022-05-28 NOTE — PROVIDER NOTIFICATION
Pt observation status and does not have PIV. Paged Jonathan if needing PIV inserted.    - spoke to Dr. Castillo. Okay without PIV.

## 2022-05-28 NOTE — PROGRESS NOTES
"PRIMARY DIAGNOSIS: \"GENERIC\" NURSING  OUTPATIENT/OBSERVATION GOALS TO BE MET BEFORE DISCHARGE:  1.  ADLs back to baseline: No  Had a BM today.  2. Activity and level of assistance: Bed rest. Assist of two w Ko.    3. Pain status: Pain free.    4. Return to near baseline physical activity: No Was wheelchair bound and wife assisted with transfers. Pt is assist of two with ko.      Discharge Planner Nurse   Safe discharge environment identified: Yes  Barriers to discharge: Yes       Entered by: Gina Faith RN 05/28/2022 2:38 PM     Please review provider order for any additional goals.   Nurse to notify provider when observation goals have been met and patient is ready for discharge.  "

## 2022-05-28 NOTE — PROGRESS NOTES
"PRIMARY DIAGNOSIS: \"GENERIC\" NURSING  OUTPATIENT/OBSERVATION GOALS TO BE MET BEFORE DISCHARGE:  1. ADLs back to baseline: No, assist of 1. Staff washing dentures and helping with toileting needs.     2. Activity and level of assistance: Bedrest. Assist of two with chelly.    3. Pain status: Pain free.    4. Return to near baseline physical activity: No, was wheelchair bound and wife assisted with transfers. Pt is assist of two with chelly. Stated has bot been able to pass gas for since admission. Very constipated. Mg citrate PRN given. Active bowel sounds. q2 hour reposition.      Discharge Planner Nurse   Safe discharge environment identified: Yes  Barriers to discharge: No       Entered by: Anabell Pena RN 05/27/2022 9:46 PM     Please review provider order for any additional goals.   Nurse to notify provider when observation goals have been met and patient is ready for discharge.    "

## 2022-05-28 NOTE — PROGRESS NOTES
Assessment & Plan   82-year-old male with end-stage Parkinson's, HTN, CKD 3 who presented with recurrent syncope, encephalopathy, generalized failure to thrive all secondary to Parkinson's and necessary medications.    Active Problems:    Altered mental status, unspecified altered mental status type    Syncope, unspecified syncope type    Metabolic encephalopathy    Parkinson's disease (H)    Adult failure to thrive    Dysuria    SYDNEY (acute kidney injury) (H)    CKD (chronic kidney disease) stage 3, GFR 30-59 ml/min (H)    HLD (hyperlipidemia)    Benign essential hypertension    Autonomic instability    Gastroesophageal reflux disease without esophagitis    Constipation    Hypokalemia    Normocytic anemia    Present on Admission:    Altered mental status, unspecified altered mental status type    Syncope, unspecified syncope type      Recurrent syncope  -Patient initially presented for evaluation of recurrent syncope, appear to be vasovagal in etiology.  Recurrent syncope thought to be multifactorial secondary to advanced Parkinson's, orthostatic hypotension, dehydration.  CT head on admission negative for any acute intracranial processes, masses, or bleed.  Pacemaker interrogated in the ER without any remarkable irregularities.  Carotid ultrasound 5/22 unremarkable, TTE 5/22 ejection fraction 65% with moderate to severe LVH but no obvious valvular disease.  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  Most of these episodes are related to bowel movements or eating.  -Fludrocortisone 0.1 mg p.o. daily  -Midodrine 10 mg p.o. twice daily with hold parameters  -No longer on telemetry  -Focus on maintaining appropriate blood pressure  -PT/OT recommending TCU  -Neurology following, appreciate recommendations    Acute metabolic encephalopathy  -Patient with prolonged episode of unresponsiveness during syncopal  episode prior to admission as well as intermittent hallucinations described by patient's wife..  CT head negative, EEG 5/23 abnormal with diffuse slow background which is nonspecific for generalized cerebral dysfunction but negative for seizure activity.  Unable to perform MRI brain secondary to pacemaker placement.  No signs of infection, urinalysis appears uninfected.  -Delirium protocol    End-stage Parkinson's disease, adult failure to thrive  -Patient with severe and end-stage parkinsonism with severe autonomic instability.  Patient currently chair/bedbound.  Patient and family have met with palliative care as well as hospice, not ready for hospice at this time.  -Continue home Sinemet 4 times daily  -Pyridostigmine 30 mg p.o. 3 times daily  -Tolterodine 4 mg p.o. daily  -PT/OT recommending TCU  -Mirtazapine 7.5 mg p.o. daily    Dysuria  -Patient endorses urinary urgency and dysuria on 5/28  -Check urinalysis    SYDNEY on CKD 3  -Baseline creatinine approximately 1.1-1.2, natasha to 1.55 at time of admission.  Resolved with IVF.    HLD  -ASA 81 mg p.o. daily  -Atorvastatin 40 mg p.o. daily    HTN  -Patient can be extremely hypertensive and then swing drastically to hypotensive secondary to autonomic instability.  At this time as he remains grossly asymptomatic during times of hypertension would avoid treating aggressively.  Did discuss with patient and his wife and they agree with only as needed blood pressure medications outside of his beta-blocker.  -Metoprolol XL 37.5 mg p.o. daily  -Hydralazine as needed    GERD  -Pantoprazole 40 mg p.o. daily    Bilateral lower extremity edema  -Lymphedema wraps    Constipation  -Docusate senna twice daily  -Start MiraLAX p.o. daily    Hypokalemia-resolved    Normocytic anemia  -Hemoglobin 10.3 with MCV of 94.     Clinically Significant Risk Factors Present on Admission                # Platelet Defect: home medication list includes an antiplatelet medication          Electrolytes: Currently within normal limits  Fluids: P.o.  Diet: Regular  VTE prophylaxis: heparin SQ      COVID19 symptom check 5/28/2022  Fevers/Chills/myalgias: NEGATIVE TO ALL  Sick contacts/COVID19 exposure: NEGATIVE TO ALL  Cough/trouble breathing/SOB/sore throat: NEGATIVE TO ALL  Diarrhea: NEGATIVE    Expected Discharge: 05/28/2022     Anticipated discharge location:  Awaiting care coordination huddle  Delays:     Lab Result Pending (enter specific test & time in comments)  Specialist Consult (enter specialist & decision needed in comments)  Placement - TCU           Subjective  Cc: Weakness and confusion    Pt is new to be today.  Personally conducted extensive chart review prior to visit including labs, imaging, past provider notes and interventions.  Patient endorses he feels relatively well today.  Was able to have a soft bowel movement and he had been feeling constipated prior to that.  Patient notes that his blood pressures were rather higher overnight but denies any headaches, dizziness, chest pain, shortness of breath or other symptoms during that time.  Patient's wife at bedside states that his blood pressure can get very high at home and drop very quickly.  Patient did note that the past day or 2 he has felt like he has had some burning with urination and his urge to urinate and has increased.    Review of Systems: History obtained from the patient  General ROS: negative  for  - chills, fatigue, fever  ENT ROS: negative for - headaches, hearing change, nasal congestion, nasal discharge  Hematological and Lymphatic ROS: negative for - bleeding problems, blood clots, bruising  Respiratory ROS: negative for - cough, pleuritic pain, shortness of breath, sputum changes  Cardiovascular ROS: negative for - chest pain, dyspnea on exertion, edema  Gastrointestinal ROS: negative for - abdominal pain, constipation, diarrhea, and nausea/vomiting  Genito-Urinary ROS: Positive for - change in urinary  stream, dysuriaMusculoskeletal ROS: Positive for generalized weakness  Neurological ROS: negative for - behavioral changes, confusion, dizziness, numbness/tingling   Dermatological ROS: negative for pruritus, rash    Objective    Physical Examination:   General appearance - alert, well appearing, and in no distress and oriented to person, place, and time  Mental status - alert, oriented to person, place, and time, flat affect but pleasant mood, normal behavior, tremulous but fluent and comprehensible speech, dress, resting tremor noted as well as pill-rolling activity of right upper extremity  HEENT - sclera anicteric, left eye normal, right eye normal, nares normal and patent  Lymphatics - no palpable lymphadenopathy, no hepatosplenomegaly  Respiratory - no tachypnea, retractions or cyanosis  Abdomen - soft, nontender, nondistended, no masses or organomegaly no rebound tenderness noted bowel sounds normal, no bladder distension noted  Neurological - alert, oriented, speech is delayed and somewhat tremulous, visible pill-rolling of the right upper extremity as well as upper and lower extremity resting tremors  Musculoskeletal -kyphotic curve to the thoracic and cervical spine  Extremities - peripheral pulses normal, +1 bilateral lower extremity pitting edema, no clubbing or cyanosis  Skin - normal coloration and turgor, no rashes, no suspicious skin lesions noted    Temp:  [97.6  F (36.4  C)-98.1  F (36.7  C)] 98.1  F (36.7  C)  Pulse:  [66-83] 83  Resp:  [17-18] 17  BP: (105-216)/() 192/96  SpO2:  [96 %-99 %] 96 %      Intake/Output Summary (Last 24 hours) at 5/27/2022 1930  Last data filed at 5/27/2022 1200  Gross per 24 hour   Intake 240 ml   Output 150 ml   Net 90 ml       Results    Recent Results (from the past 24 hour(s))   Glucose by meter    Collection Time: 05/28/22  4:24 AM   Result Value Ref Range    GLUCOSE BY METER POCT 85 70 - 99 mg/dL   Basic metabolic panel    Collection Time: 05/28/22  7:42  AM   Result Value Ref Range    Sodium 141 136 - 145 mmol/L    Potassium 3.7 3.5 - 5.0 mmol/L    Chloride 109 (H) 98 - 107 mmol/L    Carbon Dioxide (CO2) 27 22 - 31 mmol/L    Anion Gap 5 5 - 18 mmol/L    Urea Nitrogen 29 (H) 8 - 28 mg/dL    Creatinine 1.04 0.70 - 1.30 mg/dL    Calcium 8.5 8.5 - 10.5 mg/dL    Glucose 85 70 - 125 mg/dL    GFR Estimate 72 >60 mL/min/1.73m2   Magnesium    Collection Time: 05/28/22  7:42 AM   Result Value Ref Range    Magnesium 2.1 1.8 - 2.6 mg/dL   CBC with platelets    Collection Time: 05/28/22  7:42 AM   Result Value Ref Range    WBC Count 6.5 4.0 - 11.0 10e3/uL    RBC Count 3.53 (L) 4.40 - 5.90 10e6/uL    Hemoglobin 10.6 (L) 13.3 - 17.7 g/dL    Hematocrit 33.0 (L) 40.0 - 53.0 %    MCV 94 78 - 100 fL    MCH 30.0 26.5 - 33.0 pg    MCHC 32.1 31.5 - 36.5 g/dL    RDW 14.6 10.0 - 15.0 %    Platelet Count 187 150 - 450 10e3/uL          Lab Results personally reviewed 5/28  Imaging Results personally reviewed 5/28  Discussed with patient and his wife  Reviewed old records     Advanced Care Planning  Discharge Planning discussed with patient and family  Discussed care with patient and family for 35 minutes with greater than 50% of time spent in counseling and coordination of care.    Updated patient's wife Susanne at bedside on 5/28    Eneida Castillo DO  Hospitalist Service  [facility]  Text page via [facility] Paging/Directory     This note was created using dragon dictation, any spelling and grammatical errors are unintentional.

## 2022-05-29 NOTE — PROGRESS NOTES
Pt slept well. Denies pain. Two person assist with repositions, incontinent urine. Per wife pt is back to baseline which is w/c bound, pivot transfers. Waiting for TCU placement. Pt's BP elevated 204/96, asymptomatic. PRN hydralazine given, re-check 182/93.     PRIMARY DIAGNOSIS: Altered mental status, syncope  OUTPATIENT/OBSERVATION GOALS TO BE MET BEFORE DISCHARGE:  1. ADLs back to baseline: Yes    2. Activity and level of assistance: Up with maximum assistance. Consider SW and/or PT evaluation.     3. Pain status: Pain free.    4. Return to near baseline physical activity: Yes     Discharge Planner Nurse   Safe discharge environment identified: Yes  Barriers to discharge: Yes- tcu placement       Entered by: Génesis Belcher RN 05/29/2022 4:17 AM     Please review provider order for any additional goals.   Nurse to notify provider when observation goals have been met and patient is ready for discharge.

## 2022-05-29 NOTE — PROGRESS NOTES
Care Management Follow Up    Length of Stay (days): 0    Expected Discharge Date: 05/30/2022     Concerns to be Addressed: placement TCU      Patient plan of care discussed at interdisciplinary rounds: Yes    Anticipated Discharge Disposition: Transitional Care     Anticipated Discharge Services:  TCU  Anticipated Discharge DME:  TBD  Patient/family educated on Medicare website which has current facility and service quality ratings:  yes  Education Provided on the Discharge Plan:  n/a  Patient/Family in Agreement with the Plan:  n/a    Referrals Placed by CM/SW:  TCU  Private pay costs discussed: Not applicable    Additional Information:  Plan if for pt to get stronger and back to his baseline so he can go home with home care, informational hospice consult Friday 5/27 and looks like goal is TCU and if he doesn't progress they can later think about out patient hospice. CM to follow for medical progression recommendations and final discharge plan.      Ashley Lemon RN

## 2022-05-29 NOTE — PROGRESS NOTES
PRIMARY DIAGNOSIS: Altered mental status  OUTPATIENT/OBSERVATION GOALS TO BE MET BEFORE DISCHARGE:  1. ADLs back to baseline: Yes    2. Activity and level of assistance: Up with maximum assistance. Consider SW and/or PT evaluation.     3. Pain status: Pain free.    4. Return to near baseline physical activity: Yes     Discharge Planner Nurse   Safe discharge environment identified: Yes  Barriers to discharge: Yes- TCU placement       Entered by: Génesis Belcher RN 05/29/2022 1:50 AM     Please review provider order for any additional goals.   Nurse to notify provider when observation goals have been met and patient is ready for discharge.

## 2022-05-29 NOTE — PROGRESS NOTES
Assessment & Plan   82-year-old male with end-stage Parkinson's, HTN, CKD 3 who presented with recurrent syncope, encephalopathy, generalized failure to thrive all secondary to Parkinson's and necessary medications.    Active Problems:    Altered mental status, unspecified altered mental status type    Syncope, unspecified syncope type    Metabolic encephalopathy    Parkinson's disease (H)    Adult failure to thrive    Dysuria    SYDNEY (acute kidney injury) (H)    CKD (chronic kidney disease) stage 3, GFR 30-59 ml/min (H)    HLD (hyperlipidemia)    Benign essential hypertension    Autonomic instability    Gastroesophageal reflux disease without esophagitis    Constipation    Hypokalemia    Normocytic anemia    Present on Admission:    Altered mental status, unspecified altered mental status type    Syncope, unspecified syncope type      Recurrent syncope  -Patient initially presented for evaluation of recurrent syncope, appear to be vasovagal in etiology.  Recurrent syncope thought to be multifactorial secondary to advanced Parkinson's, orthostatic hypotension, dehydration.  CT head on admission negative for any acute intracranial processes, masses, or bleed.  Pacemaker interrogated in the ER without any remarkable irregularities.  Carotid ultrasound 5/22 unremarkable, TTE 5/22 ejection fraction 65% with moderate to severe LVH but no obvious valvular disease.  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  Most of these episodes are related to bowel movements or eating.  -Fludrocortisone 0.1 mg p.o. daily  -Midodrine 10 mg p.o. twice daily with hold parameters  -No longer on telemetry  -Focus on maintaining appropriate blood pressure  -PT/OT recommending TCU  -Neurology following, appreciate recommendations    Acute metabolic encephalopathy  -Patient with prolonged episode of unresponsiveness during syncopal  episode prior to admission as well as intermittent hallucinations described by patient's wife..  CT head negative, EEG 5/23 abnormal with diffuse slow background which is nonspecific for generalized cerebral dysfunction but negative for seizure activity.  Unable to perform MRI brain secondary to pacemaker placement.  No signs of infection, urinalysis appears uninfected.  -Delirium protocol    End-stage Parkinson's disease, adult failure to thrive  -Patient with severe and end-stage parkinsonism with severe autonomic instability.  Patient currently chair/bedbound.  Patient and family have met with palliative care as well as hospice, not ready for hospice at this time.  -Continue home Sinemet 4 times daily  -Pyridostigmine 30 mg p.o. 3 times daily  -Tolterodine 4 mg p.o. daily  -PT/OT recommending TCU  -Mirtazapine 7.5 mg p.o. daily    Dysuria  -Patient endorses urinary urgency and dysuria on 5/28, urinalysis 5/28 negative for signs of urinary tract infection.  Patient likely having some mild bladder spasms associated with Parkinson's.  -Continue home tolterodine  -Trial pyridium for some urinary discomfort    SYDNEY on CKD 3  -Baseline creatinine approximately 1.1-1.2, natasha to 1.55 at time of admission.  Resolved with IVF.    HLD  -ASA 81 mg p.o. daily  -Atorvastatin 40 mg p.o. daily    HTN  -Patient can be extremely hypertensive and then swing drastically to hypotensive secondary to autonomic instability.  At this time as he remains grossly asymptomatic during times of hypertension would avoid treating aggressively.  Did discuss with patient and his wife and they agree with only as needed blood pressure medications outside of his beta-blocker.  -Metoprolol XL 37.5 mg p.o. daily  -Hydralazine as needed    GERD  -Pantoprazole 40 mg p.o. daily    Bilateral lower extremity edema  -Lymphedema wraps    Constipation  -Docusate senna twice daily  -Start MiraLAX p.o. daily    Hypokalemia-resolved    Normocytic  anemia  -Hemoglobin 10.3 with MCV of 94.     Clinically Significant Risk Factors Present on Admission                # Platelet Defect: home medication list includes an antiplatelet medication         Electrolytes: Currently within normal limits  Fluids: P.o.  Diet: Regular  VTE prophylaxis: heparin SQ      COVID19 symptom check 5/29/2022  Fevers/Chills/myalgias: NEGATIVE TO ALL  Sick contacts/COVID19 exposure: NEGATIVE TO ALL  Cough/trouble breathing/SOB/sore throat: NEGATIVE TO ALL  Diarrhea: NEGATIVE    Expected Discharge: 05/30/2022     Anticipated discharge location:  Awaiting care coordination huddle  Delays:     Lab Result Pending (enter specific test & time in comments)  Specialist Consult (enter specialist & decision needed in comments)  Placement - TCU           Subjective  Cc: Weakness and confusion    Patient feels about the same today.  Endorses he is able to urinate feeling completely but still has a little bit of burning with this.  Patient's wife at bedside thinks he may have been hallucinating a little bit earlier but he seems to be thinking clearly and speaking clearly currently.  Denies chest pain, cough, fevers or chills.    Review of Systems: History obtained from the patient  General ROS: negative  for  - chills, fatigue, fever  ENT ROS: negative for - headaches, hearing change, nasal congestion, nasal discharge  Hematological and Lymphatic ROS: negative for - bleeding problems, blood clots, bruising  Respiratory ROS: negative for - cough, pleuritic pain, shortness of breath, sputum changes  Cardiovascular ROS: negative for - chest pain, dyspnea on exertion, edema  Gastrointestinal ROS: negative for - abdominal pain, constipation, diarrhea, and nausea/vomiting  Genito-Urinary ROS: Positive for - change in urinary stream, dysuriaMusculoskeletal ROS: Positive for generalized weakness  Neurological ROS: negative for - behavioral changes, confusion, dizziness, numbness/tingling   Dermatological  ROS: negative for pruritus, rash    Objective    Physical Examination:   General appearance -alert, well appearing, and in no distress and oriented to person, place, and time  Mental status - alert, oriented to person place and time, flat affect but pleasant mood, normal behavior, tremulous but fluent and comprehensible speech, dress, resting tremor noted as well as pill-rolling activity of right upper extremity, patient was talking as if he had been standing up and getting dizzy but wife endorsed he had no recent changes in position.  HEENT - sclera anicteric, left eye normal, right eye normal, nares normal and patent  Lymphatics - no palpable lymphadenopathy, no hepatosplenomegaly  Respiratory - no tachypnea, retractions or cyanosis  Abdomen - soft, nontender, nondistended, no masses or organomegaly no rebound tenderness noted bowel sounds normal, no bladder distension noted  Neurological - alert, oriented, speech is delayed and somewhat tremulous, visible pill-rolling of the right upper extremity as well as upper and lower extremity resting tremors  Musculoskeletal -kyphotic curve to the thoracic and cervical spine  Extremities - peripheral pulses normal, +1 bilateral lower extremity pitting edema, no clubbing or cyanosis  Skin - normal coloration and turgor, no rashes, no suspicious skin lesions noted    Temp:  [97.7  F (36.5  C)-98.4  F (36.9  C)] 98.2  F (36.8  C)  Pulse:  [64-83] 66  Resp:  [16-17] 16  BP: (105-213)/() 183/93  SpO2:  [96 %-99 %] 97 %      Intake/Output Summary (Last 24 hours) at 5/27/2022 1930  Last data filed at 5/27/2022 1200  Gross per 24 hour   Intake 240 ml   Output 150 ml   Net 90 ml       Results    Recent Results (from the past 24 hour(s))   UA reflex to Microscopic and Culture    Collection Time: 05/28/22  7:04 PM    Specimen: Urine, Midstream   Result Value Ref Range    Color Urine Yellow Colorless, Straw, Light Yellow, Yellow    Appearance Urine Clear Clear    Glucose Urine  Negative Negative mg/dL    Bilirubin Urine Negative Negative    Ketones Urine Negative Negative mg/dL    Specific Gravity Urine 1.017 1.001 - 1.030    Blood Urine Negative Negative    pH Urine 5.5 5.0 - 7.0    Protein Albumin Urine Negative Negative mg/dL    Urobilinogen Urine <2.0 <2.0 mg/dL    Nitrite Urine Negative Negative    Leukocyte Esterase Urine Negative Negative          Lab Results personally reviewed 5/29  Imaging Results personally reviewed 5/28  Discussed with patient and his wife  Reviewed old records     Advanced Care Planning  Discharge Planning discussed with patient and family  Discussed care with patient and family for 25 minutes with greater than 50% of time spent in counseling and coordination of care.    Updated patient's wife Susanne at bedside on 5/29    Eneida Castillo DO  Hospitalist Service  [facility]  Text page via [facility] Paging/Directory     This note was created using dragon dictation, any spelling and grammatical errors are unintentional.

## 2022-05-29 NOTE — PLAN OF CARE
Wife and son here today- supportive. Uneventful shift. Up to chair with 2 assist- used lift to get back into bed (very tired)  eating and drinking well. Had large BM, voiding- some incontinent. Falls and pressure  Problem: Plan of Care - These are the overarching goals to be used throughout the patient stay.    Goal: Absence of Hospital-Acquired Illness or Injury  Outcome: Ongoing, Progressing  Intervention: Identify and Manage Fall Risk  Recent Flowsheet Documentation  Taken 5/29/2022 0800 by Kesha Rojas RN  Safety Promotion/Fall Prevention:   bed alarm on   chair alarm on   fall prevention program maintained   patient and family education  Intervention: Prevent and Manage VTE (Venous Thromboembolism) Risk  Recent Flowsheet Documentation  Taken 5/29/2022 0800 by Kesha Rojas RN  Activity Management:   activity encouraged   activity adjusted per tolerance     Problem: Fall Injury Risk  Goal: Absence of Fall and Fall-Related Injury  Outcome: Ongoing, Progressing  Intervention: Promote Injury-Free Environment  Recent Flowsheet Documentation  Taken 5/29/2022 0800 by Kesha Rojas RN  Safety Promotion/Fall Prevention:   bed alarm on   chair alarm on   fall prevention program maintained   patient and family education    ulcer prevention plans in place. Kesha Rojas, ADRIANA

## 2022-05-29 NOTE — PLAN OF CARE
"PRIMARY DIAGNOSIS: \"GENERIC\" NURSING  OUTPATIENT/OBSERVATION GOALS TO BE MET BEFORE DISCHARGE:  ADLs back to baseline: Yes    Activity and level of assistance: wheel/chair bound, pivot to commode.    Pain status: Pain free.    Return to near baseline physical activity: Yes     Discharge Planner Nurse   Safe discharge environment identified: Yes  Barriers to discharge: Yes       Entered by: Lainey Tierney RN 05/28/2022 11:02 PM            "

## 2022-05-29 NOTE — PLAN OF CARE
"PRIMARY DIAGNOSIS: \"GENERIC\" NURSING  OUTPATIENT/OBSERVATION GOALS TO BE MET BEFORE DISCHARGE:  1. ADLs back to baseline: Yes    2. Activity and level of assistance:  Back to baseline per spouse. 1 assist to turn in bed.  WC bound    3. Pain status: Pain free.    4. Return to near baseline physical activity: Yes     Discharge Planner Nurse   Safe discharge environment identified: Yes  Barriers to discharge: Yes, placement       Entered by: Lainey Tierney RN 05/28/2022 11:04 PM      Had late lunch, did not eat dinner.  "

## 2022-05-30 NOTE — PROGRESS NOTES
Slept well. Elevated BP's overnight treated with PRN hydralazine and scheduled metoprolol early. Waiting for TCU placement.     0547- Pt's BP's continue to be elevated 220s/100s with machine, 210/108 manually. Dr. Koch gave orders for one time PRN hydralazine. Pt appears more lethargic than baseline, is alert, oriented to self and time but not place or situation. PEERL.     0673- BP re-check 199/100. Lung sounds include some expiratory wheezing.     PRIMARY DIAGNOSIS: Altered mental status, Syncopal episodes  OUTPATIENT/OBSERVATION GOALS TO BE MET BEFORE DISCHARGE:  1. ADLs back to baseline: No    2. Activity and level of assistance: Up with maximum assistance.Per pt wife w/c bound baseline, can pivot transfer. Extremely difficult to transfer to bedside commode with assist x3 tonight.     3. Pain status: Pain free.    4. Return to near baseline physical activity: No     Discharge Planner Nurse   Safe discharge environment identified: Yes  Barriers to discharge: Yes- tcu placement.        Entered by: Génesis Belcher RN 05/30/2022 5:11 AM     Please review provider order for any additional goals.   Nurse to notify provider when observation goals have been met and patient is ready for discharge.

## 2022-05-30 NOTE — PLAN OF CARE
"PRIMARY DIAGNOSIS: \"GENERIC\" NURSING  OUTPATIENT/OBSERVATION GOALS TO BE MET BEFORE DISCHARGE:  1. ADLs back to baseline: Yes    2. Activity and level of assistance: 2 assist.    3. Pain status: Pain free.    4. Return to near baseline physical activity: Yes     Discharge Planner Nurse   Safe discharge environment identified: Yes  Barriers to discharge: Yes, pending placement       Entered by: Lainey Tierney RN 05/30/2022 12:06 AM     "

## 2022-05-30 NOTE — PLAN OF CARE
"PRIMARY DIAGNOSIS: \"GENERIC\" NURSING  OUTPATIENT/OBSERVATION GOALS TO BE MET BEFORE DISCHARGE:  ADLs back to baseline: No    Activity and level of assistance: Up with maximum assistance. Consider SW and/or PT evaluation.     Pain status: Pain free.    Return to near baseline physical activity: No     Discharge Planner Nurse   Safe discharge environment identified: Yes  Barriers to discharge: Yes. Placement         Entered by: Aristeo Kelly RN 05/30/2022 2:24 PM                 "

## 2022-05-30 NOTE — PROGRESS NOTES
Assist x2 with repositions. Incontinent urine. Elevated BP- 205/102, Prn hydralazine given at this time, will monitor.     PRIMARY DIAGNOSIS: Altered mental status, syncopal event   OUTPATIENT/OBSERVATION GOALS TO BE MET BEFORE DISCHARGE:  1. ADLs back to baseline: Yes    2. Activity and level of assistance: Up with maximum assistance. Consider SW and/or PT evaluation. -- is w/c bound per baseline. Wife states pt is back to physical baseline.     3. Pain status: Pain free.    4. Return to near baseline physical activity: Yes     Discharge Planner Nurse   Safe discharge environment identified: Yes  Barriers to discharge: Yes- tcu placement        Entered by: Génesis Belcher RN 05/30/2022 12:03 AM     Please review provider order for any additional goals.   Nurse to notify provider when observation goals have been met and patient is ready for discharge.

## 2022-05-30 NOTE — PLAN OF CARE
Goal Outcome Evaluation:      PRIMARY DIAGNOSIS: GENERALIZED WEAKNESS    OUTPATIENT/OBSERVATION GOALS TO BE MET BEFORE DISCHARGE  1. Orthostatic performed: No    2. Tolerating PO medications: Yes    3. Return to near baseline physical activity: No    4. Cleared for discharge by consultants (if involved): No    Discharge Planner Nurse   Safe discharge environment identified: Yes  Barriers to discharge: Yes. Placement problem       Entered by: Aristeo Kelly RN 05/30/2022 11:33 AM     Please review provider order for any additional goals.   Nurse to notify provider when observation goals have been met and patient is ready for discharge.

## 2022-05-30 NOTE — PLAN OF CARE
"PRIMARY DIAGNOSIS: \"GENERIC\" NURSING  OUTPATIENT/OBSERVATION GOALS TO BE MET BEFORE DISCHARGE:  ADLs back to baseline: No    Activity and level of assistance: 2 assist    Pain status: Pain free.    Return to near baseline physical activity: No     Discharge Planner Nurse   Safe discharge environment identified: Yes  Barriers to discharge: Yes, placement       Entered by: Lainey Tierney RN 05/30/2022 12:05 AM     "

## 2022-05-30 NOTE — PROVIDER NOTIFICATION
Text paged resident re: pt's elevated BP's overnight. 200s/100s majority of night, PRN hydralazine not effective. Most recent /103. Scheduled metoprolol being given early and will re-check BP in 1 hr. Pt reports feeling slightly dizzy and short of breath. Sats 99% room air, Lung sounds diminished but clear.

## 2022-05-30 NOTE — PROGRESS NOTES
Assessment & Plan   82-year-old male with end-stage Parkinson's, HTN, CKD 3 who presented with recurrent syncope, encephalopathy, generalized failure to thrive all secondary to Parkinson's and necessary medications.    Active Problems:    Altered mental status, unspecified altered mental status type    Syncope, unspecified syncope type    Metabolic encephalopathy    Parkinson's disease (H)    Adult failure to thrive    Dysuria    SYDNEY (acute kidney injury) (H)    CKD (chronic kidney disease) stage 3, GFR 30-59 ml/min (H)    HLD (hyperlipidemia)    Benign essential hypertension    Autonomic instability    Gastroesophageal reflux disease without esophagitis    Constipation    Hypokalemia    Normocytic anemia    Present on Admission:    Altered mental status, unspecified altered mental status type    Syncope, unspecified syncope type      Recurrent syncope  -Patient initially presented for evaluation of recurrent syncope, appear to be vasovagal in etiology.  Recurrent syncope thought to be multifactorial secondary to advanced Parkinson's, orthostatic hypotension, dehydration.  CT head on admission negative for any acute intracranial processes, masses, or bleed.  Pacemaker interrogated in the ER without any remarkable irregularities.  Carotid ultrasound 5/22 unremarkable, TTE 5/22 ejection fraction 65% with moderate to severe LVH but no obvious valvular disease.  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  Most of these episodes are related to bowel movements or eating.  -Fludrocortisone 0.1 mg p.o. daily  -Midodrine 10 mg p.o. twice daily with hold parameters  -No longer on telemetry  -Focus on maintaining appropriate blood pressure  -PT/OT recommending TCU  -Neurology following, appreciate recommendations    Acute metabolic encephalopathy  -Patient with prolonged episode of unresponsiveness during syncopal  episode prior to admission as well as intermittent hallucinations described by patient's wife..  CT head negative, EEG 5/23 abnormal with diffuse slow background which is nonspecific for generalized cerebral dysfunction but negative for seizure activity.  Unable to perform MRI brain secondary to pacemaker placement.  No signs of infection, urinalysis appears uninfected.  -Delirium protocol    End-stage Parkinson's disease, adult failure to thrive  -Patient with severe and end-stage parkinsonism with severe autonomic instability.  Patient currently chair/bedbound.  Patient and family have met with palliative care as well as hospice, not ready for hospice at this time.  -Continue home Sinemet 4 times daily  -Pyridostigmine 30 mg p.o. 3 times daily  -Tolterodine 4 mg p.o. daily  -PT/OT recommending TCU  -Mirtazapine 7.5 mg p.o. daily    Dysuria  -Patient endorses urinary urgency and dysuria on 5/28, urinalysis 5/28 negative for signs of urinary tract infection.  Patient likely having some mild bladder spasms associated with Parkinson's.  -Continue home tolterodine  -Pyridium po TID for some urinary discomfort    SYDNEY on CKD 3  -Baseline creatinine approximately 1.1-1.2, natasha to 1.55 at time of admission.  Resolved with IVF.    HLD  -ASA 81 mg p.o. daily  -Atorvastatin 40 mg p.o. daily    HTN  -Patient can be extremely hypertensive and then swing drastically to hypotensive secondary to autonomic instability.  At this time as he remains grossly asymptomatic during times of hypertension would avoid treating aggressively.  Patient did have some symptoms with dizziness and SOB on 5/30 so will start very low dose hydralazine  -Metoprolol XL 37.5 mg p.o. daily  -Start hydralazine 10mg po BID and titrate as needed    GERD  -Pantoprazole 40 mg p.o. daily    Bilateral lower extremity edema  -Lymphedema wraps    Constipation  -Docusate senna twice daily  -Start MiraLAX p.o. daily    Hypokalemia-resolved    Normocytic  anemia  -Hemoglobin 10.3 with MCV of 94.     Clinically Significant Risk Factors Present on Admission                # Platelet Defect: home medication list includes an antiplatelet medication         Electrolytes: Currently within normal limits  Fluids: P.o.  Diet: Regular  VTE prophylaxis: heparin SQ      COVID19 symptom check 5/30/2022  Fevers/Chills/myalgias: NEGATIVE TO ALL  Sick contacts/COVID19 exposure: NEGATIVE TO ALL  Cough/trouble breathing/SOB/sore throat: NEGATIVE TO ALL  Diarrhea: NEGATIVE    Expected Discharge: 05/30/2022     Anticipated discharge location:  Awaiting care coordination huddle  Delays:     Lab Result Pending (enter specific test & time in comments)  Specialist Consult (enter specialist & decision needed in comments)  Placement - TCU           Subjective  Cc: Weakness and confusion    Per patient's wife he was more tired and a little more confused this morning but he seems to be waking up and speaking much more clearly when I am with him.  States that the burning with his urination has decreased and endorses he is now having regular bowel movements once again.  Denies any chest pain, cough, shortness of breath.    Review of Systems: History obtained from the patient  General ROS: Negative for  - chills, fatigue, fever  ENT ROS: Negative for - headaches, hearing change, nasal congestion, nasal discharge  Hematological and Lymphatic ROS: negative for - bleeding problems, blood clots, bruising  Respiratory ROS: Negative for - cough, pleuritic pain, shortness of breath, sputum changes  Cardiovascular ROS: negative for - chest pain, dyspnea on exertion, edema  Gastrointestinal ROS: negative for - abdominal pain, constipation, diarrhea, and nausea/vomiting  Genito-Urinary ROS: Positive for - change in urinary stream, dysuria that has improved  Musculoskeletal ROS: Positive for generalized weakness  Neurological ROS: negative for - behavioral changes, confusion, dizziness, numbness/tingling    Dermatological ROS: negative for pruritus, rash    Objective    Physical Examination:   General appearance -alert, well appearing, and in no distress and oriented to person, place, and time  Mental status - alert, oriented to person place and time, flat affect but pleasant mood and does keep his eyes closed most of the time he speaking, normal behavior, speech difficult to understand at times and tremulous but generally coherent and clear, dress, resting tremor noted as well as pill-rolling activity of right upper extremity  HEENT - sclera anicteric, left eye normal, right eye normal, nares normal and patent  Lymphatics - no palpable lymphadenopathy, no hepatosplenomegaly  Respiratory -no tachypnea, retractions or cyanosis  Neurological -alert, oriented, speech is delayed and somewhat tremulous, visible pill-rolling of the right upper extremity as well as upper and lower extremity resting tremors  Musculoskeletal -kyphotic curve to the thoracic and cervical spine  Extremities - peripheral pulses normal, +1 bilateral lower extremity pitting edema, no clubbing or cyanosis  Skin - normal coloration and turgor, no rashes, no suspicious skin lesions noted    Temp:  [97.6  F (36.4  C)-98.4  F (36.9  C)] 98.1  F (36.7  C)  Pulse:  [67-86] 70  Resp:  [17-18] 18  BP: (173-220)/() 215/101  SpO2:  [96 %-98 %] 98 %      Intake/Output Summary (Last 24 hours) at 5/27/2022 1930  Last data filed at 5/27/2022 1200  Gross per 24 hour   Intake 240 ml   Output 150 ml   Net 90 ml       Results    Recent Results (from the past 24 hour(s))   Basic metabolic panel    Collection Time: 05/29/22  7:55 AM   Result Value Ref Range    Sodium 142 136 - 145 mmol/L    Potassium 3.6 3.5 - 5.0 mmol/L    Chloride 109 (H) 98 - 107 mmol/L    Carbon Dioxide (CO2) 28 22 - 31 mmol/L    Anion Gap 5 5 - 18 mmol/L    Urea Nitrogen 24 8 - 28 mg/dL    Creatinine 1.02 0.70 - 1.30 mg/dL    Calcium 8.3 (L) 8.5 - 10.5 mg/dL    Glucose 85 70 - 125 mg/dL     GFR Estimate 74 >60 mL/min/1.73m2   Magnesium    Collection Time: 05/29/22  7:55 AM   Result Value Ref Range    Magnesium 1.9 1.8 - 2.6 mg/dL   CBC with platelets    Collection Time: 05/29/22  7:55 AM   Result Value Ref Range    WBC Count 6.6 4.0 - 11.0 10e3/uL    RBC Count 3.56 (L) 4.40 - 5.90 10e6/uL    Hemoglobin 10.6 (L) 13.3 - 17.7 g/dL    Hematocrit 33.2 (L) 40.0 - 53.0 %    MCV 93 78 - 100 fL    MCH 29.8 26.5 - 33.0 pg    MCHC 31.9 31.5 - 36.5 g/dL    RDW 14.6 10.0 - 15.0 %    Platelet Count 190 150 - 450 10e3/uL   CBC with platelets    Collection Time: 05/30/22  7:00 AM   Result Value Ref Range    WBC Count 6.4 4.0 - 11.0 10e3/uL    RBC Count 3.52 (L) 4.40 - 5.90 10e6/uL    Hemoglobin 10.6 (L) 13.3 - 17.7 g/dL    Hematocrit 33.0 (L) 40.0 - 53.0 %    MCV 94 78 - 100 fL    MCH 30.1 26.5 - 33.0 pg    MCHC 32.1 31.5 - 36.5 g/dL    RDW 14.5 10.0 - 15.0 %    Platelet Count 195 150 - 450 10e3/uL          Lab Results personally reviewed 5/30  Imaging Results personally reviewed 5/28  Discussed with patient and his wife  Reviewed old records     Advanced Care Planning  Discharge Planning discussed with patient and family  Discussed care with patient and family for 25 minutes with greater than 50% of time spent in counseling and coordination of care.    Updated patient's wife Susanne at bedside on 5/30    Eneida Castillo DO  Hospitalist [facility]  Text page via [employer] Paging/Directory     This note was created using dragon dictation, any spelling and grammatical errors are unintentional.

## 2022-05-30 NOTE — PROGRESS NOTES
Care Management Follow Up    Length of Stay (days): 0    Expected Discharge Date: 05/31/2022     Concerns to be Addressed:     Placement-TCU  Patient plan of care discussed at interdisciplinary rounds: Yes    Anticipated Discharge Disposition: Transitional Care     Anticipated Discharge Services:  Therapy recommending TCU  Anticipated Discharge DME:  To be determined     Patient/family educated on Medicare website which has current facility and service quality ratings:  Yes  Education Provided on the Discharge Plan:  Yes, per team  Patient/Family in Agreement with the Plan:  Yes    Referrals Placed by CM/SW:  TCU referrals faxed.   Private pay costs discussed: Not applicable    Additional Information:  Chart Reviewed. Working on TCU placement. Referrals previously faxed; called and left messages with call back extension [phone]. Spouse had question about if facilities offering COVID-19 waiver as he is admitted under observation status; will need to check with facilities to determine if they are offering this. Goal for patient to get stronger in order to return home with home care services. Transport to be determined. Care Manager to follow.       Glenna Combs RN

## 2022-05-31 NOTE — PLAN OF CARE
PRIMARY DIAGNOSIS: SYNCOPE  OUTPATIENT/OBSERVATION GOALS TO BE MET BEFORE DISCHARGE:  1. Orthostatic performed: No    2. Diagnostic testing complete & at baseline neurologic testing: Yes    3. Cleared by consultants (if involved): Yes    4. Interpretation of cardiac rhythm per telemetry tech: None    5. Tolerating adequate PO diet and medications: Yes    6. Return to near baseline physical activity or neurologic status: Yes    Discharge Planner Nurse   Safe discharge environment identified: Yes  Barriers to discharge: Yes       Entered by: Alfred Sandra RN 05/31/2022 4:49 AM     Please review provider order for any additional goals.   Nurse to notify provider when observation goals have been met and patient is ready for discharge.Goal Outcome Evaluation:      BP this time 145/69. Incontinent of bladder, judy-cares done and brief changed.

## 2022-05-31 NOTE — PROGRESS NOTES
Care Management Follow Up    Length of Stay (days): 0    Expected Discharge Date: 05/31/2022     Concerns to be Addressed:       Patient plan of care discussed at interdisciplinary rounds: Yes    Anticipated Discharge Disposition: Transitional Care     Anticipated Discharge Services:  TCU  Anticipated Discharge DME:      Patient/family educated on Medicare website which has current facility and service quality ratings:  yes  Education Provided on the Discharge Plan:    Patient/Family in Agreement with the Plan:      Referrals Placed by CM/SW:  Cost of transportation, and copay for room after 20 days.   Private pay costs discussed:NA  Additional Information:  JOÃO called and left message for admissions at [facility], Spoke with [facility] admissions,  they do not have referral available, but have beds, SW resent full referral. JOÃO spoke to Matt at [facility], resent face sheet, she will look at referral.  Matt  Called JOÃO, she is able to accept pt referral. JOÃO asked her about pt wife question for paying for stay due to being on observation status. Marek said first 20 days will be covered by medicare.   JOÃO contacted pt wife Susanne, she prefers [facility] wheelchair transport, and understands there is a cost. She is agreeable to facility.    Pt to transport via [facility] wheelchair at 330 PM       KOFI Valentine

## 2022-05-31 NOTE — DISCHARGE SUMMARY
[facility] MEDICINE  DISCHARGE SUMMARY  [facility]     Primary Care Physician: Hung Camacho  Admission Date: 5/22/2022   Discharge Provider: Eneida Castillo DO Discharge Date: 5/31/2022   Diet: regular   Code Status: No CPR- Do NOT Intubate   Activity: as tolerated with assistance        Condition at Discharge: stable      REASON FOR PRESENTATION(See Admission Note for Details)   82-year-old male with end-stage Parkinson's, HTN, CKD 3 who presented with recurrent syncope, encephalopathy, generalized failure to thrive all secondary to Parkinson's and necessary medications.    PRINCIPAL & ACTIVE DISCHARGE DIAGNOSES     Active Problems:    Altered mental status, unspecified altered mental status type    Syncope, unspecified syncope type    Metabolic encephalopathy    Parkinson's disease (H)    Adult failure to thrive    Dysuria    SYDNEY (acute kidney injury) (H)    CKD (chronic kidney disease) stage 3, GFR 30-59 ml/min (H)    HLD (hyperlipidemia)    Benign essential hypertension    Autonomic instability    Gastroesophageal reflux disease without esophagitis    Constipation    Hypokalemia    Normocytic anemia      SIGNIFICANT FINDINGS (Imaging, labs):     Impression:     IMPRESSION: Stable dual-lead left-sided cardiac conduction device. Minimal right basilar pulmonary opacities likely reflect atelectasis. No focal pneumonic consolidation or pleural effusion. Normal heart size. No overt vascular congestion or pulmonary   edema.     IMPRESSION:  1.  Mild plaque formation, velocities consistent with less than 50% stenosis in the right internal carotid artery.  2.  Mild plaque formation, velocities consistent with less than 50% stenosis in the left internal carotid artery.  3.  Flow within the vertebral arteries is antegrade.    IMPRESSION: Heart size is stable. Pulmonary vascularity is within normal limits. Dual-lead cardiac device left chest wall, with lead tips projected over the RA and  RV. Mild infiltrate or atelectasis in the right lower lung could represent a mild   pneumonitis. The lung apices are partially obscured by the patient's head related to kyphosis. Old left eighth rib fracture.    IMPRESSION:  1.  Exam is moderately degraded by motion artifact despite reacquisitions.     2.  No finding for intracranial hemorrhage, mass, or acute infarct.     3.  Moderate volume loss and mild presumed sequelae of chronic microvascular ischemic change.    PENDING LABS     [unfilled]      PROCEDURES ( this hospitalization only)          RECOMMENDATIONS TO OUTPATIENT PROVIDER FOR F/U VISIT     Follow up with primary care within 1 week    DISPOSITION     Skilled Nursing Facility    SUMMARY OF HOSPITAL COURSE:      Recurrent syncope  Patient initially presented for evaluation of recurrent syncope, appear to be vasovagal in etiology.  Recurrent syncope thought to be multifactorial secondary to advanced Parkinson's, orthostatic hypotension, dehydration.  CT head on admission negative for any acute intracranial processes, masses, or bleed.  Pacemaker interrogated in the ER without any remarkable irregularities.  Carotid ultrasound 5/22 unremarkable, TTE 5/22 ejection fraction 65% with moderate to severe LVH but no obvious valvular disease.  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  Most of these episodes are related to bowel movements or eating.  Dizziness seems to be better controlled with keeping patient's blood pressure on the higher end and with use of fludrocortisone and PT/OT.  Have decreased midodrine from home dose to 2.5 mg p.o. twice daily as multiple severe range pressures during inpatient stay.  Recurrent syncope unlikely to stabilize, more likely only to get worse as Parkinson's severity increases.     Acute metabolic encephalopathy  Patient with prolonged episode of  unresponsiveness during syncopal episode prior to admission as well as intermittent hallucinations described by patient's wife  CT head negative, EEG 5/23 abnormal with diffuse slow background which is nonspecific for generalized cerebral dysfunction but negative for seizure activity.  Unable to perform MRI brain secondary to pacemaker placement.  No signs of infection, urinalysis appears uninfected.  Encephalopathy more likely secondary to hypotension, orthostasis, worsening Parkinson's.     End-stage Parkinson's disease, adult failure to thrive  Patient with severe and end-stage parkinsonism with severe autonomic instability.  Patient currently chair/bedbound.  Patient and family have met with palliative care as well as hospice, not ready for hospice at this time.  Continue home Sinemet, pyridostigmine, tolterodine, mirtazapine.     Dysuria  Patient endorses urinary urgency and dysuria on 5/28, urinalysis 5/28 negative for signs of urinary tract infection.  Patient likely having some mild bladder spasms associated with Parkinson's.  Continue home tolterodine, Pyridium was used as needed.     SYDNEY on CKD 3  Baseline creatinine approximately 1.1-1.2, natasha to 1.55 at time of admission.  Resolved with IVF.     HLD  Home aspirin, atorvastatin     HTN  Patient can be extremely hypertensive and then swing drastically to hypotensive secondary to autonomic instability.  Fludrocortisone and a lower dose midodrine being used to help with orthostatic hypotension and only metoprolol used for blood pressure control.     GERD  Continue home pantoprazole     Bilateral lower extremity edema  Edema wraps as tolerated     Constipation  Continue daily MiraLAX, docusate, magnesium     Hypokalemia-resolved     Normocytic anemia  Hemoglobin 10.3 with MCV of 94.    Discharge Medications with Med changes:        Review of your medicines      START taking      Dose / Directions   bisacodyl 10 MG suppository  Commonly known as: DULCOLAX       Dose: 10 mg  Place 1 suppository (10 mg) rectally daily as needed for constipation  Refills: 0     magnesium hydroxide 400 MG/5ML suspension  Commonly known as: MILK OF MAGNESIA      Dose: 30 mL  Start taking on: June 1, 2022  Take 30 mLs by mouth daily  Refills: 0     polyethylene glycol 17 GM/Dose powder  Commonly known as: MIRALAX      Dose: 17 g  Take 17 g by mouth daily  Quantity: 510 g  Refills: 0        CONTINUE these medicines which may have CHANGED, or have new prescriptions. If we are uncertain of the size of tablets/capsules you have at home, strength may be listed as something that might have changed.      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      Dose: 81 mg  Take 81 mg by mouth daily  Refills: 0     atorvastatin 40 MG tablet  Commonly known as: LIPITOR  Indication: Ischemic Heart Disease      Dose: 40 mg  Take 40 mg by mouth daily  Refills: 0     azelastine 0.1 % nasal spray  Commonly known as: ASTELIN      Dose: 1 spray  Spray 1 spray into both nostrils daily as needed for rhinitis  Refills: 0     carbidopa-levodopa  MG tablet  Commonly known as: SINEMET  Indication: Parkinson's Disease      Dose: 2 tablet  Take 2 tablets by mouth 4 times daily  Refills: 0     cyanocobalamin 1000 MCG tablet  Commonly known as: VITAMIN B-12  Indication: Inadequate Vitamin B12      Dose: 1,000 mcg  Take 1,000 mcg by mouth daily  Refills: 0     fludrocortisone 0.1 MG tablet  Commonly known as: FLORINEF  Indication: Blood Pressure Drop Upon Standing      Dose: 0.1 mg  Take 0.1 mg by mouth daily  Refills: 0     magnesium oxide 400 MG tablet  Commonly known as:  MAG-OX  Indication: Disorder with Low Magnesium Levels      Dose: 400 mg  Take 400 mg by mouth daily  Refills: 0     metoprolol succinate ER 25 MG 24 hr tablet  Commonly known as: TOPROL XL      Dose: 37.5 mg  Start taking on: June 1, 2022  Take 1.5 tablets (37.5 mg) by mouth daily  Refills: 0     mirtazapine 7.5 MG tablet  Commonly known as: REMERON  Indication: Major Depressive Disorder      Dose: 7.5 mg  Take 7.5 mg by mouth At Bedtime  Refills: 0     nystatin 380767 UNIT/GM external ointment  Commonly known as: MYCOSTATIN  Indication: Skin Infection due to Candida Yeast      Apply topically 2 times daily  Refills: 0     omeprazole 20 MG DR capsule  Commonly known as: priLOSEC  Indication: Gastroesophageal Reflux Disease      Dose: 20 mg  Take 20 mg by mouth daily  Refills: 0     pyridostigmine 60 MG tablet  Commonly known as: MESTINON  Indication: Anticholinergic Side Effects of Medication      Dose: 30 mg  Take 30 mg by mouth 3 times daily  Refills: 0     sennosides 8.6 MG tablet  Commonly known as: SENOKOT  Indication: Constipation      Dose: 2 tablet  Take 2 tablets by mouth daily  Refills: 0     tamsulosin 0.4 MG capsule  Commonly known as: FLOMAX  Indication: Benign Enlargement of Prostate      Dose: 0.4 mg  Take 0.4 mg by mouth daily  Refills: 0     tolterodine ER 4 MG 24 hr capsule  Commonly known as: DETROL LA  Indication: Overactive Bladder, Urinary Urgency      Dose: 4 mg  Take 4 mg by mouth daily  Refills: 0     vitamin b complex w/vitamin C Tabs tablet  Indication: nutritional supplement      Dose: 1 tablet  Take 1 tablet by mouth daily  Refills: 0     vitamin D3 50 mcg (2000 units) tablet  Commonly known as: CHOLECALCIFEROL  Indication: Vitamin D Deficiency      Dose: 1 tablet  Take 1 tablet by mouth daily  Refills: 0                Rationale for medication changes:      Midodrine decreased as patient with multiple severe range blood pressures  Metoprolol increased mildly also to help with blood  pressure control        Consults   neurology      Immunizations given this encounter     [unfilled]       Anticoagulation Information      Recent INR results: No results for input(s): INR in the last 168 hours.    Discharge Orders     Discharge Procedure Orders   General info for SNF   Order Comments: Length of Stay Estimate: Short Term Care: Estimated # of Days <30  Condition at Discharge: Stable  Level of care:skilled   Rehabilitation Potential: Poor  Admission H&P remains valid and up-to-date: Yes  Recent Chemotherapy: N/A  Use Nursing Home Standing Orders: Yes     Mantoux instructions   Order Comments: Give two-step Mantoux (PPD) Per Facility Policy Yes     Follow Up and recommended labs and tests   Order Comments: Follow up with Nursing home physician.  No follow up labs or test are needed.     Reason for your hospital stay   Order Comments: Increasing syncope in the setting of severe Parkinson's and orthostatic hypotension     Activity - Up with assistive device     Order Specific Question Answer Comments   Is discharge order? Yes      No CPR- Do NOT Intubate     Order Specific Question Answer Comments   Code status determined by: Discussion with patient/ legal decision maker      Physical Therapy Adult Consult   Order Comments: Evaluate and treat as clinically indicated.    Reason:  parkinson's disease, orthostatic hypotension     Occupational Therapy Adult Consult   Order Comments: Evaluate and treat as clinically indicated.    Reason:  parkinson's disease, orthostatic hypotension     Fall precautions     Diet   Order Comments: Follow this diet upon discharge: Orders Placed This Encounter      Room Service      Regular Diet Adult     Order Specific Question Answer Comments   Is discharge order? Yes      Examination     Vital signs:  Temp: 98.7  F (37.1  C) Temp src: Axillary BP: (!) 179/90 (RN NOTIFIED) Pulse: 71   Resp: 16 SpO2: 96 % O2 Device: None (Room air)   Height: 182.9 cm (6') Weight: 79.1 kg  (174 lb 6.4 oz)  Estimated body mass index is 23.65 kg/m  as calculated from the following:    Height as of this encounter: 1.829 m (6').    Weight as of this encounter: 79.1 kg (174 lb 6.4 oz).      Physical Examination:   General appearance -alert, well appearing, and in no distress and oriented to person, place, and time  Mental status - alert, oriented to person place and time, flat affect but pleasant mood and does keep his eyes closed most of the time he speaking, normal behavior, speech difficult to understand at times and tremulous but generally coherent and clear, dress, resting tremor noted as well as pill-rolling activity of right upper extremity  HEENT - sclera anicteric, left eye normal, right eye normal, nares normal and patent  Lymphatics - no palpable lymphadenopathy, no hepatosplenomegaly  Respiratory -no tachypnea, retractions or cyanosis  Neurological -alert, oriented, speech is delayed and somewhat tremulous, visible pill-rolling of the right upper extremity as well as upper and lower extremity resting tremors  Musculoskeletal -kyphotic curve to the thoracic and cervical spine  Extremities - peripheral pulses normal, +1 bilateral lower extremity pitting edema, no clubbing or cyanosis  Skin - normal coloration and turgor, no rashes, no suspicious skin lesions noted    Please see EMR for more detailed significant labs, imaging, consultant notes etc.  Total time spent on discharge: 50 minutes    Eneida Castillo DO    [facility]: Ph:[phone]    CC:Hung Camacho

## 2022-05-31 NOTE — PLAN OF CARE
"PRIMARY DIAGNOSIS: \"GENERIC\" NURSING  OUTPATIENT/OBSERVATION GOALS TO BE MET BEFORE DISCHARGE:  1. ADLs back to baseline: No    2. Activity and level of assistance: Up with maximum assistance. Consider SW and/or PT evaluation.     3. Pain status: Pain free.    4. Return to near baseline physical activity: No     Discharge Planner Nurse   Safe discharge environment identified: Yes  Barriers to discharge: No       Entered by: Tarah Palomo RN 05/31/2022 1:23 PM     Please review provider order for any additional goals.   Nurse to notify provider when observation goals have been met and patient is ready for discharge.Goal Outcome Evaluation:        Pt. is discharging to [facility] TCU today, P/U time at 3:30 pm. Pt. has no C/O pain or dizziness this shift. BP was lower this afternoon after pt. received AM mediations. Took medications whole with applesauce. Ax1 with meals. Edema noted to BLE, legs elevated in recliner at this time. Incontinent of bowel, occasionally incontinent of bladder. Alert and slow to respond. Redness noted to buttocks, no rash to groin. Alarms in place. Midodrine was held this shift D/t order parameters not being met.               "

## 2022-05-31 NOTE — PLAN OF CARE
PRIMARY DIAGNOSIS: Altered mental status  OUTPATIENT/OBSERVATION GOALS TO BE MET BEFORE DISCHARGE:  ADLs back to baseline: Yes    Activity and level of assistance: Up with maximum assistance. Consider SW and/or PT evaluation.     Pain status: Pain free.    Return to near baseline physical activity: Yes     Discharge Planner Nurse   Safe discharge environment identified: Yes  Barriers to discharge: Yes       Entered by: Ruthy Mandel RN 05/30/2022 11:44 PM   Patient denied pain. Incontinent of bowel and bladder. One small formed stool noted. Patient being checked and changed as needed. Alert & oriented to place, person, and situation.   Please review provider order for any additional goals.   Nurse to notify provider when observation goals have been met and patient is ready for discharge.Goal Outcome Evaluation:

## 2022-05-31 NOTE — PLAN OF CARE
Problem: Muscle Strength Impairment  Goal: Improved Muscle Strength  Outcome: Ongoing, Not Progressing   Patient being helped with repositioning and judy care. Able to help at times with repositioning.     Problem: Plan of Care - These are the overarching goals to be used throughout the patient stay.    Goal: Optimal Comfort and Wellbeing  Outcome: Ongoing, Progressing   Patient denied pain.     Problem: Hypertension Comorbidity  Goal: Blood Pressure in Desired Range  Outcome: Ongoing, Progressing  Intervention: Maintain Blood Pressure Management  Recent Flowsheet Documentation  Taken 5/30/2022 1920 by Ruthy Mandel RN  Medication Review/Management: medications reviewed  Taken 5/30/2022 1522 by Ruthy Mandel RN  Medication Review/Management: medications reviewed     Problem: Hypertension Comorbidity  Goal: Blood Pressure in Desired Range  Outcome: Ongoing, Progressing  Intervention: Maintain Blood Pressure Management  Recent Flowsheet Documentation  Taken 5/30/2022 1920 by Ruthy Mandel RN  Medication Review/Management: medications reviewed  Taken 5/30/2022 1522 by Ruthy Mandel, RN  Medication Review/Management: medications reviewed   Scheduled BP medication given for SBP <180. Will monitor.     Goal Outcome Evaluation:

## 2022-05-31 NOTE — PLAN OF CARE
PRIMARY DIAGNOSIS: SYNCOPE  OUTPATIENT/OBSERVATION GOALS TO BE MET BEFORE DISCHARGE:  1. Orthostatic performed: Yes, lying /104. Sitting 134/75.    2. Diagnostic testing complete & at baseline neurologic testing: Yes    3. Cleared by consultants (if involved): Yes    4. Interpretation of cardiac rhythm per telemetry tech: None     5. Tolerating adequate PO diet and medications: Yes    6. Return to near baseline physical activity or neurologic status: Yes    Discharge Planner Nurse   Safe discharge environment identified: Yes  Barriers to discharge: No       Entered by: Alfred Sandra RN 05/31/2022 12:54 AM   Pt denies pain, lightheadedness or dizziness at rest.   Please review provider order for any additional goals.   Nurse to notify provider when observation goals have been met and patient is ready for discharge.Goal Outcome Evaluation:  Pt continues to have postural hypotension.

## 2022-05-31 NOTE — PLAN OF CARE
Physical Therapy Discharge Summary    Reason for therapy discharge:    Discharged to transitional care facility.    Progress towards therapy goal(s). See goals on Care Plan in [facility] electronic health record for goal details.  Goals partially met.  Barriers to achieving goals:   limited tolerance for therapy and discharge from facility.    Therapy recommendation(s):    Recommend continued therapies to improve overall strength, balance and mobility.     Sybil Turk, PT, DPT  5/31/2022

## 2022-05-31 NOTE — PLAN OF CARE
PRIMARY DIAGNOSIS: Altered mental status  OUTPATIENT/OBSERVATION GOALS TO BE MET BEFORE DISCHARGE:  ADLs back to baseline: Yes    Activity and level of assistance: Up with maximum assistance. Consider SW and/or PT evaluation.     Pain status: Pain free.    Return to near baseline physical activity: Yes     Discharge Planner Nurse   Safe discharge environment identified: Yes  Barriers to discharge: Yes       Entered by: Ruthy Mandel RN 05/30/2022 11:46 PM   Continued to deny pain. No acute changes noted. Awaiting for TCU placement.   Please review provider order for any additional goals.   Nurse to notify provider when observation goals have been met and patient is ready for discharge.Goal Outcome Evaluation:

## 2022-05-31 NOTE — PLAN OF CARE
"PRIMARY DIAGNOSIS: \"GENERIC\" NURSING  OUTPATIENT/OBSERVATION GOALS TO BE MET BEFORE DISCHARGE:  1. ADLs back to baseline: No    2. Activity and level of assistance: Up with maximum assistance. Consider SW and/or PT evaluation.     3. Pain status: Pain free.    4. Return to near baseline physical activity: No     Discharge Planner Nurse   Safe discharge environment identified: No  Barriers to discharge: Yes       Entered by: Tarah Palomo RN 05/31/2022 10:54 AM     Please review provider order for any additional goals.   Nurse to notify provider when observation goals have been met and patient is ready for discharge.Goal Outcome Evaluation:      Pt. has been sleepy this shift, no C/O pain at this time. Has been repositioned and is currently resting comfortably. Waiting on TCU placement.  Incontinent of bowel and occasionally incontinent of bladder.                 "

## 2022-05-31 NOTE — PLAN OF CARE
Goal Outcome Evaluation:    Patient discharged this afternoon to TCU. [facility] transport to bring pt to facility.

## 2022-06-03 NOTE — LETTER
6/3/2022        RE: Canelo Cook  [address]  [place] [address]        [facility] GERIATRICS    PRIMARY CARE PROVIDER AND CLINIC:  Hung Camacho MD, [address] / [facility] [address]  Chief Complaint   Patient presents with     Hospital F/U      [facility] Medical Record Number:  [id number]  Place of Service where encounter took place:  [facility] (TCU) [[id number]]    Canelo Cook  is a 82 year old  (1940), admitted to the above facility from  [facility]. Hospital stay 5/22 through 5/31..   HPI: Patient has a history of Parkinson's disease and autonomic dysfunction.  Arrived at [facility] ED after experiencing recurrent syncope and falls.  CT of the head was negative for any acute process.  Remainder of work-up essentially normal.  He does have severe autonomic instability at baseline and has a neurologist.  Supposedly these are mostly related to bowel movements or eating.  He did improve with sitting creasing blood pressure and the use of fludrocortisone.  He did have some elevated blood pressures up to 200s in the hospital so midodrine was decreased to 2.5 twice daily.  He was also taking Mestinon 60 mg 3 times a day.  He has had intermittent hallucinations but negative for seizure activity.  No signs of infection.  This was attributed to worsening Parkinson's disease.  In the hospital patient did meet with palliative and hospice care however family is not ready for this at that time.  He also remains on metoprolol for blood Pressure control.     He is seen today with his wife in the room.  Nursing is asking for parameters on midodrine due to severely elevated blood pressures between 180 and 200.  Wife actually does mention hospice and palliative care today which encouraged her to pursue with social work if that is the direction they want to go.  Canelo is alert and oriented, is known to me from another TCU admission at a different  "facility.  He is eating and drinking well here.  Attending therapies.    CODE STATUS/ADVANCE DIRECTIVES DISCUSSION:  No CPR- Do NOT Intubate  DNR / DNI  ALLERGIES:   Allergies   Allergen Reactions     Sulfa Drugs      Acetaminophen      Other reaction(s): Agitation  Restless  Feeling - skin crawling        PAST MEDICAL HISTORY:   Past Medical History:   Diagnosis Date     Acute chest pain 11/10/2019     Acute non-Q wave non-ST elevation myocardial infarction (NSTEMI) (H) 11/11/2019     Anemia 11/15/2013     Closed fracture of multiple ribs of left side with routine healing 2/18/2020     Coronary artery disease due to lipid rich plaque 11/11/2019     DNAR (do not attempt resuscitation) 11/11/2019     Essential hypertension      Fall at home, sequela 12/6/2019     GERD (gastroesophageal reflux disease)      HLD (hyperlipidemia)      Near syncope 10/12/2015     Parkinson's disease (H)      SA node dysfunction (H) 07/29/2015    \"chronotropic incompetence\" per [facility] EP notes     Syncope, unspecified syncope type 11/10/2019     Vitamin D deficiency       PAST SURGICAL HISTORY:   has a past surgical history that includes back surgery; Implant pacemaker (08/10/2015); Implant prosthesis penis inflatable; Cataract Extraction; Esophagoscopy, gastroscopy, duodenoscopy (EGD), combined (N/A, 3/20/2016); Pr Edg Us Exam Surgical Alter Stom Duodenum/Jejunum (N/A, 3/23/2016); Cv Coronary Angiogram (N/A, 11/11/2019); Cv Left Heart Catheterization With Left Ventriculogram (N/A, 11/11/2019); and Coronary Stent Placement (11/11/2019).  FAMILY HISTORY: family history includes Cancer in his sister; Heart Disease in his brother, brother, brother, brother, father, and mother; Lung Cancer in his brother, brother, and brother; Myocardial Infarction in his father and sister.  SOCIAL HISTORY:   reports that he has quit smoking. He has never used smokeless tobacco. He reports current alcohol use.  Patient's living condition: lives with " spouse    Post Discharge Medication Reconciliation Status: discharge medications reconciled, continue medications without change  Current Outpatient Medications   Medication Sig     aspirin 81 MG EC tablet Take 81 mg by mouth daily     atorvastatin (LIPITOR) 40 MG tablet Take 40 mg by mouth daily     azelastine (ASTELIN) 0.1 % nasal spray Spray 1 spray into both nostrils daily as needed for rhinitis     B Complex-C (VITAMIN B COMPLEX W/VITAMIN C) TABS tablet Take 1 tablet by mouth daily     bisacodyl (DULCOLAX) 10 MG suppository Place 1 suppository (10 mg) rectally daily as needed for constipation     carbidopa-levodopa (SINEMET)  MG tablet Take 2 tablets by mouth 4 times daily     cyanocobalamin (VITAMIN B-12) 1000 MCG tablet Take 1,000 mcg by mouth daily     fludrocortisone (FLORINEF) 0.1 MG tablet Take 0.1 mg by mouth daily     hydrALAZINE (APRESOLINE) 10 MG tablet Take 1 tablet (10 mg) by mouth 3 times daily as needed (for SBP >180)     magnesium hydroxide (MILK OF MAGNESIA) 400 MG/5ML suspension Take 30 mLs by mouth daily     magnesium oxide (MAGNESIUM OXIDE) 400 MG tablet Take 400 mg by mouth daily     metoprolol succinate ER (TOPROL XL) 25 MG 24 hr tablet Take 2 tablets (50 mg) by mouth daily     midodrine (PROAMATINE) 2.5 MG tablet Take 2 tablets (5 mg) by mouth 3 times daily HOLD if SBP>180     mirtazapine (REMERON) 7.5 MG tablet Take 7.5 mg by mouth At Bedtime     nitroGLYcerin (NITROSTAT) 0.4 MG sublingual tablet Place 1 tablet (0.4 mg) under the tongue every 5 minutes as needed for chest pain     nystatin (MYCOSTATIN) 569590 UNIT/GM external ointment Apply topically 2 times daily     nystatin (MYCOSTATIN) 721284 UNIT/ML suspension Take 5 mLs (500,000 Units) by mouth 4 times daily for 10 days     omeprazole (PRILOSEC) 20 MG DR capsule Take 20 mg by mouth daily     polyethylene glycol (MIRALAX) 17 GM/Dose powder Take 17 g by mouth daily     potassium chloride ER (K-TAB) 20 MEQ CR tablet Take 1 tablet  (20 mEq) by mouth daily     pyridostigmine (MESTINON) 60 MG tablet Take 30 mg by mouth 3 times daily     sennosides (SENOKOT) 8.6 MG tablet Take 2 tablets by mouth daily     tamsulosin (FLOMAX) 0.4 MG capsule Take 0.4 mg by mouth daily     tolterodine ER (DETROL LA) 4 MG 24 hr capsule Take 4 mg by mouth daily     vitamin D3 (CHOLECALCIFEROL) 50 mcg (2000 units) tablet Take 1 tablet by mouth daily     No current facility-administered medications for this visit.       ROS:  4 point ROS including Respiratory, CV, GI and , other than that noted in the HPI,  is negative    Vitals:  BP (!) 171/100   Pulse 87   Temp 98.1  F (36.7  C)   Resp 18   Ht 1.829 m (6')   Wt 86.2 kg (190 lb)   SpO2 98%   BMI 25.77 kg/m    Exam:  Physical Exam   General appearance: alert, appears stated age and cooperative.   Head: Normocephalic, without obvious abnormality, atraumatic, Eyes: sclera anicteric.  Lungs: respirations unlabored.  ABDOMEN: Globular and soft, non tender.    Extremities: extremities normal, atraumatic, no cyanosis or edema  Skin: Skin color, texture, turgor normal. No rashes or lesions  Neurologic:oriented. No focal deficits.   Psych: interacts well with caregivers, exhibits logical thought processes and connections, pleasant    Lab/Diagnostic data:    Most Recent 3 CBC's:  Recent Labs   Lab Test 06/10/22  0756 05/31/22  0723 05/30/22  0700   WBC 6.5 5.7 6.4   HGB 11.6* 10.2* 10.6*   MCV 94 93 94    196 195     Most Recent 3 BMP's:  Recent Labs   Lab Test 06/10/22  0756 05/31/22  0723 05/30/22  0700    140 141   POTASSIUM 3.3* 3.9 3.6   CHLORIDE 105 108* 107   CO2 31 28 28   BUN 24 22 25   CR 1.05 1.11 1.02   ANIONGAP 8 4* 6   EMMY 8.7 8.2* 8.2*   GLC 70 83 81       ASSESSMENT/PLAN:    (R55) Syncope and collapse  (primary encounter diagnosis)  Comment: Secondary to autonomic instability and Parkinson's disease.  Plan: PT and OT.    (G20) Parkinson's disease (H)  Comment: End-stage, severe autonomic  instability, hallucinations.  Plan: Consider palliative care, continue Sinemet.    (G90.9) Autonomic instability  Comment: Secondary to Parkinson's disease.  Blood pressures ranging 120s to 200s.  Plan: Continue PTA metoprolol, fludrocortisone, pyristigamine, and midodrine.  Parameters on midodrine, give for blood pressure under systolic 110.    (I10) Benign essential hypertension  Comment: Continues on metoprolol due to severe variability in blood pressures.  Plan: Continue PTA metoprolol 50 mg twice daily.    Orders:  Place parameters on midodrine, give for blood pressure less than systolic 110.    Electronically signed by:  Cy Jerry CNP                     Sincerely,        Cy Jerry, CNP

## 2022-06-06 NOTE — PROGRESS NOTES
[facility] GERIATRICS    Chief Complaint   Patient presents with     RECHECK     HPI:  Canelo Cook is a 82 year old  (1940), who is being seen today for an episodic care visit at: [facility] ([facility]) [[id number]]. Today's concern is: The primary encounter diagnosis was Physical deconditioning. Diagnoses of Generalized muscle weakness, Recurrent syncope, Acute metabolic encephalopathy, Parkinson disease (H), Adult failure to thrive, Dysuria, SYDNEY (acute kidney injury) (H), Stage 3 chronic kidney disease, unspecified whether stage 3a or 3b CKD (H), Hypertension, unspecified type, Dyslipidemia, goal LDL below 100, Coronary artery disease involving native coronary artery of native heart without angina pectoris, Bilateral leg edema, Gastroesophageal reflux disease with esophagitis, unspecified whether hemorrhage, Hypokalemia, Normocytic anemia, Constipation, unspecified constipation type, Orthostatic hypotension dysautonomic syndrome, 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    Met with patient who denies any chest pain, palpitations, shortness of breath, TRAORE, lightheadedness, dizziness, or cough. Family do report today some wheeze snce admission however I do not hear any today. He was using incentive spirometry in hospital but has not used yet here. Denies any abdominal discomfort. Denies N&V.  Denies any dysuria but continues to report urgency which is chronic. Denies loose or constipation. Appetite fair. Sleeping well. He denies any pain complaints. He does report feeling like he has some canker sores in the back of his throat for the past week. I do not see any today however he does report it is there and is aggavated by certain acidic foods he consumes.     BP Readings from Last 3 Encounters:   06/08/22 124/83   06/08/22 (!) 171/100   05/31/22 110/57     Wt Readings from Last 5 Encounters:   06/08/22 81.9 kg  (180 lb 8 oz)   06/08/22 86.2 kg (190 lb)   05/24/22 79.1 kg (174 lb 6.4 oz)   05/02/22 83.9 kg (185 lb)   04/04/22 83.9 kg (185 lb)     Allergies, and PMH/PSH reviewed in EPIC today.  REVIEW OF SYSTEMS:  10 point ROS of systems including Constitutional, Eyes, Respiratory, Cardiovascular, Gastroenterology, Genitourinary, Integumentary, Musculoskeletal, Psychiatric were all negative except for pertinent positives noted in my HPI.    Objective:   /83   Pulse 79   Temp 97.7  F (36.5  C)   Resp 16   Ht 1.829 m (6')   Wt 81.9 kg (180 lb 8 oz)   SpO2 96%   BMI 24.48 kg/m    GENERAL APPEARANCE:  Alert, in no distress, cooperative  ENT:  Mouth and posterior oropharynx normal, moist mucous membranes, [place]  EYES:  EOM, conjunctivae, lids, pupils and irises normal  NECK:  No adenopathy,masses or thyromegaly  RESP:  respiratory effort and palpation of chest normal, lungs clear to auscultation , no respiratory distress  CV:  Palpation and auscultation of heart done , regular rate and rhythm, no murmur, rub, or gallop, no edema  ABDOMEN:  normal bowel sounds, soft, nontender, no hepatosplenomegaly or other masses, no guarding or rebound  M/S:   Ambulates with staff and walker. Staff to assist with mobility and cares. Wheelchair for long distance needs  SKIN:  Inspection of skin and subcutaneous tissue baseline, Palpation of skin and subcutaneous tissue baseline  NEURO:   Cranial nerves 2-12 are normal tested and grossly at patient's baseline, no purposeful movement in upper and lower extremities  PSYCH:  oriented X 3, memory impaired , affect and mood normal      Most Recent 3 CBC's:  Recent Labs   Lab Test 05/31/22  0723 05/30/22  0700 05/29/22  0755   WBC 5.7 6.4 6.6   HGB 10.2* 10.6* 10.6*   MCV 93 94 93    195 190     Most Recent 3 BMP's:  Recent Labs   Lab Test 05/31/22  0723 05/30/22  0700 05/29/22  0755    141 142   POTASSIUM 3.9 3.6 3.6   CHLORIDE 108* 107 109*   CO2 28 28 28   BUN 22 25 24   CR  1.11 1.02 1.02   ANIONGAP 4* 6 5   EMMY 8.2* 8.2* 8.3*   GLC 83 81 85     Most Recent 2 LFT's:  Recent Labs   Lab Test 05/23/22  0351 05/22/22  1308   AST 14 12   ALT <9 <9   ALKPHOS 53 67   BILITOTAL 0.8 0.8     Most Recent Anemia Panel:  Recent Labs   Lab Test 05/31/22  0723   WBC 5.7   HGB 10.2*   HCT 31.7*   MCV 93          ASSESSMENT/PLAN:    (R53.81) Physical deconditioning  (primary encounter diagnosis)  (M62.81) Generalized muscle weakness  Comment: Acute on chronic. S/T below diagnosis.   Plan:   -Continue Physical therapy and Occupational therapy as directed  -SW to remain involved for safe discharge planning needs    (R55) Recurrent syncope  Comment: Acute on chronic. Patient initially presented for evaluation of recurrent syncope, appear to be vasovagal in etiology.  Recurrent syncope thought to be multifactorial secondary to advanced Parkinson's, orthostatic hypotension, dehydration.  CT head on admission negative for any acute intracranial processes, masses, or bleed.  Pacemaker interrogated in the ER without any remarkable irregularities.  Carotid ultrasound 5/22 unremarkable, TTE 5/22 ejection fraction 65% with moderate to severe LVH but no obvious valvular disease.  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  Most of these episodes are related to bowel movements or eating.  Dizziness seems to be better controlled with keeping patient's blood pressure on the higher end and with use of fludrocortisone and PT/OT.  Have decreased midodrine from home dose to 2.5 mg p.o. twice daily as multiple severe range pressures during inpatient stay.  Recurrent  Plan:   -Monitor for worsening s/sx of concerns  -Continue midodrine 2.5mg BID to keep SBP>110. HOLD if SBP>170.   -Continue metoprolol 37.5mg daily. Add HOLD parameters if SBP<100  -Continue Fludrocortisone Acetate Tablet 0.1 MG daily.    -Monitor BP and HR as directed.   -Encourage incentive spirometry every 4 hours while awake.   -Continue Mestinon Tablet TID as directed  -BMP and CBC due 6/10/22    (G93.41) Acute metabolic encephalopathy  Comment: Acute. Patient with prolonged episode of unresponsiveness during syncopal episode prior to admission as well as intermittent hallucinations described by patient's wife  CT head negative, EEG 5/23 abnormal with diffuse slow background which is nonspecific for generalized cerebral dysfunction but negative for seizure activity.  Unable to perform MRI brain secondary to pacemaker placement.  No signs of infection, urinalysis appears uninfected.  Encephalopathy more likely secondary to hypotension, orthostasis, worsening Parkinson's.  Plan:   -Monitor for worsening s/sx of concerns.   -Monitor for changes in mood and behaviors  -BMP and CBC due 6/10/22    (G20) Parkinson disease (H)  (R62.7) Adult failure to thrive  Comment: Chronic. Patient with prolonged episode of unresponsiveness during syncopal episode prior to admission as well as intermittent hallucinations described by patient's wife  CT head negative, EEG 5/23 abnormal with diffuse slow background which is nonspecific for generalized cerebral dysfunction but negative for seizure activity.  Unable to perform MRI brain secondary to pacemaker placement.  No signs of infection, urinalysis appears uninfected.  Encephalopathy more likely secondary to hypotension, orthostasis, worsening Parkinson's.  Plan:   -Monitor for worsening s/sx of concerns  -Continue Carbidopa-Levodopa Tablet  MG 2 tabs QID as directed  -Follow up with neurology as directed.   -BMP and CBC due 6/10/22    (R30.0) Dysuria  (N17.9) SYDNEY (acute kidney injury) (H)  (N18.30) Stage 3 chronic kidney disease, unspecified whether stage 3a or 3b CKD (H)  Comment: Acute on chronic. Patient endorses urinary urgency and dysuria on 5/28, urinalysis 5/28 negative for signs of urinary tract  infection.  Patient likely having some mild bladder spasms associated with Parkinson's.  Continue home tolterodine, Pyridium was used as needed. Baseline creatinine approximately 1.1-1.2, natasha to 1.55 at time of admission.  Resolved with IVF.  Plan:  -Monitor urinary status  -Continue tolterodine as directed without change  -Continue flomax 0.4mg daily  -BMP and CBC due 6/10/22    (I10) Hypertension, unspecified type  (E78.5) Dyslipidemia, goal LDL below 100  (I25.10) Coronary artery disease involving native coronary artery of native heart without angina pectoris  (R60.0) Bilateral leg edema  Comment: Chronic. Patient can be extremely hypertensive and then swing drastically to hypotensive secondary to autonomic instability.  Fludrocortisone and a lower dose midodrine being used to help with orthostatic hypotension and only metoprolol used for blood pressure control.  Plan:   -Monitor for worsening s/sx of concerns  -Continue midodrine 2.5mg BID to keep SBP>110. HOLD if SBP>160.   -Continue metoprolol 37.5mg daily. Add HOLD parameters if SBP<100  -Continue Fludrocortisone Acetate Tablet 0.1 MG daily.   -Monitor BP and HR as directed.  -Continue statin and asa daily as directed.    -BMP and CBC due 6/10/22    (E87.6) Hypokalemia  Comment: Acute on chronic. Replaced in hospital. Not currently on supplementation  Plan:   -Monitor for worsening s/sx of concerns.  -BMP and CBC due 6/10/22    (D64.9) Normocytic anemia  Comment: Chronic. Hemoglobin 10.3 with MCV of 94.  Plan:   -Monitor bleeding risks  -Monitor falls  -Continue asa as directed  -BMP and CBC due 6/10/22    (K21.00) Gastroesophageal reflux disease with esophagitis, unspecified whether hemorrhage  (K59.00) Constipation  Comment: Chronic. 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  Most of these episodes are related to bowel movements or  eating.  Plan:  -Monitor BM Patterns  -Continue bisacodyl daily PRN  -Continue senna S 2 tabs daily  -Continue magnesium daily.   -Continue magnesium hydroxide 30mL daily.   -Continue miralax daily  -Continue omeprazole 20mg daily.   -Monitor for worsening s/sx of concerns.   -BMP and CBC due 6/10/22    (K13.6) irritation of oral cavity  Comment: Acute for past week per patient. No redness or sores noted by provider today but patient reports it is back of tongue.   Plan:  -Monitor for worsening s/sx of concerns  -Avoid acidic or irritant foods and fluids  -Start nystatin swish and swallow for 10 days  -Monitor for worsening s/sx of concerns.   -BMP and CBC due 6/10/22    Electronically signed by:  Marilu Mcdermott DNP, APRN

## 2022-06-08 NOTE — LETTER
6/8/2022        RE: Canelo Cook  [address]  [address]  [place] [address]        [facility] GERIATRICS    Chief Complaint   Patient presents with     RECHECK     HPI:  Canelo Cook is a 82 year old  (1940), who is being seen today for an episodic care visit at: [facility] ([facility]) [[id number]]. Today's concern is: The primary encounter diagnosis was Physical deconditioning. Diagnoses of Generalized muscle weakness, Recurrent syncope, Acute metabolic encephalopathy, Parkinson disease (H), Adult failure to thrive, Dysuria, SYDNEY (acute kidney injury) (H), Stage 3 chronic kidney disease, unspecified whether stage 3a or 3b CKD (H), Hypertension, unspecified type, Dyslipidemia, goal LDL below 100, Coronary artery disease involving native coronary artery of native heart without angina pectoris, Bilateral leg edema, Gastroesophageal reflux disease with esophagitis, unspecified whether hemorrhage, Hypokalemia, Normocytic anemia, Constipation, unspecified constipation type, Orthostatic hypotension dysautonomic syndrome, 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    Met with patient who denies any chest pain, palpitations, shortness of breath, TRAORE, lightheadedness, dizziness, or cough. Family do report today some wheeze snce admission however I do not hear any today. He was using incentive spirometry in hospital but has not used yet here. Denies any abdominal discomfort. Denies N&V.  Denies any dysuria but continues to report urgency which is chronic. Denies loose or constipation. Appetite fair. Sleeping well. He denies any pain complaints. He does report feeling like he has some canker sores in the back of his throat for the past week. I do not see any today however he does report it is there and is aggavated by certain acidic foods he consumes.     BP Readings from Last 3 Encounters:   06/08/22 124/83    06/08/22 (!) 171/100   05/31/22 110/57     Wt Readings from Last 5 Encounters:   06/08/22 81.9 kg (180 lb 8 oz)   06/08/22 86.2 kg (190 lb)   05/24/22 79.1 kg (174 lb 6.4 oz)   05/02/22 83.9 kg (185 lb)   04/04/22 83.9 kg (185 lb)     Allergies, and PMH/PSH reviewed in EPIC today.  REVIEW OF SYSTEMS:  10 point ROS of systems including Constitutional, Eyes, Respiratory, Cardiovascular, Gastroenterology, Genitourinary, Integumentary, Musculoskeletal, Psychiatric were all negative except for pertinent positives noted in my HPI.    Objective:   /83   Pulse 79   Temp 97.7  F (36.5  C)   Resp 16   Ht 1.829 m (6')   Wt 81.9 kg (180 lb 8 oz)   SpO2 96%   BMI 24.48 kg/m    GENERAL APPEARANCE:  Alert, in no distress, cooperative  ENT:  Mouth and posterior oropharynx normal, moist mucous membranes, [place]  EYES:  EOM, conjunctivae, lids, pupils and irises normal  NECK:  No adenopathy,masses or thyromegaly  RESP:  respiratory effort and palpation of chest normal, lungs clear to auscultation , no respiratory distress  CV:  Palpation and auscultation of heart done , regular rate and rhythm, no murmur, rub, or gallop, no edema  ABDOMEN:  normal bowel sounds, soft, nontender, no hepatosplenomegaly or other masses, no guarding or rebound  M/S:   Ambulates with staff and walker. Staff to assist with mobility and cares. Wheelchair for long distance needs  SKIN:  Inspection of skin and subcutaneous tissue baseline, Palpation of skin and subcutaneous tissue baseline  NEURO:   Cranial nerves 2-12 are normal tested and grossly at patient's baseline, no purposeful movement in upper and lower extremities  PSYCH:  oriented X 3, memory impaired , affect and mood normal      Most Recent 3 CBC's:  Recent Labs   Lab Test 05/31/22  0723 05/30/22  0700 05/29/22  0755   WBC 5.7 6.4 6.6   HGB 10.2* 10.6* 10.6*   MCV 93 94 93    195 190     Most Recent 3 BMP's:  Recent Labs   Lab Test 05/31/22  0723 05/30/22  0700 05/29/22  0755     141 142   POTASSIUM 3.9 3.6 3.6   CHLORIDE 108* 107 109*   CO2 28 28 28   BUN 22 25 24   CR 1.11 1.02 1.02   ANIONGAP 4* 6 5   EMMY 8.2* 8.2* 8.3*   GLC 83 81 85     Most Recent 2 LFT's:  Recent Labs   Lab Test 05/23/22  0351 05/22/22  1308   AST 14 12   ALT <9 <9   ALKPHOS 53 67   BILITOTAL 0.8 0.8     Most Recent Anemia Panel:  Recent Labs   Lab Test 05/31/22  0723   WBC 5.7   HGB 10.2*   HCT 31.7*   MCV 93          ASSESSMENT/PLAN:    (R53.81) Physical deconditioning  (primary encounter diagnosis)  (M62.81) Generalized muscle weakness  Comment: Acute on chronic. S/T below diagnosis.   Plan:   -Continue Physical therapy and Occupational therapy as directed  -SW to remain involved for safe discharge planning needs    (R55) Recurrent syncope  Comment: Acute on chronic. Patient initially presented for evaluation of recurrent syncope, appear to be vasovagal in etiology.  Recurrent syncope thought to be multifactorial secondary to advanced Parkinson's, orthostatic hypotension, dehydration.  CT head on admission negative for any acute intracranial processes, masses, or bleed.  Pacemaker interrogated in the ER without any remarkable irregularities.  Carotid ultrasound 5/22 unremarkable, TTE 5/22 ejection fraction 65% with moderate to severe LVH but no obvious valvular disease.  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  Most of these episodes are related to bowel movements or eating.  Dizziness seems to be better controlled with keeping patient's blood pressure on the higher end and with use of fludrocortisone and PT/OT.  Have decreased midodrine from home dose to 2.5 mg p.o. twice daily as multiple severe range pressures during inpatient stay.  Recurrent  Plan:   -Monitor for worsening s/sx of concerns  -Continue midodrine 2.5mg BID to keep SBP>110. HOLD if SBP>170.   -Continue metoprolol  37.5mg daily. Add HOLD parameters if SBP<100  -Continue Fludrocortisone Acetate Tablet 0.1 MG daily.   -Monitor BP and HR as directed.   -Encourage incentive spirometry every 4 hours while awake.   -Continue Mestinon Tablet TID as directed  -BMP and CBC due 6/10/22    (G93.41) Acute metabolic encephalopathy  Comment: Acute. Patient with prolonged episode of unresponsiveness during syncopal episode prior to admission as well as intermittent hallucinations described by patient's wife  CT head negative, EEG 5/23 abnormal with diffuse slow background which is nonspecific for generalized cerebral dysfunction but negative for seizure activity.  Unable to perform MRI brain secondary to pacemaker placement.  No signs of infection, urinalysis appears uninfected.  Encephalopathy more likely secondary to hypotension, orthostasis, worsening Parkinson's.  Plan:   -Monitor for worsening s/sx of concerns.   -Monitor for changes in mood and behaviors  -BMP and CBC due 6/10/22    (G20) Parkinson disease (H)  (R62.7) Adult failure to thrive  Comment: Chronic. Patient with prolonged episode of unresponsiveness during syncopal episode prior to admission as well as intermittent hallucinations described by patient's wife  CT head negative, EEG 5/23 abnormal with diffuse slow background which is nonspecific for generalized cerebral dysfunction but negative for seizure activity.  Unable to perform MRI brain secondary to pacemaker placement.  No signs of infection, urinalysis appears uninfected.  Encephalopathy more likely secondary to hypotension, orthostasis, worsening Parkinson's.  Plan:   -Monitor for worsening s/sx of concerns  -Continue Carbidopa-Levodopa Tablet  MG 2 tabs QID as directed  -Follow up with neurology as directed.   -BMP and CBC due 6/10/22    (R30.0) Dysuria  (N17.9) SYDNEY (acute kidney injury) (H)  (N18.30) Stage 3 chronic kidney disease, unspecified whether stage 3a or 3b CKD (H)  Comment: Acute on chronic.  Patient endorses urinary urgency and dysuria on 5/28, urinalysis 5/28 negative for signs of urinary tract infection.  Patient likely having some mild bladder spasms associated with Parkinson's.  Continue home tolterodine, Pyridium was used as needed. Baseline creatinine approximately 1.1-1.2, natasha to 1.55 at time of admission.  Resolved with IVF.  Plan:  -Monitor urinary status  -Continue tolterodine as directed without change  -Continue flomax 0.4mg daily  -BMP and CBC due 6/10/22    (I10) Hypertension, unspecified type  (E78.5) Dyslipidemia, goal LDL below 100  (I25.10) Coronary artery disease involving native coronary artery of native heart without angina pectoris  (R60.0) Bilateral leg edema  Comment: Chronic. Patient can be extremely hypertensive and then swing drastically to hypotensive secondary to autonomic instability.  Fludrocortisone and a lower dose midodrine being used to help with orthostatic hypotension and only metoprolol used for blood pressure control.  Plan:   -Monitor for worsening s/sx of concerns  -Continue midodrine 2.5mg BID to keep SBP>110. HOLD if SBP>160.   -Continue metoprolol 37.5mg daily. Add HOLD parameters if SBP<100  -Continue Fludrocortisone Acetate Tablet 0.1 MG daily.   -Monitor BP and HR as directed.  -Continue statin and asa daily as directed.    -BMP and CBC due 6/10/22    (E87.6) Hypokalemia  Comment: Acute on chronic. Replaced in hospital. Not currently on supplementation  Plan:   -Monitor for worsening s/sx of concerns.  -BMP and CBC due 6/10/22    (D64.9) Normocytic anemia  Comment: Chronic. Hemoglobin 10.3 with MCV of 94.  Plan:   -Monitor bleeding risks  -Monitor falls  -Continue asa as directed  -BMP and CBC due 6/10/22    (K21.00) Gastroesophageal reflux disease with esophagitis, unspecified whether hemorrhage  (K59.00) Constipation  Comment: Chronic. Patient with severe autonomic instability at baseline and prior to admission was noted by outpatient neurologist to be  having almost daily syncopal episodes.  Most of these episodes are related to bowel movements or eating.  Plan:  -Monitor BM Patterns  -Continue bisacodyl daily PRN  -Continue senna S 2 tabs daily  -Continue magnesium daily.   -Continue magnesium hydroxide 30mL daily.   -Continue miralax daily  -Continue omeprazole 20mg daily.   -Monitor for worsening s/sx of concerns.   -BMP and CBC due 6/10/22    (K13.6) irritation of oral cavity  Comment: Acute for past week per patient. No redness or sores noted by provider today but patient reports it is back of tongue.   Plan:  -Monitor for worsening s/sx of concerns  -Avoid acidic or irritant foods and fluids  -Start nystatin swish and swallow for 10 days  -Monitor for worsening s/sx of concerns.   -BMP and CBC due 6/10/22    Electronically signed by:  Marilu Mcdermott DNP, APRN              Sincerely,        TOVA Maldonado CNP

## 2022-06-08 NOTE — PROGRESS NOTES
[facility] GERIATRICS    PRIMARY CARE PROVIDER AND CLINIC:  Hung Camacho MD, [address] [facility] [address]  Chief Complaint   Patient presents with     Hospital F/U      [facility] Medical Record Number:  [id number]  Place of Service where encounter took place:  [facility] (TCU) [[id number]]    Canelo Cook  is a 82 year old  (1940), admitted to the above facility from  [facility]. Hospital stay 5/22 through 5/31..   HPI: Patient has a history of Parkinson's disease and autonomic dysfunction.  Arrived at [facility] ED after experiencing recurrent syncope and falls.  CT of the head was negative for any acute process.  Remainder of work-up essentially normal.  He does have severe autonomic instability at baseline and has a neurologist.  Supposedly these are mostly related to bowel movements or eating.  He did improve with sitting creasing blood pressure and the use of fludrocortisone.  He did have some elevated blood pressures up to 200s in the hospital so midodrine was decreased to 2.5 twice daily.  He was also taking Mestinon 60 mg 3 times a day.  He has had intermittent hallucinations but negative for seizure activity.  No signs of infection.  This was attributed to worsening Parkinson's disease.  In the hospital patient did meet with palliative and hospice care however family is not ready for this at that time.  He also remains on metoprolol for blood Pressure control.     He is seen today with his wife in the room.  Nursing is asking for parameters on midodrine due to severely elevated blood pressures between 180 and 200.  Wife actually does mention hospice and palliative care today which encouraged her to pursue with social work if that is the direction they want to go.  Canelo is alert and oriented, is known to me from another TCU admission at a different facility.  He is eating and drinking well here.  Attending therapies.    CODE STATUS/ADVANCE  "DIRECTIVES DISCUSSION:  No CPR- Do NOT Intubate  DNR / DNI  ALLERGIES:   Allergies   Allergen Reactions     Sulfa Drugs      Acetaminophen      Other reaction(s): Agitation  Restless  Feeling - skin crawling        PAST MEDICAL HISTORY:   Past Medical History:   Diagnosis Date     Acute chest pain 11/10/2019     Acute non-Q wave non-ST elevation myocardial infarction (NSTEMI) (H) 11/11/2019     Anemia 11/15/2013     Closed fracture of multiple ribs of left side with routine healing 2/18/2020     Coronary artery disease due to lipid rich plaque 11/11/2019     DNAR (do not attempt resuscitation) 11/11/2019     Essential hypertension      Fall at home, sequela 12/6/2019     GERD (gastroesophageal reflux disease)      HLD (hyperlipidemia)      Near syncope 10/12/2015     Parkinson's disease (H)      SA node dysfunction (H) 07/29/2015    \"chronotropic incompetence\" per [facility] EP notes     Syncope, unspecified syncope type 11/10/2019     Vitamin D deficiency       PAST SURGICAL HISTORY:   has a past surgical history that includes back surgery; Implant pacemaker (08/10/2015); Implant prosthesis penis inflatable; Cataract Extraction; Esophagoscopy, gastroscopy, duodenoscopy (EGD), combined (N/A, 3/20/2016); Pr Edg Us Exam Surgical Alter Stom Duodenum/Jejunum (N/A, 3/23/2016); Cv Coronary Angiogram (N/A, 11/11/2019); Cv Left Heart Catheterization With Left Ventriculogram (N/A, 11/11/2019); and Coronary Stent Placement (11/11/2019).  FAMILY HISTORY: family history includes Cancer in his sister; Heart Disease in his brother, brother, brother, brother, father, and mother; Lung Cancer in his brother, brother, and brother; Myocardial Infarction in his father and sister.  SOCIAL HISTORY:   reports that he has quit smoking. He has never used smokeless tobacco. He reports current alcohol use.  Patient's living condition: lives with spouse    Post Discharge Medication Reconciliation Status: discharge medications reconciled, " continue medications without change  Current Outpatient Medications   Medication Sig     aspirin 81 MG EC tablet Take 81 mg by mouth daily     atorvastatin (LIPITOR) 40 MG tablet Take 40 mg by mouth daily     azelastine (ASTELIN) 0.1 % nasal spray Spray 1 spray into both nostrils daily as needed for rhinitis     B Complex-C (VITAMIN B COMPLEX W/VITAMIN C) TABS tablet Take 1 tablet by mouth daily     bisacodyl (DULCOLAX) 10 MG suppository Place 1 suppository (10 mg) rectally daily as needed for constipation     carbidopa-levodopa (SINEMET)  MG tablet Take 2 tablets by mouth 4 times daily     cyanocobalamin (VITAMIN B-12) 1000 MCG tablet Take 1,000 mcg by mouth daily     fludrocortisone (FLORINEF) 0.1 MG tablet Take 0.1 mg by mouth daily     hydrALAZINE (APRESOLINE) 10 MG tablet Take 1 tablet (10 mg) by mouth 3 times daily as needed (for SBP >180)     magnesium hydroxide (MILK OF MAGNESIA) 400 MG/5ML suspension Take 30 mLs by mouth daily     magnesium oxide (MAGNESIUM OXIDE) 400 MG tablet Take 400 mg by mouth daily     metoprolol succinate ER (TOPROL XL) 25 MG 24 hr tablet Take 2 tablets (50 mg) by mouth daily     midodrine (PROAMATINE) 2.5 MG tablet Take 2 tablets (5 mg) by mouth 3 times daily HOLD if SBP>180     mirtazapine (REMERON) 7.5 MG tablet Take 7.5 mg by mouth At Bedtime     nitroGLYcerin (NITROSTAT) 0.4 MG sublingual tablet Place 1 tablet (0.4 mg) under the tongue every 5 minutes as needed for chest pain     nystatin (MYCOSTATIN) 218073 UNIT/GM external ointment Apply topically 2 times daily     nystatin (MYCOSTATIN) 235816 UNIT/ML suspension Take 5 mLs (500,000 Units) by mouth 4 times daily for 10 days     omeprazole (PRILOSEC) 20 MG DR capsule Take 20 mg by mouth daily     polyethylene glycol (MIRALAX) 17 GM/Dose powder Take 17 g by mouth daily     potassium chloride ER (K-TAB) 20 MEQ CR tablet Take 1 tablet (20 mEq) by mouth daily     pyridostigmine (MESTINON) 60 MG tablet Take 30 mg by mouth 3  times daily     sennosides (SENOKOT) 8.6 MG tablet Take 2 tablets by mouth daily     tamsulosin (FLOMAX) 0.4 MG capsule Take 0.4 mg by mouth daily     tolterodine ER (DETROL LA) 4 MG 24 hr capsule Take 4 mg by mouth daily     vitamin D3 (CHOLECALCIFEROL) 50 mcg (2000 units) tablet Take 1 tablet by mouth daily     No current facility-administered medications for this visit.       ROS:  4 point ROS including Respiratory, CV, GI and , other than that noted in the HPI,  is negative    Vitals:  BP (!) 171/100   Pulse 87   Temp 98.1  F (36.7  C)   Resp 18   Ht 1.829 m (6')   Wt 86.2 kg (190 lb)   SpO2 98%   BMI 25.77 kg/m    Exam:  Physical Exam   General appearance: alert, appears stated age and cooperative.   Head: Normocephalic, without obvious abnormality, atraumatic, Eyes: sclera anicteric.  Lungs: respirations unlabored.  ABDOMEN: Globular and soft, non tender.    Extremities: extremities normal, atraumatic, no cyanosis or edema  Skin: Skin color, texture, turgor normal. No rashes or lesions  Neurologic:oriented. No focal deficits.   Psych: interacts well with caregivers, exhibits logical thought processes and connections, pleasant    Lab/Diagnostic data:    Most Recent 3 CBC's:  Recent Labs   Lab Test 06/10/22  0756 05/31/22  0723 05/30/22  0700   WBC 6.5 5.7 6.4   HGB 11.6* 10.2* 10.6*   MCV 94 93 94    196 195     Most Recent 3 BMP's:  Recent Labs   Lab Test 06/10/22  0756 05/31/22  0723 05/30/22  0700    140 141   POTASSIUM 3.3* 3.9 3.6   CHLORIDE 105 108* 107   CO2 31 28 28   BUN 24 22 25   CR 1.05 1.11 1.02   ANIONGAP 8 4* 6   EMMY 8.7 8.2* 8.2*   GLC 70 83 81       ASSESSMENT/PLAN:    (R55) Syncope and collapse  (primary encounter diagnosis)  Comment: Secondary to autonomic instability and Parkinson's disease.  Plan: PT and OT.    (G20) Parkinson's disease (H)  Comment: End-stage, severe autonomic instability, hallucinations.  Plan: Consider palliative care, continue Sinemet.    (G90.9)  Autonomic instability  Comment: Secondary to Parkinson's disease.  Blood pressures ranging 120s to 200s.  Plan: Continue PTA metoprolol, fludrocortisone, pyristigamine, and midodrine.  Parameters on midodrine, give for blood pressure under systolic 110.    (I10) Benign essential hypertension  Comment: Continues on metoprolol due to severe variability in blood pressures.  Plan: Continue PTA metoprolol 50 mg twice daily.    Orders:  Place parameters on midodrine, give for blood pressure less than systolic 110.    Electronically signed by:  Cy Jerry, EDISON

## 2022-06-10 NOTE — TELEPHONE ENCOUNTER
[place] [facility] TELEPHONE ENCOUNTER    Chief Complaint   Patient presents with     Hypotension       Canelo Cook is a 82 year old  (1940),Nurse called today to report: fluctuation of SBP readings. Sometimes he is >180 and sometimes he is <100. He does become symptomatic when he gets hypotensive.     ASSESSMENT/PLAN      Change midodrne to 5mg TID. HOLD if SBP>180    Monitor for worsening s/sx of concerns    Electronically signed by:   TOVA Maldonado CNP

## 2022-06-10 NOTE — TELEPHONE ENCOUNTER
[place] [facility] TELEPHONE ENCOUNTER    No chief complaint on file.      Canelo Cook is a 82 year old  (1940),Nurse called today to report: lab results reviewed. All labs unremarkable except for decreased levels of potassium today. Levels 3.3. Not currently on supplementation.     ASSESSMENT/PLAN      Start potassium chloride 20mEq daily    BMP due 6/15/22    Electronically signed by:   TOVA Maldonado CNP

## 2022-06-12 NOTE — PROGRESS NOTES
[facility] GERIATRICS    Chief Complaint   Patient presents with     RECHECK     HPI:  Canelo Cook is a 82 year old  (1940), who is being seen today for an episodic care visit at: [facility] ([facility]) [[id number]]. Today's concern is: The primary encounter diagnosis was Hypokalemia. Diagnoses of Physical deconditioning, Generalized muscle weakness, Recurrent syncope, Acute metabolic encephalopathy, Parkinson disease (H), Adult failure to thrive, Dysuria, SYDNEY (acute kidney injury) (H), Stage 3 chronic kidney disease, unspecified whether stage 3a or 3b CKD (H), Hypertension, unspecified type, Dyslipidemia, goal LDL below 100, Coronary artery disease involving native coronary artery of native heart without angina pectoris, Bilateral leg edema, Gastroesophageal reflux disease with esophagitis, unspecified whether hemorrhage, Normocytic anemia, Constipation, unspecified constipation type, Irritation of oral cavity, Benign essential hypertension, and Autonomic instability were also pertinent to this visit.     Met with patient who denies any chest pain, palpitations, shortness of breath, TRAORE, lightheadedness, dizziness, or cough. Denies any abdominal discomfort. Denies N&V. Denies B&B concerns. Denies dysuria or frequency. Denies loose or constipation. LBM today which was large and soft. Appetite good. Sleeping well. SBP have been fluctuating with ranges -230. Has been using PRN hydralazine at times due to elevated SBP readings. He denies any acute concerns. Family at bedside without concerns.     BP Readings from Last 3 Encounters:   06/15/22 117/79   06/08/22 124/83   06/08/22 (!) 171/100     Wt Readings from Last 5 Encounters:   06/15/22 80.7 kg (177 lb 14.4 oz)   06/08/22 81.9 kg (180 lb 8 oz)   06/08/22 86.2 kg (190 lb)   05/24/22 79.1 kg (174 lb 6.4 oz)   05/02/22 83.9 kg (185 lb)     Allergies, and PMH/PSH reviewed in EPIC today.  REVIEW OF SYSTEMS:  10 point ROS of systems including  Constitutional, Eyes, Respiratory, Cardiovascular, Gastroenterology, Genitourinary, Integumentary, Musculoskeletal, Psychiatric were all negative except for pertinent positives noted in my HPI.    Objective:   /79   Pulse 62   Temp 97.7  F (36.5  C)   Resp 18   Ht 1.829 m (6')   Wt 80.7 kg (177 lb 14.4 oz)   SpO2 95%   BMI 24.13 kg/m    GENERAL APPEARANCE:  Alert, in no distress, cooperative  ENT:  Mouth and posterior oropharynx normal, moist mucous membranes, [place]  EYES:  EOM, conjunctivae, lids, pupils and irises normal  NECK:  No adenopathy,masses or thyromegaly  RESP:  respiratory effort and palpation of chest normal, lungs clear to auscultation , no respiratory distress  CV:  Palpation and auscultation of heart done , regular rate and rhythm, no murmur, rub, or gallop, peripheral edema 1+ in BLE  ABDOMEN:  normal bowel sounds, soft, nontender, no hepatosplenomegaly or other masses, no guarding or rebound  M/S:   Ko lift. Wheelchair bound.   SKIN:  Inspection of skin and subcutaneous tissue baseline, Palpation of skin and subcutaneous tissue baseline  NEURO:   Cranial nerves 2-12 are normal tested and grossly at patient's baseline, no purposeful movement in upper and lower extremities  PSYCH:  oriented X 3, memory impaired , affect and mood normal      Most Recent 3 CBC's:  Recent Labs   Lab Test 06/15/22  0610 06/10/22  0756 05/31/22  0723   WBC 6.2 6.5 5.7   HGB 10.4* 11.6* 10.2*   MCV 92 94 93    245 196     Most Recent 3 BMP's:  Recent Labs   Lab Test 06/15/22  0610 06/10/22  0756 05/31/22  0723    144 140   POTASSIUM 3.7 3.3* 3.9   CHLORIDE 108* 105 108*   CO2 24 31 28   BUN 28 24 22   CR 0.98 1.05 1.11   ANIONGAP 11 8 4*   EMMY 8.3* 8.7 8.2*   GLC 71 70 83     Most Recent 2 LFT's:  Recent Labs   Lab Test 05/23/22  0351 05/22/22  1308   AST 14 12   ALT <9 <9   ALKPHOS 53 67   BILITOTAL 0.8 0.8     Most Recent Urinalysis:  Recent Labs   Lab Test 05/28/22  1904 05/23/22  1228  03/01/22 2122 06/23/20  1144   COLOR Yellow Yellow   < > Yellow   APPEARANCE Clear Clear   < > Clear   URINEGLC Negative Negative   < > Negative   URINEBILI Negative Negative   < > Negative   URINEKETONE Negative Trace*   < > Negative   SG 1.017 1.016   < > 1.010   UBLD Negative Negative   < > Negative   URINEPH 5.5 5.5   < > 7.5   PROTEIN Negative 20 *   < > Negative   UROBILINOGEN  --   --   --  <2.0 E.U./dL   NITRITE Negative Negative   < > Negative   LEUKEST Negative Negative   < > Negative   RBCU  --  1   < >  --    WBCU  --  3   < >  --     < > = values in this interval not displayed.     Most Recent Anemia Panel:  Recent Labs   Lab Test 06/15/22  0610   WBC 6.2   HGB 10.4*   HCT 31.8*   MCV 92        ASSESSMENT/PLAN:     (R53.81) Physical deconditioning  (primary encounter diagnosis)  (M62.81) Generalized muscle weakness  Comment: Acute on chronic. S/T below diagnosis.   Plan:   -Continue Physical therapy and Occupational therapy as directed  -SW to remain involved for safe discharge planning needs     (R55) Recurrent syncope  Comment: Acute on chronic. Patient initially presented for evaluation of recurrent syncope, appear to be vasovagal in etiology.  Recurrent syncope thought to be multifactorial secondary to advanced Parkinson's, orthostatic hypotension, dehydration.  CT head on admission negative for any acute intracranial processes, masses, or bleed.  Pacemaker interrogated in the ER without any remarkable irregularities.  Carotid ultrasound 5/22 unremarkable, TTE 5/22 ejection fraction 65% with moderate to severe LVH but no obvious valvular disease.  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  Most of these episodes are related to bowel movements or eating.  Dizziness seems to be better controlled with keeping patient's blood pressure on the higher end and with use of  fludrocortisone and PT/OT.    Plan:   -Monitor for worsening s/sx of concerns  -Change midodrine to 5mg TID PRN. Give if SBP<100.    -Start lisinopril 2.5mg daily  -Hydralazine PRN if SBP>180  -Continue metoprolol 50mg daily. HOLD parameters if SBP<100 (dose recently increased on 6/10)  -Continue Fludrocortisone Acetate Tablet 0.1 MG daily.   -Monitor BP and HR as directed.   -Encourage incentive spirometry every 4 hours while awake.   -Continue Mestinon Tablet TID as directed  -Recent labs stable today. Trend labs periodically while on TCU     (G93.41) Acute metabolic encephalopathy  Comment: Acute. Patient with prolonged episode of unresponsiveness during syncopal episode prior to admission as well as intermittent hallucinations described by patient's wife  CT head negative, EEG 5/23 abnormal with diffuse slow background which is nonspecific for generalized cerebral dysfunction but negative for seizure activity.  Unable to perform MRI brain secondary to pacemaker placement.  No signs of infection, urinalysis appears uninfected.  Encephalopathy more likely secondary to hypotension, orthostasis, worsening Parkinson's.  Plan:   -Monitor for worsening s/sx of concerns.   -Monitor for changes in mood and behaviors  -Recent labs stable today. Trend labs periodically while on TCU     (G20) Parkinson disease (H)  (R62.7) Adult failure to thrive  Comment: Chronic. Patient with prolonged episode of unresponsiveness during syncopal episode prior to admission as well as intermittent hallucinations described by patient's wife  CT head negative, EEG 5/23 abnormal with diffuse slow background which is nonspecific for generalized cerebral dysfunction but negative for seizure activity.  Unable to perform MRI brain secondary to pacemaker placement.  No signs of infection, urinalysis appears uninfected.  Encephalopathy more likely secondary to hypotension, orthostasis, worsening Parkinson's.  Plan:   -Monitor for worsening s/sx of  concerns  -Continue Carbidopa-Levodopa Tablet  MG 2 tabs QID as directed  -Follow up with neurology as directed. Scheduled for 6/27/22  -Recent labs stable today. Trend labs periodically while on TCU     (R30.0) Dysuria  (N17.9) SYDNEY (acute kidney injury) (H)  (N18.30) Stage 3 chronic kidney disease, unspecified whether stage 3a or 3b CKD (H)  Comment: Acute on chronic. Patient endorses urinary urgency and dysuria on 5/28, urinalysis 5/28 negative for signs of urinary tract infection.  Patient likely having some mild bladder spasms associated with Parkinson's.  Continue home tolterodine, Pyridium was used as needed. Baseline creatinine approximately 1.1-1.2, natasha to 1.55 at time of admission.  Resolved with IVF.  Plan:  -Monitor urinary status  -Continue tolterodine as directed without change  -Continue flomax 0.4mg daily  -Recent labs stable today. Trend labs periodically while on TCU     (I10) Hypertension, unspecified type  (E78.5) Dyslipidemia, goal LDL below 100  (I25.10) Coronary artery disease involving native coronary artery of native heart without angina pectoris  (R60.0) Bilateral leg edema  Comment: Chronic. Patient can be extremely hypertensive and then swing drastically to hypotensive secondary to autonomic instability.  Fludrocortisone and a lower dose midodrine being used to help with orthostatic hypotension and only metoprolol used for blood pressure control. Minimal edema noted to bilateral lower extremities.   Plan:   -Monitor for worsening s/sx of concerns  -Continue Knee high Bilateral tubigrip daily. On in AM and off at HS.   -Change midodrine to 5mg TID PRN. Give if SBP<100.    -Start lisinopril 2.5mg daily.   -Hydralazine 10mg TID PRN for SBP>180  -Continue metoprolol 50mg daily. HOLD parameters if SBP<100 (dose recently increased on 6/10)  -Continue Fludrocortisone Acetate Tablet 0.1 MG daily.   -Monitor BP and HR as directed.  -Continue statin and asa daily as directed.    -Recent labs  stable today. Trend labs periodically while on TCU     (E87.6) Hypokalemia  Comment: Acute on chronic. Replaced in hospital. Recent levels 3.3. started on supplementation  Plan:   -Monitor for worsening s/sx of concerns.  -Continue potassium chloride 20mEq daily  -Recent labs stable today. Trend labs periodically while on TCU     (D64.9) Normocytic anemia  Comment: Chronic. Hemoglobin 10.3 with MCV of 94.  Plan:   -Monitor bleeding risks  -Monitor falls  -Continue asa as directed  -Recent labs stable today. Trend labs periodically while on TCU     (K21.00) Gastroesophageal reflux disease with esophagitis, unspecified whether hemorrhage  (K59.00) Constipation  Comment: Chronic. 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  Most of these episodes are related to bowel movements or eating.  Plan:  -Monitor BM Patterns  -Continue bisacodyl daily PRN  -Continue senna S 2 tabs daily  -Continue magnesium daily.   -Continue magnesium hydroxide 30mL daily.   -Continue miralax daily  -Continue omeprazole 20mg daily.   -Monitor for worsening s/sx of concerns.   -Recent labs stable today. Trend labs periodically while on TCU     (K13.6) irritation of oral cavity  Comment: Acute for past week per patient. RESOLVING  Plan:  -Monitor for worsening s/sx of concerns  -Avoid acidic or irritant foods and fluids  -Continue nystatin swish and swallow for 10 days  -Monitor for worsening s/sx of concerns.   -Recent labs stable today. Trend labs periodically while on TCU     Electronically signed by:  Marilu Mcdermott DNP, APRCHUYITA

## 2022-06-13 NOTE — TELEPHONE ENCOUNTER
[facility] Nurse Triage Telephone Note    Provider: AGA Humphries  Facility: [facility]   Facility Type:  TCU    Caller: Danielle  Call Back Number: [phone]    Allergies   Allergen Reactions     Sulfa Drugs      Acetaminophen      Other reaction(s): Agitation  Restless  Feeling - skin crawling         Reason for call: Pt BP at 1420 is 201/104 manual cuff with HR 75. Currently asymptomatic, no c/o pain or headache. Hx of fluctuating BP readings. This morning BP was 168/97 and Midodrine 5mg was administered (parameter to hold if SBP >180). Nurse held afternoon dose due to elevated reading. Received dose of Metoprolol 37.5mg this morning.    Verbal Order/Direction given by Provider:   - Increase Metoprolol Succinate ER to 50mg daily, hold if SBP <100  - Hydralazine 10mg PO TID PRN for SBP >180  - BMP and CBC w/ platelets on 6/15/22    Provider giving Order:  Marilu Mcdermott DNP, APRN    Verbal Order given to: Danielle Puckett RN

## 2022-06-15 NOTE — LETTER
6/15/2022        RE: Canelo Cook  [address]  [address]  [place] [address]        [facility] GERIATRICS    Chief Complaint   Patient presents with     RECHECK     HPI:  Canelo Cook is a 82 year old  (1940), who is being seen today for an episodic care visit at: [facility] ([facility]) [[id number]]. Today's concern is: The primary encounter diagnosis was Hypokalemia. Diagnoses of Physical deconditioning, Generalized muscle weakness, Recurrent syncope, Acute metabolic encephalopathy, Parkinson disease (H), Adult failure to thrive, Dysuria, SYDNEY (acute kidney injury) (H), Stage 3 chronic kidney disease, unspecified whether stage 3a or 3b CKD (H), Hypertension, unspecified type, Dyslipidemia, goal LDL below 100, Coronary artery disease involving native coronary artery of native heart without angina pectoris, Bilateral leg edema, Gastroesophageal reflux disease with esophagitis, unspecified whether hemorrhage, Normocytic anemia, Constipation, unspecified constipation type, Irritation of oral cavity, Benign essential hypertension, and Autonomic instability were also pertinent to this visit.     Met with patient who denies any chest pain, palpitations, shortness of breath, TRAORE, lightheadedness, dizziness, or cough. Denies any abdominal discomfort. Denies N&V. Denies B&B concerns. Denies dysuria or frequency. Denies loose or constipation. LBM today which was large and soft. Appetite good. Sleeping well. SBP have been fluctuating with ranges -230. Has been using PRN hydralazine at times due to elevated SBP readings. He denies any acute concerns. Family at bedside without concerns.     BP Readings from Last 3 Encounters:   06/15/22 117/79   06/08/22 124/83   06/08/22 (!) 171/100     Wt Readings from Last 5 Encounters:   06/15/22 80.7 kg (177 lb 14.4 oz)   06/08/22 81.9 kg (180 lb 8 oz)   06/08/22 86.2 kg (190 lb)   05/24/22 79.1 kg (174 lb 6.4 oz)   05/02/22 83.9 kg (185 lb)      Allergies, and PMH/PSH reviewed in [facility] today.  REVIEW OF SYSTEMS:  10 point ROS of systems including Constitutional, Eyes, Respiratory, Cardiovascular, Gastroenterology, Genitourinary, Integumentary, Musculoskeletal, Psychiatric were all negative except for pertinent positives noted in my HPI.    Objective:   /79   Pulse 62   Temp 97.7  F (36.5  C)   Resp 18   Ht 1.829 m (6')   Wt 80.7 kg (177 lb 14.4 oz)   SpO2 95%   BMI 24.13 kg/m    GENERAL APPEARANCE:  Alert, in no distress, cooperative  ENT:  Mouth and posterior oropharynx normal, moist mucous membranes, [place]  EYES:  EOM, conjunctivae, lids, pupils and irises normal  NECK:  No adenopathy,masses or thyromegaly  RESP:  respiratory effort and palpation of chest normal, lungs clear to auscultation , no respiratory distress  CV:  Palpation and auscultation of heart done , regular rate and rhythm, no murmur, rub, or gallop, peripheral edema 1+ in BLE  ABDOMEN:  normal bowel sounds, soft, nontender, no hepatosplenomegaly or other masses, no guarding or rebound  M/S:   Ko lift. Wheelchair bound.   SKIN:  Inspection of skin and subcutaneous tissue baseline, Palpation of skin and subcutaneous tissue baseline  NEURO:   Cranial nerves 2-12 are normal tested and grossly at patient's baseline, no purposeful movement in upper and lower extremities  PSYCH:  oriented X 3, memory impaired , affect and mood normal      Most Recent 3 CBC's:  Recent Labs   Lab Test 06/15/22  0610 06/10/22  0756 05/31/22  0723   WBC 6.2 6.5 5.7   HGB 10.4* 11.6* 10.2*   MCV 92 94 93    245 196     Most Recent 3 BMP's:  Recent Labs   Lab Test 06/15/22  0610 06/10/22  0756 05/31/22  0723    144 140   POTASSIUM 3.7 3.3* 3.9   CHLORIDE 108* 105 108*   CO2 24 31 28   BUN 28 24 22   CR 0.98 1.05 1.11   ANIONGAP 11 8 4*   EMMY 8.3* 8.7 8.2*   GLC 71 70 83     Most Recent 2 LFT's:  Recent Labs   Lab Test 05/23/22  0351 05/22/22  1308   AST 14 12   ALT <9 <9   ALKPHOS 53 67    BILITOTAL 0.8 0.8     Most Recent Urinalysis:  Recent Labs   Lab Test 05/28/22  1904 05/23/22  1220 03/01/22  2122 06/23/20  1144   COLOR Yellow Yellow   < > Yellow   APPEARANCE Clear Clear   < > Clear   URINEGLC Negative Negative   < > Negative   URINEBILI Negative Negative   < > Negative   URINEKETONE Negative Trace*   < > Negative   SG 1.017 1.016   < > 1.010   UBLD Negative Negative   < > Negative   URINEPH 5.5 5.5   < > 7.5   PROTEIN Negative 20 *   < > Negative   UROBILINOGEN  --   --   --  <2.0 E.U./dL   NITRITE Negative Negative   < > Negative   LEUKEST Negative Negative   < > Negative   RBCU  --  1   < >  --    WBCU  --  3   < >  --     < > = values in this interval not displayed.     Most Recent Anemia Panel:  Recent Labs   Lab Test 06/15/22  0610   WBC 6.2   HGB 10.4*   HCT 31.8*   MCV 92        ASSESSMENT/PLAN:     (R53.81) Physical deconditioning  (primary encounter diagnosis)  (M62.81) Generalized muscle weakness  Comment: Acute on chronic. S/T below diagnosis.   Plan:   -Continue Physical therapy and Occupational therapy as directed  -SW to remain involved for safe discharge planning needs     (R55) Recurrent syncope  Comment: Acute on chronic. Patient initially presented for evaluation of recurrent syncope, appear to be vasovagal in etiology.  Recurrent syncope thought to be multifactorial secondary to advanced Parkinson's, orthostatic hypotension, dehydration.  CT head on admission negative for any acute intracranial processes, masses, or bleed.  Pacemaker interrogated in the ER without any remarkable irregularities.  Carotid ultrasound 5/22 unremarkable, TTE 5/22 ejection fraction 65% with moderate to severe LVH but no obvious valvular disease.  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  Most of these episodes are related to bowel movements or eating.  Dizziness seems to be  better controlled with keeping patient's blood pressure on the higher end and with use of fludrocortisone and PT/OT.    Plan:   -Monitor for worsening s/sx of concerns  -Change midodrine to 5mg TID PRN. Give if SBP<100.    -Start lisinopril 2.5mg daily  -Hydralazine PRN if SBP>180  -Continue metoprolol 50mg daily. HOLD parameters if SBP<100 (dose recently increased on 6/10)  -Continue Fludrocortisone Acetate Tablet 0.1 MG daily.   -Monitor BP and HR as directed.   -Encourage incentive spirometry every 4 hours while awake.   -Continue Mestinon Tablet TID as directed  -Recent labs stable today. Trend labs periodically while on TCU     (G93.41) Acute metabolic encephalopathy  Comment: Acute. Patient with prolonged episode of unresponsiveness during syncopal episode prior to admission as well as intermittent hallucinations described by patient's wife  CT head negative, EEG 5/23 abnormal with diffuse slow background which is nonspecific for generalized cerebral dysfunction but negative for seizure activity.  Unable to perform MRI brain secondary to pacemaker placement.  No signs of infection, urinalysis appears uninfected.  Encephalopathy more likely secondary to hypotension, orthostasis, worsening Parkinson's.  Plan:   -Monitor for worsening s/sx of concerns.   -Monitor for changes in mood and behaviors  -Recent labs stable today. Trend labs periodically while on TCU     (G20) Parkinson disease (H)  (R62.7) Adult failure to thrive  Comment: Chronic. Patient with prolonged episode of unresponsiveness during syncopal episode prior to admission as well as intermittent hallucinations described by patient's wife  CT head negative, EEG 5/23 abnormal with diffuse slow background which is nonspecific for generalized cerebral dysfunction but negative for seizure activity.  Unable to perform MRI brain secondary to pacemaker placement.  No signs of infection, urinalysis appears uninfected.  Encephalopathy more likely secondary to  hypotension, orthostasis, worsening Parkinson's.  Plan:   -Monitor for worsening s/sx of concerns  -Continue Carbidopa-Levodopa Tablet  MG 2 tabs QID as directed  -Follow up with neurology as directed. Scheduled for 6/27/22  -Recent labs stable today. Trend labs periodically while on TCU     (R30.0) Dysuria  (N17.9) SYDNEY (acute kidney injury) (H)  (N18.30) Stage 3 chronic kidney disease, unspecified whether stage 3a or 3b CKD (H)  Comment: Acute on chronic. Patient endorses urinary urgency and dysuria on 5/28, urinalysis 5/28 negative for signs of urinary tract infection.  Patient likely having some mild bladder spasms associated with Parkinson's.  Continue home tolterodine, Pyridium was used as needed. Baseline creatinine approximately 1.1-1.2, natasha to 1.55 at time of admission.  Resolved with IVF.  Plan:  -Monitor urinary status  -Continue tolterodine as directed without change  -Continue flomax 0.4mg daily  -Recent labs stable today. Trend labs periodically while on TCU     (I10) Hypertension, unspecified type  (E78.5) Dyslipidemia, goal LDL below 100  (I25.10) Coronary artery disease involving native coronary artery of native heart without angina pectoris  (R60.0) Bilateral leg edema  Comment: Chronic. Patient can be extremely hypertensive and then swing drastically to hypotensive secondary to autonomic instability.  Fludrocortisone and a lower dose midodrine being used to help with orthostatic hypotension and only metoprolol used for blood pressure control. Minimal edema noted to bilateral lower extremities.   Plan:   -Monitor for worsening s/sx of concerns  -Continue Knee high Bilateral tubigrip daily. On in AM and off at HS.   -Change midodrine to 5mg TID PRN. Give if SBP<100.    -Start lisinopril 2.5mg daily.   -Hydralazine 10mg TID PRN for SBP>180  -Continue metoprolol 50mg daily. HOLD parameters if SBP<100 (dose recently increased on 6/10)  -Continue Fludrocortisone Acetate Tablet 0.1 MG daily.    -Monitor BP and HR as directed.  -Continue statin and asa daily as directed.    -Recent labs stable today. Trend labs periodically while on TCU     (E87.6) Hypokalemia  Comment: Acute on chronic. Replaced in hospital. Recent levels 3.3. started on supplementation  Plan:   -Monitor for worsening s/sx of concerns.  -Continue potassium chloride 20mEq daily  -Recent labs stable today. Trend labs periodically while on TCU     (D64.9) Normocytic anemia  Comment: Chronic. Hemoglobin 10.3 with MCV of 94.  Plan:   -Monitor bleeding risks  -Monitor falls  -Continue asa as directed  -Recent labs stable today. Trend labs periodically while on TCU     (K21.00) Gastroesophageal reflux disease with esophagitis, unspecified whether hemorrhage  (K59.00) Constipation  Comment: Chronic. 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  Most of these episodes are related to bowel movements or eating.  Plan:  -Monitor BM Patterns  -Continue bisacodyl daily PRN  -Continue senna S 2 tabs daily  -Continue magnesium daily.   -Continue magnesium hydroxide 30mL daily.   -Continue miralax daily  -Continue omeprazole 20mg daily.   -Monitor for worsening s/sx of concerns.   -Recent labs stable today. Trend labs periodically while on TCU     (K13.6) irritation of oral cavity  Comment: Acute for past week per patient. RESOLVING  Plan:  -Monitor for worsening s/sx of concerns  -Avoid acidic or irritant foods and fluids  -Continue nystatin swish and swallow for 10 days  -Monitor for worsening s/sx of concerns.   -Recent labs stable today. Trend labs periodically while on TCU     Electronically signed by:  Marilu Mcdermott DNP, APRN                 Sincerely,        Marilu Mcdermott, APRN CNP

## 2022-06-20 NOTE — PROGRESS NOTES
[facility] GERIATRICS    Chief Complaint   Patient presents with     RECHECK     HPI:  Canelo Cook is a 82 year old  (1940), who is being seen today for an episodic care visit at: [facility] ([facility] [place]) [[id number]]. Today's concern is: The primary encounter diagnosis was Hypokalemia. Diagnoses of Physical deconditioning, Generalized muscle weakness, Recurrent syncope, Acute metabolic encephalopathy, Parkinson disease (H), Adult failure to thrive, Dysuria, SYDNEY (acute kidney injury) (H), Stage 3 chronic kidney disease, unspecified whether stage 3a or 3b CKD (H), Dyslipidemia, goal LDL below 100, Hypertension, unspecified type, Coronary artery disease involving native coronary artery of native heart without angina pectoris, Bilateral leg edema, Gastroesophageal reflux disease with esophagitis, unspecified whether hemorrhage, Normocytic anemia, Constipation, unspecified constipation type, Autonomic instability, and Insomnia, unspecified type were also pertinent to this visit.    Wife at bedside and discussed POC along with end stage parkinsons needs. If patient does not progress with therapies then family is open to palliative care. Met with patient who denies any chest pain, palpitations, shortness of breath, TRAORE, lightheadedness, dizziness, or cough. His SBP readings have been continuously fluctuating drastically on site. Ranging SBP 80s->200. Family has noted that orthostatic is noted when monitoring BPs that he drastically drops when sitting compared to lying down. Denies any abdominal discomfort. Denies dysuria or frequency. He reports he is constipated with LBM yesterday, but staff disagree. He reports they are hard. Appetite good. He reports not sleeping well at night and sleeping throughout the day-suspect parkinsons is contributing to this as well.     BP Readings from Last 3 Encounters:   06/22/22 (!) 157/87   06/15/22 117/79   06/08/22 124/83     Wt Readings from Last 5 Encounters:    06/22/22 80.7 kg (177 lb 14.4 oz)   06/15/22 80.7 kg (177 lb 14.4 oz)   06/08/22 81.9 kg (180 lb 8 oz)   06/08/22 86.2 kg (190 lb)   05/24/22 79.1 kg (174 lb 6.4 oz)     Allergies, and PMH/PSH reviewed in EPIC today.  REVIEW OF SYSTEMS:  10 point ROS of systems including Constitutional, Eyes, Respiratory, Cardiovascular, Gastroenterology, Genitourinary, Integumentary, Musculoskeletal, Psychiatric were all negative except for pertinent positives noted in my HPI.    Objective:   BP (!) 157/87   Pulse 79   Temp 96.8  F (36  C)   Resp 18   Ht 1.829 m (6')   Wt 80.7 kg (177 lb 14.4 oz)   SpO2 96%   BMI 24.13 kg/m    GENERAL APPEARANCE:  Alert, cooperative  ENT:  Mouth and posterior oropharynx normal, moist mucous membranes, [place]  EYES:  EOM, conjunctivae, lids, pupils and irises normal  NECK:  No adenopathy,masses or thyromegaly  RESP:  respiratory effort and palpation of chest normal, lungs clear to auscultation , no respiratory distress  CV:  Palpation and auscultation of heart done , regular rate and rhythm, no murmur, rub, or gallop, no edema  ABDOMEN:  normal bowel sounds, soft, nontender, no hepatosplenomegaly or other masses, no guarding or rebound  M/S:   Ko lift transfers. Maximum assist with all cares and transfers  SKIN:  Inspection of skin and subcutaneous tissue baseline, Palpation of skin and subcutaneous tissue baseline  NEURO:   Cranial nerves 2-12 are normal tested and grossly at patient's baseline, no purposeful movement in upper and lower extremities  PSYCH:  oriented X 3, memory impaired , affect and mood normal      Most Recent 3 CBC's:  Recent Labs   Lab Test 06/15/22  0610 06/10/22  0756 05/31/22  0723   WBC 6.2 6.5 5.7   HGB 10.4* 11.6* 10.2*   MCV 92 94 93    245 196     Most Recent 3 BMP's:  Recent Labs   Lab Test 06/15/22  0610 06/10/22  0756 05/31/22  0723    144 140   POTASSIUM 3.7 3.3* 3.9   CHLORIDE 108* 105 108*   CO2 24 31 28   BUN 28 24 22   CR 0.98 1.05 1.11    ANIONGAP 11 8 4*   EMMY 8.3* 8.7 8.2*   GLC 71 70 83     Most Recent 2 LFT's:  Recent Labs   Lab Test 05/23/22  0351 05/22/22  1308   AST 14 12   ALT <9 <9   ALKPHOS 53 67   BILITOTAL 0.8 0.8     Most Recent TSH and T4:  Recent Labs   Lab Test 05/02/22  0955   TSH 3.98     Most Recent Urinalysis:  Recent Labs   Lab Test 05/28/22  1904 05/23/22  1220 03/01/22  2122 06/23/20  1144   COLOR Yellow Yellow   < > Yellow   APPEARANCE Clear Clear   < > Clear   URINEGLC Negative Negative   < > Negative   URINEBILI Negative Negative   < > Negative   URINEKETONE Negative Trace*   < > Negative   SG 1.017 1.016   < > 1.010   UBLD Negative Negative   < > Negative   URINEPH 5.5 5.5   < > 7.5   PROTEIN Negative 20 *   < > Negative   UROBILINOGEN  --   --   --  <2.0 E.U./dL   NITRITE Negative Negative   < > Negative   LEUKEST Negative Negative   < > Negative   RBCU  --  1   < >  --    WBCU  --  3   < >  --     < > = values in this interval not displayed.     Most Recent Anemia Panel:  Recent Labs   Lab Test 06/15/22  0610   WBC 6.2   HGB 10.4*   HCT 31.8*   MCV 92          ASSESSMENT/PLAN:     (R53.81) Physical deconditioning  (primary encounter diagnosis)  (M62.81) Generalized muscle weakness  Comment: Acute on chronic. S/T below diagnosis.   Plan:   -Continue Physical therapy and Occupational therapy as directed  -SW to remain involved for safe discharge planning needs     (R55) Recurrent syncope  Comment: Acute on chronic. Patient initially presented for evaluation of recurrent syncope, appear to be vasovagal in etiology.  Recurrent syncope thought to be multifactorial secondary to advanced Parkinson's, orthostatic hypotension, dehydration.  CT head on admission negative for any acute intracranial processes, masses, or bleed.  Pacemaker interrogated in the ER without any remarkable irregularities.  Carotid ultrasound 5/22 unremarkable, TTE 5/22 ejection fraction 65% with moderate to severe LVH but no obvious valvular  disease.  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  Most of these episodes are related to bowel movements or eating.  Dizziness seems to be better controlled with keeping patient's blood pressure on the higher end and with use of fludrocortisone and PT/OT. His SBP readings have been continuously fluctuating drastically on site. Ranging SBP 80s->200. Family has noted that orthostatic is noted when monitoring BPs that he drastically drops when sitting compared to lying down.  Plan:   -Monitor for worsening s/sx of concerns  -Change midodrine to 10mg TID PRN. Give if SBP<100.    -Discontinue lisinopril give hypotension risks.   -Hydralazine PRN if SBP>180  -Continue metoprolol 50mg daily. HOLD parameters if SBP<100 (dose recently increased on 6/10)  -Continue Fludrocortisone Acetate Tablet 0.1 MG daily.   -Monitor BP and HR as directed.   -Encourage incentive spirometry every 4 hours while awake.   -Continue Mestinon Tablet TID as directed  -Trend labs periodically while on TCU     (G93.41) Acute metabolic encephalopathy  (G47.00) Insomnia  Comment: Acute. Patient with history of prolonged episode of unresponsiveness during syncopal episode prior to admission as well as intermittent hallucinations described by patient's wife  CT head negative, EEG 5/23 abnormal with diffuse slow background which is nonspecific for generalized cerebral dysfunction but negative for seizure activity.  Unable to perform MRI brain secondary to pacemaker placement.  No signs of infection, urinalysis appears uninfected.  Encephalopathy more likely secondary to hypotension, orthostasis, worsening Parkinson's. HE reports increased sleeping during the day along with not sleeping well at night. Suspect parkinsons contributing, however patient is willing to try medication.   Plan:   -Monitor for worsening s/sx of concerns.    -Monitor sleeping patterns  -Start melatonin 3mg at HS.   -Monitor for changes in mood and behaviors  -Trend labs periodically while on TCU     (G20) Parkinson disease (H)  (R62.7) Adult failure to thrive  Comment: Chronic. Patient with prolonged episode of unresponsiveness during syncopal episode prior to admission as well as intermittent hallucinations described by patient's wife  CT head negative, EEG 5/23 abnormal with diffuse slow background which is nonspecific for generalized cerebral dysfunction but negative for seizure activity.  Unable to perform MRI brain secondary to pacemaker placement.  No signs of infection, urinalysis appears uninfected.  Encephalopathy more likely secondary to hypotension, orthostasis, worsening Parkinson's.  Plan:   -Monitor for worsening s/sx of concerns  -Continue Carbidopa-Levodopa Tablet  MG 2 tabs QID as directed  -Would recommend palliative and/or hospice if condition worsens.   -Follow up with neurology as directed. Scheduled for 6/27/22  -Trend labs periodically while on TCU     (R30.0) Dysuria  (N17.9) SYDNEY (acute kidney injury) (H)  (N18.30) Stage 3 chronic kidney disease, unspecified whether stage 3a or 3b CKD (H)  Comment: Acute on chronic. Patient endorses urinary urgency and dysuria on 5/28, urinalysis 5/28 negative for signs of urinary tract infection.  Patient likely having some mild bladder spasms associated with Parkinson's.  Continue home tolterodine, Pyridium was used as needed. Baseline creatinine approximately 1.1-1.2, natasha to 1.55 at time of admission.  Resolved with IVF.  Plan:  -Monitor urinary status  -Continue tolterodine as directed without change  -Continue flomax 0.4mg daily  -Trend labs periodically while on TCU     (I10) Hypertension, unspecified type  (E78.5) Dyslipidemia, goal LDL below 100  (I25.10) Coronary artery disease involving native coronary artery of native heart without angina pectoris  (R60.0) Bilateral leg  edema  Comment: Chronic. Patient can be extremely hypertensive and then swing drastically to hypotensive secondary to autonomic instability.  Minimal edema noted to bilateral lower extremities.   Plan:   -Monitor for worsening s/sx of concerns  -Monitor BP with HOB >30 degrees or sitting to obtain accuracy.   -Continue Knee high Bilateral tubigrip daily. On in AM and off at HS.   -Change midodrine to 10mg TID PRN. Give if SBP<100  -Discontinue lisinopril due to hypotensive risks,   -Continue Hydralazine 10mg TID PRN for SBP>180  -Continue metoprolol 50mg daily. HOLD parameters if SBP<100 (dose recently increased on 6/10)  -Continue Fludrocortisone Acetate Tablet 0.1 MG daily.   -Monitor BP and HR as directed.  -Continue statin and asa daily as directed.    -Trend labs periodically while on TCU     (E87.6) Hypokalemia  Comment: Acute on chronic. Replaced in hospital. Recent levels 3.3. started on supplementation which improved.   Plan:   -Monitor for worsening s/sx of concerns.  -Continue potassium chloride 20mEq daily  -Trend labs periodically while on TCU     (D64.9) Normocytic anemia  Comment: Chronic. Hemoglobin 10.3 with MCV of 94.  Plan:   -Monitor bleeding risks  -Monitor falls  -Continue asa as directed  -Trend labs periodically while on TCU     (K21.00) Gastroesophageal reflux disease with esophagitis, unspecified whether hemorrhage  (K59.00) Constipation  Comment: Chronic. 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  Most of these episodes are related to bowel movements or eating. He reports being constipated but staff disagree.   Plan:  -Monitor BM Patterns  -Continue bisacodyl daily PRN  -Change senna S 2 tabs BID. HOLD for loose stools  -Discontinue MOM  -Continue magnesium daily.   -Continue miralax daily  -Continue omeprazole 20mg daily.   -Monitor for worsening s/sx of concerns.   -Trend  labs periodically while on TCU     Electronically signed by:  Marilu Mcdermott DNP, APRN

## 2022-06-21 NOTE — TELEPHONE ENCOUNTER
[facility] Call Center    Phone Message    May a detailed message be left on voicemail: yes     Reason for Call: Other: Pt's spouse Susanne called to advise she is not certain if Pt will be able to make 7/27/22 appt with Dr Mackay.      Pt was in hospital from 5/22-5/31 and in now in [facility]. She is not sure if he will be out in time for appt and even if he is, she does not know if she will be able to bring him in.     She states that he was started on new med - hydrALAZINE (APRESOLINE) 10 MG tablet - and is given to him if Systolic is over 108.    Please call Susanne back at [phone] to discuss option for her keeping this appt - perhaps video.      Action Taken: Message routed to:  Other: Neurology    Travel Screening: Not Applicable

## 2022-06-21 NOTE — TELEPHONE ENCOUNTER
Spoke to pt's wife, she states appt on 6/27/22 will need to changed to a video visit as pt is currently at [facility] and is too difficult to transport. Information given to her to get pt signed up for  [employer] in order to do a video visit. Emailed her a link to get signed up for [email or web address].  She will drop off a copy of pt's med list along with BP readings prior to appt.    Nathaly Patel LPN on 6/21/2022 at 3:42 PM

## 2022-06-22 NOTE — LETTER
6/22/2022        RE: Canelo Cook  [address]  [address]  [facility] [address]        [facility] GERIATRICS    Chief Complaint   Patient presents with     RECHECK     HPI:  Canelo Cook is a 82 year old  (1940), who is being seen today for an episodic care visit at: [facility] ([facility]) [[id number]]. Today's concern is: The primary encounter diagnosis was Hypokalemia. Diagnoses of Physical deconditioning, Generalized muscle weakness, Recurrent syncope, Acute metabolic encephalopathy, Parkinson disease (H), Adult failure to thrive, Dysuria, SYDNEY (acute kidney injury) (H), Stage 3 chronic kidney disease, unspecified whether stage 3a or 3b CKD (H), Dyslipidemia, goal LDL below 100, Hypertension, unspecified type, Coronary artery disease involving native coronary artery of native heart without angina pectoris, Bilateral leg edema, Gastroesophageal reflux disease with esophagitis, unspecified whether hemorrhage, Normocytic anemia, Constipation, unspecified constipation type, Autonomic instability, and Insomnia, unspecified type were also pertinent to this visit.    Wife at bedside and discussed POC along with end stage parkinsons needs. If patient does not progress with therapies then family is open to palliative care. Met with patient who denies any chest pain, palpitations, shortness of breath, TRAORE, lightheadedness, dizziness, or cough. His SBP readings have been continuously fluctuating drastically on site. Ranging SBP 80s->200. Family has noted that orthostatic is noted when monitoring BPs that he drastically drops when sitting compared to lying down. Denies any abdominal discomfort. Denies dysuria or frequency. He reports he is constipated with LBM yesterday, but staff disagree. He reports they are hard. Appetite good. He reports not sleeping well at night and sleeping throughout the day-suspect parkinsons is contributing to this as well.     BP Readings from Last 3 Encounters:    06/22/22 (!) 157/87   06/15/22 117/79   06/08/22 124/83     Wt Readings from Last 5 Encounters:   06/22/22 80.7 kg (177 lb 14.4 oz)   06/15/22 80.7 kg (177 lb 14.4 oz)   06/08/22 81.9 kg (180 lb 8 oz)   06/08/22 86.2 kg (190 lb)   05/24/22 79.1 kg (174 lb 6.4 oz)     Allergies, and PMH/PSH reviewed in EPIC today.  REVIEW OF SYSTEMS:  10 point ROS of systems including Constitutional, Eyes, Respiratory, Cardiovascular, Gastroenterology, Genitourinary, Integumentary, Musculoskeletal, Psychiatric were all negative except for pertinent positives noted in my HPI.    Objective:   BP (!) 157/87   Pulse 79   Temp 96.8  F (36  C)   Resp 18   Ht 1.829 m (6')   Wt 80.7 kg (177 lb 14.4 oz)   SpO2 96%   BMI 24.13 kg/m    GENERAL APPEARANCE:  Alert, cooperative  ENT:  Mouth and posterior oropharynx normal, moist mucous membranes, [place]  EYES:  EOM, conjunctivae, lids, pupils and irises normal  NECK:  No adenopathy,masses or thyromegaly  RESP:  respiratory effort and palpation of chest normal, lungs clear to auscultation , no respiratory distress  CV:  Palpation and auscultation of heart done , regular rate and rhythm, no murmur, rub, or gallop, no edema  ABDOMEN:  normal bowel sounds, soft, nontender, no hepatosplenomegaly or other masses, no guarding or rebound  M/S:   Ko lift transfers. Maximum assist with all cares and transfers  SKIN:  Inspection of skin and subcutaneous tissue baseline, Palpation of skin and subcutaneous tissue baseline  NEURO:   Cranial nerves 2-12 are normal tested and grossly at patient's baseline, no purposeful movement in upper and lower extremities  PSYCH:  oriented X 3, memory impaired , affect and mood normal      Most Recent 3 CBC's:  Recent Labs   Lab Test 06/15/22  0610 06/10/22  0756 05/31/22  0723   WBC 6.2 6.5 5.7   HGB 10.4* 11.6* 10.2*   MCV 92 94 93    245 196     Most Recent 3 BMP's:  Recent Labs   Lab Test 06/15/22  0610 06/10/22  0756 05/31/22  0723    144 140    POTASSIUM 3.7 3.3* 3.9   CHLORIDE 108* 105 108*   CO2 24 31 28   BUN 28 24 22   CR 0.98 1.05 1.11   ANIONGAP 11 8 4*   EMMY 8.3* 8.7 8.2*   GLC 71 70 83     Most Recent 2 LFT's:  Recent Labs   Lab Test 05/23/22  0351 05/22/22  1308   AST 14 12   ALT <9 <9   ALKPHOS 53 67   BILITOTAL 0.8 0.8     Most Recent TSH and T4:  Recent Labs   Lab Test 05/02/22  0955   TSH 3.98     Most Recent Urinalysis:  Recent Labs   Lab Test 05/28/22  1904 05/23/22  1220 03/01/22  2122 06/23/20  1144   COLOR Yellow Yellow   < > Yellow   APPEARANCE Clear Clear   < > Clear   URINEGLC Negative Negative   < > Negative   URINEBILI Negative Negative   < > Negative   URINEKETONE Negative Trace*   < > Negative   SG 1.017 1.016   < > 1.010   UBLD Negative Negative   < > Negative   URINEPH 5.5 5.5   < > 7.5   PROTEIN Negative 20 *   < > Negative   UROBILINOGEN  --   --   --  <2.0 E.U./dL   NITRITE Negative Negative   < > Negative   LEUKEST Negative Negative   < > Negative   RBCU  --  1   < >  --    WBCU  --  3   < >  --     < > = values in this interval not displayed.     Most Recent Anemia Panel:  Recent Labs   Lab Test 06/15/22  0610   WBC 6.2   HGB 10.4*   HCT 31.8*   MCV 92          ASSESSMENT/PLAN:     (R53.81) Physical deconditioning  (primary encounter diagnosis)  (M62.81) Generalized muscle weakness  Comment: Acute on chronic. S/T below diagnosis.   Plan:   -Continue Physical therapy and Occupational therapy as directed  -SW to remain involved for safe discharge planning needs     (R55) Recurrent syncope  Comment: Acute on chronic. Patient initially presented for evaluation of recurrent syncope, appear to be vasovagal in etiology.  Recurrent syncope thought to be multifactorial secondary to advanced Parkinson's, orthostatic hypotension, dehydration.  CT head on admission negative for any acute intracranial processes, masses, or bleed.  Pacemaker interrogated in the ER without any remarkable irregularities.  Carotid ultrasound 5/22  unremarkable, TTE 5/22 ejection fraction 65% with moderate to severe LVH but no obvious valvular disease.  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  Most of these episodes are related to bowel movements or eating.  Dizziness seems to be better controlled with keeping patient's blood pressure on the higher end and with use of fludrocortisone and PT/OT. His SBP readings have been continuously fluctuating drastically on site. Ranging SBP 80s->200. Family has noted that orthostatic is noted when monitoring BPs that he drastically drops when sitting compared to lying down.  Plan:   -Monitor for worsening s/sx of concerns  -Change midodrine to 10mg TID PRN. Give if SBP<100.    -Discontinue lisinopril give hypotension risks.   -Hydralazine PRN if SBP>180  -Continue metoprolol 50mg daily. HOLD parameters if SBP<100 (dose recently increased on 6/10)  -Continue Fludrocortisone Acetate Tablet 0.1 MG daily.   -Monitor BP and HR as directed.   -Encourage incentive spirometry every 4 hours while awake.   -Continue Mestinon Tablet TID as directed  -Trend labs periodically while on TCU     (G93.41) Acute metabolic encephalopathy  (G47.00) Insomnia  Comment: Acute. Patient with history of prolonged episode of unresponsiveness during syncopal episode prior to admission as well as intermittent hallucinations described by patient's wife  CT head negative, EEG 5/23 abnormal with diffuse slow background which is nonspecific for generalized cerebral dysfunction but negative for seizure activity.  Unable to perform MRI brain secondary to pacemaker placement.  No signs of infection, urinalysis appears uninfected.  Encephalopathy more likely secondary to hypotension, orthostasis, worsening Parkinson's. HE reports increased sleeping during the day along with not sleeping well at night. Suspect parkinsons contributing, however  patient is willing to try medication.   Plan:   -Monitor for worsening s/sx of concerns.   -Monitor sleeping patterns  -Start melatonin 3mg at HS.   -Monitor for changes in mood and behaviors  -Trend labs periodically while on TCU     (G20) Parkinson disease (H)  (R62.7) Adult failure to thrive  Comment: Chronic. Patient with prolonged episode of unresponsiveness during syncopal episode prior to admission as well as intermittent hallucinations described by patient's wife  CT head negative, EEG 5/23 abnormal with diffuse slow background which is nonspecific for generalized cerebral dysfunction but negative for seizure activity.  Unable to perform MRI brain secondary to pacemaker placement.  No signs of infection, urinalysis appears uninfected.  Encephalopathy more likely secondary to hypotension, orthostasis, worsening Parkinson's.  Plan:   -Monitor for worsening s/sx of concerns  -Continue Carbidopa-Levodopa Tablet  MG 2 tabs QID as directed  -Would recommend palliative and/or hospice if condition worsens.   -Follow up with neurology as directed. Scheduled for 6/27/22  -Trend labs periodically while on TCU     (R30.0) Dysuria  (N17.9) SYDNEY (acute kidney injury) (H)  (N18.30) Stage 3 chronic kidney disease, unspecified whether stage 3a or 3b CKD (H)  Comment: Acute on chronic. Patient endorses urinary urgency and dysuria on 5/28, urinalysis 5/28 negative for signs of urinary tract infection.  Patient likely having some mild bladder spasms associated with Parkinson's.  Continue home tolterodine, Pyridium was used as needed. Baseline creatinine approximately 1.1-1.2, natasha to 1.55 at time of admission.  Resolved with IVF.  Plan:  -Monitor urinary status  -Continue tolterodine as directed without change  -Continue flomax 0.4mg daily  -Trend labs periodically while on TCU     (I10) Hypertension, unspecified type  (E78.5) Dyslipidemia, goal LDL below 100  (I25.10) Coronary artery disease involving native coronary  artery of native heart without angina pectoris  (R60.0) Bilateral leg edema  Comment: Chronic. Patient can be extremely hypertensive and then swing drastically to hypotensive secondary to autonomic instability.  Minimal edema noted to bilateral lower extremities.   Plan:   -Monitor for worsening s/sx of concerns  -Monitor BP with HOB >30 degrees or sitting to obtain accuracy.   -Continue Knee high Bilateral tubigrip daily. On in AM and off at HS.   -Change midodrine to 10mg TID PRN. Give if SBP<100  -Discontinue lisinopril due to hypotensive risks,   -Continue Hydralazine 10mg TID PRN for SBP>180  -Continue metoprolol 50mg daily. HOLD parameters if SBP<100 (dose recently increased on 6/10)  -Continue Fludrocortisone Acetate Tablet 0.1 MG daily.   -Monitor BP and HR as directed.  -Continue statin and asa daily as directed.    -Trend labs periodically while on TCU     (E87.6) Hypokalemia  Comment: Acute on chronic. Replaced in hospital. Recent levels 3.3. started on supplementation which improved.   Plan:   -Monitor for worsening s/sx of concerns.  -Continue potassium chloride 20mEq daily  -Trend labs periodically while on TCU     (D64.9) Normocytic anemia  Comment: Chronic. Hemoglobin 10.3 with MCV of 94.  Plan:   -Monitor bleeding risks  -Monitor falls  -Continue asa as directed  -Trend labs periodically while on TCU     (K21.00) Gastroesophageal reflux disease with esophagitis, unspecified whether hemorrhage  (K59.00) Constipation  Comment: Chronic. 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  Most of these episodes are related to bowel movements or eating. He reports being constipated but staff disagree.   Plan:  -Monitor BM Patterns  -Continue bisacodyl daily PRN  -Change senna S 2 tabs BID. HOLD for loose stools  -Discontinue MOM  -Continue magnesium daily.   -Continue miralax daily  -Continue  omeprazole 20mg daily.   -Monitor for worsening s/sx of concerns.   -Trend labs periodically while on TCU     Electronically signed by:  Marilu Mcdermott DNP, APRN               Sincerely,        TOVA Maldonado CNP

## 2022-06-24 NOTE — PROGRESS NOTES
"Video visit  Patient accompanied by wife who helps with history and information    Patient is being evaluated via a billable video visit. The patient has been notified of following:     \"This video visit will be conducted via a call between you and your physician/provider. We have found that certain health care needs can be provided without the need for an in-person physical exam. This service lets us provide the care you need with a video conversation. If a prescription is necessary we can send it directly to your pharmacy. If lab work is needed we can place an order for that and you can then stop by our lab to have the test done at a later time.    If during the course of the call the physician/provider feels a video visit is not appropriate, you will not be charged for this service.    Physician has received verbal consent for a Video Visit from the patient? YES    Patient would like the video invitation sent by: Email    Video Visit Details    Type of service: Video Visit    Video Start Time: 11:15 AM    Video End Time (time video stopped): 11:45 AM    Originating Location (pt. Location): Patient's Home    Distant Location (provider location): [facility] [place]     Mode of Communication: Video Conference via [facility]  2/25/2020, in person visit  5/19/2020, video visit  12/8/2020, video visit  6/25/2021, in person visit  4/4/2022, in person visit  6/27/2022, video visit      82-year-old followed neurologically for:  Parkinson's disease going on for 22+ years  Severe autonomic instability/syncope      Since last seen in April was hospitalized 5/22/2022  Patient had altered mental status with syncope and encephalopathy  Patient with severe autonomic instability with systolic blood pressures ranging from 220s- 70 systolic.    Reviewed work-up in hospital 5/22/2022  CT scan head 5/22/2022  1.  Exam is moderately degraded by motion artifact despite reacquisitions.  2.  No finding for " "intracranial hemorrhage, mass, or acute infarct.  3.  Moderate volume loss and mild presumed sequelae of chronic microvascular ischemic change.  Carotid Dopplers 5/22/2022  A.  Right ICA peak systolic velocity 70 cm/s no significant stenosis  B.  Left ICA peak systolic velocity 78 cm/s no significant stenosis  EEG 5/23/2022, 6 to 7 Hz background rhythms with occasional 3-4 Hz slowing no seizure activity, dysrhythmia grade 2 generalized slowing read by Dr. Phelps    Discussed with patient and wife on the video call multiple issues  When in hospital he did talk with palliative care but came to no conclusions  Patient now living in the care center  Sometimes they will let him lay flat and his blood pressure systolic below to 238  May then give him hydralazine 10 mg as needed for blood pressures greater than 180 which then causes a swing in his blood pressure.    And he will go to therapy later and they will try to stand him up and his blood pressure is \"okay when he is laying down\"  Then when he is in therapy in the standing up it drops too low and he have to put him back in bed.    Rediscussed autonomic instability in our need to let some of the blood pressures running a bit high up to 180 and not overtreat.  He should always be sleeping with a head of the bed at 30 to 45 degrees and still can have pillows of it that high    He should use the abdominal binder when he is up for therapy she will bring that to the care center and see if he will wear it now.    He has some chronic headache but did not have any increased headache when he had the high blood pressure systolic of 238  Did not complain of any specific chest pain during that time    When he is awake and alert he is able to swallow and get in enough food but has very soft voice    He continues to deteriorate and his neurodegenerative disease  Has multiple problems with his bladder and is on both Ditropan and Flomax which can affect his vision and his blood " "pressure  He got some prisms for his glasses but his eye hand coordination is still poor which is probably part of his neurodegenerative disease and complications from his multiple medications.    At this point he is a Ko lift and cannot pivot or transfer I do not see how his wife would be able to take him home  If he she does take him home he would need a lot of extra care  Question whether they need to meet again with palliative care to change long-term care goals    Multiple issues discussed as above.      1.  Parkinson's end-stage disease       Has significant severe autonomic instability       Patient is lost a lot of muscle mass        Patient basically wheelchair-bound       Wife has a transfer belt and is able to transfer him from the chair to the bed but this is getting harder prior to this recent hospitalization and may of 2022.              Discussed end-of-life issues       He does have a living well       Talked about hydration and nutrition and he was not interested in any feeding tube patient is coherent and says that when he gets that point he does not want 1            2.  Significant problems at this time  As possible \"spells of hypotension couple times per day that can last 5 minutes\"  Wife is not doing blood pressures though anymore so we do not know where his blood pressure is  Then she did state that sometimes it is really high right in the morning when he gets up but he has been laying flat  We talked about him needing to sleep at least at 30 degrees-45 degrees    3.  He has chronic edema in his feet but it has been stable  He is on the Florinef and midodrine    4.  Patient sometimes will \"have excessive sleepiness\" and go 2 days without really eating or drinking much or taking his meds  I talked with wife that this could lead to some of the severe constipation he has then she did say no he stays well-hydrated    5.  Patient with significant constipation  Is on 2 senna per day  Prunes in " the morning  Geneucel  Wife is pushing hydration    6.  Does have some hallucinations  He is getting people or animals are probably related to his Parkinson's disease and his Sinemet          A.     In regards to the autonomic instability       Florinef 0.1 mg tablet every morning       midodrine 10 mg tablet twice daily         He did get his lift chair  Is not wearing the abdominal binder, we recommended the abdominal binder at least where he had prior to getting up for therapy  Patient has a hospital bed but I do not think that he is actually elevating it to 30 degrees as he has a.m. hypertension  Does wear knee-high pressure socks but has some edema in his feet  Is basically wheelchair-bound  Transported by wheelchair has a lift chair at home    Seems to pass out a lot when getting up to go to the bathroom or back or if he is on the toilet seat  Also passes out a lot at breakfast so she has been a lift chair so she can leg and down  Talked about if he passes out using a lift chair laying flat  Feels that he is having more trouble and is mainly wheelchair-bound            For the patient's autonomic instability he is on  Florinef 0.1 mg daily  Proamantine 10 mg twice daily  Mestinon 60 mg half a tablet 3 times daily  He is going to try to use the abdominal binder but maybe not have it on so tight so he can tolerate it to put it on in the morning  Sleeps with the head of his bed up we will try having elevated to at least 30 to 45 degrees discussed at length with patient's wife    Have asked the wife to check blood pressures 3 times per day at different times just to see where he is for spot check  If he is having this time where he zoned out then to take a blood pressure to see if is because of low blood pressure  Question whether these episodes of zoning out are from Parkinson's and been tired versus hypotensive    Currently on Sinemet 25/100, 2 tablets 4 times per day    Review of Meds  Aspirin 81 mg once per  day  Atorvastatin 40 mg once per day  Brilanta 90 mg twice daily    B12 1000 mcg orally every other day      B.  Parkinson's disease for 22+ years        Very hypophonic voice autonomic instability resting tremor right side greater than left chronic head drop with leaning to the left        Difficulty with rigidity difficulty decrease carbidopa levodopa medication        No visual hallucinations or vivid dreams        Discussed living well which has been filled out in the past        Discussed power of  which has been filled out in the past        Discussed moving to a apartment that has escalating care available as seniors apartment is not enough and increasing care needs are imminent in the near future        Sinemet 25/100, increase 12/8/2020, (2 tablets 4 times daily)        Remeron 7.5 mg at nighttime    Review of Meds  Aspirin 81 mg once per day  Atorvastatin 40 mg once per day  Brilanta 90 mg twice daily    B12 1000 mcg orally every other day          Past medical history  Parkinson's disease onset prior to 1999  Severe autonomic instability  Pacemaker  MI/NSTEMI 2019  Hypertension  Hyperlipidemia  GERD  Syncope/falls due to autonomic instability    Habits  Past smoker  Past occasional alcohol beverage    Family history  Mom with heart disease  Father with heart disease      Work-up summary:  CT scan had June 2018 no subdural mild chronic small vessel changes mild volume loss  Previous workup included the following which is also outlined in the November 27, 2013 note for evaluation:  a. CTA of the neck vessels normal.  b. CTA of the head vessels no stenosis.  c. Echo 60% ejection fraction, normal left atrium.  d. HDL in the past 34, LDL 83.  CT cervical spine 12/12/2020 spondylolisthesis no fracture  CT head 5/20/2021 moderate volume loss small vessel changes no subdural hematoma or mass  B12 1038 (November 2019)  Labs May 2021  Sodium 142 potassium 4.0 BUN 25 creatinine 1.36  Glucose 94  White blood  count 7.3, hemoglobin 11.1, platelets 201,000       CT scan head 5/22/2022  1.  Exam is moderately degraded by motion artifact despite reacquisitions.  2.  No finding for intracranial hemorrhage, mass, or acute infarct.  3.  Moderate volume loss and mild presumed sequelae of chronic microvascular ischemic change.  Carotid Dopplers 5/22/2022  A.  Right ICA peak systolic velocity 70 cm/s no significant stenosis  B.  Left ICA peak systolic velocity 78 cm/s no significant stenosis  EEG 5/23/2022, 6 to 7 Hz background rhythms with occasional 3-4 Hz slowing no seizure activity, dysrhythmia grade 2 generalized slowing read by Dr. Phelps         exam  Video exam       Review of systems  See HPI above  Pertinent positives and negatives  Leaning to the left  Hypophonic speech  Can swallow okay not choking on food  Sometimes sees people or animals    Basically wheelchair-bound  Postural instability and severe autonomic instability  Weakness of his legs  Edema in the feet    Has chronic constipation sometimes alternating with diarrhea but less diarrhea now than in the past is on a bowel program      No diplopia no dysphagia  No visual field changes    No headache or chest pain or shortness of breath    Otherwise review of systems negative    General exam  Video exam  Below is from past in person visit for comparison in the future  HEENT normal  Lungs clear  Heart rate regular  Abdomen soft  Edema in the feet wearing pressure stockings, 1+ edema up the shin and foot  Decreased range of motion of both shoulders    Neurologic exam  Alert attentive oriented x3  No aphasia  No neglect  Brief memory testing okay    Cranial nerves II through XII except for parkinsonian features normal  No ophthalmoplegia  No nystagmus  Visual fields intact  Face symmetrical  Tongue twisters slow but understandable    Parkinsonian features  Soft hypophonic voice  Head drop posture old/chronic  Mild to moderate decreased blink but reasonable upgaze  No  hallucinations or vivid dreams  Mild bradykinesia  Mild resting tremor a little bit more on the right greater than the left today  Has had difficulty with alternating constipation and diarrhea  Orthostatic BP changes     Upper extremities  No drift  Tremor today was a little bit more noticed on the right than the left but on other visits has been the other way around  Mild to moderate bradykinesia  Slow finger tapping  Decreased range of motion of both shoulders getting his arms up    Lower extremities  Diffusely weak  In the seated position can move them weekly  Inability to stand due to weakness    Gait  Flexed posture  Can march in place while seated  Can kick his legs out  Gets fatigued easily when he tries to stand up  Uses most of his energy to transfer in and out of the car when he comes for a visit  Does have a lift chair at home        Assessment/Plan     1.  Autonomic dysfunction (G90.3)       Reviewed medications, stay well-hydrated       2.  Parkinson Disease (G20)         We will not lower the Sinemet any further as he started to get more stiffness and tremor        We discussed progressive nature of Parkinson's disease      Current diagnoses:    1. Parkinson s disease going on for greater than 22 plus years as far back as in 1998 with tremor and bradykinesia.  2. Severe autonomic dysfunction with orthostatic blood pressure trouble, failed black liquorice, failed Florinef, had supine hypertension, has tried Mestinon in the past and midodrine.  3. History of heart disease on beta-blockers with some bradycardia, had a pacemaker placed in August 2015.  4. History of TIAs in August 2013 with hypertension and hyperlipidemia, is supposed to be on an aspirin, has been a past smoker.  5. Autonomic instability, which is resistant to multiple manipulations and activities, medications, and habits.    Patient does have:   Hospital bed. He is unable to sleep at 30 degrees the last time I saw him because he slides  around, discussed at length.   Did get an abdominal binder, tried it a little bit, does not like to wear it.  We will try using this more often   Has a lift chair   Has a transfer belt for his wife to use   Does have a 4 wheeled walker with hand brake      Current plan  Midodrine 10 mg 3 times daily  Florinef 0.1 mg every morning refilled  Sinemet 25/100, 2 tablets 4 times daily,   Mestinon 30 mg 3 times daily  Hospital bed should raise to at least 30 to 45 degrees   Retry the abdominal binder but maybe wear it less tight put it on in the morning to see if he can tolerate it    Other physicians have started up as needed hydralazine to be used if blood pressure greater than 180  Recommend that if he is laying down when this occurs he should be set up in the hospital bed to see if that treats the blood pressure before getting the as needed    Patient with bladder control difficulties seen by urology  Patient on both Ditropan and Hytrin      Other physicians  Atorvastatin 40 mg daily  Aspirin 81 mg once per day  Other meds see med list    Discussed gait safety high risk for falls and injuries  Discussed end-of-life issues  Discussed feeding tube and patient is not interested in this    End-stage Parkinson's disease with severe autonomic instability  Did meet with palliative care during hospitalization May 2022 but no long-term changes made    Will have patient follow-up in 4 months    Multiple discussions above    Total care time including review of hospitalization records 32 minutes        As part of the visit today  Reviewed hospitalization 5/22/2022  Reviewed EEG 5/23/2022  Reviewed CT scan head 5/22/2022  Reviewed carotid Dopplers 5/22/2022

## 2022-06-27 NOTE — LETTER
"    6/27/2022         RE: Canelo Cook  [address]  [address]  [place] [address]        Dear Colleague,    Thank you for referring your patient, Canelo Cook, to the [facility] [place]. Please see a copy of my visit note below.    Video visit  Patient accompanied by wife who helps with history and information    Patient is being evaluated via a billable video visit. The patient has been notified of following:     \"This video visit will be conducted via a call between you and your physician/provider. We have found that certain health care needs can be provided without the need for an in-person physical exam. This service lets us provide the care you need with a video conversation. If a prescription is necessary we can send it directly to your pharmacy. If lab work is needed we can place an order for that and you can then stop by our lab to have the test done at a later time.    If during the course of the call the physician/provider feels a video visit is not appropriate, you will not be charged for this service.    Physician has received verbal consent for a Video Visit from the patient? YES    Patient would like the video invitation sent by: Email    Video Visit Details    Type of service: Video Visit    Video Start Time: 11:15 AM    Video End Time (time video stopped): 11:45 AM    Originating Location (pt. Location): Patient's Home    Distant Location (provider location): [facility] [place]     Mode of Communication: Video Conference via [facility]  2/25/2020, in person visit  5/19/2020, video visit  12/8/2020, video visit  6/25/2021, in person visit  4/4/2022, in person visit  6/27/2022, video visit      82-year-old followed neurologically for:  Parkinson's disease going on for 22+ years  Severe autonomic instability/syncope      Since last seen in April was hospitalized 5/22/2022  Patient had altered mental status with syncope and " "encephalopathy  Patient with severe autonomic instability with systolic blood pressures ranging from 220s- 70 systolic.    Reviewed work-up in hospital 5/22/2022  CT scan head 5/22/2022  1.  Exam is moderately degraded by motion artifact despite reacquisitions.  2.  No finding for intracranial hemorrhage, mass, or acute infarct.  3.  Moderate volume loss and mild presumed sequelae of chronic microvascular ischemic change.  Carotid Dopplers 5/22/2022  A.  Right ICA peak systolic velocity 70 cm/s no significant stenosis  B.  Left ICA peak systolic velocity 78 cm/s no significant stenosis  EEG 5/23/2022, 6 to 7 Hz background rhythms with occasional 3-4 Hz slowing no seizure activity, dysrhythmia grade 2 generalized slowing read by Dr. Phelps    Discussed with patient and wife on the video call multiple issues  When in hospital he did talk with palliative care but came to no conclusions  Patient now living in the care center  Sometimes they will let him lay flat and his blood pressure systolic below to 238  May then give him hydralazine 10 mg as needed for blood pressures greater than 180 which then causes a swing in his blood pressure.    And he will go to therapy later and they will try to stand him up and his blood pressure is \"okay when he is laying down\"  Then when he is in therapy in the standing up it drops too low and he have to put him back in bed.    Rediscussed autonomic instability in our need to let some of the blood pressures running a bit high up to 180 and not overtreat.  He should always be sleeping with a head of the bed at 30 to 45 degrees and still can have pillows of it that high    He should use the abdominal binder when he is up for therapy she will bring that to the care center and see if he will wear it now.    He has some chronic headache but did not have any increased headache when he had the high blood pressure systolic of 238  Did not complain of any specific chest pain during that " "time    When he is awake and alert he is able to swallow and get in enough food but has very soft voice    He continues to deteriorate and his neurodegenerative disease  Has multiple problems with his bladder and is on both Ditropan and Flomax which can affect his vision and his blood pressure  He got some prisms for his glasses but his eye hand coordination is still poor which is probably part of his neurodegenerative disease and complications from his multiple medications.    At this point he is a Ko lift and cannot pivot or transfer I do not see how his wife would be able to take him home  If he she does take him home he would need a lot of extra care  Question whether they need to meet again with palliative care to change long-term care goals    Multiple issues discussed as above.      1.  Parkinson's end-stage disease       Has significant severe autonomic instability       Patient is lost a lot of muscle mass        Patient basically wheelchair-bound       Wife has a transfer belt and is able to transfer him from the chair to the bed but this is getting harder prior to this recent hospitalization and may of 2022.              Discussed end-of-life issues       He does have a living well       Talked about hydration and nutrition and he was not interested in any feeding tube patient is coherent and says that when he gets that point he does not want 1            2.  Significant problems at this time  As possible \"spells of hypotension couple times per day that can last 5 minutes\"  Wife is not doing blood pressures though anymore so we do not know where his blood pressure is  Then she did state that sometimes it is really high right in the morning when he gets up but he has been laying flat  We talked about him needing to sleep at least at 30 degrees-45 degrees    3.  He has chronic edema in his feet but it has been stable  He is on the Florinef and midodrine    4.  Patient sometimes will \"have excessive " "sleepiness\" and go 2 days without really eating or drinking much or taking his meds  I talked with wife that this could lead to some of the severe constipation he has then she did say no he stays well-hydrated    5.  Patient with significant constipation  Is on 2 senna per day  Prunes in the morning  Citrucel  Wife is pushing hydration    6.  Does have some hallucinations  He is getting people or animals are probably related to his Parkinson's disease and his Sinemet          A.     In regards to the autonomic instability       Florinef 0.1 mg tablet every morning       midodrine 10 mg tablet twice daily         He did get his lift chair  Is not wearing the abdominal binder, we recommended the abdominal binder at least where he had prior to getting up for therapy  Patient has a hospital bed but I do not think that he is actually elevating it to 30 degrees as he has a.m. hypertension  Does wear knee-high pressure socks but has some edema in his feet  Is basically wheelchair-bound  Transported by wheelchair has a lift chair at home    Seems to pass out a lot when getting up to go to the bathroom or back or if he is on the toilet seat  Also passes out a lot at breakfast so she has been a lift chair so she can leg and down  Talked about if he passes out using a lift chair laying flat  Feels that he is having more trouble and is mainly wheelchair-bound            For the patient's autonomic instability he is on  Florinef 0.1 mg daily  Proamantine 10 mg twice daily  Mestinon 60 mg half a tablet 3 times daily  He is going to try to use the abdominal binder but maybe not have it on so tight so he can tolerate it to put it on in the morning  Sleeps with the head of his bed up we will try having elevated to at least 30 to 45 degrees discussed at length with patient's wife    Have asked the wife to check blood pressures 3 times per day at different times just to see where he is for spot check  If he is having this time where " he zoned out then to take a blood pressure to see if is because of low blood pressure  Question whether these episodes of zoning out are from Parkinson's and been tired versus hypotensive    Currently on Sinemet 25/100, 2 tablets 4 times per day    Review of Meds  Aspirin 81 mg once per day  Atorvastatin 40 mg once per day  Brilanta 90 mg twice daily    B12 1000 mcg orally every other day      B.  Parkinson's disease for 22+ years        Very hypophonic voice autonomic instability resting tremor right side greater than left chronic head drop with leaning to the left        Difficulty with rigidity difficulty decrease carbidopa levodopa medication        No visual hallucinations or vivid dreams        Discussed living well which has been filled out in the past        Discussed power of  which has been filled out in the past        Discussed moving to a apartment that has escalating care available as seniors apartment is not enough and increasing care needs are imminent in the near future        Sinemet 25/100, increase 12/8/2020, (2 tablets 4 times daily)        Remeron 7.5 mg at nighttime    Review of Meds  Aspirin 81 mg once per day  Atorvastatin 40 mg once per day  Brilanta 90 mg twice daily    B12 1000 mcg orally every other day          Past medical history  Parkinson's disease onset prior to 1999  Severe autonomic instability  Pacemaker  MI/NSTEMI 2019  Hypertension  Hyperlipidemia  GERD  Syncope/falls due to autonomic instability    Habits  Past smoker  Past occasional alcohol beverage    Family history  Mom with heart disease  Father with heart disease      Work-up summary:  CT scan had June 2018 no subdural mild chronic small vessel changes mild volume loss  Previous workup included the following which is also outlined in the November 27, 2013 note for evaluation:  a. CTA of the neck vessels normal.  b. CTA of the head vessels no stenosis.  c. Echo 60% ejection fraction, normal left atrium.  d. HDL  in the past 34, LDL 83.  CT cervical spine 12/12/2020 spondylolisthesis no fracture  CT head 5/20/2021 moderate volume loss small vessel changes no subdural hematoma or mass  B12 1038 (November 2019)  Labs May 2021  Sodium 142 potassium 4.0 BUN 25 creatinine 1.36  Glucose 94  White blood count 7.3, hemoglobin 11.1, platelets 201,000       CT scan head 5/22/2022  1.  Exam is moderately degraded by motion artifact despite reacquisitions.  2.  No finding for intracranial hemorrhage, mass, or acute infarct.  3.  Moderate volume loss and mild presumed sequelae of chronic microvascular ischemic change.  Carotid Dopplers 5/22/2022  A.  Right ICA peak systolic velocity 70 cm/s no significant stenosis  B.  Left ICA peak systolic velocity 78 cm/s no significant stenosis  EEG 5/23/2022, 6 to 7 Hz background rhythms with occasional 3-4 Hz slowing no seizure activity, dysrhythmia grade 2 generalized slowing read by Dr. Phelps         exam  Video exam       Review of systems  See HPI above  Pertinent positives and negatives  Leaning to the left  Hypophonic speech  Can swallow okay not choking on food  Sometimes sees people or animals    Basically wheelchair-bound  Postural instability and severe autonomic instability  Weakness of his legs  Edema in the feet    Has chronic constipation sometimes alternating with diarrhea but less diarrhea now than in the past is on a bowel program      No diplopia no dysphagia  No visual field changes    No headache or chest pain or shortness of breath    Otherwise review of systems negative    General exam  Video exam  Below is from past in person visit for comparison in the future  HEENT normal  Lungs clear  Heart rate regular  Abdomen soft  Edema in the feet wearing pressure stockings, 1+ edema up the shin and foot  Decreased range of motion of both shoulders    Neurologic exam  Alert attentive oriented x3  No aphasia  No neglect  Brief memory testing okay    Cranial nerves II through XII except  for parkinsonian features normal  No ophthalmoplegia  No nystagmus  Visual fields intact  Face symmetrical  Tongue twisters slow but understandable    Parkinsonian features  Soft hypophonic voice  Head drop posture old/chronic  Mild to moderate decreased blink but reasonable upgaze  No hallucinations or vivid dreams  Mild bradykinesia  Mild resting tremor a little bit more on the right greater than the left today  Has had difficulty with alternating constipation and diarrhea  Orthostatic BP changes     Upper extremities  No drift  Tremor today was a little bit more noticed on the right than the left but on other visits has been the other way around  Mild to moderate bradykinesia  Slow finger tapping  Decreased range of motion of both shoulders getting his arms up    Lower extremities  Diffusely weak  In the seated position can move them weekly  Inability to stand due to weakness    Gait  Flexed posture  Can march in place while seated  Can kick his legs out  Gets fatigued easily when he tries to stand up  Uses most of his energy to transfer in and out of the car when he comes for a visit  Does have a lift chair at home        Assessment/Plan     1.  Autonomic dysfunction (G90.3)       Reviewed medications, stay well-hydrated       2.  Parkinson Disease (G20)         We will not lower the Sinemet any further as he started to get more stiffness and tremor        We discussed progressive nature of Parkinson's disease      Current diagnoses:    1. Parkinson s disease going on for greater than 22 plus years as far back as in 1998 with tremor and bradykinesia.  2. Severe autonomic dysfunction with orthostatic blood pressure trouble, failed black liquorice, failed Florinef, had supine hypertension, has tried Mestinon in the past and midodrine.  3. History of heart disease on beta-blockers with some bradycardia, had a pacemaker placed in August 2015.  4. History of TIAs in August 2013 with hypertension and hyperlipidemia,  is supposed to be on an aspirin, has been a past smoker.  5. Autonomic instability, which is resistant to multiple manipulations and activities, medications, and habits.    Patient does have:   Hospital bed. He is unable to sleep at 30 degrees the last time I saw him because he slides around, discussed at length.   Did get an abdominal binder, tried it a little bit, does not like to wear it.  We will try using this more often   Has a lift chair   Has a transfer belt for his wife to use   Does have a 4 wheeled walker with hand brake      Current plan  Midodrine 10 mg 3 times daily  Florinef 0.1 mg every morning refilled  Sinemet 25/100, 2 tablets 4 times daily,   Mestinon 30 mg 3 times daily  Hospital bed should raise to at least 30 to 45 degrees   Retry the abdominal binder but maybe wear it less tight put it on in the morning to see if he can tolerate it    Other physicians have started up as needed hydralazine to be used if blood pressure greater than 180  Recommend that if he is laying down when this occurs he should be set up in the hospital bed to see if that treats the blood pressure before getting the as needed    Patient with bladder control difficulties seen by urology  Patient on both Ditropan and Hytrin      Other physicians  Atorvastatin 40 mg daily  Aspirin 81 mg once per day  Other meds see med list    Discussed gait safety high risk for falls and injuries  Discussed end-of-life issues  Discussed feeding tube and patient is not interested in this    End-stage Parkinson's disease with severe autonomic instability  Did meet with palliative care during hospitalization May 2022 but no long-term changes made    Will have patient follow-up in 4 months    Multiple discussions above    Total care time including review of hospitalization records 32 minutes        As part of the visit today  Reviewed hospitalization 5/22/2022  Reviewed EEG 5/23/2022  Reviewed CT scan head 5/22/2022  Reviewed carotid Dopplers  5/22/2022      Again, thank you for allowing me to participate in the care of your patient.        Sincerely,        Tramaine Mackay MD

## 2022-06-27 NOTE — NURSING NOTE
"Chief Complaint   Patient presents with     Hospital F/U     Wife states he has brought to hospital having a \"spell\". Pt was taken by ambulance. Pt was not responding well, when he came around he was confused. He is now at [facility] for rehab.     Video Visit     Wife Susanne to assist with video visit.     Nathaly Patel LPN on 6/27/2022 at 11:09 AM    "

## 2022-06-27 NOTE — PROGRESS NOTES
[facility] GERIATRICS    Chief Complaint   Patient presents with     RECHECK     HPI:  Canelo Cook is a 82 year old  (1940), who is being seen today for an episodic care visit at: [facility] ([facility]) [[id number]]. Today's concern is: The primary encounter diagnosis was Hypokalemia. Diagnoses of Physical deconditioning, Generalized muscle weakness, Recurrent syncope, Acute metabolic encephalopathy, Parkinson disease (H), Adult failure to thrive, Dysuria, SYDNEY (acute kidney injury) (H), Stage 3 chronic kidney disease, unspecified whether stage 3a or 3b CKD (H), Dyslipidemia, goal LDL below 100, Hypertension, unspecified type, Coronary artery disease involving native coronary artery of native heart without angina pectoris, Bilateral leg edema, Gastroesophageal reflux disease with esophagitis, unspecified whether hemorrhage, Normocytic anemia, Constipation, unspecified constipation type, Autonomic instability, and Insomnia, unspecified type were also pertinent to this visit.    Met with patient who denies any chest pain, palpitations, shortness of breath, TRAORE, lightheadedness, dizziness, or cough. Denies any abdominal discomfort. Denies N&V. Denies B&B concerns. Denies dysuria or frequency. Denies loose or constipation. Appetite fair. He does report an occasional headache but reports good relief from Tylenol use. Sleeping well. Wife at bedside and discussed recommendations below. Open to seeing palliative in near future. She is wanting to bring him home with equipment. I did explain that this will be a lot of work for her and will be expensive to have home care options to come in daily for ongoing needs and cares. Patient does need extensive assistance at this time for mobility and toileting needs. I do not anticipate his mobility status to improve and suspect progressive decline S/T parkinsons. I recommend palliative care which wife is open to consult. Not open to hospice care at this time.      BP Readings from Last 3 Encounters:   06/29/22 (!) 160/81   06/22/22 (!) 157/87   06/15/22 117/79     Wt Readings from Last 5 Encounters:   06/29/22 75.7 kg (166 lb 12.8 oz)   06/27/22 79.4 kg (175 lb)   06/22/22 80.7 kg (177 lb 14.4 oz)   06/15/22 80.7 kg (177 lb 14.4 oz)   06/08/22 81.9 kg (180 lb 8 oz)     Allergies, and PMH/PSH reviewed in EPIC today.  REVIEW OF SYSTEMS:  10 point ROS of systems including Constitutional, Eyes, Respiratory, Cardiovascular, Gastroenterology, Genitourinary, Integumentary, Musculoskeletal, Psychiatric were all negative except for pertinent positives noted in my HPI.    Objective:   BP (!) 160/81   Pulse 66   Temp 98  F (36.7  C)   Resp 18   Ht 1.829 m (6')   Wt 75.7 kg (166 lb 12.8 oz)   SpO2 94%   BMI 22.62 kg/m    GENERAL APPEARANCE:  Alert, in no distress, cooperative  ENT:  Mouth and posterior oropharynx normal, moist mucous membranes, [place]  EYES:  EOM, conjunctivae, lids, pupils and irises normal  NECK:  No adenopathy,masses or thyromegaly  RESP:  respiratory effort and palpation of chest normal, lungs clear to auscultation , no respiratory distress  CV:  Palpation and auscultation of heart done , regular rate and rhythm, no murmur, rub, or gallop, peripheral edema 1+ in BLE  ABDOMEN:  normal bowel sounds, soft, nontender, no hepatosplenomegaly or other masses, no guarding or rebound  M/S:   Ko lift. Wheelchair bound. Requires Max of all ADLS, transfers and goals  SKIN:  Inspection of skin and subcutaneous tissue baseline, Palpation of skin and subcutaneous tissue baseline  NEURO:   Cranial nerves 2-12 are normal tested and grossly at patient's baseline, no purposeful movement in upper and lower extremities  PSYCH:  oriented X 3, memory impaired , affect and mood normal      Most Recent 3 CBC's:  Recent Labs   Lab Test 06/15/22  0610 06/10/22  0756 05/31/22  0723   WBC 6.2 6.5 5.7   HGB 10.4* 11.6* 10.2*   MCV 92 94 93    245 196     Most Recent 3  BMP's:  Recent Labs   Lab Test 06/15/22  0610 06/10/22  0756 05/31/22  0723    144 140   POTASSIUM 3.7 3.3* 3.9   CHLORIDE 108* 105 108*   CO2 24 31 28   BUN 28 24 22   CR 0.98 1.05 1.11   ANIONGAP 11 8 4*   EMMY 8.3* 8.7 8.2*   GLC 71 70 83     Most Recent 2 LFT's:  Recent Labs   Lab Test 05/23/22  0351 05/22/22  1308   AST 14 12   ALT <9 <9   ALKPHOS 53 67   BILITOTAL 0.8 0.8     Most Recent TSH and T4:  Recent Labs   Lab Test 05/02/22  0955   TSH 3.98     Most Recent Urinalysis:  Recent Labs   Lab Test 05/28/22  1904 05/23/22  1220 03/01/22 2122 06/23/20  1144   COLOR Yellow Yellow   < > Yellow   APPEARANCE Clear Clear   < > Clear   URINEGLC Negative Negative   < > Negative   URINEBILI Negative Negative   < > Negative   URINEKETONE Negative Trace*   < > Negative   SG 1.017 1.016   < > 1.010   UBLD Negative Negative   < > Negative   URINEPH 5.5 5.5   < > 7.5   PROTEIN Negative 20 *   < > Negative   UROBILINOGEN  --   --   --  <2.0 E.U./dL   NITRITE Negative Negative   < > Negative   LEUKEST Negative Negative   < > Negative   RBCU  --  1   < >  --    WBCU  --  3   < >  --     < > = values in this interval not displayed.     Most Recent Anemia Panel:  Recent Labs   Lab Test 06/15/22  0610   WBC 6.2   HGB 10.4*   HCT 31.8*   MCV 92          ASSESSMENT/PLAN:     (R53.81) Physical deconditioning  (primary encounter diagnosis)  (M62.81) Generalized muscle weakness  Comment: Acute on chronic. S/T below diagnosis.   Plan:   -Continue Physical therapy and Occupational therapy as directed  -SW to remain involved for safe discharge planning needs  -Would strongly recommend LTC for ongoing needs post TCU due to poor therapy progression  -Would recommend follow up with palliative medicine. Will send new referral in place     (R55) Recurrent syncope  Comment: Acute on chronic. Patient initially presented for evaluation of recurrent syncope, appear to be vasovagal in etiology.  Recurrent syncope thought to be  multifactorial secondary to advanced Parkinson's, orthostatic hypotension, dehydration.  CT head on admission negative for any acute intracranial processes, masses, or bleed.  Pacemaker interrogated in the ER without any remarkable irregularities.  Carotid ultrasound 5/22 unremarkable, TTE 5/22 ejection fraction 65% with moderate to severe LVH but no obvious valvular disease.  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  Most of these episodes are related to bowel movements or eating.  Dizziness seems to be better controlled with keeping patient's blood pressure on the higher end and with use of fludrocortisone and PT/OT. His SBP readings have been continuously fluctuating drastically on site. Ranging SBP 80s to above>200. Family has noted that orthostatic is noted when monitoring BPs that he drastically drops when sitting compared to lying down.  Plan:   -Monitor for worsening s/sx of concerns  -Continue midodrine to 10mg TID PRN. Give if SBP<100.    -Hydralazine PRN if SBP>180  -Continue metoprolol 50mg daily. HOLD parameters if SBP<100 (dose recently increased on 6/10)  -Continue Fludrocortisone Acetate Tablet 0.1 MG daily.   -Monitor BP and HR as directed.   -Encourage incentive spirometry every 4 hours while awake.   -Continue Mestinon Tablet TID as directed  -Would strongly recommend LTC for ongoing needs post TCU due to poor therapy progression  -Would recommend follow up with palliative medicine. Will send new referral in place  -BMP and CBC due 7/1/22         (G93.41) Acute metabolic encephalopathy  (G47.00) Insomnia  Comment: Acute. Patient with history of prolonged episode of unresponsiveness during syncopal episode prior to admission as well as intermittent hallucinations described by patient's wife  CT head negative, EEG 5/23 abnormal with diffuse slow background which is nonspecific for  generalized cerebral dysfunction but negative for seizure activity.  Unable to perform MRI brain secondary to pacemaker placement.  No signs of infection, urinalysis appears uninfected.  Encephalopathy more likely secondary to hypotension, orthostasis, worsening Parkinson's.   Plan:   -Monitor for worsening s/sx of concerns.   -Monitor sleeping patterns  -Continue melatonin 3mg at HS.   -Monitor for changes in mood and behaviors  -Would strongly recommend LTC for ongoing needs post TCU due to poor therapy progression  -Would recommend follow up with palliative medicine. Will send new referral in place  -BMP and CBC due 7/1/22     (G20) Parkinson disease (H)  (R62.7) Adult failure to thrive  Comment: Chronic. Patient with prolonged episode of unresponsiveness during syncopal episode prior to admission as well as intermittent hallucinations described by patient's wife  CT head negative, EEG 5/23 abnormal with diffuse slow background which is nonspecific for generalized cerebral dysfunction but negative for seizure activity.  Unable to perform MRI brain secondary to pacemaker placement.  No signs of infection, urinalysis appears uninfected.  Encephalopathy more likely secondary to hypotension, orthostasis, worsening Parkinson's.  Plan:   -Monitor for worsening s/sx of concerns  -He should always be sleeping with a head of the bed at 30 to 45 degrees and still can have pillows of it that high  -Continue Carbidopa-Levodopa Tablet  MG 2 tabs QID as directed  -Follow up with neurology as directed. Scheduled for 1/9/23  -Would strongly recommend LTC for ongoing needs post TCU due to poor therapy progression  -Would recommend follow up with palliative medicine. Will send new referral in place  -BMP and CBC due 7/1/22  -Monitor weights daily x 2 weeks then change back to weekly thereafter     (R30.0) Dysuria  (N17.9) SYDNEY (acute kidney injury) (H)  (N18.30) Stage 3 chronic kidney disease, unspecified whether stage 3a or  3b CKD (H)  Comment: Acute on chronic. Patient endorses urinary urgency and dysuria on 5/28, urinalysis 5/28 negative for signs of urinary tract infection.  Patient likely having some mild bladder spasms associated with Parkinson's.  Continue home tolterodine, Pyridium was used as needed. Baseline creatinine approximately 1.1-1.2, natasha to 1.55 at time of admission.  Resolved with IVF.  Plan:  -Monitor urinary status  -Continue tolterodine as directed without change  -Continue flomax 0.4mg daily  -Would strongly recommend LTC for ongoing needs post TCU due to poor therapy progression  -Would recommend follow up with palliative medicine. Will send new referral in place  -BMP and CBC due 7/1/22     (I10) Hypertension, unspecified type  (E78.5) Dyslipidemia, goal LDL below 100  (I25.10) Coronary artery disease involving native coronary artery of native heart without angina pectoris  (R60.0) Bilateral leg edema  Comment: Chronic. Patient can be extremely hypertensive and then swing drastically to hypotensive secondary to autonomic instability.  Minimal edema noted to bilateral lower extremities.   Plan:   -Monitor for worsening s/sx of concerns  -Monitor BP with HOB >30 degrees or sitting to obtain accuracy.   -Continue Knee high Bilateral tubigrip daily. On in AM and off at HS.   -Continue midodrine to 10mg TID PRN. Give if SBP<100  -He should use the abdominal binder when he is up for therapy she will bring that to the care center and see if he will wear it now.  -Continue Hydralazine 10mg TID PRN for SBP>180  -Continue metoprolol 50mg daily. HOLD parameters if SBP<100 (dose recently increased on 6/10)  -Continue Fludrocortisone Acetate Tablet 0.1 MG daily.   -Monitor BP and HR as directed.  -Monitor weights daily x 2 weeks then change back to weekly thereafter  -Continue statin and asa daily as directed.    -Follow up with cardiology as directed. Scheduled for 7/25/22  -Would strongly recommend LTC for ongoing needs  post TCU due to poor therapy progression  -Would recommend follow up with palliative medicine. Will send new referral in place  -BMP and CBC due 7/1/22     (E87.6) Hypokalemia  Comment: Acute on chronic. Replaced in hospital. Recent levels 3.3. started on supplementation which improved.   Plan:   -Monitor for worsening s/sx of concerns.  -Continue potassium chloride 20mEq daily  -Would strongly recommend LTC for ongoing needs post TCU due to poor therapy progression  -Would recommend follow up with palliative medicine. Will send new referral in place  -BMP and CBC due 7/1/22     (D64.9) Normocytic anemia  Comment: Chronic. Hemoglobin 10.3 with MCV of 94.  Plan:   -Monitor bleeding risks  -Monitor falls  -Continue asa as directed  -Would strongly recommend LTC for ongoing needs post TCU due to poor therapy progression  -Would recommend follow up with palliative medicine. Will send new referral in place  -BMP and CBC due 7/1/22     (K21.00) Gastroesophageal reflux disease with esophagitis, unspecified whether hemorrhage  (K59.00) Constipation  Comment: Chronic. 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  Most of these episodes are related to bowel movements or eating. He reports being constipated but staff disagree.   Plan:  -Monitor BM Patterns  -Continue bisacodyl daily PRN  -Continue senna S 2 tabs BID. HOLD for loose stools  -Continue magnesium daily.   -Continue miralax daily  -Continue omeprazole 20mg daily.   -Monitor for worsening s/sx of concerns.   -Would strongly recommend LTC for ongoing needs post TCU due to poor therapy progression  -Would recommend follow up with palliative medicine. Will send new referral in place  -BMP and CBC due 7/1/22     Electronically signed by:  Marilu Mcdermott DNP, APRN

## 2022-06-28 NOTE — TELEPHONE ENCOUNTER
Returned a call to spouse Susanne who called, inquiring about about additional services for her  after he leaves TCU (saw him/them while inpatient at [facility] 1 month ago). Reviewed am not involved in his care now, but was able to answer some generic questions for her re: increased home care, palliative care programs at the home, as well as an informational hospice consult option that she can ask the TCU CM/SW staff to address.   No further follow up planned with inpatient palliative care.   Merlin Cox NP,CNP, Palliative Care

## 2022-06-29 PROBLEM — S22.42XD CLOSED FRACTURE OF MULTIPLE RIBS OF LEFT SIDE WITH ROUTINE HEALING: Status: RESOLVED | Noted: 2020-02-18 | Resolved: 2022-01-01

## 2022-06-29 NOTE — LETTER
6/29/2022        RE: Canelo Cook  [address]  [address]  [place] [address]        .  [facility] GERIATRICS    Chief Complaint   Patient presents with     RECHECK     HPI:  Canelo Cook is a 82 year old  (1940), who is being seen today for an episodic care visit at: [facility] [[id number]]. Today's concern is: The primary encounter diagnosis was Hypokalemia. Diagnoses of Physical deconditioning, Generalized muscle weakness, Recurrent syncope, Acute metabolic encephalopathy, Parkinson disease (H), Adult failure to thrive, Dysuria, SYDNEY (acute kidney injury) (H), Stage 3 chronic kidney disease, unspecified whether stage 3a or 3b CKD (H), Dyslipidemia, goal LDL below 100, Hypertension, unspecified type, Coronary artery disease involving native coronary artery of native heart without angina pectoris, Bilateral leg edema, Gastroesophageal reflux disease with esophagitis, unspecified whether hemorrhage, Normocytic anemia, Constipation, unspecified constipation type, Autonomic instability, and Insomnia, unspecified type were also pertinent to this visit.    Met with patient who denies any chest pain, palpitations, shortness of breath, TRAORE, lightheadedness, dizziness, or cough. Denies any abdominal discomfort. Denies N&V. Denies B&B concerns. Denies dysuria or frequency. Denies loose or constipation. Appetite fair. He does report an occasional headache but reports good relief from Tylenol use. Sleeping well. Wife at bedside and discussed recommendations below. Open to seeing palliative in near future. She is wanting to bring him home with equipment. I did explain that this will be a lot of work for her and will be expensive to have home care options to come in daily for ongoing needs and cares. Patient does need extensive assistance at this time for mobility and toileting needs. I do not anticipate his mobility status to improve and suspect progressive decline S/T parkinsons. I  recommend palliative care which wife is open to consult. Not open to hospice care at this time.     BP Readings from Last 3 Encounters:   06/29/22 (!) 160/81   06/22/22 (!) 157/87   06/15/22 117/79     Wt Readings from Last 5 Encounters:   06/29/22 75.7 kg (166 lb 12.8 oz)   06/27/22 79.4 kg (175 lb)   06/22/22 80.7 kg (177 lb 14.4 oz)   06/15/22 80.7 kg (177 lb 14.4 oz)   06/08/22 81.9 kg (180 lb 8 oz)     Allergies, and PMH/PSH reviewed in EPIC today.  REVIEW OF SYSTEMS:  10 point ROS of systems including Constitutional, Eyes, Respiratory, Cardiovascular, Gastroenterology, Genitourinary, Integumentary, Musculoskeletal, Psychiatric were all negative except for pertinent positives noted in my HPI.    Objective:   BP (!) 160/81   Pulse 66   Temp 98  F (36.7  C)   Resp 18   Ht 1.829 m (6')   Wt 75.7 kg (166 lb 12.8 oz)   SpO2 94%   BMI 22.62 kg/m    GENERAL APPEARANCE:  Alert, in no distress, cooperative  ENT:  Mouth and posterior oropharynx normal, moist mucous membranes, [place]  EYES:  EOM, conjunctivae, lids, pupils and irises normal  NECK:  No adenopathy,masses or thyromegaly  RESP:  respiratory effort and palpation of chest normal, lungs clear to auscultation , no respiratory distress  CV:  Palpation and auscultation of heart done , regular rate and rhythm, no murmur, rub, or gallop, peripheral edema 1+ in BLE  ABDOMEN:  normal bowel sounds, soft, nontender, no hepatosplenomegaly or other masses, no guarding or rebound  M/S:   Ko lift. Wheelchair bound. Requires Max of all ADLS, transfers and goals  SKIN:  Inspection of skin and subcutaneous tissue baseline, Palpation of skin and subcutaneous tissue baseline  NEURO:   Cranial nerves 2-12 are normal tested and grossly at patient's baseline, no purposeful movement in upper and lower extremities  PSYCH:  oriented X 3, memory impaired , affect and mood normal      Most Recent 3 CBC's:  Recent Labs   Lab Test 06/15/22  0610 06/10/22  0756 05/31/22  0723   WBC  6.2 6.5 5.7   HGB 10.4* 11.6* 10.2*   MCV 92 94 93    245 196     Most Recent 3 BMP's:  Recent Labs   Lab Test 06/15/22  0610 06/10/22  0756 05/31/22  0723    144 140   POTASSIUM 3.7 3.3* 3.9   CHLORIDE 108* 105 108*   CO2 24 31 28   BUN 28 24 22   CR 0.98 1.05 1.11   ANIONGAP 11 8 4*   EMMY 8.3* 8.7 8.2*   GLC 71 70 83     Most Recent 2 LFT's:  Recent Labs   Lab Test 05/23/22  0351 05/22/22  1308   AST 14 12   ALT <9 <9   ALKPHOS 53 67   BILITOTAL 0.8 0.8     Most Recent TSH and T4:  Recent Labs   Lab Test 05/02/22  0955   TSH 3.98     Most Recent Urinalysis:  Recent Labs   Lab Test 05/28/22  1904 05/23/22  1220 03/01/22  2122 06/23/20  1144   COLOR Yellow Yellow   < > Yellow   APPEARANCE Clear Clear   < > Clear   URINEGLC Negative Negative   < > Negative   URINEBILI Negative Negative   < > Negative   URINEKETONE Negative Trace*   < > Negative   SG 1.017 1.016   < > 1.010   UBLD Negative Negative   < > Negative   URINEPH 5.5 5.5   < > 7.5   PROTEIN Negative 20 *   < > Negative   UROBILINOGEN  --   --   --  <2.0 E.U./dL   NITRITE Negative Negative   < > Negative   LEUKEST Negative Negative   < > Negative   RBCU  --  1   < >  --    WBCU  --  3   < >  --     < > = values in this interval not displayed.     Most Recent Anemia Panel:  Recent Labs   Lab Test 06/15/22  0610   WBC 6.2   HGB 10.4*   HCT 31.8*   MCV 92          ASSESSMENT/PLAN:     (R53.81) Physical deconditioning  (primary encounter diagnosis)  (M62.81) Generalized muscle weakness  Comment: Acute on chronic. S/T below diagnosis.   Plan:   -Continue Physical therapy and Occupational therapy as directed  -SW to remain involved for safe discharge planning needs  -Would strongly recommend LTC for ongoing needs post TCU due to poor therapy progression  -Would recommend follow up with palliative medicine. Will send new referral in place     (R55) Recurrent syncope  Comment: Acute on chronic. Patient initially presented for evaluation of  recurrent syncope, appear to be vasovagal in etiology.  Recurrent syncope thought to be multifactorial secondary to advanced Parkinson's, orthostatic hypotension, dehydration.  CT head on admission negative for any acute intracranial processes, masses, or bleed.  Pacemaker interrogated in the ER without any remarkable irregularities.  Carotid ultrasound 5/22 unremarkable, TTE 5/22 ejection fraction 65% with moderate to severe LVH but no obvious valvular disease.  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  Most of these episodes are related to bowel movements or eating.  Dizziness seems to be better controlled with keeping patient's blood pressure on the higher end and with use of fludrocortisone and PT/OT. His SBP readings have been continuously fluctuating drastically on site. Ranging SBP 80s to above>200. Family has noted that orthostatic is noted when monitoring BPs that he drastically drops when sitting compared to lying down.  Plan:   -Monitor for worsening s/sx of concerns  -Continue midodrine to 10mg TID PRN. Give if SBP<100.    -Hydralazine PRN if SBP>180  -Continue metoprolol 50mg daily. HOLD parameters if SBP<100 (dose recently increased on 6/10)  -Continue Fludrocortisone Acetate Tablet 0.1 MG daily.   -Monitor BP and HR as directed.   -Encourage incentive spirometry every 4 hours while awake.   -Continue Mestinon Tablet TID as directed  -Would strongly recommend LTC for ongoing needs post TCU due to poor therapy progression  -Would recommend follow up with palliative medicine. Will send new referral in place  -BMP and CBC due 7/1/22         (G93.41) Acute metabolic encephalopathy  (G47.00) Insomnia  Comment: Acute. Patient with history of prolonged episode of unresponsiveness during syncopal episode prior to admission as well as intermittent hallucinations described by patient's wife  CT  head negative, EEG 5/23 abnormal with diffuse slow background which is nonspecific for generalized cerebral dysfunction but negative for seizure activity.  Unable to perform MRI brain secondary to pacemaker placement.  No signs of infection, urinalysis appears uninfected.  Encephalopathy more likely secondary to hypotension, orthostasis, worsening Parkinson's.   Plan:   -Monitor for worsening s/sx of concerns.   -Monitor sleeping patterns  -Continue melatonin 3mg at HS.   -Monitor for changes in mood and behaviors  -Would strongly recommend LTC for ongoing needs post TCU due to poor therapy progression  -Would recommend follow up with palliative medicine. Will send new referral in place  -BMP and CBC due 7/1/22     (G20) Parkinson disease (H)  (R62.7) Adult failure to thrive  Comment: Chronic. Patient with prolonged episode of unresponsiveness during syncopal episode prior to admission as well as intermittent hallucinations described by patient's wife  CT head negative, EEG 5/23 abnormal with diffuse slow background which is nonspecific for generalized cerebral dysfunction but negative for seizure activity.  Unable to perform MRI brain secondary to pacemaker placement.  No signs of infection, urinalysis appears uninfected.  Encephalopathy more likely secondary to hypotension, orthostasis, worsening Parkinson's.  Plan:   -Monitor for worsening s/sx of concerns  -He should always be sleeping with a head of the bed at 30 to 45 degrees and still can have pillows of it that high  -Continue Carbidopa-Levodopa Tablet  MG 2 tabs QID as directed  -Follow up with neurology as directed. Scheduled for 1/9/23  -Would strongly recommend LTC for ongoing needs post TCU due to poor therapy progression  -Would recommend follow up with palliative medicine. Will send new referral in place  -BMP and CBC due 7/1/22  -Monitor weights daily x 2 weeks then change back to weekly thereafter     (R30.0) Dysuria  (N17.9) SYDNEY (acute kidney  injury) (H)  (N18.30) Stage 3 chronic kidney disease, unspecified whether stage 3a or 3b CKD (H)  Comment: Acute on chronic. Patient endorses urinary urgency and dysuria on 5/28, urinalysis 5/28 negative for signs of urinary tract infection.  Patient likely having some mild bladder spasms associated with Parkinson's.  Continue home tolterodine, Pyridium was used as needed. Baseline creatinine approximately 1.1-1.2, natasha to 1.55 at time of admission.  Resolved with IVF.  Plan:  -Monitor urinary status  -Continue tolterodine as directed without change  -Continue flomax 0.4mg daily  -Would strongly recommend LTC for ongoing needs post TCU due to poor therapy progression  -Would recommend follow up with palliative medicine. Will send new referral in place  -BMP and CBC due 7/1/22     (I10) Hypertension, unspecified type  (E78.5) Dyslipidemia, goal LDL below 100  (I25.10) Coronary artery disease involving native coronary artery of native heart without angina pectoris  (R60.0) Bilateral leg edema  Comment: Chronic. Patient can be extremely hypertensive and then swing drastically to hypotensive secondary to autonomic instability.  Minimal edema noted to bilateral lower extremities.   Plan:   -Monitor for worsening s/sx of concerns  -Monitor BP with HOB >30 degrees or sitting to obtain accuracy.   -Continue Knee high Bilateral tubigrip daily. On in AM and off at HS.   -Continue midodrine to 10mg TID PRN. Give if SBP<100  -He should use the abdominal binder when he is up for therapy she will bring that to the care center and see if he will wear it now.  -Continue Hydralazine 10mg TID PRN for SBP>180  -Continue metoprolol 50mg daily. HOLD parameters if SBP<100 (dose recently increased on 6/10)  -Continue Fludrocortisone Acetate Tablet 0.1 MG daily.   -Monitor BP and HR as directed.  -Monitor weights daily x 2 weeks then change back to weekly thereafter  -Continue statin and asa daily as directed.    -Follow up with cardiology  as directed. Scheduled for 7/25/22  -Would strongly recommend LTC for ongoing needs post TCU due to poor therapy progression  -Would recommend follow up with palliative medicine. Will send new referral in place  -BMP and CBC due 7/1/22     (E87.6) Hypokalemia  Comment: Acute on chronic. Replaced in hospital. Recent levels 3.3. started on supplementation which improved.   Plan:   -Monitor for worsening s/sx of concerns.  -Continue potassium chloride 20mEq daily  -Would strongly recommend LTC for ongoing needs post TCU due to poor therapy progression  -Would recommend follow up with palliative medicine. Will send new referral in place  -BMP and CBC due 7/1/22     (D64.9) Normocytic anemia  Comment: Chronic. Hemoglobin 10.3 with MCV of 94.  Plan:   -Monitor bleeding risks  -Monitor falls  -Continue asa as directed  -Would strongly recommend LTC for ongoing needs post TCU due to poor therapy progression  -Would recommend follow up with palliative medicine. Will send new referral in place  -BMP and CBC due 7/1/22     (K21.00) Gastroesophageal reflux disease with esophagitis, unspecified whether hemorrhage  (K59.00) Constipation  Comment: Chronic. 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  Most of these episodes are related to bowel movements or eating. He reports being constipated but staff disagree.   Plan:  -Monitor BM Patterns  -Continue bisacodyl daily PRN  -Continue senna S 2 tabs BID. HOLD for loose stools  -Continue magnesium daily.   -Continue miralax daily  -Continue omeprazole 20mg daily.   -Monitor for worsening s/sx of concerns.   -Would strongly recommend LTC for ongoing needs post TCU due to poor therapy progression  -Would recommend follow up with palliative medicine. Will send new referral in place  -BMP and CBC due 7/1/22     Electronically signed by:  Marilu Mcdermott DNP,  TOVA            Sincerely,        TOVA Maldonado CNP

## 2022-07-05 NOTE — PROGRESS NOTES
[facility] GERIATRICS    Chief Complaint   Patient presents with     RECHECK     HPI:  Canelo Cook is a 82 year old  (1940), who is being seen today for an episodic care visit at: [facility] ([facility]) [[id number]]. Today's concern is: The primary encounter diagnosis was Hypokalemia. Diagnoses of Physical deconditioning, Generalized muscle weakness, Recurrent syncope, Acute metabolic encephalopathy, Parkinson disease (H), Adult failure to thrive, Dysuria, SYDNEY (acute kidney injury) (H), Dyslipidemia, goal LDL below 100, Stage 3 chronic kidney disease, unspecified whether stage 3a or 3b CKD (H), Hypertension, unspecified type, Coronary artery disease involving native coronary artery of native heart without angina pectoris, Bilateral leg edema, Gastroesophageal reflux disease with esophagitis, unspecified whether hemorrhage, Normocytic anemia, Constipation, unspecified constipation type, Autonomic instability, and Insomnia, unspecified type were also pertinent to this visit.    Met with patient earlier today who is able to answer some questions, however he is falling in/out of sleep. Up in wheelchair. Difficulty swallowing medications this AM during to lethargy status. When asked he denies any chest pain, palpitations, shortness of breath, TRAORE, lightheadedness, dizziness, or cough. Lung are clear. Denies any abdominal discomfort. No pain with palpation. Denies N&V. Denies B&B concerns. Denies dysuria or frequency. Denies loose or constipation. Appetite fair. Sleeping well. Wife at bedside.     BP Readings from Last 3 Encounters:   07/06/22 112/71   06/29/22 (!) 160/81   06/22/22 (!) 157/87     Wt Readings from Last 5 Encounters:   07/06/22 75.8 kg (167 lb)   06/29/22 75.7 kg (166 lb 12.8 oz)   06/27/22 79.4 kg (175 lb)   06/22/22 80.7 kg (177 lb 14.4 oz)   06/15/22 80.7 kg (177 lb 14.4 oz)     Allergies, and PMH/PSH reviewed in EPIC today.  REVIEW OF SYSTEMS:  10 point ROS of systems including  Constitutional, Eyes, Respiratory, Cardiovascular, Gastroenterology, Genitourinary, Integumentary, Musculoskeletal, Psychiatric were all negative except for pertinent positives noted in my HPI.    Objective:   /71   Pulse 76   Temp 96.9  F (36.1  C)   Resp 16   Wt 75.8 kg (167 lb)   SpO2 97%   BMI 22.65 kg/m    GENERAL APPEARANCE:  Alert, somnolent, cooperative  ENT:  Mouth and posterior oropharynx normal, moist mucous membranes, [place]  EYES:  EOM, conjunctivae, lids, pupils and irises normal  NECK:  No adenopathy,masses or thyromegaly  RESP:  respiratory effort and palpation of chest normal, lungs clear to auscultation , no respiratory distress  CV:  Palpation and auscultation of heart done , regular rate and rhythm, no murmur, rub, or gallop, no edema  ABDOMEN:  normal bowel sounds, soft, nontender, no hepatosplenomegaly or other masses, no guarding or rebound  M/S:   Ko lift. Wheelchair bound  SKIN:  Inspection of skin and subcutaneous tissue baseline, Palpation of skin and subcutaneous tissue baseline  NEURO:   Cranial nerves 2-12 are normal tested and grossly at patient's baseline, no purposeful movement in upper and lower extremities  PSYCH:  oriented to self, insight and judgement impaired, memory impaired , affect and mood normal      Most Recent 3 CBC's:  Recent Labs   Lab Test 07/01/22  0748 06/15/22  0610 06/10/22  0756   WBC 6.8 6.2 6.5   HGB 11.8* 10.4* 11.6*   MCV 95 92 94    223 245     Most Recent 3 BMP's:  Recent Labs   Lab Test 07/01/22  0748 06/15/22  0610 06/10/22  0756    143 144   POTASSIUM 4.1 3.7 3.3*   CHLORIDE 106 108* 105   CO2 30* 24 31   BUN 34.0* 28 24   CR 1.18* 0.98 1.05   ANIONGAP 7 11 8   EMMY 9.0 8.3* 8.7   GLC 69* 71 70     Most Recent 2 LFT's:  Recent Labs   Lab Test 05/23/22  0351 05/22/22  1308   AST 14 12   ALT <9 <9   ALKPHOS 53 67   BILITOTAL 0.8 0.8     Most Recent TSH and T4:  Recent Labs   Lab Test 05/02/22  0955   TSH 3.98     Most Recent  Urinalysis:  Recent Labs   Lab Test 05/28/22  1904 05/23/22  1220 03/01/22  2122 06/23/20  1144   COLOR Yellow Yellow   < > Yellow   APPEARANCE Clear Clear   < > Clear   URINEGLC Negative Negative   < > Negative   URINEBILI Negative Negative   < > Negative   URINEKETONE Negative Trace*   < > Negative   SG 1.017 1.016   < > 1.010   UBLD Negative Negative   < > Negative   URINEPH 5.5 5.5   < > 7.5   PROTEIN Negative 20 *   < > Negative   UROBILINOGEN  --   --   --  <2.0 E.U./dL   NITRITE Negative Negative   < > Negative   LEUKEST Negative Negative   < > Negative   RBCU  --  1   < >  --    WBCU  --  3   < >  --     < > = values in this interval not displayed.     Most Recent Anemia Panel:  Recent Labs   Lab Test 07/01/22  0748   WBC 6.8   HGB 11.8*   HCT 38.0*   MCV 95          ASSESSMENT/PLAN:     (R53.81) Physical deconditioning  (primary encounter diagnosis)  (M62.81) Generalized muscle weakness  Comment: Acute on chronic. S/T below diagnosis. Per IDT rounds- SW to discuss with family on recommendations. LCD 7/14/22. Recommend LTC vs home as home will be expensive and self limiting for wife to accomondate  Plan:   -Continue Physical therapy and Occupational therapy as directed  -SW to remain involved for safe discharge planning needs  -Would strongly recommend LTC for ongoing needs post TCU due to poor therapy progression  -Would recommend follow up with palliative medicine. Pending appt     (R55) Recurrent syncope  Comment: Acute on chronic. Patient initially presented for evaluation of recurrent syncope, appear to be vasovagal in etiology.  Recurrent syncope thought to be multifactorial secondary to advanced Parkinson's, orthostatic hypotension, dehydration.  CT head on admission negative for any acute intracranial processes, masses, or bleed.  Pacemaker interrogated in the ER without any remarkable irregularities.  Carotid ultrasound 5/22 unremarkable, TTE 5/22 ejection fraction 65% with moderate to severe  LVH but no obvious valvular disease.  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  Most of these episodes are related to bowel movements or eating.  Dizziness seems to be better controlled with keeping patient's blood pressure on the higher end and with use of fludrocortisone and PT/OT. His SBP readings have been continuously fluctuating drastically on site. Ranging SBP 80s to above>200. Family has noted that orthostatic is noted when monitoring BPs that he drastically drops when sitting compared to lying down.  Plan:   -Monitor for worsening s/sx of concerns  -Continue midodrine to 10mg TID PRN. Give if SBP<100.    -Hydralazine PRN if SBP>180  -Continue metoprolol 50mg daily. HOLD parameters if SBP<100 (dose recently increased on 6/10)  -Continue Fludrocortisone Acetate Tablet 0.1 MG daily.   -Monitor BP and HR as directed.   -Encourage incentive spirometry every 4 hours while awake.   -Continue Mestinon Tablet TID as directed  -Would strongly recommend LTC for ongoing needs post TCU due to poor therapy progression  -Would recommend follow up with palliative medicine. Pending appt  -Trend labs periodically while on TCU          (G93.41) Acute metabolic encephalopathy  (G47.00) Insomnia  Comment: Acute. Patient with history of prolonged episode of unresponsiveness during syncopal episode prior to admission as well as intermittent hallucinations described by patient's wife  CT head negative, EEG 5/23 abnormal with diffuse slow background which is nonspecific for generalized cerebral dysfunction but negative for seizure activity.  Unable to perform MRI brain secondary to pacemaker placement.  No signs of infection, urinalysis appears uninfected.  Encephalopathy more likely secondary to hypotension, orthostasis, worsening Parkinson's.   Plan:   -Monitor for worsening s/sx of concerns.   -Monitor sleeping  patterns  -Discontinue melatonin due to lethargy in AM.   -UA/UC to rule out infection concerns. Will treat accordingly  -Monitor for changes in mood and behaviors  -Would strongly recommend LTC for ongoing needs post TCU due to poor therapy progression  -Would recommend follow up with palliative medicine. Pending appt  -Trend labs periodically while on TCU     (G20) Parkinson disease (H)  (R62.7) Adult failure to thrive  Comment: Chronic. Patient with prolonged episode of unresponsiveness during syncopal episode prior to admission as well as intermittent hallucinations described by patient's wife  CT head negative, EEG 5/23 abnormal with diffuse slow background which is nonspecific for generalized cerebral dysfunction but negative for seizure activity.  Unable to perform MRI brain secondary to pacemaker placement.  No signs of infection, urinalysis appears uninfected.  Encephalopathy more likely secondary to hypotension, orthostasis, worsening Parkinson's. Weight trending down. Admit weight 180# with most recent weight 167#  Plan:   -Monitor for worsening s/sx of concerns  -He should always be sleeping with a head of the bed at 30 to 45 degrees and still can have pillows of it that high  -Continue Carbidopa-Levodopa Tablet  MG 2 tabs QID as directed  -Follow up with neurology as directed. Scheduled for 1/9/23  -Would strongly recommend LTC for ongoing needs post TCU due to poor therapy progression  -Would recommend follow up with palliative medicine. Pending appt  -Trend labs periodically while on TCU  -Monitor weights daily x 2 weeks then change back to weekly thereafter     (R30.0) Dysuria  (N17.9) SYDNEY (acute kidney injury) (H)  (N18.30) Stage 3 chronic kidney disease, unspecified whether stage 3a or 3b CKD (H)  Comment: Acute on chronic. Patient endorses urinary urgency and dysuria on 5/28, urinalysis 5/28 negative for signs of urinary tract infection.  Patient likely having some mild bladder spasms  associated with Parkinson's.  Continue home tolterodine, Pyridium was used as needed. Baseline creatinine approximately 1.1-1.2, natasha to 1.55 at time of admission.  Resolved with IVF.  Plan:  -Monitor urinary status  -Continue tolterodine as directed without change  -Continue flomax 0.4mg daily  -Would strongly recommend LTC for ongoing needs post TCU due to poor therapy progression  -Would recommend follow up with palliative medicine. Pending appt  -Trend labs periodically while on TCU     (I10) Hypertension, unspecified type  (E78.5) Dyslipidemia, goal LDL below 100  (I25.10) Coronary artery disease involving native coronary artery of native heart without angina pectoris  (R60.0) Bilateral leg edema  Comment: Chronic. Patient can be extremely hypertensive and then swing drastically to hypotensive secondary to autonomic instability.  Minimal edema noted to bilateral lower extremities.   Plan:   -Monitor for worsening s/sx of concerns  -Monitor BP with HOB >30 degrees or sitting to obtain accuracy.   -Continue Knee high Bilateral tubigrip daily. On in AM and off at HS.   -Continue midodrine to 10mg TID PRN. Give if SBP<100  -He should use the abdominal binder when he is up for therapy she will bring that to the care center and see if he will wear it now.  -Continue Hydralazine 10mg TID PRN for SBP>180  -Continue metoprolol 50mg daily. HOLD parameters if SBP<100 (dose recently increased on 6/10)  -Continue Fludrocortisone Acetate Tablet 0.1 MG daily.   -Monitor BP and HR as directed.  -Monitor weights daily x 2 weeks then change back to weekly thereafter  -Continue statin and asa daily as directed.    -Follow up with cardiology as directed. Scheduled for 7/25/22  -Would strongly recommend LTC for ongoing needs post TCU due to poor therapy progression  -Would recommend follow up with palliative medicine. Pending appt  -Trend labs periodically while on TCU     (E87.6) Hypokalemia  Comment: Acute on chronic. Replaced in  hospital. Recent levels 3.3. started on supplementation which improved.   Plan:   -Monitor for worsening s/sx of concerns.  -Continue potassium chloride 20mEq daily  -Would strongly recommend LTC for ongoing needs post TCU due to poor therapy progression  -Would recommend follow up with palliative medicine. Pending appt  -Trend labs periodically while on TCU     (D64.9) Normocytic anemia  Comment: Chronic. Hemoglobin 10.3 with MCV of 94.  Plan:   -Monitor bleeding risks  -Monitor falls  -Continue asa as directed  -Would strongly recommend LTC for ongoing needs post TCU due to poor therapy progression  -Would recommend follow up with palliative medicine. Pending appt  -Trend labs periodically while on TCU     (K21.00) Gastroesophageal reflux disease with esophagitis, unspecified whether hemorrhage  (K59.00) Constipation  Comment: Chronic. 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  Most of these episodes are related to bowel movements or eating.   Plan:  -Monitor BM Patterns  -Continue bisacodyl daily PRN  -Continue senna S 2 tabs BID. HOLD for loose stools  -Continue magnesium daily.   -Continue miralax daily  -Continue omeprazole 20mg daily.   -Monitor for worsening s/sx of concerns.   -Would strongly recommend LTC for ongoing needs post TCU due to poor therapy progression  -Would recommend follow up with palliative medicine. Pending appt  -Trend labs periodically while on TCU     Electronically signed by:  Marilu Mcdermott DNP, APRN

## 2022-07-06 NOTE — LETTER
7/6/2022        RE: Canelo Cook  [address]  [address]  [facility] [address]        [facility] GERIATRICS    Chief Complaint   Patient presents with     RECHECK     HPI:  Canelo Cook is a 82 year old  (1940), who is being seen today for an episodic care visit at: [facility] ([facility]) [[id number]]. Today's concern is: The primary encounter diagnosis was Hypokalemia. Diagnoses of Physical deconditioning, Generalized muscle weakness, Recurrent syncope, Acute metabolic encephalopathy, Parkinson disease (H), Adult failure to thrive, Dysuria, SYDNEY (acute kidney injury) (H), Dyslipidemia, goal LDL below 100, Stage 3 chronic kidney disease, unspecified whether stage 3a or 3b CKD (H), Hypertension, unspecified type, Coronary artery disease involving native coronary artery of native heart without angina pectoris, Bilateral leg edema, Gastroesophageal reflux disease with esophagitis, unspecified whether hemorrhage, Normocytic anemia, Constipation, unspecified constipation type, Autonomic instability, and Insomnia, unspecified type were also pertinent to this visit.    Met with patient earlier today who is able to answer some questions, however he is falling in/out of sleep. Up in wheelchair. Difficulty swallowing medications this AM during to lethargy status. When asked he denies any chest pain, palpitations, shortness of breath, TRAORE, lightheadedness, dizziness, or cough. Lung are clear. Denies any abdominal discomfort. No pain with palpation. Denies N&V. Denies B&B concerns. Denies dysuria or frequency. Denies loose or constipation. Appetite fair. Sleeping well. Wife at bedside.     BP Readings from Last 3 Encounters:   07/06/22 112/71   06/29/22 (!) 160/81   06/22/22 (!) 157/87     Wt Readings from Last 5 Encounters:   07/06/22 75.8 kg (167 lb)   06/29/22 75.7 kg (166 lb 12.8 oz)   06/27/22 79.4 kg (175 lb)   06/22/22 80.7 kg (177 lb 14.4 oz)   06/15/22 80.7 kg (177 lb 14.4 oz)      Allergies, and PMH/PSH reviewed in [facility] today.  REVIEW OF SYSTEMS:  10 point ROS of systems including Constitutional, Eyes, Respiratory, Cardiovascular, Gastroenterology, Genitourinary, Integumentary, Musculoskeletal, Psychiatric were all negative except for pertinent positives noted in my HPI.    Objective:   /71   Pulse 76   Temp 96.9  F (36.1  C)   Resp 16   Wt 75.8 kg (167 lb)   SpO2 97%   BMI 22.65 kg/m    GENERAL APPEARANCE:  Alert, somnolent, cooperative  ENT:  Mouth and posterior oropharynx normal, moist mucous membranes, [place]  EYES:  EOM, conjunctivae, lids, pupils and irises normal  NECK:  No adenopathy,masses or thyromegaly  RESP:  respiratory effort and palpation of chest normal, lungs clear to auscultation , no respiratory distress  CV:  Palpation and auscultation of heart done , regular rate and rhythm, no murmur, rub, or gallop, no edema  ABDOMEN:  normal bowel sounds, soft, nontender, no hepatosplenomegaly or other masses, no guarding or rebound  M/S:   Ko lift. Wheelchair bound  SKIN:  Inspection of skin and subcutaneous tissue baseline, Palpation of skin and subcutaneous tissue baseline  NEURO:   Cranial nerves 2-12 are normal tested and grossly at patient's baseline, no purposeful movement in upper and lower extremities  PSYCH:  oriented to self, insight and judgement impaired, memory impaired , affect and mood normal      Most Recent 3 CBC's:  Recent Labs   Lab Test 07/01/22  0748 06/15/22  0610 06/10/22  0756   WBC 6.8 6.2 6.5   HGB 11.8* 10.4* 11.6*   MCV 95 92 94    223 245     Most Recent 3 BMP's:  Recent Labs   Lab Test 07/01/22  0748 06/15/22  0610 06/10/22  0756    143 144   POTASSIUM 4.1 3.7 3.3*   CHLORIDE 106 108* 105   CO2 30* 24 31   BUN 34.0* 28 24   CR 1.18* 0.98 1.05   ANIONGAP 7 11 8   EMMY 9.0 8.3* 8.7   GLC 69* 71 70     Most Recent 2 LFT's:  Recent Labs   Lab Test 05/23/22  0351 05/22/22  1308   AST 14 12   ALT <9 <9   ALKPHOS 53 67   BILITOTAL 0.8  0.8     Most Recent TSH and T4:  Recent Labs   Lab Test 05/02/22  0955   TSH 3.98     Most Recent Urinalysis:  Recent Labs   Lab Test 05/28/22  1904 05/23/22  1220 03/01/22  2122 06/23/20  1144   COLOR Yellow Yellow   < > Yellow   APPEARANCE Clear Clear   < > Clear   URINEGLC Negative Negative   < > Negative   URINEBILI Negative Negative   < > Negative   URINEKETONE Negative Trace*   < > Negative   SG 1.017 1.016   < > 1.010   UBLD Negative Negative   < > Negative   URINEPH 5.5 5.5   < > 7.5   PROTEIN Negative 20 *   < > Negative   UROBILINOGEN  --   --   --  <2.0 E.U./dL   NITRITE Negative Negative   < > Negative   LEUKEST Negative Negative   < > Negative   RBCU  --  1   < >  --    WBCU  --  3   < >  --     < > = values in this interval not displayed.     Most Recent Anemia Panel:  Recent Labs   Lab Test 07/01/22  0748   WBC 6.8   HGB 11.8*   HCT 38.0*   MCV 95          ASSESSMENT/PLAN:     (R53.81) Physical deconditioning  (primary encounter diagnosis)  (M62.81) Generalized muscle weakness  Comment: Acute on chronic. S/T below diagnosis.   Plan:   -Continue Physical therapy and Occupational therapy as directed  -SW to remain involved for safe discharge planning needs  -Would strongly recommend LTC for ongoing needs post TCU due to poor therapy progression  -Would recommend follow up with palliative medicine. Pending appt     (R55) Recurrent syncope  Comment: Acute on chronic. Patient initially presented for evaluation of recurrent syncope, appear to be vasovagal in etiology.  Recurrent syncope thought to be multifactorial secondary to advanced Parkinson's, orthostatic hypotension, dehydration.  CT head on admission negative for any acute intracranial processes, masses, or bleed.  Pacemaker interrogated in the ER without any remarkable irregularities.  Carotid ultrasound 5/22 unremarkable, TTE 5/22 ejection fraction 65% with moderate to severe LVH but no obvious valvular disease.  Patient with severe  autonomic instability at baseline and prior to admission was noted by outpatient neurologist to be having almost daily syncopal episodes.  Most of these episodes are related to bowel movements or eating.  Dizziness seems to be better controlled with keeping patient's blood pressure on the higher end and with use of fludrocortisone and PT/OT. His SBP readings have been continuously fluctuating drastically on site. Ranging SBP 80s to above>200. Family has noted that orthostatic is noted when monitoring BPs that he drastically drops when sitting compared to lying down.  Plan:   -Monitor for worsening s/sx of concerns  -Continue midodrine to 10mg TID PRN. Give if SBP<100.    -Hydralazine PRN if SBP>180  -Continue metoprolol 50mg daily. HOLD parameters if SBP<100 (dose recently increased on 6/10)  -Continue Fludrocortisone Acetate Tablet 0.1 MG daily.   -Monitor BP and HR as directed.   -Encourage incentive spirometry every 4 hours while awake.   -Continue Mestinon Tablet TID as directed  -Would strongly recommend LTC for ongoing needs post TCU due to poor therapy progression  -Would recommend follow up with palliative medicine. Pending appt  -Trend labs periodically while on TCU          (G93.41) Acute metabolic encephalopathy  (G47.00) Insomnia  Comment: Acute. Patient with history of prolonged episode of unresponsiveness during syncopal episode prior to admission as well as intermittent hallucinations described by patient's wife  CT head negative, EEG 5/23 abnormal with diffuse slow background which is nonspecific for generalized cerebral dysfunction but negative for seizure activity.  Unable to perform MRI brain secondary to pacemaker placement.  No signs of infection, urinalysis appears uninfected.  Encephalopathy more likely secondary to hypotension, orthostasis, worsening Parkinson's.   Plan:   -Monitor for worsening s/sx of concerns.   -Monitor sleeping patterns  -Discontinue melatonin due to lethargy in AM.    -UA/UC to rule out infection concerns. Will treat accordingly  -Monitor for changes in mood and behaviors  -Would strongly recommend LTC for ongoing needs post TCU due to poor therapy progression  -Would recommend follow up with palliative medicine. Pending appt  -Trend labs periodically while on TCU     (G20) Parkinson disease (H)  (R62.7) Adult failure to thrive  Comment: Chronic. Patient with prolonged episode of unresponsiveness during syncopal episode prior to admission as well as intermittent hallucinations described by patient's wife  CT head negative, EEG 5/23 abnormal with diffuse slow background which is nonspecific for generalized cerebral dysfunction but negative for seizure activity.  Unable to perform MRI brain secondary to pacemaker placement.  No signs of infection, urinalysis appears uninfected.  Encephalopathy more likely secondary to hypotension, orthostasis, worsening Parkinson's. Weight trending down. Admit weight 180# with most recent weight 167#  Plan:   -Monitor for worsening s/sx of concerns  -He should always be sleeping with a head of the bed at 30 to 45 degrees and still can have pillows of it that high  -Continue Carbidopa-Levodopa Tablet  MG 2 tabs QID as directed  -Follow up with neurology as directed. Scheduled for 1/9/23  -Would strongly recommend LTC for ongoing needs post TCU due to poor therapy progression  -Would recommend follow up with palliative medicine. Pending appt  -Trend labs periodically while on TCU  -Monitor weights daily x 2 weeks then change back to weekly thereafter     (R30.0) Dysuria  (N17.9) SYDNEY (acute kidney injury) (H)  (N18.30) Stage 3 chronic kidney disease, unspecified whether stage 3a or 3b CKD (H)  Comment: Acute on chronic. Patient endorses urinary urgency and dysuria on 5/28, urinalysis 5/28 negative for signs of urinary tract infection.  Patient likely having some mild bladder spasms associated with Parkinson's.  Continue home tolterodine,  Pyridium was used as needed. Baseline creatinine approximately 1.1-1.2, natasha to 1.55 at time of admission.  Resolved with IVF.  Plan:  -Monitor urinary status  -Continue tolterodine as directed without change  -Continue flomax 0.4mg daily  -Would strongly recommend LTC for ongoing needs post TCU due to poor therapy progression  -Would recommend follow up with palliative medicine. Pending appt  -Trend labs periodically while on TCU     (I10) Hypertension, unspecified type  (E78.5) Dyslipidemia, goal LDL below 100  (I25.10) Coronary artery disease involving native coronary artery of native heart without angina pectoris  (R60.0) Bilateral leg edema  Comment: Chronic. Patient can be extremely hypertensive and then swing drastically to hypotensive secondary to autonomic instability.  Minimal edema noted to bilateral lower extremities.   Plan:   -Monitor for worsening s/sx of concerns  -Monitor BP with HOB >30 degrees or sitting to obtain accuracy.   -Continue Knee high Bilateral tubigrip daily. On in AM and off at HS.   -Continue midodrine to 10mg TID PRN. Give if SBP<100  -He should use the abdominal binder when he is up for therapy she will bring that to the care center and see if he will wear it now.  -Continue Hydralazine 10mg TID PRN for SBP>180  -Continue metoprolol 50mg daily. HOLD parameters if SBP<100 (dose recently increased on 6/10)  -Continue Fludrocortisone Acetate Tablet 0.1 MG daily.   -Monitor BP and HR as directed.  -Monitor weights daily x 2 weeks then change back to weekly thereafter  -Continue statin and asa daily as directed.    -Follow up with cardiology as directed. Scheduled for 7/25/22  -Would strongly recommend LTC for ongoing needs post TCU due to poor therapy progression  -Would recommend follow up with palliative medicine. Pending appt  -Trend labs periodically while on TCU     (E87.6) Hypokalemia  Comment: Acute on chronic. Replaced in hospital. Recent levels 3.3. started on  supplementation which improved.   Plan:   -Monitor for worsening s/sx of concerns.  -Continue potassium chloride 20mEq daily  -Would strongly recommend LTC for ongoing needs post TCU due to poor therapy progression  -Would recommend follow up with palliative medicine. Pending appt  -Trend labs periodically while on TCU     (D64.9) Normocytic anemia  Comment: Chronic. Hemoglobin 10.3 with MCV of 94.  Plan:   -Monitor bleeding risks  -Monitor falls  -Continue asa as directed  -Would strongly recommend LTC for ongoing needs post TCU due to poor therapy progression  -Would recommend follow up with palliative medicine. Pending appt  -Trend labs periodically while on TCU     (K21.00) Gastroesophageal reflux disease with esophagitis, unspecified whether hemorrhage  (K59.00) Constipation  Comment: Chronic. 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  Most of these episodes are related to bowel movements or eating.   Plan:  -Monitor BM Patterns  -Continue bisacodyl daily PRN  -Continue senna S 2 tabs BID. HOLD for loose stools  -Continue magnesium daily.   -Continue miralax daily  -Continue omeprazole 20mg daily.   -Monitor for worsening s/sx of concerns.   -Would strongly recommend LTC for ongoing needs post TCU due to poor therapy progression  -Would recommend follow up with palliative medicine. Pending appt  -Trend labs periodically while on TCU     Electronically signed by:  Marilu Mcdermott DNP, APRN                 Sincerely,        TOVA Maldonado CNP

## 2022-07-08 NOTE — PROGRESS NOTES
[facility] GERIATRICS    Chief Complaint   Patient presents with     RECHECK     HPI:  Canelo Cook is a 82 year old  (1940), who is being seen today for an episodic care visit at: [facility] ([facility]) [[id number]]. Today's concern is: The primary encounter diagnosis was Hypokalemia. Diagnoses of Physical deconditioning, Generalized muscle weakness, Recurrent syncope, Acute metabolic encephalopathy, Parkinson disease (H), Adult failure to thrive, SYDNEY (acute kidney injury) (H), Dyslipidemia, goal LDL below 100, Stage 3 chronic kidney disease, unspecified whether stage 3a or 3b CKD (H), Hypertension, unspecified type, Bilateral leg edema, Coronary artery disease involving native coronary artery of native heart without angina pectoris, Gastroesophageal reflux disease with esophagitis, unspecified whether hemorrhage, Normocytic anemia, Constipation, unspecified constipation type, Insomnia, unspecified type, and Autonomic instability were also pertinent to this visit.    Met with patient who denies any chest pain, palpitations, shortness of breath, TRAORE, lightheadedness, dizziness, or cough. Denies any abdominal discomfort. Denies N&V. Denies B&B concerns. Denies dysuria or frequency. Denies loose or constipation. Appetite good. Sleeping well. He appears more alert than he was last week. Recent urine was negative for any infection. Wife at bedside and reports his LCD 7/14/22 and is wanting to go home Friday 7/15/22 with home care needs along with needing extra equipment. She reports allowing HHA coverage for 4 hours mid day along with having family nearby to assist as well. SW involved to discuss goals of care along with coordinating safe discharge plans.     BP Readings from Last 3 Encounters:   07/11/22 113/65   07/06/22 112/71   06/29/22 (!) 160/81     Wt Readings from Last 5 Encounters:   07/11/22 75.5 kg (166 lb 6.4 oz)   07/06/22 75.8 kg (167 lb)   06/29/22 75.7 kg (166 lb 12.8 oz)   06/27/22  79.4 kg (175 lb)   06/22/22 80.7 kg (177 lb 14.4 oz)     Allergies, and PMH/PSH reviewed in EPIC today.  REVIEW OF SYSTEMS:  10 point ROS of systems including Constitutional, Eyes, Respiratory, Cardiovascular, Gastroenterology, Genitourinary, Integumentary, Musculoskeletal, Psychiatric were all negative except for pertinent positives noted in my HPI.    Objective:   /65   Pulse 67   Temp 99  F (37.2  C)   Resp 18   Ht 1.829 m (6')   Wt 75.5 kg (166 lb 6.4 oz)   SpO2 95%   BMI 22.57 kg/m    GENERAL APPEARANCE:  Alert, in no distress, cooperative  ENT:  Mouth and posterior oropharynx normal, moist mucous membranes, [place]  EYES:  EOM, conjunctivae, lids, pupils and irises normal  NECK:  No adenopathy,masses or thyromegaly  RESP:  respiratory effort and palpation of chest normal, lungs clear to auscultation , no respiratory distress  CV:  Palpation and auscultation of heart done , regular rate and rhythm, no murmur, rub, or gallop, no edema  ABDOMEN:  normal bowel sounds, soft, nontender, no hepatosplenomegaly or other masses, no guarding or rebound  M/S:   Easy stand transfers. Wheelchair bound for all needs. At times does need chelly lift when lethargic.   SKIN:  Inspection of skin and subcutaneous tissue baseline, Palpation of skin and subcutaneous tissue baseline  NEURO:   Cranial nerves 2-12 are normal tested and grossly at patient's baseline, no purposeful movement in upper and lower extremities  PSYCH:  oriented X 3, memory impaired , affect and mood normal      Most Recent 3 CBC's:  Recent Labs   Lab Test 07/01/22  0748 06/15/22  0610 06/10/22  0756   WBC 6.8 6.2 6.5   HGB 11.8* 10.4* 11.6*   MCV 95 92 94    223 245     Most Recent 3 BMP's:  Recent Labs   Lab Test 07/08/22  0640 07/01/22  0748 06/15/22  0610 06/10/22  0756   NA  --  143 143 144   POTASSIUM  --  4.1 3.7 3.3*   CHLORIDE  --  106 108* 105   CO2  --  30* 24 31   BUN  --  34.0* 28 24   CR 1.49* 1.18* 0.98 1.05   ANIONGAP  --  7 11 8    EMMY  --  9.0 8.3* 8.7   GLC  --  69* 71 70     Most Recent 2 LFT's:  Recent Labs   Lab Test 05/23/22  0351 05/22/22  1308   AST 14 12   ALT <9 <9   ALKPHOS 53 67   BILITOTAL 0.8 0.8     Most Recent Urinalysis:  Recent Labs   Lab Test 07/08/22  1800 03/01/22  2122 06/23/20  1144   COLOR Yellow   < > Yellow   APPEARANCE Clear   < > Clear   URINEGLC Negative   < > Negative   URINEBILI Negative   < > Negative   URINEKETONE Trace*   < > Negative   SG 1.021   < > 1.010   UBLD Small*   < > Negative   URINEPH 5.5   < > 7.5   PROTEIN Negative   < > Negative   UROBILINOGEN  --   --  <2.0 E.U./dL   NITRITE Negative   < > Negative   LEUKEST Negative   < > Negative   RBCU 22*   < >  --    WBCU 2   < >  --     < > = values in this interval not displayed.     Most Recent Anemia Panel:  Recent Labs   Lab Test 07/01/22  0748   WBC 6.8   HGB 11.8*   HCT 38.0*   MCV 95        Magnesium   Date Value Ref Range Status   05/31/2022 1.9 1.8 - 2.6 mg/dL Final     ASSESSMENT/PLAN:     (R53.81) Physical deconditioning  (primary encounter diagnosis)  (M62.81) Generalized muscle weakness  Comment: Acute on chronic. S/T below diagnosis. Per last week IDT rounds- SW to discuss with family on recommendations. LCD 7/14/22. Family plans to discharge home 7/15/22. Recommend LTC however family is not open to this idea at this time. They want to trial home care for ongoing needs. Highly recommend hospice at this time or when condition progresses.   Plan:   -Continue Physical therapy and Occupational therapy as directed  -SW to remain involved for safe discharge planning needs  -Would strongly recommend LTC for ongoing needs post TCU due to poor therapy progression, however family is wanting to bring him home soon.   -Would also recommend hospice/palliative for ongoing needs if family becomes open.      (R55) Recurrent syncope  Comment: Acute on chronic. Patient initially presented for evaluation of recurrent syncope, appear to be vasovagal in  etiology.  Recurrent syncope thought to be multifactorial secondary to advanced Parkinson's, orthostatic hypotension, dehydration.  CT head on admission negative for any acute intracranial processes, masses, or bleed.  Pacemaker interrogated in the ER without any remarkable irregularities.  Carotid ultrasound 5/22 unremarkable, TTE 5/22 ejection fraction 65% with moderate to severe LVH but no obvious valvular disease.  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  His SBP readings have been continuously fluctuating drastically on site. Ranging SBP 80s to above>200.   Plan:   -Monitor for worsening s/sx of concerns  -Continue midodrine to 10mg TID PRN. Give if SBP<100.    -Hydralazine PRN if SBP>180  -Continue metoprolol 50mg daily. HOLD parameters if SBP<100  -Continue Fludrocortisone Acetate Tablet 0.1 MG daily.   -Monitor BP and HR as directed.   -Encourage incentive spirometry every 4 hours while awake.   -Continue Mestinon Tablet TID as directed  -Would strongly recommend LTC for ongoing needs post TCU due to poor therapy progression, however family is wanting to bring him home soon.   -Would also recommend hospice/palliative for ongoing needs if family becomes open.  -BMP and CBC due 7/13/22          (G93.41) Acute metabolic encephalopathy  (G47.00) Insomnia  Comment: Acute. Patient with history of prolonged episode of unresponsiveness during syncopal episode prior to admission as well as intermittent hallucinations described by patient's wife  CT head negative, EEG 5/23 abnormal with diffuse slow background which is nonspecific for generalized cerebral dysfunction but negative for seizure activity.  Unable to perform MRI brain secondary to pacemaker placement. Encephalopathy more likely secondary to hypotension, orthostasis, worsening Parkinson's. Recent urine negative for infection.    Plan:   -Monitor for worsening s/sx of concerns.   -Monitor sleeping patterns  -Monitor for changes in mood and behaviors  -Would strongly recommend LTC for ongoing needs post TCU due to poor therapy progression, however family is wanting to bring him home soon.   -Would also recommend hospice/palliative for ongoing needs if family becomes open.   -BMP and CBC due 7/13/22     (G20) Parkinson disease (H)  (R62.7) Adult failure to thrive  Comment: Chronic. Patient with prolonged episode of unresponsiveness during syncopal episode prior to admission as well as intermittent hallucinations described by patient's wife  CT head negative, EEG 5/23 abnormal with diffuse slow background which is nonspecific for generalized cerebral dysfunction but negative for seizure activity.  Unable to perform MRI brain secondary to pacemaker placement.  Encephalopathy more likely secondary to hypotension, orthostasis, worsening Parkinson's. Weight trending down. Admit weight 180# with most recent weight 166#  Plan:   -Monitor for worsening s/sx of concerns  -He should always be sleeping with a head of the bed at 30 to 45 degrees and still can have pillows of it that high  -Continue Carbidopa-Levodopa Tablet  MG 2 tabs QID as directed  -Follow up with neurology as directed. Scheduled for 1/9/23  -Would strongly recommend LTC for ongoing needs post TCU due to poor therapy progression, however family is wanting to bring him home soon.   -Would also recommend hospice/palliative for ongoing needs if family becomes open.  -BMP and CBC due 7/13/22  -Monitor weights daily     (R30.0) Dysuria  (N17.9) SYDNEY (acute kidney injury) (H)  (N18.30) Stage 3 chronic kidney disease, unspecified whether stage 3a or 3b CKD (H)  Comment: Acute on chronic. Patient endorses urinary urgency and dysuria on 5/28, urinalysis 5/28 negative for signs of urinary tract infection.  Patient likely having some mild bladder spasms associated with  Parkinson's.  Baseline creatinine approximately 1.1-1.2, natasha to 1.55 at time of admission.  Resolved with IVF.  Plan:  -Monitor urinary status  -Continue tolterodine as directed without change  -Continue flomax 0.4mg daily  -Would strongly recommend LTC for ongoing needs post TCU due to poor therapy progression, however family is wanting to bring him home soon.   -Would also recommend hospice/palliative for ongoing needs if family becomes open.  -BMP and CBC due 7/13/22     (I10) Hypertension, unspecified type  (E78.5) Dyslipidemia, goal LDL below 100  (I25.10) Coronary artery disease involving native coronary artery of native heart without angina pectoris  (R60.0) Bilateral leg edema  Comment: Chronic. Patient can be extremely hypertensive and then swing drastically to hypotensive secondary to autonomic instability.  Minimal edema noted to bilateral lower extremities.   Plan:   -Monitor for worsening s/sx of concerns  -Monitor BP with HOB >30 degrees or sitting to obtain accuracy.   -Continue Knee high Bilateral tubigrip daily. On in AM and off at HS.   -Continue midodrine to 10mg TID PRN. Give if SBP<100  -He should use the abdominal binder when he is up for therapy she will bring that to the care center and see if he will wear it now.  -Continue Hydralazine 10mg TID PRN for SBP>180  -Continue metoprolol 50mg daily. HOLD parameters if SBP<100   -Continue Fludrocortisone Acetate Tablet 0.1 MG daily.   -Monitor BP and HR as directed.  -Monitor weights daily  -Continue statin and asa daily as directed.    -Follow up with cardiology as directed. Scheduled for 7/25/22  -Would strongly recommend LTC for ongoing needs post TCU due to poor therapy progression, however family is wanting to bring him home soon.   -Would also recommend hospice/palliative for ongoing needs if family becomes open.  -BMP and CBC due 7/13/22     (E87.6) Hypokalemia  Comment: Acute on chronic. Replaced in hospital. Recent levels 3.3. started on  supplementation which improved.   Plan:   -Monitor for worsening s/sx of concerns.  -Continue potassium chloride 20mEq daily  -Would strongly recommend LTC for ongoing needs post TCU due to poor therapy progression, however family is wanting to bring him home soon.   -Would also recommend hospice/palliative for ongoing needs if family becomes open.  -BMP and CBC due 22     (D64.9) Normocytic anemia  Comment: Chronic. Hemoglobin 10.3 with MCV of 94.  Plan:   -Monitor bleeding risks  -Monitor falls  -Continue asa as directed  -Would strongly recommend LTC for ongoing needs post TCU due to poor therapy progression, however family is wanting to bring him home soon.   -Would also recommend hospice/palliative for ongoing needs if family becomes open.  -BMP and CBC due 22     (K21.00) Gastroesophageal reflux disease with esophagitis, unspecified whether hemorrhage  (K59.00) Constipation  Comment: Chronic. 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  Most of these episodes are related to bowel movements or eating.   Plan:  -Monitor BM Patterns  -Continue bisacodyl daily PRN  -Continue senna S 2 tabs BID. HOLD for loose stools  -Continue magnesium daily.   -Continue miralax daily  -Continue omeprazole 20mg daily.   -Monitor for worsening s/sx of concerns.   -Would strongly recommend LTC for ongoing needs post TCU due to poor therapy progression, however family is wanting to bring him home soon.   -Would also recommend hospice/palliative for ongoing needs if family becomes open.  -BMP and CBC due 22     Electronically signed by:  Marilu Mcdermott DNP, APRN      .    Face to Face and Medical Necessity Statement for DME Provider visit    Demographic Information on Canelo Cook:  Gender: male  : 1940  [address]    [facility] [address]  [phone] (home) [phone]  (work)    Medical Record: [id number]  Social Security Number: [id number]  Primary Care Provider: Hung Camacho  Insurance: Payor: MEDICARE / Plan: MEDICARE / Product Type: Medicare /     HPI:   Canelo Cook is a 82 year old  (1940), who is being seen today for a face to face provider visit at [facility]; medical necessity statement for DME included. This patient requires the following:  DME Ordered and Medical Necessity Statement     Full electric hospital bed with associated mattress along with bilateral side railings. Specifications per therapy recommendations  Easy stand mechanical lift for transfer needs Specifications per therapy recommendations  Bedside commode to assist for toileting needs. Specifications per therapy recommendations  Wheelchair per therapy recommendations.       Wheelchair Documentation  Size: Per therapy recommendations  Corresponding cushion: Yes: per therapy recommendations  Standard foot rests: per therapy recomendations  Elevating leg rests: Per therapy recommendations  Arm rests: Yes: per therapy recommendations  Lap tray: No  Dose the patient use oxygen? No   Is the patient able to propel wheelchair? No family able to assist  1. The patient has mobility limitations that impairs their ability to participate in one or more mobility related activities: Toileting, Feeding, Grooming and Bathing.  The wheelchair is suitable and necessary for use in the patient's home.  2. The patient's mobility limitations cannot be safely resolved by using a cane/walker:Yes    Reason why a cane or walker will not meet the patient's needs. (ie: balance, tolerance, level of assistance) Progressive Parkinsons  3. The patients home has adequate access to use a manual wheelchair:Yes  4. The use of a manual wheelchair on a regular basis will improve the patients ability to participate in mobility related ADL's at home:Yes  5. The patient is willing to use a manual wheelchair at  home:Yes  6. The patient has adequate upper body strength and the mental capability to safely use a manual wheelchair and/or has a caregiver that is able to assist: Yes  7. Does the patient have a lower extremity injury or edema?Yes  Reason for Type of Wheelchair  Wt Readings from Last 5 Encounters:   07/11/22 75.5 kg (166 lb 6.4 oz)   07/06/22 75.8 kg (167 lb)   06/29/22 75.7 kg (166 lb 12.8 oz)   06/27/22 79.4 kg (175 lb)   06/22/22 80.7 kg (177 lb 14.4 oz)     Wheelchair per therapy recommendations-Refer to therapy notes        The patient does  require positioning of the body in ways not feasible with an ordinary bed due to a medical condition that is expected to last at least 1 month due to progressive parkinsons.   The patient does  require, for the alleviation of pain, postioning of the body in ways not feasible with an ordinary bed.   The patient does  require the head of bed elevated more than 30* most of the time due to CHF, chronic pulmonary disease or aspiration.  The patient does  require traction that can only be attached to a hospital bed.  The patient does  require a bed height different than a fixed height hospital bed to permit tranfers to wheelchair or standing position.   The patient does  require frequent or immediate changes in body position due to progressive parkinsons, chronic pain and limited mobility.     Pt needing above DME with expected length of need of 99 year  due to medical necessity associated with following diagnosis:     Hypokalemia  Physical deconditioning  Generalized muscle weakness  Recurrent syncope  Acute metabolic encephalopathy  Parkinson disease (H)  Adult failure to thrive  SYDNEY (acute kidney injury) (H)  Dyslipidemia, goal LDL below 100  Stage 3 chronic kidney disease, unspecified whether stage 3a or 3b CKD (H)  Hypertension, unspecified type  Bilateral leg edema  Coronary artery disease involving native coronary artery of native heart without angina  pectoris  Gastroesophageal reflux disease with esophagitis, unspecified whether hemorrhage  Normocytic anemia  Constipation, unspecified constipation type  Insomnia, unspecified type  Autonomic instability      PMH   has a past medical history of Acute chest pain (11/10/2019), Acute non-Q wave non-ST elevation myocardial infarction (NSTEMI) (H) (11/11/2019), Anemia (11/15/2013), Closed fracture of multiple ribs of left side with routine healing (2/18/2020), Coronary artery disease due to lipid rich plaque (11/11/2019), DNAR (do not attempt resuscitation) (11/11/2019), Essential hypertension, Fall at home, sequela (12/6/2019), GERD (gastroesophageal reflux disease), HLD (hyperlipidemia), Near syncope (10/12/2015), Parkinson's disease (H), SA node dysfunction (H) (07/29/2015), Syncope, unspecified syncope type (11/10/2019), and Vitamin D deficiency.    ROS:10 point ROS of systems including Constitutional, Eyes, Respiratory, Cardiovascular, Gastroenterology, Genitourinary, Integumentary, Musculoskeletal, Psychiatric were all negative except for pertinent positives noted in my HPI.    EXAM  Vitals: /65   Pulse 67   Temp 99  F (37.2  C)   Resp 18   Ht 1.829 m (6')   Wt 75.5 kg (166 lb 6.4 oz)   SpO2 95%   BMI 22.57 kg/m  ;BMI= Body mass index is 22.57 kg/m .  GENERAL APPEARANCE:  Alert, in no distress, cooperative  ENT:  Mouth and posterior oropharynx normal, moist mucous membranes, [place]  EYES:  EOM, conjunctivae, lids, pupils and irises normal  NECK:  No adenopathy,masses or thyromegaly  RESP:  respiratory effort and palpation of chest normal, lungs clear to auscultation , no respiratory distress  CV:  Palpation and auscultation of heart done , regular rate and rhythm, no murmur, rub, or gallop, no edema  ABDOMEN:  normal bowel sounds, soft, nontender, no hepatosplenomegaly or other masses, no guarding or rebound  M/S:   Easy stand transfers. Wheelchair bound for all needs. At times does need chelly lift when  lethargic.   SKIN:  Inspection of skin and subcutaneous tissue baseline, Palpation of skin and subcutaneous tissue baseline  NEURO:   Cranial nerves 2-12 are normal tested and grossly at patient's baseline, no purposeful movement in upper and lower extremities  PSYCH:  oriented X 3, memory impaired , affect and mood normal     ASSESSMENT/PLAN:  1. Hypokalemia    2. Physical deconditioning    3. Generalized muscle weakness    4. Recurrent syncope    5. Acute metabolic encephalopathy    6. Parkinson disease (H)    7. Adult failure to thrive    8. SYDNEY (acute kidney injury) (H)    9. Dyslipidemia, goal LDL below 100    10. Stage 3 chronic kidney disease, unspecified whether stage 3a or 3b CKD (H)    11. Hypertension, unspecified type    12. Bilateral leg edema    13. Coronary artery disease involving native coronary artery of native heart without angina pectoris    14. Gastroesophageal reflux disease with esophagitis, unspecified whether hemorrhage    15. Normocytic anemia    16. Constipation, unspecified constipation type    17. Insomnia, unspecified type    18. Autonomic instability        Orders:  1. Facility staff/TC to contact DME company to get their order form for provider to fill out    ELECTRONICALLY SIGNED BY LULA CERTIFIED PROVIDER:  TOVA Maldonado CNP   NPI: [id number]  [facility]  [address] [place] [address], [address]  [place] [address]

## 2022-07-11 NOTE — LETTER
7/11/2022        RE: Canelo Cook  [address]  [address]  [facility] [address]        [facility] GERIATRICS    Chief Complaint   Patient presents with     RECHECK     HPI:  Canelo Cook is a 82 year old  (1940), who is being seen today for an episodic care visit at: [facility] ([facility]) [[id number]]. Today's concern is: The primary encounter diagnosis was Hypokalemia. Diagnoses of Physical deconditioning, Generalized muscle weakness, Recurrent syncope, Acute metabolic encephalopathy, Parkinson disease (H), Adult failure to thrive, SYDNEY (acute kidney injury) (H), Dyslipidemia, goal LDL below 100, Stage 3 chronic kidney disease, unspecified whether stage 3a or 3b CKD (H), Hypertension, unspecified type, Bilateral leg edema, Coronary artery disease involving native coronary artery of native heart without angina pectoris, Gastroesophageal reflux disease with esophagitis, unspecified whether hemorrhage, Normocytic anemia, Constipation, unspecified constipation type, Insomnia, unspecified type, and Autonomic instability were also pertinent to this visit.    Met with patient who denies any chest pain, palpitations, shortness of breath, TRAORE, lightheadedness, dizziness, or cough. Denies any abdominal discomfort. Denies N&V. Denies B&B concerns. Denies dysuria or frequency. Denies loose or constipation. Appetite good. Sleeping well. He appears more alert than he was last week. Recent urine was negative for any infection. Wife at bedside and reports his LCD 7/14/22 and is wanting to go home Friday 7/15/22 with home care needs along with needing extra equipment. She reports allowing HHA coverage for 4 hours mid day along with having family nearby to assist as well. SW involved to discuss goals of care along with coordinating safe discharge plans.     BP Readings from Last 3 Encounters:   07/11/22 113/65   07/06/22 112/71   06/29/22 (!) 160/81     Wt Readings from Last 5 Encounters:   07/11/22  75.5 kg (166 lb 6.4 oz)   07/06/22 75.8 kg (167 lb)   06/29/22 75.7 kg (166 lb 12.8 oz)   06/27/22 79.4 kg (175 lb)   06/22/22 80.7 kg (177 lb 14.4 oz)     Allergies, and PMH/PSH reviewed in EPIC today.  REVIEW OF SYSTEMS:  10 point ROS of systems including Constitutional, Eyes, Respiratory, Cardiovascular, Gastroenterology, Genitourinary, Integumentary, Musculoskeletal, Psychiatric were all negative except for pertinent positives noted in my HPI.    Objective:   /65   Pulse 67   Temp 99  F (37.2  C)   Resp 18   Ht 1.829 m (6')   Wt 75.5 kg (166 lb 6.4 oz)   SpO2 95%   BMI 22.57 kg/m    GENERAL APPEARANCE:  Alert, in no distress, cooperative  ENT:  Mouth and posterior oropharynx normal, moist mucous membranes, [place]  EYES:  EOM, conjunctivae, lids, pupils and irises normal  NECK:  No adenopathy,masses or thyromegaly  RESP:  respiratory effort and palpation of chest normal, lungs clear to auscultation , no respiratory distress  CV:  Palpation and auscultation of heart done , regular rate and rhythm, no murmur, rub, or gallop, no edema  ABDOMEN:  normal bowel sounds, soft, nontender, no hepatosplenomegaly or other masses, no guarding or rebound  M/S:   Easy stand transfers. Wheelchair bound for all needs. At times does need chelly lift when lethargic.   SKIN:  Inspection of skin and subcutaneous tissue baseline, Palpation of skin and subcutaneous tissue baseline  NEURO:   Cranial nerves 2-12 are normal tested and grossly at patient's baseline, no purposeful movement in upper and lower extremities  PSYCH:  oriented X 3, memory impaired , affect and mood normal      Most Recent 3 CBC's:  Recent Labs   Lab Test 07/01/22  0748 06/15/22  0610 06/10/22  0756   WBC 6.8 6.2 6.5   HGB 11.8* 10.4* 11.6*   MCV 95 92 94    223 245     Most Recent 3 BMP's:  Recent Labs   Lab Test 07/08/22  0640 07/01/22  0748 06/15/22  0610 06/10/22  0756   NA  --  143 143 144   POTASSIUM  --  4.1 3.7 3.3*   CHLORIDE  --  106 108*  105   CO2  --  30* 24 31   BUN  --  34.0* 28 24   CR 1.49* 1.18* 0.98 1.05   ANIONGAP  --  7 11 8   EMMY  --  9.0 8.3* 8.7   GLC  --  69* 71 70     Most Recent 2 LFT's:  Recent Labs   Lab Test 05/23/22  0351 05/22/22  1308   AST 14 12   ALT <9 <9   ALKPHOS 53 67   BILITOTAL 0.8 0.8     Most Recent Urinalysis:  Recent Labs   Lab Test 07/08/22  1800 03/01/22  2122 06/23/20  1144   COLOR Yellow   < > Yellow   APPEARANCE Clear   < > Clear   URINEGLC Negative   < > Negative   URINEBILI Negative   < > Negative   URINEKETONE Trace*   < > Negative   SG 1.021   < > 1.010   UBLD Small*   < > Negative   URINEPH 5.5   < > 7.5   PROTEIN Negative   < > Negative   UROBILINOGEN  --   --  <2.0 E.U./dL   NITRITE Negative   < > Negative   LEUKEST Negative   < > Negative   RBCU 22*   < >  --    WBCU 2   < >  --     < > = values in this interval not displayed.     Most Recent Anemia Panel:  Recent Labs   Lab Test 07/01/22  0748   WBC 6.8   HGB 11.8*   HCT 38.0*   MCV 95        Magnesium   Date Value Ref Range Status   05/31/2022 1.9 1.8 - 2.6 mg/dL Final     ASSESSMENT/PLAN:     (R53.81) Physical deconditioning  (primary encounter diagnosis)  (M62.81) Generalized muscle weakness  Comment: Acute on chronic. S/T below diagnosis. Per last week IDT rounds- SW to discuss with family on recommendations. LCD 7/14/22. Family plans to discharge home 7/15/22. Recommend LTC however family is not open to this idea at this time. They want to trial home care for ongoing needs. Highly recommend hospice at this time or when condition progresses.   Plan:   -Continue Physical therapy and Occupational therapy as directed  -SW to remain involved for safe discharge planning needs  -Would strongly recommend LTC for ongoing needs post TCU due to poor therapy progression, however family is wanting to bring him home soon.   -Would also recommend hospice/palliative for ongoing needs if family becomes open.      (R55) Recurrent syncope  Comment: Acute on  chronic. Patient initially presented for evaluation of recurrent syncope, appear to be vasovagal in etiology.  Recurrent syncope thought to be multifactorial secondary to advanced Parkinson's, orthostatic hypotension, dehydration.  CT head on admission negative for any acute intracranial processes, masses, or bleed.  Pacemaker interrogated in the ER without any remarkable irregularities.  Carotid ultrasound 5/22 unremarkable, TTE 5/22 ejection fraction 65% with moderate to severe LVH but no obvious valvular disease.  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  His SBP readings have been continuously fluctuating drastically on site. Ranging SBP 80s to above>200.   Plan:   -Monitor for worsening s/sx of concerns  -Continue midodrine to 10mg TID PRN. Give if SBP<100.    -Hydralazine PRN if SBP>180  -Continue metoprolol 50mg daily. HOLD parameters if SBP<100  -Continue Fludrocortisone Acetate Tablet 0.1 MG daily.   -Monitor BP and HR as directed.   -Encourage incentive spirometry every 4 hours while awake.   -Continue Mestinon Tablet TID as directed  -Would strongly recommend LTC for ongoing needs post TCU due to poor therapy progression, however family is wanting to bring him home soon.   -Would also recommend hospice/palliative for ongoing needs if family becomes open.  -BMP and CBC due 7/13/22          (G93.41) Acute metabolic encephalopathy  (G47.00) Insomnia  Comment: Acute. Patient with history of prolonged episode of unresponsiveness during syncopal episode prior to admission as well as intermittent hallucinations described by patient's wife  CT head negative, EEG 5/23 abnormal with diffuse slow background which is nonspecific for generalized cerebral dysfunction but negative for seizure activity.  Unable to perform MRI brain secondary to pacemaker placement. Encephalopathy more likely secondary to  hypotension, orthostasis, worsening Parkinson's. Recent urine negative for infection.   Plan:   -Monitor for worsening s/sx of concerns.   -Monitor sleeping patterns  -Monitor for changes in mood and behaviors  -Would strongly recommend LTC for ongoing needs post TCU due to poor therapy progression, however family is wanting to bring him home soon.   -Would also recommend hospice/palliative for ongoing needs if family becomes open.   -BMP and CBC due 7/13/22     (G20) Parkinson disease (H)  (R62.7) Adult failure to thrive  Comment: Chronic. Patient with prolonged episode of unresponsiveness during syncopal episode prior to admission as well as intermittent hallucinations described by patient's wife  CT head negative, EEG 5/23 abnormal with diffuse slow background which is nonspecific for generalized cerebral dysfunction but negative for seizure activity.  Unable to perform MRI brain secondary to pacemaker placement.  Encephalopathy more likely secondary to hypotension, orthostasis, worsening Parkinson's. Weight trending down. Admit weight 180# with most recent weight 166#  Plan:   -Monitor for worsening s/sx of concerns  -He should always be sleeping with a head of the bed at 30 to 45 degrees and still can have pillows of it that high  -Continue Carbidopa-Levodopa Tablet  MG 2 tabs QID as directed  -Follow up with neurology as directed. Scheduled for 1/9/23  -Would strongly recommend LTC for ongoing needs post TCU due to poor therapy progression, however family is wanting to bring him home soon.   -Would also recommend hospice/palliative for ongoing needs if family becomes open.  -BMP and CBC due 7/13/22  -Monitor weights daily     (R30.0) Dysuria  (N17.9) SYDNEY (acute kidney injury) (H)  (N18.30) Stage 3 chronic kidney disease, unspecified whether stage 3a or 3b CKD (H)  Comment: Acute on chronic. Patient endorses urinary urgency and dysuria on 5/28, urinalysis 5/28 negative for signs of urinary tract  infection.  Patient likely having some mild bladder spasms associated with Parkinson's.  Baseline creatinine approximately 1.1-1.2, natasha to 1.55 at time of admission.  Resolved with IVF.  Plan:  -Monitor urinary status  -Continue tolterodine as directed without change  -Continue flomax 0.4mg daily  -Would strongly recommend LTC for ongoing needs post TCU due to poor therapy progression, however family is wanting to bring him home soon.   -Would also recommend hospice/palliative for ongoing needs if family becomes open.  -BMP and CBC due 7/13/22     (I10) Hypertension, unspecified type  (E78.5) Dyslipidemia, goal LDL below 100  (I25.10) Coronary artery disease involving native coronary artery of native heart without angina pectoris  (R60.0) Bilateral leg edema  Comment: Chronic. Patient can be extremely hypertensive and then swing drastically to hypotensive secondary to autonomic instability.  Minimal edema noted to bilateral lower extremities.   Plan:   -Monitor for worsening s/sx of concerns  -Monitor BP with HOB >30 degrees or sitting to obtain accuracy.   -Continue Knee high Bilateral tubigrip daily. On in AM and off at HS.   -Continue midodrine to 10mg TID PRN. Give if SBP<100  -He should use the abdominal binder when he is up for therapy she will bring that to the care center and see if he will wear it now.  -Continue Hydralazine 10mg TID PRN for SBP>180  -Continue metoprolol 50mg daily. HOLD parameters if SBP<100   -Continue Fludrocortisone Acetate Tablet 0.1 MG daily.   -Monitor BP and HR as directed.  -Monitor weights daily  -Continue statin and asa daily as directed.    -Follow up with cardiology as directed. Scheduled for 7/25/22  -Would strongly recommend LTC for ongoing needs post TCU due to poor therapy progression, however family is wanting to bring him home soon.   -Would also recommend hospice/palliative for ongoing needs if family becomes open.  -BMP and CBC due 7/13/22     (E87.6)  Hypokalemia  Comment: Acute on chronic. Replaced in hospital. Recent levels 3.3. started on supplementation which improved.   Plan:   -Monitor for worsening s/sx of concerns.  -Continue potassium chloride 20mEq daily  -Would strongly recommend LTC for ongoing needs post TCU due to poor therapy progression, however family is wanting to bring him home soon.   -Would also recommend hospice/palliative for ongoing needs if family becomes open.  -BMP and CBC due 22     (D64.9) Normocytic anemia  Comment: Chronic. Hemoglobin 10.3 with MCV of 94.  Plan:   -Monitor bleeding risks  -Monitor falls  -Continue asa as directed  -Would strongly recommend LTC for ongoing needs post TCU due to poor therapy progression, however family is wanting to bring him home soon.   -Would also recommend hospice/palliative for ongoing needs if family becomes open.  -BMP and CBC due 22     (K21.00) Gastroesophageal reflux disease with esophagitis, unspecified whether hemorrhage  (K59.00) Constipation  Comment: Chronic. 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  Most of these episodes are related to bowel movements or eating.   Plan:  -Monitor BM Patterns  -Continue bisacodyl daily PRN  -Continue senna S 2 tabs BID. HOLD for loose stools  -Continue magnesium daily.   -Continue miralax daily  -Continue omeprazole 20mg daily.   -Monitor for worsening s/sx of concerns.   -Would strongly recommend LTC for ongoing needs post TCU due to poor therapy progression, however family is wanting to bring him home soon.   -Would also recommend hospice/palliative for ongoing needs if family becomes open.  -BMP and CBC due 22     Electronically signed by:  Marilu Mcdermott DNP, APRN      .    Face to Face and Medical Necessity Statement for DME Provider visit    Demographic Information on Canelo oCok:  Gender: male  :  1940  [address]    [facility] [address]  [phone] (home) [phone] (work)    Medical Record: [id number]  Social Security Number: [id number]  Primary Care Provider: Hung Camacho  Insurance: Payor: MEDICARE / Plan: MEDICARE / Product Type: Medicare /     HPI:   Canelo Cook is a 82 year old  (1940), who is being seen today for a face to face provider visit at [facility]; medical necessity statement for DME included. This patient requires the following:  DME Ordered and Medical Necessity Statement     Full electric hospital bed with associated mattress along with bilateral side railings. Specifications per therapy recommendations  Easy stand mechanical lift for transfer needs Specifications per therapy recommendations  Bedside commode to assist for toileting needs. Specifications per therapy recommendations  Wheelchair per therapy recommendations.       Wheelchair Documentation  Size: Per therapy recommendations  Corresponding cushion: Yes: per therapy recommendations  Standard foot rests: per therapy recomendations  Elevating leg rests: Per therapy recommendations  Arm rests: Yes: per therapy recommendations  Lap tray: No  Dose the patient use oxygen? No   Is the patient able to propel wheelchair? No family able to assist  1. The patient has mobility limitations that impairs their ability to participate in one or more mobility related activities: Toileting, Feeding, Grooming and Bathing.  The wheelchair is suitable and necessary for use in the patient's home.  2. The patient's mobility limitations cannot be safely resolved by using a cane/walker:Yes    Reason why a cane or walker will not meet the patient's needs. (ie: balance, tolerance, level of assistance) Progressive Parkinsons  3. The patients home has adequate access to use a manual wheelchair:Yes  4. The use of a manual wheelchair on a regular basis will improve the patients ability to participate in mobility  related ADL's at home:Yes  5. The patient is willing to use a manual wheelchair at home:Yes  6. The patient has adequate upper body strength and the mental capability to safely use a manual wheelchair and/or has a caregiver that is able to assist: Yes  7. Does the patient have a lower extremity injury or edema?Yes  Reason for Type of Wheelchair  Wt Readings from Last 5 Encounters:   07/11/22 75.5 kg (166 lb 6.4 oz)   07/06/22 75.8 kg (167 lb)   06/29/22 75.7 kg (166 lb 12.8 oz)   06/27/22 79.4 kg (175 lb)   06/22/22 80.7 kg (177 lb 14.4 oz)     Wheelchair per therapy recommendations-Refer to therapy notes        The patient does  require positioning of the body in ways not feasible with an ordinary bed due to a medical condition that is expected to last at least 1 month due to progressive parkinsons.   The patient does  require, for the alleviation of pain, postioning of the body in ways not feasible with an ordinary bed.   The patient does  require the head of bed elevated more than 30* most of the time due to CHF, chronic pulmonary disease or aspiration.  The patient does  require traction that can only be attached to a hospital bed.  The patient does  require a bed height different than a fixed height hospital bed to permit tranfers to wheelchair or standing position.   The patient does  require frequent or immediate changes in body position due to progressive parkinsons, chronic pain and limited mobility.     Pt needing above DME with expected length of need of 99 year  due to medical necessity associated with following diagnosis:     Hypokalemia  Physical deconditioning  Generalized muscle weakness  Recurrent syncope  Acute metabolic encephalopathy  Parkinson disease (H)  Adult failure to thrive  SYDNEY (acute kidney injury) (H)  Dyslipidemia, goal LDL below 100  Stage 3 chronic kidney disease, unspecified whether stage 3a or 3b CKD (H)  Hypertension, unspecified type  Bilateral leg edema  Coronary artery disease  involving native coronary artery of native heart without angina pectoris  Gastroesophageal reflux disease with esophagitis, unspecified whether hemorrhage  Normocytic anemia  Constipation, unspecified constipation type  Insomnia, unspecified type  Autonomic instability      PMH   has a past medical history of Acute chest pain (11/10/2019), Acute non-Q wave non-ST elevation myocardial infarction (NSTEMI) (H) (11/11/2019), Anemia (11/15/2013), Closed fracture of multiple ribs of left side with routine healing (2/18/2020), Coronary artery disease due to lipid rich plaque (11/11/2019), DNAR (do not attempt resuscitation) (11/11/2019), Essential hypertension, Fall at home, sequela (12/6/2019), GERD (gastroesophageal reflux disease), HLD (hyperlipidemia), Near syncope (10/12/2015), Parkinson's disease (H), SA node dysfunction (H) (07/29/2015), Syncope, unspecified syncope type (11/10/2019), and Vitamin D deficiency.    ROS:10 point ROS of systems including Constitutional, Eyes, Respiratory, Cardiovascular, Gastroenterology, Genitourinary, Integumentary, Musculoskeletal, Psychiatric were all negative except for pertinent positives noted in my HPI.    EXAM  Vitals: /65   Pulse 67   Temp 99  F (37.2  C)   Resp 18   Ht 1.829 m (6')   Wt 75.5 kg (166 lb 6.4 oz)   SpO2 95%   BMI 22.57 kg/m  ;BMI= Body mass index is 22.57 kg/m .  GENERAL APPEARANCE:  Alert, in no distress, cooperative  ENT:  Mouth and posterior oropharynx normal, moist mucous membranes, [place]  EYES:  EOM, conjunctivae, lids, pupils and irises normal  NECK:  No adenopathy,masses or thyromegaly  RESP:  respiratory effort and palpation of chest normal, lungs clear to auscultation , no respiratory distress  CV:  Palpation and auscultation of heart done , regular rate and rhythm, no murmur, rub, or gallop, no edema  ABDOMEN:  normal bowel sounds, soft, nontender, no hepatosplenomegaly or other masses, no guarding or rebound  M/S:   Easy stand transfers.  Wheelchair bound for all needs. At times does need chelly lift when lethargic.   SKIN:  Inspection of skin and subcutaneous tissue baseline, Palpation of skin and subcutaneous tissue baseline  NEURO:   Cranial nerves 2-12 are normal tested and grossly at patient's baseline, no purposeful movement in upper and lower extremities  PSYCH:  oriented X 3, memory impaired , affect and mood normal     ASSESSMENT/PLAN:  1. Hypokalemia    2. Physical deconditioning    3. Generalized muscle weakness    4. Recurrent syncope    5. Acute metabolic encephalopathy    6. Parkinson disease (H)    7. Adult failure to thrive    8. SYDNEY (acute kidney injury) (H)    9. Dyslipidemia, goal LDL below 100    10. Stage 3 chronic kidney disease, unspecified whether stage 3a or 3b CKD (H)    11. Hypertension, unspecified type    12. Bilateral leg edema    13. Coronary artery disease involving native coronary artery of native heart without angina pectoris    14. Gastroesophageal reflux disease with esophagitis, unspecified whether hemorrhage    15. Normocytic anemia    16. Constipation, unspecified constipation type    17. Insomnia, unspecified type    18. Autonomic instability        Orders:  1. Facility staff/TC to contact DME company to get their order form for provider to fill out    ELECTRONICALLY SIGNED BY [employer]HIMA CERTIFIED PROVIDER:  TOVA Maldonado CNP   NPI: [id number]  [facility]  [address] [place] [address], [address]  [place] [address]              Sincerely,        TOVA Maldonado CNP

## 2022-07-13 NOTE — LETTER
7/13/2022        RE: Canelo Cook  [address]  [address]  [place] [address]        [facility] GERIATRICS DISCHARGE SUMMARY  PATIENT'S NAME: Canelo Cook  YOB: 1940  MEDICAL RECORD NUMBER:  [id number]  Place of Service where encounter took place:  [facility] (TCU) [[id number]]    PRIMARY CARE PROVIDER AND CLINIC RESPONSIBLE AFTER TRANSFER:   Hung Camacho MD, [address] / [facility] [address]    Non-[facility] Provider     Transferring providers: TOVA Maldonado CNP, Eneida Hughes MD  Recent Hospitalization/ED:  [facility] stay 5/22/22 to 5/31/22.  Date of SNF Admission: May 31, 2022  Date of SNF (anticipated) Discharge: July 15, 2022  Discharged to: previous independent home  Cognitive Scores: BIMS: 13/15  Physical Function: Wheelchair dependent and Mechanical lift dependent for transfers  DME: Hospital Bed and easy stand mechanical transfer device, and beside commode    CODE STATUS/ADVANCE DIRECTIVES DISCUSSION:  No CPR- Do NOT Intubate   ALLERGIES: Sulfa drugs and Acetaminophen    NURSING FACILITY COURSE   Medication Changes/Rationale:     See below    Summary of nursing facility stay:     (R53.81) Physical deconditioning  (primary encounter diagnosis)  (M62.81) Generalized muscle weakness  Comment: Acute on chronic. S/T below diagnosis. Per last week IDT rounds- SW to discuss with family on recommendations. LCD 7/14/22. Family plans to discharge home 7/15/22. Recommend LTC however family is not open to this idea at this time. They want to trial home care for ongoing needs. Highly recommend hospice at this time or when condition progresses.   Plan:   -Continue Physical therapy and Occupational therapy as directed  -SW to remain involved for safe discharge planning needs  -Would strongly recommend LTC for ongoing needs post TCU due to poor therapy progression, however family is wanting to bring him home soon.   -Would also  recommend hospice/palliative for ongoing needs if family becomes open.      (R55) Recurrent syncope  Comment: Acute on chronic. Patient initially presented for evaluation of recurrent syncope, appear to be vasovagal in etiology.  Recurrent syncope thought to be multifactorial secondary to advanced Parkinson's, orthostatic hypotension, dehydration.  CT head on admission negative for any acute intracranial processes, masses, or bleed.  Pacemaker interrogated in the ER without any remarkable irregularities.  Carotid ultrasound 5/22 unremarkable, TTE 5/22 ejection fraction 65% with moderate to severe LVH but no obvious valvular disease.  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  His SBP readings have been continuously fluctuating drastically on site. Ranging SBP 80s to above>200.   Plan:   -Monitor for worsening s/sx of concerns  -Continue midodrine to 10mg TID PRN. Give if SBP<100.    -Hydralazine PRN if SBP>180  -Continue metoprolol 50mg daily. HOLD parameters if SBP<100  -Continue Fludrocortisone Acetate Tablet 0.1 MG daily.   -Monitor BP and HR as directed.   -Encourage incentive spirometry every 4 hours while awake.   -Continue Mestinon Tablet TID as directed  -Would strongly recommend LTC for ongoing needs post TCU due to poor therapy progression, however family is wanting to bring him home soon.   -Would also recommend hospice/palliative for ongoing needs if family becomes open.  -BMP and CBC due 7/13/22--results pending  -Trend labs with PCP post TCU          (G93.41) Acute metabolic encephalopathy  (G47.00) Insomnia  Comment: Acute. Patient with history of prolonged episode of unresponsiveness during syncopal episode prior to admission as well as intermittent hallucinations described by patient's wife  CT head negative, EEG 5/23 abnormal with diffuse slow background which is nonspecific for  generalized cerebral dysfunction but negative for seizure activity.  Unable to perform MRI brain secondary to pacemaker placement. Encephalopathy more likely secondary to hypotension, orthostasis, worsening Parkinson's. Recent urine negative for infection.   Plan:   -Monitor for worsening s/sx of concerns.   -Monitor sleeping patterns  -Monitor for changes in mood and behaviors  -Would strongly recommend LTC for ongoing needs post TCU due to poor therapy progression, however family is wanting to bring him home soon.   -Would also recommend hospice/palliative for ongoing needs if family becomes open.   -BMP and CBC due 7/13/22--results pending  -Trend labs with PCP post TCU     (G20) Parkinson disease (H)  (R62.7) Adult failure to thrive  Comment: Chronic. Patient with prolonged episode of unresponsiveness during syncopal episode prior to admission as well as intermittent hallucinations described by patient's wife  CT head negative, EEG 5/23 abnormal with diffuse slow background which is nonspecific for generalized cerebral dysfunction but negative for seizure activity.  Unable to perform MRI brain secondary to pacemaker placement.  Encephalopathy more likely secondary to hypotension, orthostasis, worsening Parkinson's. Weight trending down. Admit weight 180# with most recent weight 166#  Plan:   -Monitor for worsening s/sx of concerns  -He should always be sleeping with a head of the bed at 30 to 45 degrees and still can have pillows of it that high  -Continue Carbidopa-Levodopa Tablet  MG 2 tabs QID as directed  -Follow up with neurology as directed. Scheduled for 1/9/23  -Would strongly recommend LTC for ongoing needs post TCU due to poor therapy progression, however family is wanting to bring him home soon.   -Would also recommend hospice/palliative for ongoing needs if family becomes open.  -BMP and CBC due 7/13/22--results pending  -Trend labs with PCP post TCU     (R30.0) Dysuria  (N17.9) SYDNEY (acute  kidney injury) (H)  (N18.30) Stage 3 chronic kidney disease, unspecified whether stage 3a or 3b CKD (H)  Comment: Acute on chronic. Patient endorses urinary urgency and dysuria on 5/28, urinalysis 5/28 negative for signs of urinary tract infection.  Patient likely having some mild bladder spasms associated with Parkinson's.  Baseline creatinine approximately 1.1-1.2, natasha to 1.55 at time of admission.  Resolved with IVF.  Plan:  -Monitor urinary status  -Continue tolterodine as directed without change  -Continue flomax 0.4mg daily  -Would strongly recommend LTC for ongoing needs post TCU due to poor therapy progression, however family is wanting to bring him home soon.   -Would also recommend hospice/palliative for ongoing needs if family becomes open.  -BMP and CBC due 7/13/22--results pending  -Trend labs with PCP post TCU     (I10) Hypertension, unspecified type  (E78.5) Dyslipidemia, goal LDL below 100  (I25.10) Coronary artery disease involving native coronary artery of native heart without angina pectoris  (R60.0) Bilateral leg edema  Comment: Chronic. Patient can be extremely hypertensive and then swing drastically to hypotensive secondary to autonomic instability.  Minimal edema noted to bilateral lower extremities.   Plan:   -Monitor for worsening s/sx of concerns  -Monitor BP with HOB >30 degrees or sitting to obtain accuracy.   -Continue Knee high Bilateral tubigrip daily. On in AM and off at HS.   -Continue midodrine to 10mg TID PRN. Give if SBP<100  -He should use the abdominal binder when he is up for therapy she will bring that to the care center and see if he will wear it now.  -Continue Hydralazine 10mg TID PRN for SBP>180  -Continue metoprolol 50mg daily. HOLD parameters if SBP<100   -Continue Fludrocortisone Acetate Tablet 0.1 MG daily.   -Monitor BP and HR as directed.  -Monitor weights daily  -Continue statin and asa daily as directed.    -Follow up with cardiology as directed. Scheduled for  7/25/22  -Would strongly recommend LTC for ongoing needs post TCU due to poor therapy progression, however family is wanting to bring him home soon.   -Would also recommend hospice/palliative for ongoing needs if family becomes open.  -BMP and CBC due 7/13/22--results pending  -Trend labs with PCP post TCU     (E87.6) Hypokalemia  Comment: Acute on chronic. Replaced in hospital. Recent levels 3.3. started on supplementation which improved.   Plan:   -Monitor for worsening s/sx of concerns.  -Continue potassium chloride 20mEq daily  -Would strongly recommend LTC for ongoing needs post TCU due to poor therapy progression, however family is wanting to bring him home soon.   -Would also recommend hospice/palliative for ongoing needs if family becomes open.  -BMP and CBC due 7/13/22--results pending  -Trend labs with PCP post TCU     (D64.9) Normocytic anemia  Comment: Chronic. Hemoglobin 10.3 with MCV of 94.  Plan:   -Monitor bleeding risks  -Monitor falls  -Continue asa as directed  -Would strongly recommend LTC for ongoing needs post TCU due to poor therapy progression, however family is wanting to bring him home soon.   -Would also recommend hospice/palliative for ongoing needs if family becomes open.  -BMP and CBC due 7/13/22--results pending  -Trend labs with PCP post TCU     (K21.00) Gastroesophageal reflux disease with esophagitis, unspecified whether hemorrhage  (K59.00) Constipation  Comment: Chronic. 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  Most of these episodes are related to bowel movements or eating.   Plan:  -Monitor BM Patterns  -Continue bisacodyl daily PRN  -Continue senna S 2 tabs BID. HOLD for loose stools  -Continue magnesium daily.   -Continue miralax daily  -Continue omeprazole 20mg daily.   -Monitor for worsening s/sx of concerns.   -Would strongly recommend LTC for ongoing needs  post TCU due to poor therapy progression, however family is wanting to bring him home soon.   -Would also recommend hospice/palliative for ongoing needs if family becomes open.  -BMP and CBC due 7/13/22--results pending  -Trend labs with PCP post TCU    Discharge Medications:    Current Outpatient Medications   Medication Sig Dispense Refill     aspirin 81 MG EC tablet Take 81 mg by mouth daily       atorvastatin (LIPITOR) 40 MG tablet Take 40 mg by mouth daily       azelastine (ASTELIN) 0.1 % nasal spray Spray 1 spray into both nostrils daily as needed for rhinitis       B Complex-C (VITAMIN B COMPLEX W/VITAMIN C) TABS tablet Take 1 tablet by mouth daily       bisacodyl (DULCOLAX) 10 MG suppository Place 1 suppository (10 mg) rectally daily as needed for constipation       carbidopa-levodopa (SINEMET)  MG tablet Take 2 tablets by mouth 4 times daily       cyanocobalamin (VITAMIN B-12) 1000 MCG tablet Take 1,000 mcg by mouth daily       fludrocortisone (FLORINEF) 0.1 MG tablet Take 0.1 mg by mouth daily       hydrALAZINE (APRESOLINE) 10 MG tablet Take 1 tablet (10 mg) by mouth 3 times daily as needed (for SBP >180)       lisinopril (ZESTRIL) 2.5 MG tablet Take 1 tablet (2.5 mg) by mouth daily 30 tablet 0     magnesium oxide (MAG-OX) 400 MG tablet Take 400 mg by mouth daily       metoprolol succinate ER (TOPROL XL) 25 MG 24 hr tablet Take 2 tablets (50 mg) by mouth daily HOLD if SBP<100 and.or HR<55       midodrine (PROAMATINE) 10 MG tablet Take 1 tablet (10 mg) by mouth 3 times daily as needed (SBP<100) 90 tablet 3     mirtazapine (REMERON) 7.5 MG tablet Take 7.5 mg by mouth At Bedtime       nitroGLYcerin (NITROSTAT) 0.4 MG sublingual tablet Place 1 tablet (0.4 mg) under the tongue every 5 minutes as needed for chest pain 30 tablet 4     nystatin (MYCOSTATIN) 155022 UNIT/GM external ointment Apply topically 2 times daily       omeprazole (PRILOSEC) 20 MG DR capsule Take 20 mg by mouth daily       polyethylene  "glycol (MIRALAX) 17 GM/Dose powder Take 17 g by mouth daily 510 g      potassium chloride ER (K-TAB) 20 MEQ CR tablet Take 1 tablet (20 mEq) by mouth daily 30 tablet 0     pyridostigmine (MESTINON) 60 MG tablet Take 30 mg by mouth 3 times daily       sennosides (SENOKOT) 8.6 MG tablet Take 2 tablets by mouth 2 times daily HOLD for loose stools 240 tablet 3     tamsulosin (FLOMAX) 0.4 MG capsule Take 0.4 mg by mouth daily       tolterodine ER (DETROL LA) 4 MG 24 hr capsule Take 4 mg by mouth daily       vitamin D3 (CHOLECALCIFEROL) 50 mcg (2000 units) tablet Take 1 tablet by mouth daily        Controlled medications:   not applicable/none     Past Medical History:   Past Medical History:   Diagnosis Date     Acute chest pain 11/10/2019     Acute non-Q wave non-ST elevation myocardial infarction (NSTEMI) (H) 11/11/2019     Anemia 11/15/2013     Closed fracture of multiple ribs of left side with routine healing 2/18/2020     Coronary artery disease due to lipid rich plaque 11/11/2019     DNAR (do not attempt resuscitation) 11/11/2019     Essential hypertension      Fall at home, sequela 12/6/2019     GERD (gastroesophageal reflux disease)      HLD (hyperlipidemia)      Near syncope 10/12/2015     Parkinson's disease (H)      SA node dysfunction (H) 07/29/2015    \"chronotropic incompetence\" per [facility] EP notes     Syncope, unspecified syncope type 11/10/2019     Vitamin D deficiency      Physical Exam:   Vitals: /80   Pulse 77   Temp (!) 93  F (33.9  C)   Resp 17   Ht 1.829 m (6')   Wt 74.8 kg (164 lb 14.4 oz)   SpO2 98%   BMI 22.36 kg/m    BMI: Body mass index is 22.36 kg/m .  GENERAL APPEARANCE:  Alert, in no distress, cooperative  ENT:  Mouth and posterior oropharynx normal, moist mucous membranes, [place]  EYES:  EOM, conjunctivae, lids, pupils and irises normal  NECK:  No adenopathy,masses or thyromegaly  RESP:  respiratory effort and palpation of chest normal, lungs clear to auscultation , no " respiratory distress  CV:  Palpation and auscultation of heart done , regular rate and rhythm, no murmur, rub, or gallop, no edema  ABDOMEN:  normal bowel sounds, soft, nontender, no hepatosplenomegaly or other masses, no guarding or rebound  M/S:   Easy stand transfers. Wheelchair bound. Max A for all ADLs, transfers and cares. Nonambulatory  SKIN:  Inspection of skin and subcutaneous tissue baseline, Palpation of skin and subcutaneous tissue baseline  NEURO:   Cranial nerves 2-12 are normal tested and grossly at patient's baseline, no purposeful movement in upper and lower extremities  PSYCH:  oriented X 3, memory impaired , affect and mood normal     SNF labs:   Most Recent 3 CBC's:  Recent Labs   Lab Test 07/13/22  0640 07/01/22  0748 06/15/22  0610   WBC 7.5 6.8 6.2   HGB 11.1* 11.8* 10.4*   MCV 93 95 92    213 223     Most Recent 3 BMP's:  Recent Labs   Lab Test 07/13/22  0640 07/08/22  0640 07/01/22  0748 06/15/22  0610     --  143 143   POTASSIUM 4.3  --  4.1 3.7   CHLORIDE 107  --  106 108*   CO2 26  --  30* 24   BUN 37.6*  --  34.0* 28   CR 1.24* 1.49* 1.18* 0.98   ANIONGAP 7  --  7 11   EMMY 8.9  --  9.0 8.3*   GLC 88  --  69* 71     Most Recent 2 LFT's:  Recent Labs   Lab Test 05/23/22  0351 05/22/22  1308   AST 14 12   ALT <9 <9   ALKPHOS 53 67   BILITOTAL 0.8 0.8     Most Recent Urinalysis:  Recent Labs   Lab Test 07/08/22  1800 03/01/22  2122 06/23/20  1144   COLOR Yellow   < > Yellow   APPEARANCE Clear   < > Clear   URINEGLC Negative   < > Negative   URINEBILI Negative   < > Negative   URINEKETONE Trace*   < > Negative   SG 1.021   < > 1.010   UBLD Small*   < > Negative   URINEPH 5.5   < > 7.5   PROTEIN Negative   < > Negative   UROBILINOGEN  --   --  <2.0 E.U./dL   NITRITE Negative   < > Negative   LEUKEST Negative   < > Negative   RBCU 22*   < >  --    WBCU 2   < >  --     < > = values in this interval not displayed.     Most Recent Anemia Panel:  Recent Labs   Lab Test 07/13/22  0640    WBC 7.5   HGB 11.1*   HCT 35.1*   MCV 93          DISCHARGE PLAN:    Follow up labs: No labs orders/due    Medical Follow Up:      Follow up with primary care provider in 1-2 weeks  Follow up with specialist as above     [facility] scheduled appointments:    Discharge Services: Home Care:  Occupational Therapy, Physical Therapy, Registered Nurse, Home Health Aide and From:  [facility] along with PCA services with [facility]    Discharge Instructions Verbalized to Patient at Discharge:     Weight bearing restrictions:  Weight bearing as tolerated.     Continue to follow your diet:  regular.     Ensure nutritional supplement recommended 1-2 times a day.     Weigh yourself daily in the morning and keep a record. Call your primary clinic: a) if you are more short of breath, or b) if your weight changes more than 3 pounds in one day or more than 5 pounds in one week.     24-hour supervision is recommended for safety.     HEAD OF BED NEED TO REMAIN 30-45 DEGREES AT ALL TIME WHEN IN BED    ABDOMNIAL BINDER WHEN UP WITH THERAPY APPLY WHILE IN BED PRIOR TO GETTING UP IN WHEELCHAIR. DX HYPOTENSION    ALL BP'S READING SHOULD BE COMPLETED WITH HOB >30 DEGREES OR SITTING    OK FOR WIFE TO PROVIDE BOOST NUTRITIONAL SUPPLEMENT FROM HOME EVERY DAY PER FAMILY PREFERENCE.    KNEE HIGH TUBIGRIPS FOR EDEMA ON IN THE AM OFF IN THE PM    INCENTIVE SPIROMETRY EVERY 4 HOURS WHILE AWAKE    TOTAL DISCHARGE TIME:   Greater than 30 minutes  Electronically signed by:  Marilu Mcdermott DNP, APRN    Documentation of Face to Face and Certification for Home Health Services    I certify that patient: Canelo Cook is under my care and that I, or a nurse practitioner or physician's assistant working with me, had a face-to-face encounter that meets the physician face-to-face encounter requirements with this patient on: 7/13/2022.    This encounter with the patient was in whole, or in part, for the following medical condition, which  is the primary reason for home health care: The primary encounter diagnosis was Hypokalemia. Diagnoses of Physical deconditioning, Generalized muscle weakness, Recurrent syncope, Parkinson disease (H), Acute metabolic encephalopathy, Adult failure to thrive, SYDNEY (acute kidney injury) (H), Dyslipidemia, goal LDL below 100, Stage 3 chronic kidney disease, unspecified whether stage 3a or 3b CKD (H), Hypertension, unspecified type, Bilateral leg edema, Coronary artery disease involving native coronary artery of native heart without angina pectoris, Gastroesophageal reflux disease with esophagitis, unspecified whether hemorrhage, Constipation, unspecified constipation type, Normocytic anemia, Autonomic instability, and Insomnia, unspecified type were also pertinent to this visit..    I certify that, based on my findings, the following services are medically necessary home health services: Nursing, Occupational Therapy and Physical Therapy.    My clinical findings support the need for the above services because: Nurse is needed: To assess medication management, education after changes in medications or other medical regimen.., Occupational Therapy Services are needed to assess and treat cognitive ability and address ADL safety due to impairment in deconditioning. and Physical Therapy Services are needed to assess and treat the following functional impairments: deconditioning.    Further, I certify that my clinical findings support that this patient is homebound (i.e. absences from home require considerable and taxing effort and are for medical reasons or [employer]hovah's witness services or infrequently or of short duration when for other reasons) because: Requires assistance of another person or specialized equipment to access medical services because patient: Requires supervision of another for safe transfer. and  unable to ambulate..    Based on the above findings. I certify that this patient is confined to the home and needs  intermittent skilled nursing care, physical therapy and/or speech therapy.  The patient is under my care, and I have initiated the establishment of the plan of care.  This patient will be followed by a physician who will periodically review the plan of care.  Physician/Provider to provide follow up care: Hung Camacho    Attending [facility] physician (the Medicare certified PEC[facility] provider): Dr.Sara Saul MD, signing F2F and only signing for initial order. Please send all follow up questions and concerns or needed follow up signatures to the PCP, who [facility] has on file as:  Hung Camacho.    Physician Signature: See electronic signature associated with these discharge orders.  Date: 7/13/2022                  Sincerely,        TOVA Maldonado CNP

## 2022-07-13 NOTE — PROGRESS NOTES
[facility] GERIATRICS DISCHARGE SUMMARY  PATIENT'S NAME: Canelo Cook  YOB: 1940  MEDICAL RECORD NUMBER:  [id number]  Place of Service where encounter took place:  [facility] (TCU) [[id number]]    PRIMARY CARE PROVIDER AND CLINIC RESPONSIBLE AFTER TRANSFER:   Hung Camacho MD, [address] / [facility] [address]    Non-FMG Provider     Transferring providers: TOVA Maldonado CNP, Eneida Hughes MD  Recent Hospitalization/ED:  [facility] stay 5/22/22 to 5/31/22.  Date of SNF Admission: May 31, 2022  Date of SNF (anticipated) Discharge: July 15, 2022  Discharged to: previous independent home  Cognitive Scores: BIMS: 13/15  Physical Function: Wheelchair dependent and Mechanical lift dependent for transfers  DME: Hospital Bed and easy stand mechanical transfer device, and beside commode    CODE STATUS/ADVANCE DIRECTIVES DISCUSSION:  No CPR- Do NOT Intubate   ALLERGIES: Sulfa drugs and Acetaminophen    NURSING FACILITY COURSE   Medication Changes/Rationale:     See below    Summary of nursing facility stay:     (R53.81) Physical deconditioning  (primary encounter diagnosis)  (M62.81) Generalized muscle weakness  Comment: Acute on chronic. S/T below diagnosis. Per last week IDT rounds- SW to discuss with family on recommendations. LCD 7/14/22. Family plans to discharge home 7/15/22. Recommend LTC however family is not open to this idea at this time. They want to trial home care for ongoing needs. Highly recommend hospice at this time or when condition progresses.   Plan:   -Continue Physical therapy and Occupational therapy as directed  -SW to remain involved for safe discharge planning needs  -Would strongly recommend LTC for ongoing needs post TCU due to poor therapy progression, however family is wanting to bring him home soon.   -Would also recommend hospice/palliative for ongoing needs if family becomes open.      (R55) Recurrent  syncope  Comment: Acute on chronic. Patient initially presented for evaluation of recurrent syncope, appear to be vasovagal in etiology.  Recurrent syncope thought to be multifactorial secondary to advanced Parkinson's, orthostatic hypotension, dehydration.  CT head on admission negative for any acute intracranial processes, masses, or bleed.  Pacemaker interrogated in the ER without any remarkable irregularities.  Carotid ultrasound 5/22 unremarkable, TTE 5/22 ejection fraction 65% with moderate to severe LVH but no obvious valvular disease.  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  His SBP readings have been continuously fluctuating drastically on site. Ranging SBP 80s to above>200.   Plan:   -Monitor for worsening s/sx of concerns  -Continue midodrine to 10mg TID PRN. Give if SBP<100.    -Hydralazine PRN if SBP>180  -Continue metoprolol 50mg daily. HOLD parameters if SBP<100  -Continue Fludrocortisone Acetate Tablet 0.1 MG daily.   -Monitor BP and HR as directed.   -Encourage incentive spirometry every 4 hours while awake.   -Continue Mestinon Tablet TID as directed  -Would strongly recommend LTC for ongoing needs post TCU due to poor therapy progression, however family is wanting to bring him home soon.   -Would also recommend hospice/palliative for ongoing needs if family becomes open.  -BMP and CBC due 7/13/22--results pending  -Trend labs with PCP post TCU          (G93.41) Acute metabolic encephalopathy  (G47.00) Insomnia  Comment: Acute. Patient with history of prolonged episode of unresponsiveness during syncopal episode prior to admission as well as intermittent hallucinations described by patient's wife  CT head negative, EEG 5/23 abnormal with diffuse slow background which is nonspecific for generalized cerebral dysfunction but negative for seizure activity.  Unable to perform MRI brain  secondary to pacemaker placement. Encephalopathy more likely secondary to hypotension, orthostasis, worsening Parkinson's. Recent urine negative for infection.   Plan:   -Monitor for worsening s/sx of concerns.   -Monitor sleeping patterns  -Monitor for changes in mood and behaviors  -Would strongly recommend LTC for ongoing needs post TCU due to poor therapy progression, however family is wanting to bring him home soon.   -Would also recommend hospice/palliative for ongoing needs if family becomes open.   -BMP and CBC due 7/13/22--results pending  -Trend labs with PCP post TCU     (G20) Parkinson disease (H)  (R62.7) Adult failure to thrive  Comment: Chronic. Patient with prolonged episode of unresponsiveness during syncopal episode prior to admission as well as intermittent hallucinations described by patient's wife  CT head negative, EEG 5/23 abnormal with diffuse slow background which is nonspecific for generalized cerebral dysfunction but negative for seizure activity.  Unable to perform MRI brain secondary to pacemaker placement.  Encephalopathy more likely secondary to hypotension, orthostasis, worsening Parkinson's. Weight trending down. Admit weight 180# with most recent weight 166#  Plan:   -Monitor for worsening s/sx of concerns  -He should always be sleeping with a head of the bed at 30 to 45 degrees and still can have pillows of it that high  -Continue Carbidopa-Levodopa Tablet  MG 2 tabs QID as directed  -Follow up with neurology as directed. Scheduled for 1/9/23  -Would strongly recommend LTC for ongoing needs post TCU due to poor therapy progression, however family is wanting to bring him home soon.   -Would also recommend hospice/palliative for ongoing needs if family becomes open.  -BMP and CBC due 7/13/22--results pending  -Trend labs with PCP post TCU     (R30.0) Dysuria  (N17.9) SYDNEY (acute kidney injury) (H)  (N18.30) Stage 3 chronic kidney disease, unspecified whether stage 3a or 3b CKD  (H)  Comment: Acute on chronic. Patient endorses urinary urgency and dysuria on 5/28, urinalysis 5/28 negative for signs of urinary tract infection.  Patient likely having some mild bladder spasms associated with Parkinson's.  Baseline creatinine approximately 1.1-1.2, natasha to 1.55 at time of admission.  Resolved with IVF.  Plan:  -Monitor urinary status  -Continue tolterodine as directed without change  -Continue flomax 0.4mg daily  -Would strongly recommend LTC for ongoing needs post TCU due to poor therapy progression, however family is wanting to bring him home soon.   -Would also recommend hospice/palliative for ongoing needs if family becomes open.  -BMP and CBC due 7/13/22--results pending  -Trend labs with PCP post TCU     (I10) Hypertension, unspecified type  (E78.5) Dyslipidemia, goal LDL below 100  (I25.10) Coronary artery disease involving native coronary artery of native heart without angina pectoris  (R60.0) Bilateral leg edema  Comment: Chronic. Patient can be extremely hypertensive and then swing drastically to hypotensive secondary to autonomic instability.  Minimal edema noted to bilateral lower extremities.   Plan:   -Monitor for worsening s/sx of concerns  -Monitor BP with HOB >30 degrees or sitting to obtain accuracy.   -Continue Knee high Bilateral tubigrip daily. On in AM and off at HS.   -Continue midodrine to 10mg TID PRN. Give if SBP<100  -He should use the abdominal binder when he is up for therapy she will bring that to the care center and see if he will wear it now.  -Continue Hydralazine 10mg TID PRN for SBP>180  -Continue metoprolol 50mg daily. HOLD parameters if SBP<100   -Continue Fludrocortisone Acetate Tablet 0.1 MG daily.   -Monitor BP and HR as directed.  -Monitor weights daily  -Continue statin and asa daily as directed.    -Follow up with cardiology as directed. Scheduled for 7/25/22  -Would strongly recommend LTC for ongoing needs post TCU due to poor therapy progression,  however family is wanting to bring him home soon.   -Would also recommend hospice/palliative for ongoing needs if family becomes open.  -BMP and CBC due 7/13/22--results pending  -Trend labs with PCP post TCU     (E87.6) Hypokalemia  Comment: Acute on chronic. Replaced in hospital. Recent levels 3.3. started on supplementation which improved.   Plan:   -Monitor for worsening s/sx of concerns.  -Continue potassium chloride 20mEq daily  -Would strongly recommend LTC for ongoing needs post TCU due to poor therapy progression, however family is wanting to bring him home soon.   -Would also recommend hospice/palliative for ongoing needs if family becomes open.  -BMP and CBC due 7/13/22--results pending  -Trend labs with PCP post TCU     (D64.9) Normocytic anemia  Comment: Chronic. Hemoglobin 10.3 with MCV of 94.  Plan:   -Monitor bleeding risks  -Monitor falls  -Continue asa as directed  -Would strongly recommend LTC for ongoing needs post TCU due to poor therapy progression, however family is wanting to bring him home soon.   -Would also recommend hospice/palliative for ongoing needs if family becomes open.  -BMP and CBC due 7/13/22--results pending  -Trend labs with PCP post TCU     (K21.00) Gastroesophageal reflux disease with esophagitis, unspecified whether hemorrhage  (K59.00) Constipation  Comment: Chronic. 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  Most of these episodes are related to bowel movements or eating.   Plan:  -Monitor BM Patterns  -Continue bisacodyl daily PRN  -Continue senna S 2 tabs BID. HOLD for loose stools  -Continue magnesium daily.   -Continue miralax daily  -Continue omeprazole 20mg daily.   -Monitor for worsening s/sx of concerns.   -Would strongly recommend LTC for ongoing needs post TCU due to poor therapy progression, however family is wanting to bring him home soon.    -Would also recommend hospice/palliative for ongoing needs if family becomes open.  -BMP and CBC due 7/13/22--results pending  -Trend labs with PCP post TCU    Discharge Medications:    Current Outpatient Medications   Medication Sig Dispense Refill     aspirin 81 MG EC tablet Take 81 mg by mouth daily       atorvastatin (LIPITOR) 40 MG tablet Take 40 mg by mouth daily       azelastine (ASTELIN) 0.1 % nasal spray Spray 1 spray into both nostrils daily as needed for rhinitis       B Complex-C (VITAMIN B COMPLEX W/VITAMIN C) TABS tablet Take 1 tablet by mouth daily       bisacodyl (DULCOLAX) 10 MG suppository Place 1 suppository (10 mg) rectally daily as needed for constipation       carbidopa-levodopa (SINEMET)  MG tablet Take 2 tablets by mouth 4 times daily       cyanocobalamin (VITAMIN B-12) 1000 MCG tablet Take 1,000 mcg by mouth daily       fludrocortisone (FLORINEF) 0.1 MG tablet Take 0.1 mg by mouth daily       hydrALAZINE (APRESOLINE) 10 MG tablet Take 1 tablet (10 mg) by mouth 3 times daily as needed (for SBP >180)       lisinopril (ZESTRIL) 2.5 MG tablet Take 1 tablet (2.5 mg) by mouth daily 30 tablet 0     magnesium oxide (MAG-OX) 400 MG tablet Take 400 mg by mouth daily       metoprolol succinate ER (TOPROL XL) 25 MG 24 hr tablet Take 2 tablets (50 mg) by mouth daily HOLD if SBP<100 and.or HR<55       midodrine (PROAMATINE) 10 MG tablet Take 1 tablet (10 mg) by mouth 3 times daily as needed (SBP<100) 90 tablet 3     mirtazapine (REMERON) 7.5 MG tablet Take 7.5 mg by mouth At Bedtime       nitroGLYcerin (NITROSTAT) 0.4 MG sublingual tablet Place 1 tablet (0.4 mg) under the tongue every 5 minutes as needed for chest pain 30 tablet 4     nystatin (MYCOSTATIN) 300422 UNIT/GM external ointment Apply topically 2 times daily       omeprazole (PRILOSEC) 20 MG DR capsule Take 20 mg by mouth daily       polyethylene glycol (MIRALAX) 17 GM/Dose powder Take 17 g by mouth daily 510 g      potassium chloride ER  "(K-TAB) 20 MEQ CR tablet Take 1 tablet (20 mEq) by mouth daily 30 tablet 0     pyridostigmine (MESTINON) 60 MG tablet Take 30 mg by mouth 3 times daily       sennosides (SENOKOT) 8.6 MG tablet Take 2 tablets by mouth 2 times daily HOLD for loose stools 240 tablet 3     tamsulosin (FLOMAX) 0.4 MG capsule Take 0.4 mg by mouth daily       tolterodine ER (DETROL LA) 4 MG 24 hr capsule Take 4 mg by mouth daily       vitamin D3 (CHOLECALCIFEROL) 50 mcg (2000 units) tablet Take 1 tablet by mouth daily        Controlled medications:   not applicable/none     Past Medical History:   Past Medical History:   Diagnosis Date     Acute chest pain 11/10/2019     Acute non-Q wave non-ST elevation myocardial infarction (NSTEMI) (H) 11/11/2019     Anemia 11/15/2013     Closed fracture of multiple ribs of left side with routine healing 2/18/2020     Coronary artery disease due to lipid rich plaque 11/11/2019     DNAR (do not attempt resuscitation) 11/11/2019     Essential hypertension      Fall at home, sequela 12/6/2019     GERD (gastroesophageal reflux disease)      HLD (hyperlipidemia)      Near syncope 10/12/2015     Parkinson's disease (H)      SA node dysfunction (H) 07/29/2015    \"chronotropic incompetence\" per [facility] EP notes     Syncope, unspecified syncope type 11/10/2019     Vitamin D deficiency      Physical Exam:   Vitals: /80   Pulse 77   Temp (!) 93  F (33.9  C)   Resp 17   Ht 1.829 m (6')   Wt 74.8 kg (164 lb 14.4 oz)   SpO2 98%   BMI 22.36 kg/m    BMI: Body mass index is 22.36 kg/m .  GENERAL APPEARANCE:  Alert, in no distress, cooperative  ENT:  Mouth and posterior oropharynx normal, moist mucous membranes, [place]  EYES:  EOM, conjunctivae, lids, pupils and irises normal  NECK:  No adenopathy,masses or thyromegaly  RESP:  respiratory effort and palpation of chest normal, lungs clear to auscultation , no respiratory distress  CV:  Palpation and auscultation of heart done , regular rate and rhythm, no " murmur, rub, or gallop, no edema  ABDOMEN:  normal bowel sounds, soft, nontender, no hepatosplenomegaly or other masses, no guarding or rebound  M/S:   Easy stand transfers. Wheelchair bound. Max A for all ADLs, transfers and cares. Nonambulatory  SKIN:  Inspection of skin and subcutaneous tissue baseline, Palpation of skin and subcutaneous tissue baseline  NEURO:   Cranial nerves 2-12 are normal tested and grossly at patient's baseline, no purposeful movement in upper and lower extremities  PSYCH:  oriented X 3, memory impaired , affect and mood normal     SNF labs:   Most Recent 3 CBC's:  Recent Labs   Lab Test 07/13/22  0640 07/01/22  0748 06/15/22  0610   WBC 7.5 6.8 6.2   HGB 11.1* 11.8* 10.4*   MCV 93 95 92    213 223     Most Recent 3 BMP's:  Recent Labs   Lab Test 07/13/22  0640 07/08/22  0640 07/01/22  0748 06/15/22  0610     --  143 143   POTASSIUM 4.3  --  4.1 3.7   CHLORIDE 107  --  106 108*   CO2 26  --  30* 24   BUN 37.6*  --  34.0* 28   CR 1.24* 1.49* 1.18* 0.98   ANIONGAP 7  --  7 11   EMMY 8.9  --  9.0 8.3*   GLC 88  --  69* 71     Most Recent 2 LFT's:  Recent Labs   Lab Test 05/23/22  0351 05/22/22  1308   AST 14 12   ALT <9 <9   ALKPHOS 53 67   BILITOTAL 0.8 0.8     Most Recent Urinalysis:  Recent Labs   Lab Test 07/08/22  1800 03/01/22  2122 06/23/20  1144   COLOR Yellow   < > Yellow   APPEARANCE Clear   < > Clear   URINEGLC Negative   < > Negative   URINEBILI Negative   < > Negative   URINEKETONE Trace*   < > Negative   SG 1.021   < > 1.010   UBLD Small*   < > Negative   URINEPH 5.5   < > 7.5   PROTEIN Negative   < > Negative   UROBILINOGEN  --   --  <2.0 E.U./dL   NITRITE Negative   < > Negative   LEUKEST Negative   < > Negative   RBCU 22*   < >  --    WBCU 2   < >  --     < > = values in this interval not displayed.     Most Recent Anemia Panel:  Recent Labs   Lab Test 07/13/22  0640   WBC 7.5   HGB 11.1*   HCT 35.1*   MCV 93          DISCHARGE PLAN:    Follow up labs: No  labs orders/due    Medical Follow Up:      Follow up with primary care provider in 1-2 weeks  Follow up with specialist as above     [facility] scheduled appointments:    Discharge Services: Home Care:  Occupational Therapy, Physical Therapy, Registered Nurse, Home Health Aide and From:  [facility]  along with PCA services with [facility]    Discharge Instructions Verbalized to Patient at Discharge:     Weight bearing restrictions:  Weight bearing as tolerated.     Continue to follow your diet:  regular.     Ensure nutritional supplement recommended 1-2 times a day.     Weigh yourself daily in the morning and keep a record. Call your primary clinic: a) if you are more short of breath, or b) if your weight changes more than 3 pounds in one day or more than 5 pounds in one week.     24-hour supervision is recommended for safety.     HEAD OF BED NEED TO REMAIN 30-45 DEGREES AT ALL TIME WHEN IN BED    ABDOMNIAL BINDER WHEN UP WITH THERAPY APPLY WHILE IN BED PRIOR TO GETTING UP IN WHEELCHAIR. DX HYPOTENSION    ALL BP'S READING SHOULD BE COMPLETED WITH HOB >30 DEGREES OR SITTING    OK FOR WIFE TO PROVIDE BOOST NUTRITIONAL SUPPLEMENT FROM HOME EVERY DAY PER FAMILY PREFERENCE.    KNEE HIGH TUBIGRIPS FOR EDEMA ON IN THE AM OFF IN THE PM    INCENTIVE SPIROMETRY EVERY 4 HOURS WHILE AWAKE    TOTAL DISCHARGE TIME:   Greater than 30 minutes  Electronically signed by:  Marilu Mcdermott DNP, APRN    Documentation of Face to Face and Certification for Home Health Services    I certify that patient: Canelo Cook is under my care and that I, or a nurse practitioner or physician's assistant working with me, had a face-to-face encounter that meets the physician face-to-face encounter requirements with this patient on: 7/13/2022.    This encounter with the patient was in whole, or in part, for the following medical condition, which is the primary reason for home health care: The primary encounter diagnosis was Hypokalemia.  Diagnoses of Physical deconditioning, Generalized muscle weakness, Recurrent syncope, Parkinson disease (H), Acute metabolic encephalopathy, Adult failure to thrive, SYDNEY (acute kidney injury) (H), Dyslipidemia, goal LDL below 100, Stage 3 chronic kidney disease, unspecified whether stage 3a or 3b CKD (H), Hypertension, unspecified type, Bilateral leg edema, Coronary artery disease involving native coronary artery of native heart without angina pectoris, Gastroesophageal reflux disease with esophagitis, unspecified whether hemorrhage, Constipation, unspecified constipation type, Normocytic anemia, Autonomic instability, and Insomnia, unspecified type were also pertinent to this visit..    I certify that, based on my findings, the following services are medically necessary home health services: Nursing, Occupational Therapy and Physical Therapy.    My clinical findings support the need for the above services because: Nurse is needed: To assess medication management, education after changes in medications or other medical regimen.., Occupational Therapy Services are needed to assess and treat cognitive ability and address ADL safety due to impairment in deconditioning. and Physical Therapy Services are needed to assess and treat the following functional impairments: deconditioning.    Further, I certify that my clinical findings support that this patient is homebound (i.e. absences from home require considerable and taxing effort and are for medical reasons or [facility] services or infrequently or of short duration when for other reasons) because: Requires assistance of another person or specialized equipment to access medical services because patient: Requires supervision of another for safe transfer. and  unable to ambulate..    Based on the above findings. I certify that this patient is confined to the home and needs intermittent skilled nursing care, physical therapy and/or speech therapy.  The patient is under my  care, and I have initiated the establishment of the plan of care.  This patient will be followed by a physician who will periodically review the plan of care.  Physician/Provider to provide follow up care: Hung Camacho    Attending hospital physician (the Medicare certified [facility] provider): Dr.Sara Saul MD, signing F2F and only signing for initial order. Please send all follow up questions and concerns or needed follow up signatures to the PCP, who [place] has on file as:  Hung Camacho.    Physician Signature: See electronic signature associated with these discharge orders.  Date: 7/13/2022

## 2022-08-24 NOTE — DISCHARGE INSTRUCTIONS
Hold atorvastatin, tamsulosin and tolterodine while you are taking the Paxlovid and resume them three days after you finish the Paxlovid.

## 2022-08-24 NOTE — ED PROVIDER NOTES
EMERGENCY DEPARTMENT ENCOUNTER      NAME: Canelo Cook  AGE: 82 year old male  YOB: 1940  MRN: [id number]  EVALUATION DATE & TIME: 8/24/2022 12:20 PM    PCP: Hung Camacho    ED PROVIDER: Anabell Wills MD      Chief Complaint   Patient presents with     Hypotension         FINAL IMPRESSION:  1. Hypotension, unspecified hypotension type    2. COVID-19 virus infection          ED COURSE & MEDICAL DECISION MAKING:    Pertinent Labs & Imaging studies reviewed. (See chart for details)    12:23 PM I introduced myself to the patient, obtained patient history, performed a physical exam, and discussed plan for ED workup including potential diagnostic laboratory/imaging studies and interventions.  12:34 PM Spoke with patient's wife, Susanne. patient's wife is COVID-positive currently.  She reports that she had helped him out of bed to the bathroom.  She had helped put him on the toilet.  She then noticed that he was slumping over.  She states that this happens frequently, but it is usually fleeting.  He then appeared to be more lightheaded and less responsive than normal.  Given that it was prolonged, she Auld for lift assist.  She reports that when his systolic blood pressure is less than 100, she is supposed to give him midodrine 10 mg orally.  She had not given him any today.  He had taken his morning oral omeprazole and his 2 tablets of carbidopa levodopa this morning.  No other medications.  He otherwise has not been ill, no recent fever, chills, sweats, cough, congestion.  She is COVID-positive but he has not exhibited any symptoms.  3:18 PM I updated the patient that he is COVID-positive.  Vital signs remained stable.  Pharmacy was able to review his medications, would recommend the patient hold atorvastatin, tamsulosin, tolterodine while on Paxlovid and three days after finishing the medication.     82 year old male presents to the Emergency Department for evaluation of an episode of hypotension.   Patient with a known hypotension and takes midodrine 10 mg orally as needed as needed for a systolic blood pressure less than 100.  Patient had an episode today where he slumped over on the toilet.  His wife reports that she was with him the entire time, he never hit his head.  She is COVID-positive.  Here, he tested positive for COVID as well.  He does meet criteria to receive Paxlovid.  The pharmacist is currently checking to see if there are any contraindications with his medications to Paxlovid.  If not, the patient will be discharged home with Paxlovid.  I suspect that the patient's episode of hypotension this morning was like his previous episodes, would not recommend any further intervention at this time.  Patient was given a dose of oral midodrine in our emergency department and was able to maintain a stable blood pressure.    At the conclusion of the encounter I discussed the results of all of the tests and the disposition. The questions were answered. The patient or family acknowledged understanding and was agreeable with the care plan.       MEDICATIONS GIVEN IN THE EMERGENCY:  Medications   midodrine (PROAMATINE) tablet 10 mg (has no administration in time range)       NEW PRESCRIPTIONS STARTED AT TODAY'S ER VISIT  New Prescriptions    No medications on file          =================================================================    HPI    Patient information was obtained from: RN, patient     Use of : N/A         Canelo Cook is a 82 year old male with a pertinent history of anemia, HTN, parkinson's, MI, GERD, pacemaker who presents to this ED by EMS for evaluation of hypotension.     Per RN, patient arrives via EMS from home. Wife called 911 when she found pt in bathroom on toilet, minimally responsive. Patient's BP when EMS arrived was 60/40. Takes PRN midodrine and other BP medications, none taken today. Patient denies any pain. Blood sugar was 120. Post fluids pt /7-0's.  "Patient is alert and oriented x 3. Able to make needs known. No known falls, no pacemaker, no blood thinners. Wheelchair with pivot assist for transfers. No blood in stool. Denies N/V. Pt has history of parkinson's, and has tremors at baseline.     Patient reports feeling fine. He states that he does not remember fainting when on the toilet. He endorses previous leg pain, which has subsided at this time. He denies headaches, cough, congestion, fever, chills, vomiting, diarrhea, back pain. He uses a wheelchair to ambulate, with pivot assist for transfers.      REVIEW OF SYSTEMS   Review of Systems   Constitutional: Negative for chills and fever.   HENT: Negative for congestion.    Respiratory: Negative for cough.    Gastrointestinal: Negative for diarrhea, nausea and vomiting.   Musculoskeletal: Negative for back pain.   Neurological: Positive for tremors (Baseline) and syncope. Negative for speech difficulty (Soft spoken at baseline) and headaches.   All other systems reviewed and are negative.       PAST MEDICAL HISTORY:  Past Medical History:   Diagnosis Date     Acute chest pain 11/10/2019     Acute non-Q wave non-ST elevation myocardial infarction (NSTEMI) (H) 11/11/2019     Anemia 11/15/2013     Closed fracture of multiple ribs of left side with routine healing 2/18/2020     Coronary artery disease due to lipid rich plaque 11/11/2019     DNAR (do not attempt resuscitation) 11/11/2019     Essential hypertension      Fall at home, sequela 12/6/2019     GERD (gastroesophageal reflux disease)      HLD (hyperlipidemia)      Near syncope 10/12/2015     Parkinson's disease (H)      SA node dysfunction (H) 07/29/2015    \"chronotropic incompetence\" per [facility] EP notes     Syncope, unspecified syncope type 11/10/2019     Vitamin D deficiency        PAST SURGICAL HISTORY:  Past Surgical History:   Procedure Laterality Date     BACK SURGERY       CATARACT EXTRACTION       CORONARY STENT PLACEMENT  11/11/2019    stent " "to bifurcation of LAD and DG     CV CORONARY ANGIOGRAM N/A 11/11/2019    Procedure: Coronary Angiogram;  Surgeon: Suzan Pineda MD;  Location: [facility] Cath Lab;  Service: Cardiology     CV LEFT HEART CATHETERIZATION WITH LEFT VENTRICULOGRAM N/A 11/11/2019    Procedure: Left Heart Catheterization with Left Ventriculogram;  Surgeon: Suzan Pineda MD;  Location: [facility] Cath Lab;  Service: Cardiology     ESOPHAGOSCOPY, GASTROSCOPY, DUODENOSCOPY (EGD), COMBINED N/A 3/20/2016    Procedure: ESOPHAGOGASTRODUODENOSCOPY with biopsy;  Surgeon: Melissa Castellano MD;  Location: [facility] GI;  Service:      IMPLANT PACEMAKER  08/10/2015    Biotronik AV pacer by Dr. Vidal at [facility] for \"chronotropic incompetence\", syncope has persisted since then     IMPLANT PROSTHESIS PENIS INFLATABLE       MO EDG US EXAM SURGICAL ALTER STOM DUODENUM/JEJUNUM N/A 3/23/2016    Procedure: EGD and ENDOSCOPIC ULTRASOUND with brushing and biopsies;  Surgeon: Nj Howe MD;  Location: [facility];  Service: Gastroenterology           CURRENT MEDICATIONS:    aspirin 81 MG EC tablet  atorvastatin (LIPITOR) 40 MG tablet  azelastine (ASTELIN) 0.1 % nasal spray  B Complex-C (VITAMIN B COMPLEX W/VITAMIN C) TABS tablet  bisacodyl (DULCOLAX) 10 MG suppository  Blood Pressure Monitoring (BLOOD PRESSURE KIT) ROBER  carbidopa-levodopa (SINEMET)  MG tablet  cyanocobalamin (VITAMIN B-12) 1000 MCG tablet  fludrocortisone (FLORINEF) 0.1 MG tablet  hydrALAZINE (APRESOLINE) 10 MG tablet  lisinopril (ZESTRIL) 2.5 MG tablet  magnesium oxide (MAG-OX) 400 MG tablet  metoprolol succinate ER (TOPROL XL) 25 MG 24 hr tablet  midodrine (PROAMATINE) 10 MG tablet  mirtazapine (REMERON) 7.5 MG tablet  nitroGLYcerin (NITROSTAT) 0.4 MG sublingual tablet  nystatin (MYCOSTATIN) 735156 UNIT/GM external ointment  omeprazole (PRILOSEC) 20 MG DR capsule  polyethylene glycol (MIRALAX) 17 GM/Dose powder  potassium chloride ER (K-TAB) 20 MEQ CR " "tablet  pyridostigmine (MESTINON) 60 MG tablet  sennosides (SENOKOT) 8.6 MG tablet  tamsulosin (FLOMAX) 0.4 MG capsule  tolterodine ER (DETROL LA) 4 MG 24 hr capsule  vitamin D3 (CHOLECALCIFEROL) 50 mcg (2000 units) tablet        ALLERGIES:  Allergies   Allergen Reactions     Sulfa Drugs      Acetaminophen      Other reaction(s): Agitation  Restless  Feeling - skin crawling         FAMILY HISTORY:  Family History   Problem Relation Age of Onset     Heart Disease Mother      Myocardial Infarction Father      Myocardial Infarction Sister      Lung Cancer Brother      Heart Disease Brother      Cancer Sister      Heart Disease Brother      Lung Cancer Brother      Heart Disease Brother      Lung Cancer Brother      Heart Disease Brother      Heart Disease Father        SOCIAL HISTORY:   Social History     Socioeconomic History     Marital status:    Tobacco Use     Smoking status: Former Smoker     Smokeless tobacco: Never Used   Substance and Sexual Activity     Alcohol use: Yes     Comment: rare 1-2 times per year       VITALS:  /54   Pulse 63   Temp 98.4  F (36.9  C) (Axillary)   Resp 20   Ht 1.854 m (6' 1\")   Wt 79.4 kg (175 lb)   SpO2 96%   BMI 23.09 kg/m      PHYSICAL EXAM    Constitutional: Well developed, Well nourished, NAD  HENT: Normocephalic, Atraumatic, Bilateral external ears normal, Oropharynx normal, mucous membranes moist, Nose normal.   Neck- Normal range of motion, No tenderness, Supple, No stridor.  Eyes: PERRL, EOMI, Conjunctiva normal, No discharge.   Respiratory: Normal breath sounds, No respiratory distress  Cardiovascular: Normal heart rate, Regular rhythm  GI: Bowel sounds normal, Soft, No tenderness,   Musculoskeletal: No edema. Good range of motion in all major joints. No tenderness to palpation or major deformities noted.   Integument: Warm, Dry, No erythema, No rash  Neurologic: Alert & oriented x 3, Normal motor function, Normal sensory function, No focal deficits " noted. Normal gait.   Psychiatric: Affect normal, Judgment normal, Mood normal.      LAB:  All pertinent labs reviewed and interpreted.  Results for orders placed or performed during the hospital encounter of 08/24/22   Chest XR,  PA & LAT    Impression    IMPRESSION:  The left lung is not well assessed due to positioning. Within limitations, findings are as follows. No pleural fluid or pneumothorax. No airspace disease or edema. Normal size of the heart. There is a left chest wall pacemaker. Coronary   stents are noted.    Basic metabolic panel   Result Value Ref Range    Sodium 142 136 - 145 mmol/L    Potassium 4.3 3.5 - 5.0 mmol/L    Chloride 109 (H) 98 - 107 mmol/L    Carbon Dioxide (CO2) 26 22 - 31 mmol/L    Anion Gap 7 5 - 18 mmol/L    Urea Nitrogen 42 (H) 8 - 28 mg/dL    Creatinine 1.42 (H) 0.70 - 1.30 mg/dL    Calcium 8.6 8.5 - 10.5 mg/dL    Glucose 96 70 - 125 mg/dL    GFR Estimate 50 (L) >60 mL/min/1.73m2   Result Value Ref Range    Troponin I 0.02 0.00 - 0.29 ng/mL   Symptomatic; Unknown Influenza A/B & SARS-CoV2 (COVID-19) Virus PCR Multiplex Nasopharyngeal    Specimen: Nasopharyngeal; Swab   Result Value Ref Range    Influenza A PCR Negative Negative    Influenza B PCR Negative Negative    RSV PCR Negative Negative    SARS CoV2 PCR Positive (A) Negative   CBC with platelets and differential   Result Value Ref Range    WBC Count 8.8 4.0 - 11.0 10e3/uL    RBC Count 4.14 (L) 4.40 - 5.90 10e6/uL    Hemoglobin 12.6 (L) 13.3 - 17.7 g/dL    Hematocrit 38.4 (L) 40.0 - 53.0 %    MCV 93 78 - 100 fL    MCH 30.4 26.5 - 33.0 pg    MCHC 32.8 31.5 - 36.5 g/dL    RDW 14.2 10.0 - 15.0 %    Platelet Count 201 150 - 450 10e3/uL    % Neutrophils 87 %    % Lymphocytes 5 %    % Monocytes 7 %    % Eosinophils 1 %    % Basophils 0 %    % Immature Granulocytes 0 %    NRBCs per 100 WBC 0 <1 /100    Absolute Neutrophils 7.6 1.6 - 8.3 10e3/uL    Absolute Lymphocytes 0.5 (L) 0.8 - 5.3 10e3/uL    Absolute Monocytes 0.6 0.0 - 1.3  10e3/uL    Absolute Eosinophils 0.1 0.0 - 0.7 10e3/uL    Absolute Basophils 0.0 0.0 - 0.2 10e3/uL    Absolute Immature Granulocytes 0.0 <=0.4 10e3/uL    Absolute NRBCs 0.0 10e3/uL   ECG 12-LEAD WITH MUSE (LHE)   Result Value Ref Range    Systolic Blood Pressure  mmHg    Diastolic Blood Pressure  mmHg    Ventricular Rate 80 BPM    Atrial Rate 80 BPM    DE Interval 106 ms    QRS Duration 80 ms     ms    QTc 449 ms    P Axis -14 degrees    R AXIS -9 degrees    T Axis -11 degrees    Interpretation ECG       Electronic atrial pacemaker  Moderate voltage criteria for LVH, may be normal variant  Borderline ECG  When compared with ECG of 01-MAR-2022 18:27,  Minimal criteria for Anterior infarct are no longer Present  Confirmed by SEE ED PROVIDER NOTE FOR, ECG INTERPRETATION ([id number]),  RACHEL GALVAN ([id number]) on 8/24/2022 2:19:39 PM         RADIOLOGY:  Reviewed all pertinent imaging. Please see official radiology report.  Chest XR,  PA & LAT   Final Result   IMPRESSION:  The left lung is not well assessed due to positioning. Within limitations, findings are as follows. No pleural fluid or pneumothorax. No airspace disease or edema. Normal size of the heart. There is a left chest wall pacemaker. Coronary    stents are noted.           EKG:    Performed at: 1248    Impression: Atrial paced rhythm, no change from 3/1/22    Rate: 80 bpm  DE Interval: 106 ms  QRS Interval: 80 ms  QTc Interval: 449 ms  Comparison: No change    I have independently reviewed and interpreted the EKG(s) documented above.    PROCEDURES:   None      I, Arnie Watt, am serving as a scribe to document services personally performed by Anabell Wills, based on my observation and the provider's statements to me. I, Anabell Wills MD, attest that Arnie Watt is acting in a scribe capacity, has observed my performance of the services and has documented them in accordance with my direction.    Anabell Wills MD  Emergency Medicine    [facility] EMERGENCY DEPARTMENT  [address] [place] [id number]  [phone]       Anabell Wills MD  08/24/22 [id number]

## 2022-08-24 NOTE — ED TRIAGE NOTES
Patient arrives via EMS from home. Wife called 911 when she found pt in bathroom on toilet minimally responsive. Pt BP when EMS arrived 60/40. Pt has history of parkinson's. Takes PRN midodrine and other BP medications, none taken today. Pt reports no pain. . Post fluids pt /7-0's. 18 G left arm. Pt is alert and oriented x 3. Able to make needs known. Tremors at BL. No known falls, no pacemaker, no blood thinners. W/c with pivot assist for transfers. No blood in stool. Denies N/V.

## 2022-08-31 NOTE — TELEPHONE ENCOUNTER
Refills have been requested for the following medications:         midodrine (PROAMATINE) 10 MG tablet      Patient Comment: How do you want Darryl to take this?         fludrocortisone (FLORINEF) 0.1 MG tablet      Patient Comment: 90 day supply from [facility]         carbidopa-levodopa (SINEMET)  MG tablet      Patient Comment: 90 day supply from [facility]     Preferred pharmacy: [facility] [place] [address] AT [address] TO [facility]

## 2022-08-31 NOTE — TELEPHONE ENCOUNTER
3 medications below have been sent to his mail order  Midodrine 10 mg p.o. 3 times daily  Florinef 0.1 mg every morning  Sinemet 25/100, 2 tabs p.o. 4 times daily    Thank you  Tramaine Mackay MD on 8/31/2022 at 2:45 PM

## 2022-09-23 NOTE — TELEPHONE ENCOUNTER
Health Call Center    Phone Message    May a detailed message be left on voicemail: no     Reason for Call: Other: Please extend the order for the cardiac device check in-clinic and reach out to the Pt to coordinate a device check and office visit with Dr. Christine.      Action Taken: Other: [facility]    Travel Screening: Not Applicable

## 2022-09-23 NOTE — TELEPHONE ENCOUNTER
Spoke with Ketty, confirmed follow up needed. No further questions or concerns noted. Extended order.  Transferred to scheduling. -CAM

## 2022-09-28 PROBLEM — G93.40 ACUTE ENCEPHALOPATHY: Status: ACTIVE | Noted: 2022-01-01

## 2022-09-28 PROBLEM — R53.1 GENERALIZED WEAKNESS: Status: ACTIVE | Noted: 2022-01-01

## 2022-09-28 NOTE — ED NOTES
Bed: JNED-17  Expected date: 9/28/22  Expected time: 3:25 PM  Means of arrival: Ambulance  Comments:  82 M LOC, fall, semi conscious.

## 2022-09-28 NOTE — H&P
ADMISSION HISTORY & PHYSICAL        Patient YOB: 1940  Admit date: 9/28/2022  Date of Service: 9/28/2022    ASSESSMENT AND PLAN:  82 year old male presenting with change in mentation and worsening of generalized body weakness of 2 days duration.  Past medical history is significant for advanced Parkinson's disease, coronary artery disease s/p stent, CKD 3, FTT.    Acute encephalopathy  -Patient's mental status has become worse showing confusion and not being his usual self for the past 2 days in the context of advanced Parkinson's disease  -Patient is also noted to be more weak than usual requiring more assistance for his daily activities   -Recently discharged from TCU in July 2022  -Patient has a history of advanced Parkinson's disease with autonomic dysfunction.  -Procalcitonin is mildly elevated nevertheless no other evidence of infection.  Urinalysis is clear  -Follow-up blood culture obtained from ED  -Head CT with no acute intracranial process.  -On IV fluids possible dehydration  -Neurology consult    Advanced Parkinson's disease  -Patient has severe end-stage parkinsonian disease with severe autonomic instability.  -PTA hydralazine and midodrine as needed for high and low blood pressure respectively  -Patient's mostly chair or bedbound.  -Consider palliative care to help with determination of goals of care  -Continue Sinemet, pyridostigmine, tolterodine and mirtazapine    CAD s/p  stent  -PTA aspirin 81 mg oral daily  -Metoprolol succinate 50 mg oral daily    CODE STATUS:  Prior    Disposition:  -Anticipated Length of Stay in midnights and medical necessity (including a midnight in the Emergency Department after triage if applicable): >2 days       CHIEF COMPLAINT:  Confusion and generalized weakness of 2 days duration    HISTORY OF PRESENTING ILLNESS:  Patient is a 82 year old male with PMH significant for advanced Parkinson's disease, coronary artery disease s/p stent, CKD 3, FTT.  Patient  was recently discharged from [facility] after treatment of similar presentation.  Patient was recently discharged from TCU in July 2022.  Patient recently have been requiring more assistance for activities of daily life.  Patient is largely bedbound or wheelchair-bound.  There is no history of fever chills or rigors.  Patient denies having chest pain or breathing difficulties.  Labs done in ED are none indicated of systemic infection except procalcitonin being mildly elevated.  Urinalysis is clear.  chest x-ray with no infiltrates.  Patient will be admitted for further management of acute encephalopathy in the context of advanced consents disease      PMH/PSH:  Patient Active Problem List   Diagnosis     Parkinson disease (H)     Coronary artery disease involving native coronary artery of native heart without angina pectoris     Orthostatic hypotension     Dyslipidemia, goal LDL below 100     S/P placement of cardiac pacemaker     Bilateral leg edema     Brainstem stroke syndrome     Burning feet syndrome     Chronic low back pain without sciatica     DNAR (do not attempt resuscitation)     Dyslipidemia     Essential hypertension     GERD (gastroesophageal reflux disease)     Hypogonadism, testicular     Increased frequency of urination     Labile hypertension     Low vitamin B12 level     Male erectile dysfunction, unspecified     Nocturia     Numerous moles     OAB (overactive bladder)     Other insomnia     Presence of cardiac pacemaker     Pre-syncope     Reactive depression     Recurrent syncope     S/P coronary artery stent placement     PD (perceptive deafness), asymmetrical     Sleep disorder     Syncope and collapse     Vitamin D deficiency     Altered mental status, unspecified altered mental status type     Syncope, unspecified syncope type     Metabolic encephalopathy     Parkinson's disease (H)     Adult failure to thrive     Dysuria     SYDNEY (acute kidney injury) (H)     CKD (chronic kidney  "disease) stage 3, GFR 30-59 ml/min (H)     HLD (hyperlipidemia)     Benign essential hypertension     Autonomic instability     Gastroesophageal reflux disease without esophagitis     Constipation     Hypokalemia     Normocytic anemia     Generalized weakness     Acute encephalopathy       Past Medical History:   Diagnosis Date     Acute chest pain 11/10/2019     Acute non-Q wave non-ST elevation myocardial infarction (NSTEMI) (H) 11/11/2019     Anemia 11/15/2013     Closed fracture of multiple ribs of left side with routine healing 2/18/2020     Coronary artery disease due to lipid rich plaque 11/11/2019     DNAR (do not attempt resuscitation) 11/11/2019     Essential hypertension      Fall at home, sequela 12/6/2019     GERD (gastroesophageal reflux disease)      HLD (hyperlipidemia)      Near syncope 10/12/2015     Parkinson's disease (H)      SA node dysfunction (H) 07/29/2015    \"chronotropic incompetence\" per [facility] EP notes     Syncope, unspecified syncope type 11/10/2019     Vitamin D deficiency         Past Surgical History:   Procedure Laterality Date     BACK SURGERY       CATARACT EXTRACTION       CORONARY STENT PLACEMENT  11/11/2019    stent to bifurcation of LAD and DG     CV CORONARY ANGIOGRAM N/A 11/11/2019    Procedure: Coronary Angiogram;  Surgeon: Suzan Pineda MD;  Location: [facility] Cath Lab;  Service: Cardiology     CV LEFT HEART CATHETERIZATION WITH LEFT VENTRICULOGRAM N/A 11/11/2019    Procedure: Left Heart Catheterization with Left Ventriculogram;  Surgeon: Suzan Pineda MD;  Location: [facility] Cath Lab;  Service: Cardiology     ESOPHAGOSCOPY, GASTROSCOPY, DUODENOSCOPY (EGD), COMBINED N/A 3/20/2016    Procedure: ESOPHAGOGASTRODUODENOSCOPY with biopsy;  Surgeon: Melissa Castellano MD;  Location: [facility];  Service:      IMPLANT PACEMAKER  08/10/2015    Biotronik AV pacer by Dr. Vidal at [facility] for \"chronotropic incompetence\", syncope has persisted since then     " IMPLANT PROSTHESIS PENIS INFLATABLE       MI EDG US EXAM SURGICAL ALTER STOM DUODENUM/JEJUNUM N/A 3/23/2016    Procedure: EGD and ENDOSCOPIC ULTRASOUND with brushing and biopsies;  Surgeon: Nj Howe MD;  Location: [facility];  Service: Gastroenterology          ALLERGIES:  Allergies   Allergen Reactions     Sulfa Drugs      Acetaminophen      Other reaction(s): Agitation  Restless  Feeling - skin crawling         MEDICATIONS:  Reviewed.  Current Facility-Administered Medications   Medication     hydrALAZINE (APRESOLINE) injection 10 mg     lisinopril (ZESTRIL) tablet 2.5 mg     metoprolol succinate ER (TOPROL XL) 24 hr tablet 50 mg     Current Outpatient Medications   Medication     aspirin 81 MG EC tablet     atorvastatin (LIPITOR) 40 MG tablet     azelastine (ASTELIN) 0.1 % nasal spray     B Complex-C (VITAMIN B COMPLEX W/VITAMIN C) TABS tablet     bisacodyl (DULCOLAX) 10 MG suppository     Blood Pressure Monitoring (BLOOD PRESSURE KIT) ROBER     carbidopa-levodopa (SINEMET)  MG tablet     cyanocobalamin (VITAMIN B-12) 1000 MCG tablet     fludrocortisone (FLORINEF) 0.1 MG tablet     hydrALAZINE (APRESOLINE) 10 MG tablet     lisinopril (ZESTRIL) 2.5 MG tablet     magnesium oxide (MAG-OX) 400 MG tablet     metoprolol succinate ER (TOPROL XL) 25 MG 24 hr tablet     midodrine (PROAMATINE) 10 MG tablet     mirtazapine (REMERON) 7.5 MG tablet     nitroGLYcerin (NITROSTAT) 0.4 MG sublingual tablet     nystatin (MYCOSTATIN) 838174 UNIT/GM external ointment     omeprazole (PRILOSEC) 20 MG DR capsule     polyethylene glycol (MIRALAX) 17 GM/Dose powder     potassium chloride ER (K-TAB) 20 MEQ CR tablet     pyridostigmine (MESTINON) 60 MG tablet     sennosides (SENOKOT) 8.6 MG tablet     tamsulosin (FLOMAX) 0.4 MG capsule     tolterodine ER (DETROL LA) 4 MG 24 hr capsule     vitamin D3 (CHOLECALCIFEROL) 50 mcg (2000 units) tablet       Current Facility-Administered Medications:      hydrALAZINE  (APRESOLINE) injection 10 mg, 10 mg, Intravenous, Q4H PRN, Adam Kauffman MD     lisinopril (ZESTRIL) tablet 2.5 mg, 2.5 mg, Oral, Once, Adam Kauffman MD     metoprolol succinate ER (TOPROL XL) 24 hr tablet 50 mg, 50 mg, Oral, Once, Adam Kauffman MD    Current Outpatient Medications:      aspirin 81 MG EC tablet, Take 1 tablet (81 mg) by mouth daily, Disp: 30 tablet, Rfl: 0     atorvastatin (LIPITOR) 40 MG tablet, Take 1 tablet (40 mg) by mouth daily, Disp: 30 tablet, Rfl: 0     azelastine (ASTELIN) 0.1 % nasal spray, Spray 1 spray into both nostrils daily as needed for rhinitis, Disp: 30 mL, Rfl: 0     B Complex-C (VITAMIN B COMPLEX W/VITAMIN C) TABS tablet, Take 1 tablet by mouth daily, Disp: 30 tablet, Rfl: 0     bisacodyl (DULCOLAX) 10 MG suppository, Place 1 suppository (10 mg) rectally daily as needed for constipation, Disp: 6 suppository, Rfl: 0     Blood Pressure Monitoring (BLOOD PRESSURE KIT) ROBER, 1 Device 4 times daily, Disp: 1 each, Rfl: 0     carbidopa-levodopa (SINEMET)  MG tablet, Take 2 tablets by mouth 4 times daily, Disp: 720 tablet, Rfl: 3     cyanocobalamin (VITAMIN B-12) 1000 MCG tablet, Take 1 tablet (1,000 mcg) by mouth daily, Disp: 30 tablet, Rfl: 0     fludrocortisone (FLORINEF) 0.1 MG tablet, Take 1 tablet (0.1 mg) by mouth every morning, Disp: 90 tablet, Rfl: 3     hydrALAZINE (APRESOLINE) 10 MG tablet, Take 1 tablet (10 mg) by mouth 3 times daily as needed (for SBP >180), Disp: 30 tablet, Rfl: 0     lisinopril (ZESTRIL) 2.5 MG tablet, Take 1 tablet (2.5 mg) by mouth daily, Disp: 30 tablet, Rfl: 0     magnesium oxide (MAG-OX) 400 MG tablet, Take 1 tablet (400 mg) by mouth daily, Disp: 30 tablet, Rfl: 0     metoprolol succinate ER (TOPROL XL) 25 MG 24 hr tablet, Take 2 tablets (50 mg) by mouth daily HOLD if SBP<100 and.or HR<55, Disp: 60 tablet, Rfl: 0     midodrine (PROAMATINE) 10 MG tablet, Take 1 tablet (10 mg) by mouth 3 times daily, Disp: 270 tablet, Rfl: 3      mirtazapine (REMERON) 7.5 MG tablet, Take 1 tablet (7.5 mg) by mouth At Bedtime, Disp: 30 tablet, Rfl: 0     nitroGLYcerin (NITROSTAT) 0.4 MG sublingual tablet, Place 1 tablet (0.4 mg) under the tongue every 5 minutes as needed for chest pain, Disp: 30 tablet, Rfl: 0     nystatin (MYCOSTATIN) 332375 UNIT/GM external ointment, Apply topically 2 times daily, Disp: 30 g, Rfl: 0     omeprazole (PRILOSEC) 20 MG DR capsule, Take 1 capsule (20 mg) by mouth daily, Disp: 30 capsule, Rfl: 0     polyethylene glycol (MIRALAX) 17 GM/Dose powder, Take 17 g by mouth daily, Disp: 510 g, Rfl: 0     potassium chloride ER (K-TAB) 20 MEQ CR tablet, Take 1 tablet (20 mEq) by mouth daily, Disp: 30 tablet, Rfl: 0     pyridostigmine (MESTINON) 60 MG tablet, Take 0.5 tablets (30 mg) by mouth 3 times daily, Disp: 90 tablet, Rfl: 0     sennosides (SENOKOT) 8.6 MG tablet, Take 2 tablets by mouth 2 times daily HOLD for loose stools, Disp: 240 tablet, Rfl: 0     tamsulosin (FLOMAX) 0.4 MG capsule, Take 1 capsule (0.4 mg) by mouth daily, Disp: 30 capsule, Rfl: 0     tolterodine ER (DETROL LA) 4 MG 24 hr capsule, Take 1 capsule (4 mg) by mouth daily, Disp: 30 capsule, Rfl: 0     vitamin D3 (CHOLECALCIFEROL) 50 mcg (2000 units) tablet, Take 1 tablet (50 mcg) by mouth daily, Disp: 30 tablet, Rfl: 0   Scheduled Meds:    lisinopril  2.5 mg Oral Once     metoprolol succinate ER  50 mg Oral Once     Continuous Infusions:  PRN Meds:.hydrALAZINE    SOCIAL HISTORY:  Social History     Socioeconomic History     Marital status:      Spouse name: Not on file     Number of children: Not on file     Years of education: Not on file     Highest education level: Not on file   Occupational History     Not on file   Tobacco Use     Smoking status: Former Smoker     Smokeless tobacco: Never Used   Substance and Sexual Activity     Alcohol use: Yes     Comment: rare 1-2 times per year     Drug use: Not on file     Sexual activity: Not on file   Other Topics  "Concern     Not on file   Social History Narrative     Not on file     Social Determinants of Health     Financial Resource Strain: Not on file   Food Insecurity: Not on file   Transportation Needs: Not on file   Physical Activity: Not on file   Stress: Not on file   Social Connections: Not on file   Intimate Partner Violence: Not on file   Housing Stability: Not on file       FAMILY HISTORY:  Family History   Problem Relation Age of Onset     Heart Disease Mother      Myocardial Infarction Father      Myocardial Infarction Sister      Lung Cancer Brother      Heart Disease Brother      Cancer Sister      Heart Disease Brother      Lung Cancer Brother      Heart Disease Brother      Lung Cancer Brother      Heart Disease Brother      Heart Disease Father     reviewed    ROS:  12 point review of systems reviewed and is negative except for what has already been mentioned in HPI.       PHYSICAL EXAM:  GENRL: No distress noted.  Patient is alert and oriented x2  Appears to be mildly forgetful  BP (!) 133/91   Pulse 94   Temp 98.6  F (37  C) (Oral)   Resp 21   Ht 1.854 m (6' 1\")   Wt 77.1 kg (170 lb)   SpO2 96%   BMI 22.43 kg/m    No intake/output data recorded.  I/O this shift:  In: 1000 [IV Piggyback:1000]  Out: -   HEENT: NC/AT  CHEST: Clear to auscultation bilateral anteriorly, no ronchi or wheezing  HEART: S1S2 pace maker in situ  ABDMN: Soft. Non-tender, non-distended.  No guarding or rigidity. Bowel sounds- active  EXTRM: No pedal edema   INTGM: dry skin with seborrheic dermatitis  MUSCULOSKELETAL: no joint tenderness or swelling on upper and lower extremities  NEURO: Alert and oriented. No focal neurological deficit.        DIAGNOSTIC DATA:    Recent Labs   Lab 09/28/22  1610   WBC 6.4   HGB 12.9*   HCT 39.4*          Recent Labs   Lab 09/28/22  1610      CO2 29   BUN 22.1       No results for input(s): INR in the last 168 hours.    Recent Labs   Lab 09/28/22  1610      CO2 29   BUN 22.1 "   ALBUMIN 3.5   ALKPHOS 72   ALT 7*   AST 20       [unfilled]    XR Chest Port 1 View    Result Date: 9/28/2022  EXAM: XR CHEST PORT 1 VIEW LOCATION: [facility] DATE/TIME: 9/28/2022 6:03 PM INDICATION: ams COMPARISON: 8/24/2022     IMPRESSION: Left subclavian approach pacing device. Cardiac silhouette within normal limits. Tortuous atherosclerotic aorta. No pneumothorax or pleural effusion. No focal consolidation.    Head CT w/o contrast    Result Date: 9/28/2022  EXAM: CT HEAD W/O CONTRAST LOCATION: [facility] DATE/TIME: 9/28/2022 4:59 PM INDICATION: Encephalopathy COMPARISON:  CT head 05/22/2022. TECHNIQUE: Routine CT Head without IV contrast. Multiplanar reformats. Dose reduction techniques were used. FINDINGS: INTRACRANIAL CONTENTS: No intracranial hemorrhage, extraaxial collection, or mass effect.  No CT evidence of acute infarct. Mild presumed chronic small vessel ischemic changes. Moderate generalized volume loss. No hydrocephalus. VISUALIZED ORBITS/SINUSES/MASTOIDS: Prior bilateral cataract surgery. Visualized portions of the orbits are otherwise unremarkable. No paranasal sinus mucosal disease. No middle ear or mastoid effusion. BONES/SOFT TISSUES: No acute abnormality.     IMPRESSION: 1.  No acute intracranial process or significant change since 05/22/2022.      Patient's new lab studies reviewed personally.  Patient's new radiology reports reviewed personally.  I personally viewed and personally interpreted patient's EKG:     Note created using dragon voice recognition software.  Errors in spelling or words which seems out of context are unintentional.  Sounds alike errors may have escaped editing.     09/28/2022      Jeff Hicks  [facility]

## 2022-09-28 NOTE — ED PROVIDER NOTES
EMERGENCY DEPARTMENT ENCOUNTER      NAME: Canelo Cook  AGE: 82 year old male  YOB: 1940  MRN: [id number]  EVALUATION DATE & TIME: 2022  3:36 PM    PCP: Hung Camacho    ED PROVIDER: Adam Kauffman M.D.      Chief Complaint   Patient presents with     Altered Mental Status       FINAL IMPRESSION:  1. Acute encephalopathy    2. Generalized weakness        ED COURSE & MEDICAL DECISION MAKIN year old male presents to the Emergency Department for evaluation of encephalopathy and weakness.  He arrives to the emergency department severely encephalopathic, able to follow commands x4 and open his eyes but is nonverbal and severely weak.  Did administer his home dose of Sinemet which resulted in some improvement in his symptoms overall, he was subsequently able to converse and sort sentences and lift his head off the bed and open his eyes spontaneously but remains severely weak.  He underwent a broad metabolic work-up for consideration of infection, metabolic derangements, intracranial hemorrhage/stroke, etc. This appeared generally stable including normal white blood cell count, stable hemoglobin, unremarkable metabolic profile.  Did have a borderline elevated procalcitonin in the indeterminate range without any other focus of infection.  Specifically urine analysis was negative, patient does not have any pulmonary complaints cough or hypoxia.  While his mental status improved, he remains severely weak and off of his baseline.  He was also started on some IV fluids for presumed dehydration.  Given his mental status change and weakness, recommended admission for continued hydration and to see how he does with restarting his home medications.  Patient's wife is in agreement with this plan.  Discussed case with hospitalist.    3:55 PM I met with the patient, obtained history, performed an initial exam, and discussed options and plan for diagnostics and treatment here in the ED.   5:45 PM I  spoke with the hospitalist, Dr. Hicks.        MEDICATIONS GIVEN IN THE EMERGENCY:  Medications   hydrALAZINE (APRESOLINE) injection 10 mg (has no administration in time range)   lidocaine 1 % 0.1-1 mL (has no administration in time range)   lidocaine (LMX4) cream (has no administration in time range)   sodium chloride (PF) 0.9% PF flush 3 mL (has no administration in time range)   sodium chloride (PF) 0.9% PF flush 3 mL (has no administration in time range)   melatonin tablet 1 mg (has no administration in time range)   enoxaparin ANTICOAGULANT (LOVENOX) injection 40 mg (has no administration in time range)   dextrose 5% and 0.45% NaCl 1,000 mL with dextrose 5 % infusion (has no administration in time range)   polyethylene glycol (MIRALAX) Packet 17 g (has no administration in time range)   aspirin EC tablet 81 mg (has no administration in time range)   atorvastatin (LIPITOR) tablet 40 mg (has no administration in time range)   azelastine (ASTELIN) nasal spray 1 spray (has no administration in time range)   vitamin b complex w/vitamin C tablet 1 tablet (has no administration in time range)   bisacodyl (DULCOLAX) suppository 10 mg (has no administration in time range)   carbidopa-levodopa (SINEMET)  MG per tablet 2 tablet (has no administration in time range)   cyanocobalamin (VITAMIN B-12) tablet 1,000 mcg (has no administration in time range)   fludrocortisone (FLORINEF) tablet 0.1 mg (has no administration in time range)   hydrALAZINE (APRESOLINE) injection 10 mg (has no administration in time range)   hydrALAZINE (APRESOLINE) tablet 10 mg (has no administration in time range)   lisinopril (ZESTRIL) tablet 2.5 mg (has no administration in time range)   magnesium oxide (MAG-OX) tablet 400 mg (has no administration in time range)   metoprolol succinate ER (TOPROL XL) 24 hr tablet 50 mg (has no administration in time range)   midodrine (PROAMATINE) tablet 10 mg (has no administration in time range)  "  mirtazapine (REMERON) tablet TABS 7.5 mg (has no administration in time range)   nitroGLYcerin (NITROSTAT) sublingual tablet 0.4 mg (has no administration in time range)   nystatin (MYCOSTATIN) ointment (has no administration in time range)   omeprazole (priLOSEC) CR capsule 20 mg (has no administration in time range)   polyethylene glycol (MIRALAX) powder 17 g (has no administration in time range)   pyridostigmine (MESTINON) half-tab 30 mg (has no administration in time range)   sennosides (SENOKOT) tablet 2 tablet (has no administration in time range)   tamsulosin (FLOMAX) capsule 0.4 mg (has no administration in time range)   tolterodine ER (DETROL LA) 24 hr capsule 4 mg (has no administration in time range)   Vitamin D3 (CHOLECALCIFEROL) tablet 50 mcg (has no administration in time range)   0.9% sodium chloride BOLUS (0 mLs Intravenous Stopped 9/28/22 1732)   carbidopa-levodopa (SINEMET)  MG per tablet 2 tablet (2 tablets Oral Given 9/28/22 1639)   lisinopril (ZESTRIL) tablet 2.5 mg (2.5 mg Oral Given 9/28/22 1833)   metoprolol succinate ER (TOPROL XL) 24 hr tablet 50 mg (50 mg Oral Given 9/28/22 1833)       NEW PRESCRIPTIONS STARTED AT TODAY'S ER VISIT  New Prescriptions    No medications on file          =================================================================    HPI    Patient information was obtained from: patient's wife    Use of : N/A       Canelo Cook is a 82 year old male with a pertinent history of Parkinson disease, hypotension, HTN, cardiac pacemaker, brainstem stroke syndrome, CAD, and s/p cardia stent who presents to this ED via EMS for evaluation of altered mental status.    HPI limited due to altered mental status.    Per wife,  The patient is in end stage Parkinson's but still lives at home with wife who takes care of him. For the last 3 days he has been \"out of it\" and bedridden. In these days he has been mumbling or \"saying things that are not real\". Today it " "got worse and he would not arouse from wife or son and has not eaten or taken any medications.     The wife has a sheet of paper instructing when to administer medications for hypotension or hypertension.      REVIEW OF SYSTEMS   ROS limited due to altered mental status.    PAST MEDICAL HISTORY:  Past Medical History:   Diagnosis Date     Acute chest pain 11/10/2019     Acute non-Q wave non-ST elevation myocardial infarction (NSTEMI) (H) 11/11/2019     Anemia 11/15/2013     Closed fracture of multiple ribs of left side with routine healing 2/18/2020     Coronary artery disease due to lipid rich plaque 11/11/2019     DNAR (do not attempt resuscitation) 11/11/2019     Essential hypertension      Fall at home, sequela 12/6/2019     GERD (gastroesophageal reflux disease)      HLD (hyperlipidemia)      Near syncope 10/12/2015     Parkinson's disease (H)      SA node dysfunction (H) 07/29/2015    \"chronotropic incompetence\" per [facility] EP notes     Syncope, unspecified syncope type 11/10/2019     Vitamin D deficiency        PAST SURGICAL HISTORY:  Past Surgical History:   Procedure Laterality Date     BACK SURGERY       CATARACT EXTRACTION       CORONARY STENT PLACEMENT  11/11/2019    stent to bifurcation of LAD and DG     CV CORONARY ANGIOGRAM N/A 11/11/2019    Procedure: Coronary Angiogram;  Surgeon: Suzan Pineda MD;  Location: [facility] Cath Lab;  Service: Cardiology     CV LEFT HEART CATHETERIZATION WITH LEFT VENTRICULOGRAM N/A 11/11/2019    Procedure: Left Heart Catheterization with Left Ventriculogram;  Surgeon: Suzan Pineda MD;  Location: [facility] Cath Lab;  Service: Cardiology     ESOPHAGOSCOPY, GASTROSCOPY, DUODENOSCOPY (EGD), COMBINED N/A 3/20/2016    Procedure: ESOPHAGOGASTRODUODENOSCOPY with biopsy;  Surgeon: Melissa Castellano MD;  Location: [facility] GI;  Service:      IMPLANT PACEMAKER  08/10/2015    Biotronik AV pacer by Dr. Vidal at [facility] for \"chronotropic incompetence\", syncope " has persisted since then     IMPLANT PROSTHESIS PENIS INFLATABLE       VT EDG US EXAM SURGICAL ALTER STOM DUODENUM/JEJUNUM N/A 3/23/2016    Procedure: EGD and ENDOSCOPIC ULTRASOUND with brushing and biopsies;  Surgeon: Nj Howe MD;  Location: [facility];  Service: Gastroenterology           CURRENT MEDICATIONS:    Current Facility-Administered Medications   Medication     [START ON 9/29/2022] aspirin EC tablet 81 mg     [START ON 9/29/2022] atorvastatin (LIPITOR) tablet 40 mg     azelastine (ASTELIN) nasal spray 1 spray     bisacodyl (DULCOLAX) suppository 10 mg     carbidopa-levodopa (SINEMET)  MG per tablet 2 tablet     [START ON 9/29/2022] cyanocobalamin (VITAMIN B-12) tablet 1,000 mcg     dextrose 5% and 0.45% NaCl 1,000 mL with dextrose 5 % infusion     enoxaparin ANTICOAGULANT (LOVENOX) injection 40 mg     [START ON 9/29/2022] fludrocortisone (FLORINEF) tablet 0.1 mg     hydrALAZINE (APRESOLINE) injection 10 mg     hydrALAZINE (APRESOLINE) injection 10 mg     hydrALAZINE (APRESOLINE) tablet 10 mg     lidocaine (LMX4) cream     lidocaine 1 % 0.1-1 mL     [START ON 9/29/2022] lisinopril (ZESTRIL) tablet 2.5 mg     [START ON 9/29/2022] magnesium oxide (MAG-OX) tablet 400 mg     melatonin tablet 1 mg     [START ON 9/29/2022] metoprolol succinate ER (TOPROL XL) 24 hr tablet 50 mg     midodrine (PROAMATINE) tablet 10 mg     mirtazapine (REMERON) tablet TABS 7.5 mg     nitroGLYcerin (NITROSTAT) sublingual tablet 0.4 mg     nystatin (MYCOSTATIN) ointment     [START ON 9/29/2022] omeprazole (priLOSEC) CR capsule 20 mg     polyethylene glycol (MIRALAX) Packet 17 g     [START ON 9/29/2022] polyethylene glycol (MIRALAX) powder 17 g     pyridostigmine (MESTINON) half-tab 30 mg     sennosides (SENOKOT) tablet 2 tablet     sodium chloride (PF) 0.9% PF flush 3 mL     sodium chloride (PF) 0.9% PF flush 3 mL     [START ON 9/29/2022] tamsulosin (FLOMAX) capsule 0.4 mg     [START ON 9/29/2022]  tolterodine ER (DETROL LA) 24 hr capsule 4 mg     [START ON 9/29/2022] vitamin b complex w/vitamin C tablet 1 tablet     [START ON 9/29/2022] Vitamin D3 (CHOLECALCIFEROL) tablet 50 mcg     Current Outpatient Medications   Medication     acetaminophen (TYLENOL) 500 MG tablet     aspirin 81 MG EC tablet     atorvastatin (LIPITOR) 40 MG tablet     azelastine (ASTELIN) 0.1 % nasal spray     B Complex-C (VITAMIN B COMPLEX W/VITAMIN C) TABS tablet     bisacodyl (DULCOLAX) 10 MG suppository     carbidopa-levodopa (SINEMET)  MG tablet     cyanocobalamin (VITAMIN B-12) 1000 MCG tablet     fludrocortisone (FLORINEF) 0.1 MG tablet     hydrALAZINE (APRESOLINE) 10 MG tablet     lisinopril (ZESTRIL) 2.5 MG tablet     magnesium oxide (MAG-OX) 400 MG tablet     metoprolol succinate ER (TOPROL XL) 25 MG 24 hr tablet     midodrine (PROAMATINE) 10 MG tablet     mirtazapine (REMERON) 7.5 MG tablet     nitroGLYcerin (NITROSTAT) 0.4 MG sublingual tablet     nystatin (MYCOSTATIN) 533766 UNIT/GM external ointment     omeprazole (PRILOSEC) 20 MG DR capsule     polyethylene glycol (MIRALAX) 17 GM/Dose powder     potassium chloride ER (K-TAB) 20 MEQ CR tablet     pyridostigmine (MESTINON) 60 MG tablet     sennosides (SENOKOT) 8.6 MG tablet     tamsulosin (FLOMAX) 0.4 MG capsule     tolterodine ER (DETROL LA) 4 MG 24 hr capsule     vitamin D3 (CHOLECALCIFEROL) 50 mcg (2000 units) tablet     Blood Pressure Monitoring (BLOOD PRESSURE KIT) ROBER         ALLERGIES:  Allergies   Allergen Reactions     Sulfa Drugs      Wife is unsure of reaction       FAMILY HISTORY:  Family History   Problem Relation Age of Onset     Heart Disease Mother      Myocardial Infarction Father      Myocardial Infarction Sister      Lung Cancer Brother      Heart Disease Brother      Cancer Sister      Heart Disease Brother      Lung Cancer Brother      Heart Disease Brother      Lung Cancer Brother      Heart Disease Brother      Heart Disease Father        SOCIAL  "HISTORY:   Social History     Socioeconomic History     Marital status:      Spouse name: None     Number of children: None     Years of education: None     Highest education level: None   Tobacco Use     Smoking status: Former Smoker     Smokeless tobacco: Never Used   Substance and Sexual Activity     Alcohol use: Yes     Comment: rare 1-2 times per year       VITALS:  BP (!) 179/96 (BP Location: Left arm)   Pulse 69   Temp 98.4  F (36.9  C) (Oral)   Resp 16   Ht 1.854 m (6' 1\")   Wt 77.1 kg (170 lb)   SpO2 100%   BMI 22.43 kg/m      PHYSICAL EXAM    Constitutional: Chronically ill-appearing elderly male patient, sitting in bed, no distress  HENT: Normocephalic, Atraumatic. Neck Supple.  Eyes: EOMI, Conjunctiva normal.  Respiratory: Breathing comfortably on room air. Lungs clear to ascultation.  Cardiovascular: Normal heart rate, Regular rhythm. No peripheral edema.  Abdomen: Soft, nontender  Musculoskeletal: Good range of motion in all major joints. No major deformities noted.  Integument: Warm, Dry.  Neurologic: Lethargic, minimal response to voice.  Is able to follow commands x4 with extensive prompting.  Unable to open eyes on arrival.  Face is symmetric.  Patient is aphasic on arrival.  Psychiatric: Cooperative. Affect appropriate.     LAB:  All pertinent labs reviewed and interpreted.  Labs Ordered and Resulted from Time of ED Arrival to Time of ED Departure   HEPATIC FUNCTION PANEL - Abnormal       Result Value    Protein Total 6.4      Albumin 3.5      Bilirubin Total 1.0      Alkaline Phosphatase 72      AST 20      ALT 7 (*)     Bilirubin Direct 0.22     TROPONIN T, HIGH SENSITIVITY - Abnormal    Troponin T, High Sensitivity 39 (*)    PROCALCITONIN - Abnormal    Procalcitonin 0.06 (*)    CBC WITH PLATELETS AND DIFFERENTIAL - Abnormal    WBC Count 6.4      RBC Count 4.25 (*)     Hemoglobin 12.9 (*)     Hematocrit 39.4 (*)     MCV 93      MCH 30.4      MCHC 32.7      RDW 14.1      Platelet " Count 225      % Neutrophils 65      % Lymphocytes 18      % Monocytes 10      % Eosinophils 5      % Basophils 1      % Immature Granulocytes 1      NRBCs per 100 WBC 0      Absolute Neutrophils 4.3      Absolute Lymphocytes 1.1      Absolute Monocytes 0.6      Absolute Eosinophils 0.3      Absolute Basophils 0.1      Absolute Immature Granulocytes 0.0      Absolute NRBCs 0.0     TROPONIN T, HIGH SENSITIVITY - Abnormal    Troponin T, High Sensitivity 39 (*)    BASIC METABOLIC PANEL - Normal    Sodium 143      Potassium 3.7      Chloride 105      Carbon Dioxide (CO2) 29      Anion Gap 9      Urea Nitrogen 22.1      Creatinine 1.13      Calcium 8.8      Glucose 81      GFR Estimate 65     ROUTINE UA WITH MICROSCOPIC REFLEX TO CULTURE - Normal    Color Urine Colorless      Appearance Urine Clear      Glucose Urine Negative      Bilirubin Urine Negative      Ketones Urine Negative      Specific Gravity Urine 1.008      Blood Urine Negative      pH Urine 7.0      Protein Albumin Urine Negative      Urobilinogen Urine <2.0      Nitrite Urine Negative      Leukocyte Esterase Urine Negative      RBC Urine <1      WBC Urine <1     CREATININE   BLOOD CULTURE   BLOOD CULTURE       RADIOLOGY:  Reviewed all pertinent imaging. Please see official radiology report.  XR Chest Port 1 View   Final Result   IMPRESSION: Left subclavian approach pacing device. Cardiac silhouette within normal limits. Tortuous atherosclerotic aorta. No pneumothorax or pleural effusion. No focal consolidation.      Head CT w/o contrast   Final Result   IMPRESSION:   1.  No acute intracranial process or significant change since 05/22/2022.          EKG:    Performed at: 1538    Impression: Sinus rhythm, LVH with repolarization disturbance    Rate: 88  Rhythm: Sinus  Axis: Normal  CO Interval: 154  QRS Interval: 86  QTc Interval: 450  ST Changes: None significant  Comparison: Compared to August 24, 2022, no significant change    I have independently  reviewed and interpreted the EKG(s) documented above.      I, Zach Singleton, am serving as a scribe to document services personally performed by Dr. Adam Kauffman, based on my observation and the provider's statements to me. I, Adam Kauffman MD attest that Zach Singleton is acting in a scribe capacity, has observed my performance of the services and has documented them in accordance with my direction.    Adam Kauffman M.D.  Emergency Medicine  [facility] EMERGENCY DEPARTMENT  [address] [place] [id number]  [phone]  Dept: [phone]     Adam Kauffman MD  09/28/22 0867

## 2022-09-29 PROBLEM — R41.82 ALTERED MENTAL STATUS, UNSPECIFIED ALTERED MENTAL STATUS TYPE: Status: RESOLVED | Noted: 2022-01-01 | Resolved: 2022-01-01

## 2022-09-29 PROBLEM — G46.3 BRAINSTEM STROKE SYNDROME: Status: RESOLVED | Noted: 2022-03-01 | Resolved: 2022-01-01

## 2022-09-29 PROBLEM — R55 SYNCOPE, UNSPECIFIED SYNCOPE TYPE: Status: RESOLVED | Noted: 2022-01-01 | Resolved: 2022-01-01

## 2022-09-29 PROBLEM — I10 BENIGN ESSENTIAL HYPERTENSION: Status: RESOLVED | Noted: 2022-01-01 | Resolved: 2022-01-01

## 2022-09-29 PROBLEM — E78.5 HLD (HYPERLIPIDEMIA): Status: RESOLVED | Noted: 2022-01-01 | Resolved: 2022-01-01

## 2022-09-29 PROBLEM — R30.0 DYSURIA: Status: RESOLVED | Noted: 2022-01-01 | Resolved: 2022-01-01

## 2022-09-29 PROBLEM — R35.0 INCREASED FREQUENCY OF URINATION: Status: RESOLVED | Noted: 2022-03-01 | Resolved: 2022-01-01

## 2022-09-29 PROBLEM — K59.00 CONSTIPATION: Status: RESOLVED | Noted: 2022-01-01 | Resolved: 2022-01-01

## 2022-09-29 PROBLEM — R09.89 LABILE HYPERTENSION: Status: RESOLVED | Noted: 2018-01-24 | Resolved: 2022-01-01

## 2022-09-29 PROBLEM — F32.9 REACTIVE DEPRESSION: Status: RESOLVED | Noted: 2020-01-09 | Resolved: 2022-01-01

## 2022-09-29 PROBLEM — K21.9 GASTROESOPHAGEAL REFLUX DISEASE WITHOUT ESOPHAGITIS: Status: RESOLVED | Noted: 2022-01-01 | Resolved: 2022-01-01

## 2022-09-29 PROBLEM — N17.9 AKI (ACUTE KIDNEY INJURY) (H): Status: RESOLVED | Noted: 2022-01-01 | Resolved: 2022-01-01

## 2022-09-29 PROBLEM — R55 PRE-SYNCOPE: Status: RESOLVED | Noted: 2020-02-18 | Resolved: 2022-01-01

## 2022-09-29 PROBLEM — Z95.0 PRESENCE OF CARDIAC PACEMAKER: Status: RESOLVED | Noted: 2020-02-18 | Resolved: 2022-01-01

## 2022-09-29 PROBLEM — G93.41 METABOLIC ENCEPHALOPATHY: Status: RESOLVED | Noted: 2022-01-01 | Resolved: 2022-01-01

## 2022-09-29 PROBLEM — G47.09 OTHER INSOMNIA: Status: RESOLVED | Noted: 2019-12-20 | Resolved: 2022-01-01

## 2022-09-29 PROBLEM — R55 SYNCOPE AND COLLAPSE: Status: RESOLVED | Noted: 2019-11-10 | Resolved: 2022-01-01

## 2022-09-29 PROBLEM — G20.A1 PARKINSON'S DISEASE (H): Status: RESOLVED | Noted: 2022-01-01 | Resolved: 2022-01-01

## 2022-09-29 PROBLEM — E87.6 HYPOKALEMIA: Status: RESOLVED | Noted: 2022-01-01 | Resolved: 2022-01-01

## 2022-09-29 NOTE — PROGRESS NOTES
[facility] Hospitalist Progress Note  [facility]  Admission date: 9/28/2022    Summary:    82M with advanced Parkinson's disease, coronary artery disease s/p stent, CKD 3, who presents with 2 days of confusion and generalized weakness.  No clear infectious or metabolic trigger.  Has had similar presentation in the past with a negative evaluation.  Possibly related to severe autonomic dysfunction, mild dehydration?.  Unfortunately disease is quite advanced and I think ongoing meetings with palliative warranted.    Assessment/Plan    #Acute encephalopathy - no clear infectious or metabolic cause.  Pro-calcitonin of 0.06 is negative.  EEG results pending.    #Advanced Parkinson's disease,  multi-system atrophy with severe autonomic instability.  -PTA hydralazine and midodrine as needed for high and low blood pressure respectively  -Patient's mostly chair or bedbound.  -Palliative care consult  -Continue Sinemet, pyridostigmine, regimen for supine hypertension and orthostatic hypotension.      #Mood disorder - mirtazapine    #Minimally elevated high sensitivity Trop T - No symptoms of ACS and high sensitivity trop T minimally elevated (and stable) without signs of ACS.  Probably related to severe underlying LVH and fluctuating hemodynamics.     #Severe LVH - volume compensated     CAD s/p  stent  -PTA aspirin 81 mg oral daily  -Metoprolol succinate 50 mg oral daily    Checklist:  Code Status: No CPR- Do NOT Intubate    Diet: Regular Diet Adult    Negrete Catheter: Not present  Central Lines: None  DVT px:  Pneumatic Compression Devices        Auto-populated based on system request - if relevant they will be addressed above:  Clinically Significant Risk Factors Present on Admission                 # Hypertension: home medication list includes antihypertensive(s)          Auto-populated from discharge tab:     Expected Discharge Date: 09/30/2022                 Overnight Events/Subjective/Notable  results:    Seen earlier in AM.  No physical complaints.    Doesn't recall symptoms leading to admission.    4 point ROS otherwise negative    Objective    Vital signs in last 24 hours  Temp:  [98  F (36.7  C)-98.6  F (37  C)] 98.2  F (36.8  C)  Pulse:  [69-98] 76  Resp:  [10-54] 12  BP: (113-262)/() 121/70  SpO2:  [92 %-100 %] 97 % O2 Device: None (Room air)    Weight:   170 lbs 0 oz    Intake/Output last 3 shifts  I/O last 3 completed shifts:  In: 1120 [P.O.:120; IV Piggyback:1000]  Out: 400 [Urine:400]  Body mass index is 22.43 kg/m .    Physical Exam  General:  Alert, cooperative, no distress.  Chronically ill appearing    Neurologic:  Severe bradykinesia, slowed speech, resting tremor  Psych: calm  HEENT:  Anicteric, MMM  CV: RRR no MRG, normal S1 and S2, no edema  Lungs: CTAB.  Easyrespirations  Abd: soft, NT, normoactive BS  Skin: no rashes or jaundice noted on exposed skin.    Central Lines and Tubes: None (no pena, CVC, feeding tubes)      I have reviewed all labs, medications, imaging studies in the last 24 hours.  Pertinent findings&changes discussed above.    Data     Ronny Perez MD  Internal Medicine Hospitalist

## 2022-09-29 NOTE — UTILIZATION REVIEW
"Admission Status; Secondary Review Determination         Under the authority of the Utilization Management Committee, the utilization review process indicated a secondary review on the above patient.  The review outcome is based on review of the medical records, discussions with staff, and applying clinical experience noted on the date of the review.          (x) Observation Status Appropriate - This patient does not meet hospital inpatient criteria and is placed in observation status. If this patient's primary payer is Medicare and was admitted as an inpatient, Condition Code 44 should be used and patient status changed to \"observation\".       RATIONALE FOR DETERMINATION     Mr. Cook is a 83 yo male pt with a PMH of advanced Parkinson's disease with significant autonomic dysfunction who presents to the ED with altered mentation and gen weakness.  Vitals have been stable; no clear infectious process identified.  Head CT negative for acute intracranial process.  Neuro consulted; recommending EEG to r/o seizures.  Similar presentation 7/22 with neg work-up; palliative care consult to be considered.      The severity of illness, intensity of service provided, expected LOS and risk for adverse outcome make the care appropriate for further observation; however, doesn't meet criteria for hospital inpatient admission.     If he has clear seizure activity requiring IV medication and/or develops clear infectious process, then would transition to INPATIENT status.      Dr Perez notified of this determination, awaiting a call back.         The information on this document is developed by the utilization review team in order for the business office to ensure compliance.  This only denotes the appropriateness of proper admission status and does not reflect the quality of care rendered.         The definitions of Inpatient Status and Observation Status used in making the determination above are those provided in the CMS " Coverage Manual, Chapter 1 and Chapter 6, section 70.4.        Sincerely,    Eloise Lakcey, DO  Utilization Review  Physician Advisor  [facility]

## 2022-09-29 NOTE — CONSULTS
NEUROLOGY INPATIENT CONSULTATION NOTE       [facility]  [address] [place] [address]  Tel: [phone]  Fax: [phone]  [email or web address]     Canelo Cook,  1940, MRN [id number]  PCP: Hung Camacho  Date: 2022     ASSESSMENT & PLAN     Diagnosis code:  1. Altered mental status  2. Parkinson disease with autonomic instability    Parkinson disease with autonomic instability (multisystem atrophy)  82-year-old male with HTN, HLD, CAD s/p pacemaker, CKD stage III, end-stage Parkinson disease with autonomic instability admitted with confusion.  He was admitted to the hospital in May with similar symptoms and had a negative work-up.    CT of the head shows age-related changes    Continue Sinemet 25/100 mg 2 tablets 4 times daily.  Take empty stomach to maximize absorption    Florinef, Primatene and Mestinon for postural hypotension    Use abdominal binder, thigh-high stockings to help with postural hypotension    EEG    Unfortunately patient has end-stage Parkinson disease and during last admission in May 2022 he did meet with palliative care but no long-term changes were made.  I should note he is DNR    Thank you again for this referral, please feel free to contact me if you have any questions.    Giovanni Phelps MD  [facility]  (Formerly, [facility]     CHIEF COMPLAINT <principal problem not specified>     HISTORY OF PRESENT ILLNESS     We have been requested by Dr. Hicks to evaluate Canelo Cook who is a 82 year old  male for Parkinson's disease    Patient is a 82-year-old male with history of HTN, HLD, CAD s/p pacemaker, CKD stage III, Parkinson disease with autonomic instability who was admitted with generalized weakness for the last 2 days he was recently discharged from [facility] with similar presentation and from TCU was sent home.  He was requiring increased  assistance for activities of daily living at home and predominantly was bedbound.  He was brought back to the emergency room and lab work included borderline elevated procalcitonin chest x-ray was negative.  Neurology was consulted for management of Parkinson disease.  He had CT of the head that was unremarkable.    Patient has end-stage Parkinson disease (diagnosed in 1998 when he presented with tremor and bradykinesia) with significant autonomic instability with muscle loss and mostly is wheelchair-bound.  Wife helps him transfer from chair to bed but is becoming more difficult for her.  He also has other autonomic symptoms associated with Parkinson disease including excessive constipation and also sleep disturbances.  For his autonomic dysfunction he is on Florinef 0.1 mg every morning, Mestinon 60 mg 3 times daily and ProAmatine 10 mg twice daily.     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     Low vitamin B12 level E53.8     Male erectile dysfunction, unspecified N52.9     Numerous moles D22.9     Recurrent syncope R55     S/P coronary artery stent placement Z95.5     PD (perceptive deafness), 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     Sleep disorder G47.9     Vitamin D deficiency E55.9     Adult failure to thrive R62.7     CKD (chronic kidney disease) stage 3, GFR 30-59 ml/min (H) N18.30     Autonomic instability G90.9     Normocytic anemia D64.9     Generalized weakness R53.1     Acute encephalopathy G93.40       Clinically Significant Risk Factors Present on Admission                     PAST MEDICAL & SURGICAL HISTORY     Past Medical History: Patient  has a past medical history of Acute chest pain (11/10/2019), Acute non-Q wave non-ST elevation myocardial infarction (NSTEMI) (H) (11/11/2019), Anemia (11/15/2013), 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    Past Surgical History: He  has a past surgical history that includes back surgery; Implant pacemaker (08/10/2015); Implant prosthesis penis inflatable; Cataract Extraction; Esophagoscopy, gastroscopy, duodenoscopy (EGD), combined (N/A, 3/20/2016); Pr Edg Us Exam Surgical Alter Stom Duodenum/Jejunum (N/A, 3/23/2016); Cv Coronary Angiogram (N/A, 11/11/2019); Cv Left Heart Catheterization With Left Ventriculogram (N/A, 11/11/2019); and Coronary Stent Placement (11/11/2019).     SOCIAL HISTORY     Reviewed, and he  reports that he has quit smoking. He has never used smokeless tobacco. He reports current alcohol use.     FAMILY HISTORY     Reviewed, and family history includes Cancer in his sister; Heart Disease in his brother, brother, brother, brother, father, and mother; Lung Cancer in his brother, brother, and brother; Myocardial Infarction in his father and sister.     ALLERGIES     Allergies   Allergen Reactions     Sulfa Drugs      Wife is unsure of reaction        REVIEW OF SYSTEMS     Pertinent items are noted in HPI.     HOME & HOSPITAL MEDICATIONS     Prior to Admission Medications  (Not in a  hospital admission)      Hospital Medications    aspirin  81 mg Oral Daily     atorvastatin  40 mg Oral Daily     carbidopa-levodopa  2 tablet Oral 4x Daily     cyanocobalamin  1,000 mcg Oral Daily     enoxaparin ANTICOAGULANT  40 mg Subcutaneous Q24H     fludrocortisone  0.1 mg Oral QAM     lisinopril  2.5 mg Oral Daily     magnesium oxide  400 mg Oral Daily     metoprolol succinate ER  50 mg Oral Daily     midodrine  10 mg Oral TID     mirtazapine  7.5 mg Oral At Bedtime     nystatin   Topical BID     pantoprazole  40 mg Oral Daily     polyethylene glycol  17 g Oral Daily     pyridostigmine  30 mg Oral TID     sennosides  2 tablet Oral BID     sodium chloride (PF)  3 mL Intracatheter Q8H     tamsulosin  0.4 mg Oral Daily     tolterodine ER  4 mg Oral Daily     vitamin C with B complex  1 tablet Oral Daily     vitamin D3  50 mcg Oral Daily        PHYSICAL EXAM     Vital signs  Temp:  [98  F (36.7  C)-98.6  F (37  C)] 98.2  F (36.8  C)  Pulse:  [69-98] 76  Resp:  [10-54] 12  BP: (113-262)/() 121/70  SpO2:  [92 %-100 %] 97 %    Weight:   Wt Readings from Last 1 Encounters:   09/28/22 77.1 kg (170 lb)        General Physical Exam: Patient is alert and oriented x 3. Vital signs were reviewed and are documented in EMR. Neck was supple, no carotid bruit, thyromegaly, JVD or lymphadenopathy noted.  Neurological Exam:  Patient is alert and oriented to self and time and place.  Speech is soft comprehension is intact he can name objects and parts of objects.  Extraocular movements are intact some restriction of upgaze.  He has increased tone right worse than left.  He has resting tremor right greater than left.  Reflexes are 3+ toes are upgoing.  Sensation intact to light touch and pinprick.  He has dysmetria on finger-nose testing.  Patient is wheelchair-bound and gait testing was deferred for safety     DIAGNOSTIC STUDIES     Pertinent Radiology   Radiology Results: Reviewed impression and images     CT  1.  No acute  intracranial process or significant change since 05/22/2022.    Pertinent Labs   Lab Results: Personally Reviewed   Recent Results (from the past 24 hour(s))   ECG 12-LEAD WITH MUSE (LHE)    Collection Time: 09/28/22  3:38 PM   Result Value Ref Range    Systolic Blood Pressure 233 mmHg    Diastolic Blood Pressure 115 mmHg    Ventricular Rate 88 BPM    Atrial Rate 88 BPM    OH Interval 154 ms    QRS Duration 86 ms     ms    QTc 450 ms    P Axis -20 degrees    R AXIS -13 degrees    T Axis 67 degrees    Interpretation ECG       Sinus rhythm  Left ventricular hypertrophy with repolarization abnormality  Abnormal ECG  When compared with ECG of 24-AUG-2022 12:48,  Sinus rhythm has replaced Electronic atrial pacemaker  T wave inversion now evident in Lateral leads  Confirmed by SEE ED PROVIDER NOTE FOR, ECG INTERPRETATION ([id number]),  RACHEL GALVAN ([id number]) on 9/29/2022 7:28:00 AM     Extra Blue Top Tube    Collection Time: 09/28/22  4:09 PM   Result Value Ref Range    Hold Specimen JIC    Extra Red Top Tube    Collection Time: 09/28/22  4:09 PM   Result Value Ref Range    Hold Specimen JIC    Basic metabolic panel    Collection Time: 09/28/22  4:10 PM   Result Value Ref Range    Sodium 143 136 - 145 mmol/L    Potassium 3.7 3.4 - 5.3 mmol/L    Chloride 105 98 - 107 mmol/L    Carbon Dioxide (CO2) 29 22 - 29 mmol/L    Anion Gap 9 7 - 15 mmol/L    Urea Nitrogen 22.1 8.0 - 23.0 mg/dL    Creatinine 1.13 0.67 - 1.17 mg/dL    Calcium 8.8 8.8 - 10.2 mg/dL    Glucose 81 70 - 99 mg/dL    GFR Estimate 65 >60 mL/min/1.73m2   Hepatic function panel    Collection Time: 09/28/22  4:10 PM   Result Value Ref Range    Protein Total 6.4 6.4 - 8.3 g/dL    Albumin 3.5 3.5 - 5.2 g/dL    Bilirubin Total 1.0 <=1.2 mg/dL    Alkaline Phosphatase 72 40 - 129 U/L    AST 20 10 - 50 U/L    ALT 7 (L) 10 - 50 U/L    Bilirubin Direct 0.22 0.00 - 0.30 mg/dL   Troponin T, High Sensitivity (now)    Collection Time: 09/28/22  4:10 PM   Result Value  Ref Range    Troponin T, High Sensitivity 39 (H) <=22 ng/L   Procalcitonin    Collection Time: 09/28/22  4:10 PM   Result Value Ref Range    Procalcitonin 0.06 (H) <0.05 ng/mL   CBC with platelets and differential    Collection Time: 09/28/22  4:10 PM   Result Value Ref Range    WBC Count 6.4 4.0 - 11.0 10e3/uL    RBC Count 4.25 (L) 4.40 - 5.90 10e6/uL    Hemoglobin 12.9 (L) 13.3 - 17.7 g/dL    Hematocrit 39.4 (L) 40.0 - 53.0 %    MCV 93 78 - 100 fL    MCH 30.4 26.5 - 33.0 pg    MCHC 32.7 31.5 - 36.5 g/dL    RDW 14.1 10.0 - 15.0 %    Platelet Count 225 150 - 450 10e3/uL    % Neutrophils 65 %    % Lymphocytes 18 %    % Monocytes 10 %    % Eosinophils 5 %    % Basophils 1 %    % Immature Granulocytes 1 %    NRBCs per 100 WBC 0 <1 /100    Absolute Neutrophils 4.3 1.6 - 8.3 10e3/uL    Absolute Lymphocytes 1.1 0.8 - 5.3 10e3/uL    Absolute Monocytes 0.6 0.0 - 1.3 10e3/uL    Absolute Eosinophils 0.3 0.0 - 0.7 10e3/uL    Absolute Basophils 0.1 0.0 - 0.2 10e3/uL    Absolute Immature Granulocytes 0.0 <=0.4 10e3/uL    Absolute NRBCs 0.0 10e3/uL   UA with Microscopic reflex to Culture    Collection Time: 09/28/22  4:38 PM    Specimen: Urine, Clean Catch   Result Value Ref Range    Color Urine Colorless Colorless, Straw, Light Yellow, Yellow    Appearance Urine Clear Clear    Glucose Urine Negative Negative mg/dL    Bilirubin Urine Negative Negative    Ketones Urine Negative Negative mg/dL    Specific Gravity Urine 1.008 1.001 - 1.030    Blood Urine Negative Negative    pH Urine 7.0 5.0 - 7.0    Protein Albumin Urine Negative Negative mg/dL    Urobilinogen Urine <2.0 <2.0 mg/dL    Nitrite Urine Negative Negative    Leukocyte Esterase Urine Negative Negative    RBC Urine <1 <=2 /HPF    WBC Urine <1 <=5 /HPF   Troponin T, High Sensitivity (now)    Collection Time: 09/28/22  6:40 PM   Result Value Ref Range    Troponin T, High Sensitivity 39 (H) <=22 ng/L   CBC with platelets    Collection Time: 09/29/22  7:01 AM   Result Value  Ref Range    WBC Count 6.1 4.0 - 11.0 10e3/uL    RBC Count 3.74 (L) 4.40 - 5.90 10e6/uL    Hemoglobin 11.3 (L) 13.3 - 17.7 g/dL    Hematocrit 34.4 (L) 40.0 - 53.0 %    MCV 92 78 - 100 fL    MCH 30.2 26.5 - 33.0 pg    MCHC 32.8 31.5 - 36.5 g/dL    RDW 14.2 10.0 - 15.0 %    Platelet Count 204 150 - 450 10e3/uL       Total time spent for face to face visit, reviewing labs/imaging studies, counseling and coordination of care was: 1 Hour 15 Minutes More than 50% of this time was spent on counseling and coordination of care.      This note was dictated using voice recognition software.  Any grammatical or context distortions are unintentional and inherent to the software.

## 2022-09-29 NOTE — PHARMACY-ADMISSION MEDICATION HISTORY
Pharmacy Note - Admission Medication History    Pertinent Provider Information: Wife manages his medications and keeps a written list. Patient was unable to take any medications at home today. Discharged from [facility] on 7/13/22 and filled 1 month supply of several medications but ran out of some since have not received refill from PCP yet (see notes below).      ______________________________________________________________________    Prior To Admission (PTA) med list completed and updated in EMR.       PTA Med List   Medication Sig Note Last Dose     acetaminophen (TYLENOL) 500 MG tablet Take 500-1,000 mg by mouth daily as needed for other (headache)  Past Month at PRN     aspirin 81 MG EC tablet Take 1 tablet (81 mg) by mouth daily  9/27/2022 at AM     atorvastatin (LIPITOR) 40 MG tablet Take 1 tablet (40 mg) by mouth daily (Patient taking differently: Take 40 mg by mouth At Bedtime)  9/27/2022 at HS     azelastine (ASTELIN) 0.1 % nasal spray Spray 1 spray into both nostrils daily as needed for rhinitis  Past Month at PRN     B Complex-C (VITAMIN B COMPLEX W/VITAMIN C) TABS tablet Take 1 tablet by mouth daily (Patient taking differently: Take 1 tablet by mouth daily (with lunch))  9/27/2022 at 1200     bisacodyl (DULCOLAX) 10 MG suppository Place 1 suppository (10 mg) rectally daily as needed for constipation  Unknown at never used     carbidopa-levodopa (SINEMET)  MG tablet Take 2 tablets by mouth 4 times daily  9/27/2022 at Unknown time     cyanocobalamin (VITAMIN B-12) 1000 MCG tablet Take 1 tablet (1,000 mcg) by mouth daily  9/27/2022 at AM     fludrocortisone (FLORINEF) 0.1 MG tablet Take 1 tablet (0.1 mg) by mouth every morning  9/27/2022 at AM     hydrALAZINE (APRESOLINE) 10 MG tablet Take 1 tablet (10 mg) by mouth 3 times daily as needed (for SBP >180)  Past Week at PRN     lisinopril (ZESTRIL) 2.5 MG tablet Take 1 tablet (2.5 mg) by mouth daily 9/28/2022: Discharged from [place]  [facility] and last filled 7/13/22 for 30 day supply but not on current written medication list from wife so hasn't taken in awhile More than a month at ran out     magnesium oxide (MAG-OX) 400 MG tablet Take 1 tablet (400 mg) by mouth daily (Patient taking differently: Take 400 mg by mouth daily (with lunch))  9/27/2022 at 1200     metoprolol succinate ER (TOPROL XL) 25 MG 24 hr tablet Take 2 tablets (50 mg) by mouth daily HOLD if SBP<100 and.or HR<55  Past Week at ran out couple days ago     midodrine (PROAMATINE) 10 MG tablet Take 1 tablet (10 mg) by mouth 3 times daily (Patient taking differently: Take 10 mg by mouth 3 times daily as needed (BP < 100))  Unknown at PRN     mirtazapine (REMERON) 7.5 MG tablet Take 1 tablet (7.5 mg) by mouth At Bedtime  9/27/2022 at HS     nitroGLYcerin (NITROSTAT) 0.4 MG sublingual tablet Place 1 tablet (0.4 mg) under the tongue every 5 minutes as needed for chest pain  Unknown at never used     nystatin (MYCOSTATIN) 280479 UNIT/GM external ointment Apply topically 2 times daily (Patient taking differently: Apply topically 2 times daily as needed for other (groin redness))  Past Month at PRN     omeprazole (PRILOSEC) 20 MG DR capsule Take 1 capsule (20 mg) by mouth daily (Patient taking differently: Take 20 mg by mouth daily before breakfast)  9/27/2022 at AM     polyethylene glycol (MIRALAX) 17 GM/Dose powder Take 17 g by mouth daily (Patient taking differently: Take 17 g by mouth daily as needed for constipation)  More than a month at PRN     potassium chloride ER (K-TAB) 20 MEQ CR tablet Take 1 tablet (20 mEq) by mouth daily 9/28/2022: Last filled 7/13/22 x 30 day supply after TCU discharge but ran out More than a month at Unknown time     pyridostigmine (MESTINON) 60 MG tablet Take 0.5 tablets (30 mg) by mouth 3 times daily (Patient taking differently: Take 30 mg by mouth 3 times daily (with meals))  9/27/2022 at Unknown time     sennosides (SENOKOT) 8.6 MG tablet Take 2  tablets by mouth 2 times daily HOLD for loose stools (Patient taking differently: Take 2 tablets by mouth At Bedtime HOLD for loose stools)  9/27/2022 at HS     tamsulosin (FLOMAX) 0.4 MG capsule Take 1 capsule (0.4 mg) by mouth daily (Patient taking differently: Take 0.4 mg by mouth every evening)  9/27/2022 at PM     tolterodine ER (DETROL LA) 4 MG 24 hr capsule Take 1 capsule (4 mg) by mouth daily (Patient taking differently: Take 4 mg by mouth At Bedtime)  9/27/2022 at HS     vitamin D3 (CHOLECALCIFEROL) 50 mcg (2000 units) tablet Take 1 tablet (50 mcg) by mouth daily  9/27/2022 at AM       Information source(s): Family member and CareEverywhere/SureScripts  Method of interview communication: in-person with N95 mask and faceshield    Summary of Changes to PTA Med List  New: Tylenol  Discontinued: none  Changed: Midodrine TID to TID PRN, nystatin BID to BID PRN, Senna 2 tabs BID to 2 tabs HS    Patient was asked about OTC/herbal products specifically.  PTA med list reflects this.    Allergies were reviewed, assessed, and updated with the patient.      Patient does not anticipate needing any multi-use medications during admission.    The information provided in this note is only as accurate as the sources available at the time of the update(s).    Thank you for the opportunity to participate in the care of this patient.    Alexia Arroyo, [facility]  9/28/2022 8:38 PM

## 2022-09-29 NOTE — PROGRESS NOTES
"[facility] ED Handoff Report    ED Chief Complaint: generalized weakness    ED Diagnosis:  (G93.40) Acute encephalopathy  Comment: End stage Parkinson's   Plan: Palliative care consult    (R53.1) Generalized weakness  Comment:   Plan:        PMH:    Past Medical History:   Diagnosis Date     Acute chest pain 11/10/2019     Acute non-Q wave non-ST elevation myocardial infarction (NSTEMI) (H) 11/11/2019     Anemia 11/15/2013     Brainstem stroke syndrome 3/1/2022     Closed fracture of multiple ribs of left side with routine healing 2/18/2020     Coronary artery disease due to lipid rich plaque 11/11/2019     DNAR (do not attempt resuscitation) 11/11/2019     Essential hypertension      Fall at home, sequela 12/6/2019     GERD (gastroesophageal reflux disease)      HLD (hyperlipidemia)      Near syncope 10/12/2015     Parkinson's disease (H)      SA node dysfunction (H) 07/29/2015    \"chronotropic incompetence\" per [facility] EP notes     Syncope, unspecified syncope type 11/10/2019     Vitamin D deficiency         Code Status:  No CPR- Do NOT Intubate     Falls Risk: Yes Band: Applied    Current Living Situation/Residence: lives with a significant other     Elimination Status: Continent: Yes     Activity Level: 2 assist    Patients Preferred Language:  English     Needed: No    Vital Signs:  BP 93/52   Pulse 80   Temp 98.5  F (36.9  C) (Oral)   Resp 11   Ht 1.854 m (6' 1\")   Wt 77.1 kg (170 lb)   SpO2 97%   BMI 22.43 kg/m       Cardiac Rhythm: NA    Pain Score: 4/10    Is the Patient Confused:  Yes    Last Food or Drink: 09/29/22 at 1200    Focused Assessment:  Dependent all ADL's, oriented to self and place, sleeping most of the shift.    Tests Performed: Done: Labs and Imaging    Treatments Provided:  na    Family Dynamics/Concerns: No    Family Updated On Visitor Policy: Yes    Plan of Care Communicated to Family: Yes    Who Was Updated about Plan of Care: wife    Belongings Checklist " Done and Signed by Patient: Yes    Medications sent with patient: na    Covid: asymptomatic , negative    Additional Information: discontinue to TCU vs home      RN: OLEKSANDR Prieto RN 9/29/2022 2:34 PM

## 2022-09-29 NOTE — CONSULTS
[facility]   Palliative Care Consultation Note    Patient: Canelo Cook  Date of Admission:  9/28/2022    Requesting Clinician / Team: Hospitalist  Reason for consult: Goals of care  Decisional support  Patient and family support    Code status: No CPR- Do NOT Intubate    Impression & Recommendations:  82-year-old male with end-stage Parkinson's disease (frequent altered mental status, autonomic dysfunction, bed and chair bound), HTN, HLD, CAD status post pacemaker, and CKD stage III admitted with decreased level of consciousness.  He had a recent similar admission in June.  He is cared for at home by his wife.  Palliative care evaluated him during June admission and hospice was discussed but he was discharged to a TCU for rehabilitation attempt.  Palliative care again consulted to assist with medical decision-making and care coordination.        Symptoms/recommendations/discussion:  1.  Advanced Care Planning: Present in EMR, reviewed.  His wife Susanne is his agent.  On exam the patient is not able to correctly state the year and seems to struggle a great deal with comprehending complex information.  Any decision making should involve his wife.  2.  Family meeting completed at his bedside with his wife Susanne and some participation by patient.  We discussed high risk for increasing frequency of hospital admissions and decreasing quality of life in the context of an incurable progressive disease.  She states that he really does not want to go to the hospital.  We discussed possibility of hospice to support patient and family at home.  We talked about hospice generally continuing most Parkinson medications for comfort sake, use of p.o. antibiotics if indicated and patient able to take, but also discussed role of hospice and providing comfort care when anticipated events resulting in significant change in health status and likely end-of-life care occurr.  She believes that hospice may be most in line with his  wishes but wishes to talk more with him and possibly their son and daughter.  Palliative care will continue to follow.  3. Wife reports difficulty caring for him at home because he often wakes multiple times during the night and her fatigue and stress is even higher as she is not able to get rest. Will discuss possible sleep aid with neurology.                 Thank you for the opportunity to participate in the care of this patient and family. Our team: will continue to follow.     During regular M-F work hours -- if you are not sure who specifically to contact -- please contact us by calling us directly at the Palliative Care Main Line [phone]    After regular work hours and on weekends/holidays, you can leave a message at [phone]      History of Present Illness:  History gathered today from: patient, family/loved ones, medical chart, unit team members, health care directive/s  Canelo Cook is a 82 year old male with end-stage Parkinson's disease (frequent altered mental status, autonomic dysfunction, bed and chair bound), HTN, HLD, CAD status post pacemaker, and CKD stage III admitted with decreased level of consciousness.  He had a recent similar admission in June.  He is cared for at home by his wife.  Palliative care evaluated him during June admission and hospice was discussed but he was discharged to a TCU for rehabilitation attempt.  Palliative care again consulted to assist with medical decision-making and care coordination.      Prognosis, Goals, & Planning:      Functional Status just prior to hospitalization: 4 (Completely disabled and dependent on others for selfcare; bedbound)      Patient's decision making preferences: unable to assess          Patient has decision-making capacity today for complex decisions: No            I have concerns about the patient/family's health literacy today: No           Patient has a completed Health Care Directive: Yes, and on file.      Code status: No  "CPR / No Intubation    Coping, Meaning, & Spirituality:   Mood, coping, and/or meaning in the context of serious illness were addressed today: Yes  Summary/Comments:      Palliative Symptom Data:  We are not helping to manage these symptoms currently in this patient.    Patient is on opioids: bowels not assessed today.    ROS:  Comprehensive ROS is reviewed and is negative except as here & per HPI:   Constitutional: Sleeps much of the day, fatigue, regular episodes of altered mental status  Eyes: No reported issue  ENT: No reported issue  Cardiovascular: No reported issue  Respiratory: No reported issue  GI: Days when p.o. intake is very poor  : Incontinence  MSK: Tremor, rigidity  Integumentary: No reported issue  Neurological: Advanced Parkinson's  Psychiatric: Episodes of altered mental status     Past Medical History:  Past Medical History:   Diagnosis Date     Acute chest pain 11/10/2019     Acute non-Q wave non-ST elevation myocardial infarction (NSTEMI) (H) 11/11/2019     Anemia 11/15/2013     Brainstem stroke syndrome 3/1/2022     Closed fracture of multiple ribs of left side with routine healing 2/18/2020     Coronary artery disease due to lipid rich plaque 11/11/2019     DNAR (do not attempt resuscitation) 11/11/2019     Essential hypertension      Fall at home, sequela 12/6/2019     GERD (gastroesophageal reflux disease)      HLD (hyperlipidemia)      Near syncope 10/12/2015     Parkinson's disease (H)      SA node dysfunction (H) 07/29/2015    \"chronotropic incompetence\" per [facility] EP notes     Syncope, unspecified syncope type 11/10/2019     Vitamin D deficiency         Past Surgical History:  Past Surgical History:   Procedure Laterality Date     BACK SURGERY       CATARACT EXTRACTION       CORONARY STENT PLACEMENT  11/11/2019    stent to bifurcation of LAD and DG     CV CORONARY ANGIOGRAM N/A 11/11/2019    Procedure: Coronary Angiogram;  Surgeon: Suzna Pineda MD;  Location: [facility]" "Cath Lab;  Service: Cardiology     CV LEFT HEART CATHETERIZATION WITH LEFT VENTRICULOGRAM N/A 11/11/2019    Procedure: Left Heart Catheterization with Left Ventriculogram;  Surgeon: Suzan Pineda MD;  Location: [facility] Cath Lab;  Service: Cardiology     ESOPHAGOSCOPY, GASTROSCOPY, DUODENOSCOPY (EGD), COMBINED N/A 3/20/2016    Procedure: ESOPHAGOGASTRODUODENOSCOPY with biopsy;  Surgeon: Melissa Castellano MD;  Location: [facility];  Service:      IMPLANT PACEMAKER  08/10/2015    Biotronik AV pacer by Dr. Vidal at [facility] for \"chronotropic incompetence\", syncope has persisted since then     IMPLANT PROSTHESIS PENIS INFLATABLE       OH EDG US EXAM SURGICAL ALTER STOM DUODENUM/JEJUNUM N/A 3/23/2016    Procedure: EGD and ENDOSCOPIC ULTRASOUND with brushing and biopsies;  Surgeon: Nj Howe MD;  Location: [facility];  Service: Gastroenterology         Family History:  Family History   Problem Relation Age of Onset     Heart Disease Mother      Myocardial Infarction Father      Myocardial Infarction Sister      Lung Cancer Brother      Heart Disease Brother      Cancer Sister      Heart Disease Brother      Lung Cancer Brother      Heart Disease Brother      Lung Cancer Brother      Heart Disease Brother      Heart Disease Father          Social History     Socioeconomic History     Marital status:      Spouse name: None     Number of children: None     Years of education: None     Highest education level: None   Tobacco Use     Smoking status: Former Smoker     Smokeless tobacco: Never Used   Substance and Sexual Activity     Alcohol use: Yes     Comment: rare 1-2 times per year        Allergies:  Allergies   Allergen Reactions     Sulfa Drugs      Wife is unsure of reaction        Medications:  I have reviewed this patient's medication profile and medications from this hospitalization.   Noted:      Lab Results: personally reviewed.     Urea Nitrogen   Date Value Ref Range Status "   09/29/2022 24.5 (H) 8.0 - 23.0 mg/dL Final   08/24/2022 42 (H) 8 - 28 mg/dL Final     Creatinine   Date Value Ref Range Status   09/29/2022 1.03 0.67 - 1.17 mg/dL Final     AST   Date Value Ref Range Status   09/28/2022 20 10 - 50 U/L Final     ALT   Date Value Ref Range Status   09/28/2022 7 (L) 10 - 50 U/L Final     Alkaline Phosphatase   Date Value Ref Range Status   09/28/2022 72 40 - 129 U/L Final     Albumin   Date Value Ref Range Status   09/28/2022 3.5 3.5 - 5.2 g/dL Final   05/23/2022 2.7 (L) 3.5 - 5.0 g/dL Final       RADIOLOGY:  XR Chest Port 1 View    Result Date: 9/28/2022  EXAM: XR CHEST PORT 1 VIEW LOCATION: [facility] DATE/TIME: 9/28/2022 6:03 PM INDICATION: ams COMPARISON: 8/24/2022     IMPRESSION: Left subclavian approach pacing device. Cardiac silhouette within normal limits. Tortuous atherosclerotic aorta. No pneumothorax or pleural effusion. No focal consolidation.    Head CT w/o contrast    Result Date: 9/28/2022  EXAM: CT HEAD W/O CONTRAST LOCATION: [facility] DATE/TIME: 9/28/2022 4:59 PM INDICATION: Encephalopathy COMPARISON:  CT head 05/22/2022. TECHNIQUE: Routine CT Head without IV contrast. Multiplanar reformats. Dose reduction techniques were used. FINDINGS: INTRACRANIAL CONTENTS: No intracranial hemorrhage, extraaxial collection, or mass effect.  No CT evidence of acute infarct. Mild presumed chronic small vessel ischemic changes. Moderate generalized volume loss. No hydrocephalus. VISUALIZED ORBITS/SINUSES/MASTOIDS: Prior bilateral cataract surgery. Visualized portions of the orbits are otherwise unremarkable. No paranasal sinus mucosal disease. No middle ear or mastoid effusion. BONES/SOFT TISSUES: No acute abnormality.     IMPRESSION: 1.  No acute intracranial process or significant change since 05/22/2022.        Physical Exam:  Temp:  [98  F (36.7  C)-98.6  F (37  C)] 98.5  F (36.9  C)  Pulse:  [69-98] 80  Resp:  [10-54] 11  BP:  ()/() 93/52  SpO2:  [92 %-100 %] 97 %  Wt Readings from Last 3 Encounters:   09/28/22 77.1 kg (170 lb)   08/24/22 79.4 kg (175 lb)   07/13/22 74.8 kg (164 lb 14.4 oz)      Chronically ill-appearing, NAD  Head NC, AT  Eyes anicteric without injection  Face symmetric, eyes conjugate  Mouth grossly normal, no drainage, speech hypophonic  Neck grossly normal, no significant limits in ROM  Lungs unlabored, no respiratory distress  CV RR, normal rate, no edema  Abd soft, ntnd, benign  Extrems no deformities, no edema  Skin warm, dry  MSK generalized muscle wasting  Neuro face symmetric, AO, tremor in both hands, right greater than left  Neuropsych cannot state year correctly, falls asleep often      TTS: Total unit/floor 82 minutes, time consisted of the following, examination of patient, reviewing the record and lab results, and completing documentation. Coordination of care time with nurse and other healthcare providers 5 minutes, time consisted of the following, discussion of patient's status.  Family meeting time 55 discussion consisted of the following, discussion of prognosis, risk for reduced quality of life, extensive discussion about hospice versus home health care versus palliative care          TOVA Degroot, [facility]  Palliative Care  [phone]

## 2022-09-29 NOTE — PLAN OF CARE
PRIMARY DIAGNOSIS: generalized  weakness, end stage Parkinson dz  OUTPATIENT/OBSERVATION GOALS TO BE MET BEFORE DISCHARGE:  1. Stable vital signs Yes  2. Tolerating diet:Yes  3. Pain controlled with oral pain medications:  Yes  4. Positive bowel sounds:  Yes  5. Voiding without difficulty:  Yes  6. Able to ambulate:  No  7. Provider specific discharge goals met:  No    Discharge Planner Nurse   Safe discharge environment identified: No  Barriers to discharge: Yes       Entered by: Yi Prieto RN 09/29/2022 1:26 PM     Please review provider order for any additional goals.   Nurse to notify provider when observation goals have been met and patient is ready for discharge.Goal Outcome Evaluation:  Sleeping most of the shift, more awake than at home.  Palliative consult ordered.

## 2022-09-29 NOTE — PLAN OF CARE
PRIMARY DIAGNOSIS: GENERALIZED WEAKNESS    OUTPATIENT/OBSERVATION GOALS TO BE MET BEFORE DISCHARGE  1. Orthostatic performed: N/A    2. Tolerating PO medications: Yes    3. Return to near baseline physical activity: No    4. Cleared for discharge by consultants (if involved): No    Discharge Planner Nurse   Safe discharge environment identified: No  Barriers to discharge: Yes       Entered by: Yi Prieto RN 09/29/2022 5:31 PM     Please review provider order for any additional goals.   Nurse to notify provider when observation goals have been met and patient is ready for discharge.Goal Outcome Evaluation:  Mild improvement in wakefulness per wife, dependent for all cares.  Needs to attempt to get out of bed to chair tomorrow if more awake.

## 2022-09-29 NOTE — PROGRESS NOTES
Bedside EEG was performed. Wake, drowsy, and sleep were obtained.   Activations completed were: eyes open/eyes closed, mental activation and photic.  Activations omitted & reasoning: hyperventilation not done due to confusion.  Patient's mental status was: confused and cooperative.  Was the neurologist consulted regarding significant waveforms or interventions needed? No  Skin intact : Yes     EEG #   EEG system used: [id number]    Neurologist dictation to follow.

## 2022-09-29 NOTE — CONSULTS
Care Management Initial Consult    General Information  Assessment completed with: Patient, Spouse or significant other, spouse Susanne  Type of CM/SW Visit: Initial Assessment    Primary Care Provider verified and updated as needed: Yes   Readmission within the last 30 days: no previous admission in last 30 days   Return Category: Progression of disease  Reason for Consult: discharge planning  Advance Care Planning: Advance Care Planning Reviewed: no concerns identified, present on chart          Communication Assessment  Patient's communication style: spoken language (English or Bilingual)             Cognitive  Cognitive/Neuro/Behavioral: orientation, level of consciousness  Level of Consciousness: lethargic  Arousal Level: arouses to voice  Orientation: disoriented to, situation, time  Mood/Behavior: flat affect     Speech: slow    Living Environment:   People in home: spouse     Current living Arrangements: con[facility]ium ([facility])      Able to return to prior arrangements: yes       Family/Social Support:  Care provided by: spouse/significant other  Provides care for: no one, no one, unable/limited ability to care for self  Marital Status:   Wife          Description of Support System: Supportive, Involved    Support Assessment: Adequate family and caregiver support, Adequate social supports, Lacks adequate physical care    Current Resources:   Patient receiving home care services: No     Community Resources: DME  Equipment currently used at home: wheelchair, manual  Supplies currently used at home: None    Employment/Financial:  Employment Status:          Financial Concerns: No concerns identified   Referral to Financial Worker: No       Lifestyle & Psychosocial Needs:  Social Determinants of Health     Tobacco Use: Medium Risk     Smoking Tobacco Use: Former Smoker     Smokeless Tobacco Use: Never Used   Alcohol Use: Not on file   Financial Resource Strain: Not on file   Food Insecurity: Not  on file   Transportation Needs: Not on file   Physical Activity: Not on file   Stress: Not on file   Social Connections: Not on file   Intimate Partner Violence: Not on file   Depression: Not on file   Housing Stability: Not on file       Functional Status:  Prior to admission patient needed assistance:   Dependent ADLs:: Wheelchair-with assist, Transfers, Bathing, Dressing  Dependent IADLs:: Cleaning, Cooking, Laundry, Shopping, Meal Preparation, Medication Management, Money Management, Transportation  Assesssment of Functional Status: Not at baseline with ADL Functioning, Not at  functional baseline, Not at baseline with mobility    Mental Health Status:  Mental Health Status: No Current Concerns       Chemical Dependency Status:                Values/Beliefs:  Spiritual, Cultural Beliefs, Rastafari Practices, Values that affect care:                 Additional Information:  Assessed, lives w/wife at [facility] and has no svcs, wife is primary caregiver, discussed GARCIA w/them, pt is in  wheelchair mostly and goal is to go home. Will discuss TCU placement if recommended.       Luke Parada RN

## 2022-09-30 NOTE — PROGRESS NOTES
NEUROLOGY INPATIENT PROGRESS NOTE       [facility] NEUROLOGY [place]  [address] [place] [address]  Tel: [phone]  Fax: [phone]  [email or web address]     ASSESSMENT & PLAN   Date: 09/30/2022  Hospital Day#: 1  Visit diagnosis: Multisystem atrophy    Parkinson disease with autonomic instability (multisystem atrophy)  82-year-old male with HTN, HLD, CAD s/p pacemaker, CKD stage III, end-stage Parkinson disease with autonomic instability admitted with confusion.  He was admitted to the hospital in May with similar symptoms and had a negative work-up.    CT of the head shows age-related changes    Continue Sinemet 25/100 mg 2 tablets 4 times daily.  Take empty stomach to maximize absorption    Florinef, Primatene and Mestinon for postural hypotension    EEG shows nonspecific slowing in theta and delta range    Palliative care met with patient and family members and wife is leaning towards hospice care but wants to discuss it with family.  I feel hospice care will be most appropriate given end-stage Parkinson disease    Nothing more to add from neurology standpoint I will sign off.  Please call if any question    Neurology Discharge Planning :   Discharge when okay with hospitalist    Giovanni Phelps MD  [facility]  (Former[facility], [facility]     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     Low vitamin B12 level  E53.8     Male erectile dysfunction, unspecified N52.9     Numerous moles D22.9     Recurrent syncope R55     S/P coronary artery stent placement Z95.5     PD (perceptive deafness), 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     Sleep disorder G47.9     Vitamin D deficiency E55.9     Adult failure to thrive R62.7     CKD (chronic kidney disease) stage 3, GFR 30-59 ml/min (H) N18.30     Autonomic instability G90.9     Normocytic anemia D64.9     Generalized weakness R53.1     Acute encephalopathy G93.40       Past Medical History:   Patient  has a past medical history of Acute chest pain (11/10/2019), Acute non-Q wave non-ST elevation myocardial infarction (NSTEMI) (H) (11/11/2019), Anemia (11/15/2013), 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     SUBJECTIVE     No significant change in neuro status.  Continues to be somewhat confused.  Discussed with palliative care and family is leaning toward hospice care     OBJECTIVE     Vital signs in last 24 hours  Temp:  [97.9  F (36.6  C)-98.5  F (36.9  C)] 97.9  F (36.6  C)  Pulse:  [69-88] 76  Resp:  [11-20] 20  BP: ()/() 124/82  SpO2:  [97 %-99 %] 98 %    Weight:   Wt Readings from Last 1 Encounters:   09/28/22 77.1 kg (170 lb)         I/O last 24 hours    Intake/Output Summary (Last 24 hours) at 9/29/2022 1917  Last data filed at 9/29/2022 0100  Gross per 24 hour   Intake 120 ml   Output 400 ml   Net -280 ml       Review of Systems   Pertinent items are noted in HPI.    General Physical Exam: Patient is alert and  oriented x 3. Vital signs were reviewed and are documented in EMR. Neck was supple, no carotid bruit, thyromegaly, JVD or lymphadenopathy noted.  Neurological Exam:  Patient is alert and oriented to self and time and place.  Speech is soft comprehension is intact he can name objects and parts of objects.  Extraocular movements are intact some restriction of upgaze.  He has increased tone right worse than left.  He has resting tremor right greater than left.  Reflexes are 3+ toes are upgoing.  Sensation intact to light touch and pinprick.  He has dysmetria on finger-nose testing.  Patient is wheelchair-bound and gait testing was deferred for safety     DIAGNOSTIC STUDIES     Pertinent Radiology   Following imaging studies were reviewed:    CT  1.  No acute intracranial process or significant change since 05/22/2022.    Echocardiogram  Echo result w/o MOPS: Interpretation Summary Left ventricular size, wall motion and function are normal. The ejectionfraction is > 65%.Normal right ventricle size and systolic function.There is moderate to severe eccentric left ventricular hypertrophy.No obvious valvular disease. No previous study for comparison.     Pertinent Labs   Lab Results: Personally Reviewed  Recent Results (from the past 24 hour(s))   Basic metabolic panel    Collection Time: 09/29/22  7:01 AM   Result Value Ref Range    Sodium 143 136 - 145 mmol/L    Potassium 3.7 3.4 - 5.3 mmol/L    Chloride 109 (H) 98 - 107 mmol/L    Carbon Dioxide (CO2) 24 22 - 29 mmol/L    Anion Gap 10 7 - 15 mmol/L    Urea Nitrogen 24.5 (H) 8.0 - 23.0 mg/dL    Creatinine 1.03 0.67 - 1.17 mg/dL    Calcium 8.2 (L) 8.8 - 10.2 mg/dL    Glucose 97 70 - 99 mg/dL    GFR Estimate 73 >60 mL/min/1.73m2   CBC with platelets    Collection Time: 09/29/22  7:01 AM   Result Value Ref Range    WBC Count 6.1 4.0 - 11.0 10e3/uL    RBC Count 3.74 (L) 4.40 - 5.90 10e6/uL    Hemoglobin 11.3 (L) 13.3 - 17.7 g/dL    Hematocrit 34.4 (L) 40.0 - 53.0 %    MCV 92  78 - 100 fL    MCH 30.2 26.5 - 33.0 pg    MCHC 32.8 31.5 - 36.5 g/dL    RDW 14.2 10.0 - 15.0 %    Platelet Count 204 150 - 450 10e3/uL         HOSPITAL MEDICATIONS       aspirin  81 mg Oral Daily     atorvastatin  40 mg Oral Daily     carbidopa-levodopa  2 tablet Oral 4x Daily     cyanocobalamin  1,000 mcg Oral Daily     enoxaparin ANTICOAGULANT  40 mg Subcutaneous Q24H     fludrocortisone  0.1 mg Oral QAM     lisinopril  2.5 mg Oral Daily     magnesium oxide  400 mg Oral Daily     metoprolol succinate ER  50 mg Oral Daily     midodrine  10 mg Oral TID     mirtazapine  7.5 mg Oral At Bedtime     nystatin   Topical BID     pantoprazole  40 mg Oral Daily     polyethylene glycol  17 g Oral Daily     pyridostigmine  30 mg Oral TID     sennosides  2 tablet Oral BID     sodium chloride (PF)  3 mL Intracatheter Q8H     tamsulosin  0.4 mg Oral Daily     tolterodine ER  4 mg Oral Daily     vitamin C with B complex  1 tablet Oral Daily     vitamin D3  50 mcg Oral Daily        Total time spent for face to face visit, reviewing labs/imaging studies, counseling and coordination of care was: 30 Minutes More than 50% of this time was spent on counseling and coordination of care.        This note was dictated using voice recognition software.  Any grammatical or context distortions are unintentional and inherent to the software.

## 2022-09-30 NOTE — PROGRESS NOTES
[facility]   Palliative Care Daily Progress Note      Code status: No CPR- Do NOT Intubate     Impressions, Recommendations & Counseling   82-year-old male with end-stage Parkinson's disease (frequent altered mental status, autonomic dysfunction, bed and chair bound), HTN, HLD, CAD status post pacemaker, and CKD stage III admitted with decreased level of consciousness.  He had a recent similar admission in June.  He is cared for at home by his wife.  Palliative care evaluated him during June admission and hospice was discussed but he was discharged to a TCU for rehabilitation attempt.  Palliative care again consulted to assist with medical decision-making and care coordination.           Symptoms/recommendations/discussion:  1.  Advanced Care Planning: Present in EMR, reviewed.  His wife Susanne is his agent.  On exam the patient is not able to correctly state the year and seems to struggle a great deal with comprehending complex information.  Any decision making should involve his wife.     9/29: Family meeting completed at his bedside with his wife Suasnne and some participation by patient.  We discussed high risk for increasing frequency of hospital admissions and decreasing quality of life in the context of an incurable progressive disease.  She states that he really does not want to go to the hospital.  We discussed possibility of hospice to support patient and family at home.  We talked about hospice generally continuing most Parkinson medications for comfort sake, use of p.o. antibiotics if indicated and patient able to take, but also discussed role of hospice and providing comfort care when anticipated events resulting in significant change in health status and likely end-of-life care occurr.  She believes that hospice may be most in line with his wishes but wishes to talk more with him and possibly their son and daughter.  Palliative care will continue to follow.  Wife reports difficulty caring for him at  home because he often wakes multiple times during the night and her fatigue and stress is even higher as she is not able to get rest. Will discuss possible sleep aid with neurology.   9/30: Discussion with wife at patient's bedside.  Patient is not able to furnish information.  She is agreeable to hospice consultation.  She wants to talk with / about possible long-term care arrangement as meeting his care needs at home has become very difficult at best.  Hospice diagnosis end-stage Parkinson disease.            Thank you for the opportunity to participate in the care of this patient and family. Our team: will continue to follow.     During regular M-F work hours -- if you are not sure who specifically to contact -- please contact us by calling us directly at the Palliative Care Main Line [phone]    After regular work hours and on weekends/holidays, you can leave a message at [phone]       HPI          Today, the patient was seen for:  See above    Prognosis, Goals, or Advance Care Planning was addressed today with: Yes.  Mood, coping, and/or meaning in the context of serious illness were addressed today: Yes.  Summary/Comments:            Interval History:     Chart review/discussion with unit or clinical team members:   See above    Per patient or family/caregivers today:  See above    Key Palliative Symptoms:  We are not helping to manage these symptoms currently in this patient.    Patient is on opioids: bowels not assessed today.           Review of Systems:     Review of systems not possible due to patient condition          Medications:     I have reviewed this patient's medication profile and medications during this hospitalization.             Physical Exam:   Temp:  [97.9  F (36.6  C)] 97.9  F (36.6  C)  Pulse:  [61-88] 68  Resp:  [18-20] 18  BP: ()/(52-87) 138/68  SpO2:  [96 %-100 %] 96 %    Chronically ill-appearing, NAD  Head NC, AT  Eyes anicteric without  injection  Face symmetric, eyes conjugate  Mouth grossly normal, no drainage, speech hypophonic  Neck grossly normal, no significant limits in ROM  Lungs unlabored, no respiratory distress  CV RR, normal rate, no edema  Abd soft, ntnd, benign  Extrems no deformities, no edema  Skin warm, dry  MSK generalized muscle wasting  Neuro face symmetric, AO, tremor in both hands, right greater than left  Neuropsych  does not wake to interact during exam             Data Reviewed:     Pertinent Labs  Lab Results: personally reviewed.     Urea Nitrogen   Date Value Ref Range Status   09/29/2022 24.5 (H) 8.0 - 23.0 mg/dL Final   08/24/2022 42 (H) 8 - 28 mg/dL Final     Creatinine   Date Value Ref Range Status   09/29/2022 1.03 0.67 - 1.17 mg/dL Final     AST   Date Value Ref Range Status   09/28/2022 20 10 - 50 U/L Final     ALT   Date Value Ref Range Status   09/28/2022 7 (L) 10 - 50 U/L Final     Alkaline Phosphatase   Date Value Ref Range Status   09/28/2022 72 40 - 129 U/L Final     Albumin   Date Value Ref Range Status   09/28/2022 3.5 3.5 - 5.2 g/dL Final   05/23/2022 2.7 (L) 3.5 - 5.0 g/dL Final         Radiology Results  XR Chest Port 1 View    Result Date: 9/28/2022  EXAM: XR CHEST PORT 1 VIEW LOCATION: [facility] DATE/TIME: 9/28/2022 6:03 PM INDICATION: ams COMPARISON: 8/24/2022     IMPRESSION: Left subclavian approach pacing device. Cardiac silhouette within normal limits. Tortuous atherosclerotic aorta. No pneumothorax or pleural effusion. No focal consolidation.    Head CT w/o contrast    Result Date: 9/28/2022  EXAM: CT HEAD W/O CONTRAST LOCATION: [facility] DATE/TIME: 9/28/2022 4:59 PM INDICATION: Encephalopathy COMPARISON:  CT head 05/22/2022. TECHNIQUE: Routine CT Head without IV contrast. Multiplanar reformats. Dose reduction techniques were used. FINDINGS: INTRACRANIAL CONTENTS: No intracranial hemorrhage, extraaxial collection, or mass effect.  No CT evidence  of acute infarct. Mild presumed chronic small vessel ischemic changes. Moderate generalized volume loss. No hydrocephalus. VISUALIZED ORBITS/SINUSES/MASTOIDS: Prior bilateral cataract surgery. Visualized portions of the orbits are otherwise unremarkable. No paranasal sinus mucosal disease. No middle ear or mastoid effusion. BONES/SOFT TISSUES: No acute abnormality.     IMPRESSION: 1.  No acute intracranial process or significant change since 2022.    EEG Awake or Drowsy Routine    Result Date: 2022  ELECTROENCEPHALOGRAM (EEG) REPORT [facility] NEUROLOGY [place] [address] [place] [address] Tel: [phone]  Fax: [phone] [email or web address] Canelo Cook,  1940, MRN [id number] PCP: Hung Camacho Date: 2022 Principal Diagnosis: Altered mental status History : Patient is being evaluated for altered mental status. Medication listed includes no anticonvulsant Description of the Recording:  This is a multichannel digital EEG recording done using the international 10-20 placement system. Background of the recording consists of an irregular 5 to hz activity with an amplitude of 20-30  V.  In addition, occasionally there is  1-2 Hz activity with similar amplitude.  This diffusely slow background attenuates with alerting procedures.  Photic stimulation performed with eyes open, according to the standard protocol, minimal change.  Hyperventilation not performed.  Sleep not obtained. During this recording, no sharp discharges, spikes or electrographic seizure activity was recorded. Impression: This is an abnormal EEG due to diffusely slow background in theta and delta range that suggests a nonspecific generalized cerebral dysfunction.  Such slowing can be seen in metabolic or toxic abnormalities, clinical correlation is recommended.  No sharp discharges or spikes were recorded during this recording to suggest a seizure disorder. Classification: Dysrhythmia grade II  generalized                          Delta grade I generalized Giovanni Phelps MD [facility] This note was dictated using voice recognition software.  Any grammatical or context distortions are unintentional and inherent to the software.          TTS: Total unit/floor36 minutes, time consisted of the following, examination of patient, reviewing the record and lab results, and completing documentation. Coordination of care time with nurse and other healthcare providers 7 minutes, time consisted of the following, discussion of hospice consultation and wife concerned about being able to care for him at home.  Family meeting time 19 discussion consisted of the following, discussion of condition, hospice referral, question about possible need for long-term care      TOVA Degroot, [facility]  Palliative Care  Phone: [phone]

## 2022-09-30 NOTE — PROGRESS NOTES
[facility] Hospitalist Progress Note  [facility]  Admission date: 9/28/2022    Summary:    82M with advanced Parkinson's disease, coronary artery disease s/p stent, CKD 3, who presents with 2 days of confusion and generalized weakness.  No clear infectious or metabolic trigger.  Has had similar presentation in the past with a negative evaluation.  Possibly related to severe autonomic dysfunction, mild dehydration?.  Unfortunately disease is quite advanced and I think ongoing meetings with palliative warranted.  After meeting with palliative care wife would like to meet with hospice.    Assessment/Plan    #Acute encephalopathy - no clear infectious or metabolic cause.  Pro-calcitonin of 0.06 is negative.  EEG without seizure.    #Advanced Parkinson's disease,  multi-system atrophy with severe autonomic instability.  -PTA hydralazine and midodrine as needed for high and low blood pressure respectively  -Patient's mostly chair or bedbound.  -Palliative care consult  -Continue Sinemet, pyridostigmine, regimen for supine hypertension and orthostatic hypotension.      #Mood disorder - mirtazapine    #Minimally elevated high sensitivity Trop T - No symptoms of ACS and high sensitivity trop T minimally elevated (and stable) without signs of ACS.  Probably related to severe underlying LVH and fluctuating hemodynamics.     #Severe LVH - volume compensated     CAD s/p  stent  -PTA aspirin 81 mg oral daily  -Metoprolol succinate 50 mg oral daily    Checklist:  Code Status: No CPR- Do NOT Intubate    Diet: Regular Diet Adult    Negrete Catheter: Not present  Central Lines: None  DVT px:  Pneumatic Compression Devices        Auto-populated based on system request - if relevant they will be addressed above:  Clinically Significant Risk Factors Present on Admission                 # Hypertension: home medication list includes antihypertensive(s)          Auto-populated from discharge tab:    Expected Discharge Date:  09/30/2022                 Overnight Events/Subjective/Notable results:    No new physical complaints.  Subacute on chronic decline per pt's wife.  Planning to meet with hospice to discuss ongoing care options.       4 point ROS otherwise negative    Objective    Vital signs in last 24 hours  Temp:  [97.9  F (36.6  C)-98.5  F (36.9  C)] 97.9  F (36.6  C)  Pulse:  [61-88] 61  Resp:  [11-20] 18  BP: ()/(52-87) 122/59  SpO2:  [97 %-99 %] 97 % O2 Device: None (Room air)    Weight:   170 lbs 0 oz    Intake/Output last 3 shifts  No intake/output data recorded.  Body mass index is 22.43 kg/m .    Physical Exam  General:  cooperative, no distress.  Chronically ill appearing    Neurologic:  Severe bradykinesia, slowed speech, resting tremor  Psych: calm  HEENT:  Anicteric, MMM  Lungs:  Easyrespirations  Skin: no rashes or jaundice noted on exposed skin.    Central Lines and Tubes: None (no pena, CVC, feeding tubes)      I have reviewed all labs, medications, imaging studies in the last 24 hours.  Pertinent findings&changes discussed above.    Data     Ronny Perez MD  Internal Medicine Hospitalist

## 2022-09-30 NOTE — ED NOTES
"[facility] ED Handoff Report    ED Chief Complaint: The patient arrives from home where he lives with his wife. The patient has not been eating at home and notable increase in confusion. The patient is wheelchair bound at home. Pallitive care has been consulted and met with the  Family.    ED Diagnosis:  (G93.40) Acute encephalopathy  Comment: The patient has notable generalized weakness. The pt is alert to person and time  Plan: neuro checks, neuro consult.    (R53.1) Generalized weakness  Comment: fall risk, chair bound  Plan: pallitive consult       PMH:    Past Medical History:   Diagnosis Date     Acute chest pain 11/10/2019     Acute non-Q wave non-ST elevation myocardial infarction (NSTEMI) (H) 11/11/2019     Anemia 11/15/2013     Brainstem stroke syndrome 3/1/2022     Closed fracture of multiple ribs of left side with routine healing 2/18/2020     Coronary artery disease due to lipid rich plaque 11/11/2019     DNAR (do not attempt resuscitation) 11/11/2019     Essential hypertension      Fall at home, sequela 12/6/2019     GERD (gastroesophageal reflux disease)      HLD (hyperlipidemia)      Near syncope 10/12/2015     Parkinson's disease (H)      SA node dysfunction (H) 07/29/2015    \"chronotropic incompetence\" per [facility] EP notes     Syncope, unspecified syncope type 11/10/2019     Vitamin D deficiency         Code Status:  No CPR- Do NOT Intubate     Falls Risk: Yes Band: Applied    Current Living Situation/Residence: lives with a significant other     Elimination Status: Continent: Yes and extrenal catheter in place     Activity Level: 2 assist    Patients Preferred Language:  English     Needed: No    Vital Signs:  /68   Pulse 68   Temp 97.9  F (36.6  C) (Axillary)   Resp 18   Ht 1.854 m (6' 1\")   Wt 77.1 kg (170 lb)   SpO2 96%   BMI 22.43 kg/m       Cardiac Rhythm: NA    Pain Score: 0/10    Is the Patient Confused:  Yes    Last Food or Drink: 09/30/22 at sips " 9/30/2022    Focused Assessment: The pt took some of his am pills with encouragement. The patient has a external catheter in place, that need to be replaced as the patient had pulled off all of his clothing, and external catheter. Pt states he needed to be void.     Tests Performed: Done: Labs and Imaging    Treatments Provided:  Breakfast ordered.    Family Dynamics/Concerns: No    Family Updated On Visitor Policy: Yes, wife present at bedside at this time.    Plan of Care Communicated to Family: Yes    Who Was Updated about Plan of Care: The patients wife      Medications sent with patient: yes    Covid: asymptomatic , negative    Additional Information: None    RN: Génesis Sheikh RN 9/30/2022 9:52 AM

## 2022-09-30 NOTE — PLAN OF CARE
Problem: Plan of Care - These are the overarching goals to be used throughout the patient stay.    Goal: Absence of Hospital-Acquired Illness or Injury  Intervention: Identify and Manage Fall Risk  Recent Flowsheet Documentation  Taken 9/30/2022 1556 by Alexia Garcia RN  Safety Promotion/Fall Prevention:   bed alarm on   patient and family education  Intervention: Prevent and Manage VTE (Venous Thromboembolism) Risk  Recent Flowsheet Documentation  Taken 9/30/2022 1556 by Alexia Garcia RN  VTE Prevention/Management: compression stockings on   Goal Outcome Evaluation:  Pt wife reports pt tries to get out of bed when he has hallucinations.  No events this hospitalization.  He has home compression socks on and has order for Lovenox for VTE prevention.  He is alert and oriented at this time, with a very soft voice that his wife repeats for nurse assessment.  Denies any pain at this time, but reports he has struggled with pain in buttocks as he is bedbound.  He has dentures in, swallowed meds in applesauce with a slight cough with water.

## 2022-09-30 NOTE — UTILIZATION REVIEW
Concurrent stay review; Secondary Review Determination     Under the authority of the Utilization Management Committee, the utilization review process indicated a secondary review on Canelo Cook.  The review outcome is based on review of the medical records, discussions with staff, and applying clinical experience noted on the date of the review.        (x) Observation Status Appropriate - Concurrent stay review    RATIONALE FOR DETERMINATION   82 yr male with Parkinsons disease with autonomic dysfunction presented with weakness ad altered mentation.  End stage Parkinsons.  Palliative consultation with patient/family wishing to pursue Hospice.  Working on appropriate disposition for end of life cares.    Patient is clinically improving and there is no clear indication to change patient's status to inpatient. The severity of illness, intensity of service provided, expected LOS and risk for adverse outcome make the care appropriate for observation.    The information on this document is developed by the utilization review team in order for the business office to ensure compliance.  This only denotes the appropriateness of proper admission status and does not reflect the quality of care rendered.         The definitions of Inpatient Status and Observation Status used in making the determination above are those provided in the CMS Coverage Manual, Chapter 1 and Chapter 6, section 70.4.      Sincerely,   Joyce Perez MD  Utilization Review  Physician Advisor  [facility]

## 2022-10-01 NOTE — PLAN OF CARE
PRIMARY DIAGNOSIS: GENERALIZED WEAKNESS    OUTPATIENT/OBSERVATION GOALS TO BE MET BEFORE DISCHARGE  1. Orthostatic performed: N/A    2. Tolerating PO medications: Yes    3. Return to near baseline physical activity: Yes  Bed bound at baseline.    4. Cleared for discharge by consultants (if involved): No    Discharge Planner Nurse   Safe discharge environment identified: No  Barriers to discharge: Yes  hospice consult pending.       Entered by: Glenna Delgado RN 10/01/2022 6:02 AM  Pt had an uneventful shift.  Slept well.  Denies pain.  Turned and repositioned for comfort.   Please review provider order for any additional goals.   Nurse to notify provider when observation goals have been met and patient is ready for discharge.Goal Outcome Evaluation:

## 2022-10-01 NOTE — PROGRESS NOTES
Per JOÃO PÉREZ, an order for a hospice consult is noted. Spoke with [facility] liaison who has the order and is reviewing the chart. Once she has finished reviewing the chart, she will reach out to family to set up a time to meet and discuss options.     Kim Lynch RN

## 2022-10-01 NOTE — PROGRESS NOTES
Care Management Discharge Note    Discharge Date: 10/01/2022       Discharge Disposition:  Home with hospice  Discharge Services:  Outpatient [facility] hospice consult for 10/2/22 @ 9am    Discharge DME:      Discharge Transportation:  family    Private pay costs discussed: Not applicable    PAS Confirmation Code:    Patient/family educated on Medicare website which has current facility and service quality ratings:      Education Provided on the Discharge Plan:    Persons Notified of Discharge Plans: yes    Patient/Family in Agreement with the Plan:  yes    Handoff Referral Completed: discussed with Elissa from [facility].    Additional Information:  -JOÃO spoke with pt's spouse, Ketty to discuss discharge planning. Ketty noted that they are hoping that pt can have hospice cares at home, but is unsure what they means or would entail. JOÃO explained that hospice liaison will be able to discuss all of that with her. JOÃO explained that [facility] hospice liaison will be reaching out to her.     -JOÃO spoke with Elissa from [facility]. They are able to meet with family at home tomorrow at 9am. Elissa will let pt and family know.   KOFI Garcia, LICSW    10/01/2022   3:46 PM

## 2022-10-01 NOTE — DISCHARGE SUMMARY
[facility] MEDICINE  DISCHARGE SUMMARY     Primary Care Physician: Hung Camacho  Admission Date: 9/28/2022   Discharge Provider: Ronny Perez MD Discharge Date: 10/1/2022   Diet:   Active Diet and Nourishment Order   Procedures     Regular Diet Adult     Diet       Code Status: No CPR- Do NOT Intubate   Activity: DCACTIVITY: Activity as tolerated        Condition at Discharge: Fair     REASON FOR PRESENTATION(See Admission Note for Details)         PRINCIPAL & ACTIVE DISCHARGE DIAGNOSES     Active Problems:    Generalized weakness    Acute encephalopathy      PENDING LABS     Unresulted Labs Ordered in the Past 30 Days of this Admission     Date and Time Order Name Status Description    9/28/2022  9:04 PM Blood Culture Peripheral Blood Preliminary     9/28/2022  9:04 PM Blood Culture Peripheral Blood Preliminary             PROCEDURES ( this hospitalization only)          RECOMMENDATIONS TO OUTPATIENT PROVIDER FOR F/U VISIT           DISPOSITION     Home with hospice    SUMMARY OF HOSPITAL COURSE:      82M with advanced Parkinson's disease, coronary artery disease s/p stent, CKD 3, who presents with 2 days of confusion and generalized weakness.  No clear infectious or metabolic trigger.  Has had similar presentation in the past with a negative evaluation.  Possibly related to severe autonomic dysfunction, mild dehydration?.  Unfortunately disease is quite after meeting with palliative care family would like to pursue hospice care.   Discharge to home and will meet with hospice for initiation of services.       Assessment/Plan     #Acute encephalopathy - no clear infectious or metabolic cause.  Pro-calcitonin of 0.06 is negative.  EEG without seizure.  Now resolved     #Advanced Parkinson's disease,  multi-system atrophy with severe autonomic instability.  -no change to prior regimen for supine hypertension and orthostatic hypotension  -already have hospital bed and lift.  Will  discharge to home with family and meet with hospice.    #Mood disorder - mirtazapine     #Minimally elevated high sensitivity Trop T - No symptoms of ACS and high sensitivity trop T minimally elevated (and stable) without signs of ACS.  Probably related to severe underlying LVH and fluctuating hemodynamics.     #Severe LVH - volume compensated     CAD s/p  stent  -PTA aspirin 81 mg oral daily  -Metoprolol succinate 50 mg oral daily    Discharge Medications with Med changes:     Current Discharge Medication List      CONTINUE these medications which have NOT CHANGED    Details   acetaminophen (TYLENOL) 500 MG tablet Take 500-1,000 mg by mouth daily as needed for other (headache)      aspirin 81 MG EC tablet Take 1 tablet (81 mg) by mouth daily  Qty: 30 tablet, Refills: 0    Associated Diagnoses: Coronary artery disease involving native coronary artery of native heart without angina pectoris      atorvastatin (LIPITOR) 40 MG tablet Take 1 tablet (40 mg) by mouth daily  Qty: 30 tablet, Refills: 0    Associated Diagnoses: Coronary artery disease involving native coronary artery of native heart without angina pectoris      azelastine (ASTELIN) 0.1 % nasal spray Spray 1 spray into both nostrils daily as needed for rhinitis  Qty: 30 mL, Refills: 0    Associated Diagnoses: Acute metabolic encephalopathy      B Complex-C (VITAMIN B COMPLEX W/VITAMIN C) TABS tablet Take 1 tablet by mouth daily  Qty: 30 tablet, Refills: 0    Associated Diagnoses: Acute metabolic encephalopathy; Adult failure to thrive      bisacodyl (DULCOLAX) 10 MG suppository Place 1 suppository (10 mg) rectally daily as needed for constipation  Qty: 6 suppository, Refills: 0    Associated Diagnoses: Slow transit constipation      carbidopa-levodopa (SINEMET)  MG tablet Take 2 tablets by mouth 4 times daily  Qty: 720 tablet, Refills: 3    Associated Diagnoses: Parkinson disease (H)      cyanocobalamin (VITAMIN B-12) 1000 MCG tablet Take 1 tablet (1,000  mcg) by mouth daily  Qty: 30 tablet, Refills: 0    Associated Diagnoses: Parkinson disease (H)      fludrocortisone (FLORINEF) 0.1 MG tablet Take 1 tablet (0.1 mg) by mouth every morning  Qty: 90 tablet, Refills: 3    Associated Diagnoses: Parkinson disease (H)      hydrALAZINE (APRESOLINE) 10 MG tablet Take 1 tablet (10 mg) by mouth 3 times daily as needed (for SBP >180)  Qty: 30 tablet, Refills: 0    Associated Diagnoses: Benign essential hypertension      lisinopril (ZESTRIL) 2.5 MG tablet Take 1 tablet (2.5 mg) by mouth daily  Qty: 30 tablet, Refills: 0    Associated Diagnoses: Hypertension, unspecified type      magnesium oxide (MAG-OX) 400 MG tablet Take 1 tablet (400 mg) by mouth daily  Qty: 30 tablet, Refills: 0    Associated Diagnoses: Coronary artery disease involving native coronary artery of native heart without angina pectoris      metoprolol succinate ER (TOPROL XL) 25 MG 24 hr tablet Take 2 tablets (50 mg) by mouth daily HOLD if SBP<100 and.or HR<55  Qty: 60 tablet, Refills: 0    Associated Diagnoses: Benign essential hypertension      midodrine (PROAMATINE) 10 MG tablet Take 1 tablet (10 mg) by mouth 3 times daily  Qty: 270 tablet, Refills: 3    Associated Diagnoses: Autonomic instability      mirtazapine (REMERON) 7.5 MG tablet Take 1 tablet (7.5 mg) by mouth At Bedtime  Qty: 30 tablet, Refills: 0    Associated Diagnoses: Adult failure to thrive      nitroGLYcerin (NITROSTAT) 0.4 MG sublingual tablet Place 1 tablet (0.4 mg) under the tongue every 5 minutes as needed for chest pain  Qty: 30 tablet, Refills: 0    Associated Diagnoses: Coronary artery disease involving native coronary artery of native heart without angina pectoris      nystatin (MYCOSTATIN) 527375 UNIT/GM external ointment Apply topically 2 times daily  Qty: 30 g, Refills: 0    Associated Diagnoses: Bilateral leg edema      omeprazole (PRILOSEC) 20 MG DR capsule Take 1 capsule (20 mg) by mouth daily  Qty: 30 capsule, Refills: 0     Associated Diagnoses: Gastroesophageal reflux disease with esophagitis, unspecified whether hemorrhage      polyethylene glycol (MIRALAX) 17 GM/Dose powder Take 17 g by mouth daily  Qty: 510 g, Refills: 0    Associated Diagnoses: Slow transit constipation      pyridostigmine (MESTINON) 60 MG tablet Take 0.5 tablets (30 mg) by mouth 3 times daily  Qty: 90 tablet, Refills: 0    Associated Diagnoses: Parkinson disease (H)      sennosides (SENOKOT) 8.6 MG tablet Take 2 tablets by mouth 2 times daily HOLD for loose stools  Qty: 240 tablet, Refills: 0    Associated Diagnoses: Constipation, unspecified constipation type      tamsulosin (FLOMAX) 0.4 MG capsule Take 1 capsule (0.4 mg) by mouth daily  Qty: 30 capsule, Refills: 0    Associated Diagnoses: Stage 3 chronic kidney disease, unspecified whether stage 3a or 3b CKD (H)      tolterodine ER (DETROL LA) 4 MG 24 hr capsule Take 1 capsule (4 mg) by mouth daily  Qty: 30 capsule, Refills: 0    Associated Diagnoses: Stage 3 chronic kidney disease, unspecified whether stage 3a or 3b CKD (H)      vitamin D3 (CHOLECALCIFEROL) 50 mcg (2000 units) tablet Take 1 tablet (50 mcg) by mouth daily  Qty: 30 tablet, Refills: 0    Associated Diagnoses: Acute metabolic encephalopathy; Adult failure to thrive      Blood Pressure Monitoring (BLOOD PRESSURE KIT) ROBER 1 Device 4 times daily  Qty: 1 each, Refills: 0    Associated Diagnoses: Autonomic instability; Benign essential hypertension         STOP taking these medications       potassium chloride ER (K-TAB) 20 MEQ CR tablet Comments:   Reason for Stopping:                     Rationale for medication changes:              Consults     NEUROLOGY IP CONSULT  NEUROLOGY IP CONSULT  PALLIATIVE CARE ADULT IP CONSULT  CARE MANAGEMENT / SOCIAL WORK IP CONSULT  INPATIENT HOSPICE ADULT CONSULT  INPATIENT HOSPICE ADULT CONSULT    Immunizations given this encounter     Most Recent Immunizations   Administered Date(s) Administered      COVID-19,PF,Pfizer (12+ Yrs) 11/04/2021     FLU 6-35 months 10/05/2011     Flu 65+ Years 09/28/2018     Flu, Unspecified 09/01/2020     Influenza (High Dose) 3 valent vaccine 09/21/2020     Influenza (IIV3) PF 10/21/2013     Influenza, Quad, High Dose, Pf, 65yr+ (Fluzone HD) 10/25/2021     Pneumo Conj 13-V (2010&after) 07/29/2015     Pneumococcal 23 valent 10/25/2005     Pneumococcal, Unspecified 07/29/2015     Td (Adult), Adsorbed 04/24/2006     Td,adult,historic,unspecified 05/24/2007     Tdap (Adacel,Boostrix) 03/22/2017     Zoster Vaccine, Unspecified (historical) 07/03/2019     Zoster vaccine recombinant adjuvanted (SHINGRIX) 07/03/2019     Zoster vaccine, live 04/18/2019           Anticoagulation Information            SIGNIFICANT IMAGING FINDINGS     Results for orders placed or performed during the hospital encounter of 09/28/22   Head CT w/o contrast    Impression    IMPRESSION:  1.  No acute intracranial process or significant change since 05/22/2022.   XR Chest Port 1 View    Impression    IMPRESSION: Left subclavian approach pacing device. Cardiac silhouette within normal limits. Tortuous atherosclerotic aorta. No pneumothorax or pleural effusion. No focal consolidation.         Discharge Orders        Hospice Referral      Reason for your hospital stay    You were admitted with confusion and weakness in the setting of advanced Parkinson's disease.     Activity    Your activity upon discharge: activity as tolerated     Diet    Follow this diet upon discharge: Orders Placed This Encounter      Regular Diet Adult       Examination   Physical Exam   Temp:  [97.7  F (36.5  C)-98.6  F (37  C)] 97.7  F (36.5  C)  Pulse:  [65-73] 73  Resp:  [16] 16  BP: (128-181)/(62-89) 181/89  SpO2:  [96 %-97 %] 97 %  Wt Readings from Last 1 Encounters:   09/28/22 77.1 kg (170 lb)       Essentially back at baseline.  Would like to go home with family.  Family prepared to manage at home.    Please see EMR for more detailed  significant labs, imaging, consultant notes etc.    I, Ronny Perez MD, personally saw the patient today and spent greater than 30 minutes discharging this patient.    Ronny Perez MD  [facility]    CC:Hung Camacho

## 2022-10-01 NOTE — PLAN OF CARE
PRIMARY DIAGNOSIS: GENERALIZED WEAKNESS    OUTPATIENT/OBSERVATION GOALS TO BE MET BEFORE DISCHARGE  1. Orthostatic performed: N/A    2. Tolerating PO medications: Yes    3. Return to near baseline physical activity: Yes    4. Cleared for discharge by consultants (if involved): No    Discharge Planner Nurse   Safe discharge environment identified: No  Barriers to discharge: Yes       Entered by: Glenna Delgado RN 10/01/2022 5:45 AM   Pt quiet this shift.  Turned and repositioned for comfort.  Pt alert and oriented though speech is slow.  Denies pain.  Please review provider order for any additional goals.   Nurse to notify provider when observation goals have been met and patient is ready for discharge.Goal Outcome Evaluation:

## 2022-10-01 NOTE — PLAN OF CARE
PRIMARY DIAGNOSIS: GENERALIZED WEAKNESS    OUTPATIENT/OBSERVATION GOALS TO BE MET BEFORE DISCHARGE  1. Orthostatic performed: Yes:                                    2. Tolerating PO medications: Yes    3. Return to near baseline physical activity: Yes        Discharge Planner Nurse   Safe discharge environment identified: No  Barriers to discharge: Yes       Entered by: Alexia Garcia RN 09/30/2022 9:23 PM     Please review provider order for any additional goals.   Nurse to notify provider when observation goals have been met and patient is ready for discharge.Goal Outcome Evaluation:  Family reports pt is at baseline mobility.

## 2022-10-01 NOTE — CARE PLAN
Pt discharged home with wife and son Mark as caregivers.  Son transferred pt from facility w/c to family car and has plans to transfer to home w/c.  Hospice will make intake visit tomorrow at 0900 at pt home.  He has a hospital bed in the home.  Reviewed Discharge instructions with wife Susanne and pt.

## 2022-10-01 NOTE — PLAN OF CARE
Problem: Plan of Care - These are the overarching goals to be used throughout the patient stay.    Goal: Optimal Comfort and Wellbeing  Outcome: Ongoing, Progressing     Problem: Risk for Delirium  Goal: Improved Behavioral Control  Outcome: Ongoing, Progressing     Problem: Plan of Care - These are the overarching goals to be used throughout the patient stay.    Goal: Optimal Comfort and Wellbeing  Outcome: Ongoing, Progressing   Goal Outcome Evaluation:  Patient alert and oriented x 2, denies any pain or discomfort. Able to speak but he is slow to respond, this is not new per wife. Patient wife stated they received the flu vaccine at the place of residence more than 1 week ago.

## 2022-10-01 NOTE — PLAN OF CARE
PRIMARY DIAGNOSIS: GENERALIZED WEAKNESS    OUTPATIENT/OBSERVATION GOALS TO BE MET BEFORE DISCHARGE  1. Orthostatic performed: No    2. Tolerating PO medications: Yes    3. Return to near baseline physical activity: Yes    4. Cleared for discharge by consultants (if involved): No    Discharge Planner Nurse   Safe discharge environment identified: No  Barriers to discharge: Yes         Entered by: Rosa Banda RN 10/01/2022 10:28 AM     Please review provider order for any additional goals.   Nurse to notify provider when observation goals have been met and patient is ready for discharge.Goal Outcome Evaluation:

## 2022-10-03 NOTE — PROGRESS NOTES
[facility]    Background: Transitional Care Management program auto-identified and prompting a chart review by [facility] team.    Assessment: Upon chart review, [facility] Team member will cancel/close this episode of Transitional Care Management program due to reason below:    Patient has been discharged with Hospice Care    Plan: Transitional Care Management episode closed per reason above.      [facility] [place]  [phone]  [facility]    *Connected Care Resource Team does NOT follow patient ongoing. Referrals are identified based on internal discharge reports and the outreach is to ensure patient has an understanding of their discharge instructions.

## 2023-01-01 ENCOUNTER — ANCILLARY PROCEDURE (OUTPATIENT)
Dept: CARDIOLOGY | Facility: CLINIC | Age: 83
End: 2023-01-01
Attending: INTERNAL MEDICINE
Payer: OTHER MISCELLANEOUS

## 2023-01-01 ENCOUNTER — ANCILLARY PROCEDURE (OUTPATIENT)
Dept: CARDIOLOGY | Facility: CLINIC | Age: 83
End: 2023-01-01
Attending: INTERNAL MEDICINE
Payer: MEDICARE

## 2023-01-01 DIAGNOSIS — I49.5 SICK SINUS SYNDROME (H): ICD-10-CM

## 2023-01-01 DIAGNOSIS — Z95.0 CARDIAC PACEMAKER IN SITU: ICD-10-CM

## 2023-01-01 DIAGNOSIS — Z95.0 CARDIAC PACEMAKER IN SITU: Primary | ICD-10-CM

## 2023-01-01 LAB
MDC_IDC_LEAD_IMPLANT_DT: NORMAL
MDC_IDC_LEAD_IMPLANT_DT: NORMAL
MDC_IDC_LEAD_LOCATION: NORMAL
MDC_IDC_LEAD_LOCATION: NORMAL
MDC_IDC_LEAD_LOCATION_DETAIL_1: NORMAL
MDC_IDC_LEAD_LOCATION_DETAIL_1: NORMAL
MDC_IDC_LEAD_MFG: NORMAL
MDC_IDC_LEAD_MFG: NORMAL
MDC_IDC_LEAD_MODEL: NORMAL
MDC_IDC_LEAD_MODEL: NORMAL
MDC_IDC_LEAD_POLARITY_TYPE: NORMAL
MDC_IDC_LEAD_POLARITY_TYPE: NORMAL
MDC_IDC_LEAD_SERIAL: NORMAL
MDC_IDC_LEAD_SERIAL: NORMAL
MDC_IDC_LEAD_SPECIAL_FUNCTION: NORMAL
MDC_IDC_MSMT_BATTERY_DTM: NORMAL
MDC_IDC_MSMT_BATTERY_STATUS: NORMAL
MDC_IDC_MSMT_CAP_CHARGE_TYPE: NORMAL
MDC_IDC_MSMT_LEADCHNL_RA_IMPEDANCE_VALUE: 488 OHM
MDC_IDC_MSMT_LEADCHNL_RA_SENSING_INTR_AMPL: 2.8 MV
MDC_IDC_MSMT_LEADCHNL_RV_IMPEDANCE_VALUE: 527 OHM
MDC_IDC_MSMT_LEADCHNL_RV_SENSING_INTR_AMPL: 9.9 MV
MDC_IDC_PG_IMPLANT_DTM: NORMAL
MDC_IDC_PG_MFG: NORMAL
MDC_IDC_PG_MODEL: NORMAL
MDC_IDC_PG_SERIAL: NORMAL
MDC_IDC_PG_TYPE: NORMAL
MDC_IDC_SESS_CLINIC_NAME: NORMAL
MDC_IDC_SESS_DTM: NORMAL
MDC_IDC_SESS_TYPE: NORMAL
MDC_IDC_SET_BRADY_AT_MODE_SWITCH_MODE: NORMAL
MDC_IDC_SET_BRADY_AT_MODE_SWITCH_RATE: 160 {BEATS}/MIN
MDC_IDC_SET_BRADY_LOWRATE: 60 {BEATS}/MIN
MDC_IDC_SET_BRADY_MAX_SENSOR_RATE: 115 {BEATS}/MIN
MDC_IDC_SET_BRADY_MAX_TRACKING_RATE: 120 {BEATS}/MIN
MDC_IDC_SET_BRADY_MODE: NORMAL
MDC_IDC_SET_BRADY_PAV_DELAY_HIGH: 170 MS
MDC_IDC_SET_BRADY_PAV_DELAY_LOW: 275 MS
MDC_IDC_SET_BRADY_SAV_DELAY_HIGH: 170 MS
MDC_IDC_SET_BRADY_SAV_DELAY_LOW: 275 MS
MDC_IDC_SET_LEADCHNL_RA_PACING_POLARITY: NORMAL
MDC_IDC_SET_LEADCHNL_RA_PACING_PULSEWIDTH: 0.4 MS
MDC_IDC_SET_LEADCHNL_RA_SENSING_ADAPTATION_MODE: NORMAL
MDC_IDC_SET_LEADCHNL_RA_SENSING_POLARITY: NORMAL
MDC_IDC_SET_LEADCHNL_RV_PACING_AMPLITUDE: 2.5 V
MDC_IDC_SET_LEADCHNL_RV_PACING_POLARITY: NORMAL
MDC_IDC_SET_LEADCHNL_RV_PACING_PULSEWIDTH: 0.4 MS
MDC_IDC_SET_LEADCHNL_RV_SENSING_ADAPTATION_MODE: NORMAL
MDC_IDC_SET_LEADCHNL_RV_SENSING_POLARITY: NORMAL
MDC_IDC_STAT_BRADY_RA_PERCENT_PACED: 98 %
MDC_IDC_STAT_BRADY_RV_PERCENT_PACED: 0 %

## 2023-01-01 PROCEDURE — 93294 REM INTERROG EVL PM/LDLS PM: CPT | Mod: GV | Performed by: INTERNAL MEDICINE

## 2023-01-01 PROCEDURE — 93296 REM INTERROG EVL PM/IDS: CPT | Mod: GV | Performed by: INTERNAL MEDICINE

## 2023-01-01 PROCEDURE — 99207 CARDIAC DEVICE CHECK - REMOTE: CPT | Performed by: INTERNAL MEDICINE

## 2023-04-28 ENCOUNTER — TELEPHONE (OUTPATIENT)
Dept: CARDIOLOGY | Facility: CLINIC | Age: 83
End: 2023-04-28
Payer: MEDICARE

## 2023-04-28 NOTE — TELEPHONE ENCOUNTER
[facility] Call Center    Phone Message    May a detailed message be left on voicemail: yes     Reason for Call: Other: Spouse called stating the patient has passed away. A  patient form has been filled out and sent. Spouse is requesting to speak with a member of his care team in regards to his pacemaker. Susanne is unsure what to do with the device. Please call spouse back ASAP to further discuss, thank you.    Action Taken: Message routed to:  Other: Cardiology    Travel Screening: Not Applicable     Thank you!  [facility]

## 2023-05-03 LAB
MDC_IDC_LEAD_IMPLANT_DT: NORMAL
MDC_IDC_LEAD_IMPLANT_DT: NORMAL
MDC_IDC_LEAD_LOCATION: NORMAL
MDC_IDC_LEAD_LOCATION: NORMAL
MDC_IDC_LEAD_LOCATION_DETAIL_1: NORMAL
MDC_IDC_LEAD_LOCATION_DETAIL_1: NORMAL
MDC_IDC_LEAD_MFG: NORMAL
MDC_IDC_LEAD_MFG: NORMAL
MDC_IDC_LEAD_MODEL: NORMAL
MDC_IDC_LEAD_MODEL: NORMAL
MDC_IDC_LEAD_POLARITY_TYPE: NORMAL
MDC_IDC_LEAD_POLARITY_TYPE: NORMAL
MDC_IDC_LEAD_SERIAL: NORMAL
MDC_IDC_LEAD_SERIAL: NORMAL
MDC_IDC_LEAD_SPECIAL_FUNCTION: NORMAL
MDC_IDC_MSMT_BATTERY_DTM: NORMAL
MDC_IDC_MSMT_BATTERY_STATUS: NORMAL
MDC_IDC_MSMT_CAP_CHARGE_TYPE: NORMAL
MDC_IDC_MSMT_LEADCHNL_RA_IMPEDANCE_VALUE: 507 OHM
MDC_IDC_MSMT_LEADCHNL_RA_SENSING_INTR_AMPL: 3.2 MV
MDC_IDC_MSMT_LEADCHNL_RV_IMPEDANCE_VALUE: 566 OHM
MDC_IDC_MSMT_LEADCHNL_RV_SENSING_INTR_AMPL: 11.7 MV
MDC_IDC_PG_IMPLANT_DTM: NORMAL
MDC_IDC_PG_MFG: NORMAL
MDC_IDC_PG_MODEL: NORMAL
MDC_IDC_PG_SERIAL: NORMAL
MDC_IDC_PG_TYPE: NORMAL
MDC_IDC_SESS_CLINIC_NAME: NORMAL
MDC_IDC_SESS_DTM: NORMAL
MDC_IDC_SESS_TYPE: NORMAL
MDC_IDC_SET_BRADY_AT_MODE_SWITCH_MODE: NORMAL
MDC_IDC_SET_BRADY_AT_MODE_SWITCH_RATE: 160 {BEATS}/MIN
MDC_IDC_SET_BRADY_LOWRATE: 60 {BEATS}/MIN
MDC_IDC_SET_BRADY_MAX_SENSOR_RATE: 115 {BEATS}/MIN
MDC_IDC_SET_BRADY_MAX_TRACKING_RATE: 120 {BEATS}/MIN
MDC_IDC_SET_BRADY_MODE: NORMAL
MDC_IDC_SET_BRADY_PAV_DELAY_HIGH: 170 MS
MDC_IDC_SET_BRADY_PAV_DELAY_LOW: 275 MS
MDC_IDC_SET_BRADY_SAV_DELAY_HIGH: 170 MS
MDC_IDC_SET_BRADY_SAV_DELAY_LOW: 275 MS
MDC_IDC_SET_LEADCHNL_RA_PACING_POLARITY: NORMAL
MDC_IDC_SET_LEADCHNL_RA_PACING_PULSEWIDTH: 0.4 MS
MDC_IDC_SET_LEADCHNL_RA_SENSING_ADAPTATION_MODE: NORMAL
MDC_IDC_SET_LEADCHNL_RA_SENSING_POLARITY: NORMAL
MDC_IDC_SET_LEADCHNL_RV_PACING_AMPLITUDE: 2.5 V
MDC_IDC_SET_LEADCHNL_RV_PACING_POLARITY: NORMAL
MDC_IDC_SET_LEADCHNL_RV_PACING_PULSEWIDTH: 0.4 MS
MDC_IDC_SET_LEADCHNL_RV_SENSING_ADAPTATION_MODE: NORMAL
MDC_IDC_SET_LEADCHNL_RV_SENSING_POLARITY: NORMAL
MDC_IDC_STAT_BRADY_RA_PERCENT_PACED: 98 %
MDC_IDC_STAT_BRADY_RV_PERCENT_PACED: 0 %